# Patient Record
Sex: MALE | Race: ASIAN | NOT HISPANIC OR LATINO | Employment: OTHER | URBAN - METROPOLITAN AREA
[De-identification: names, ages, dates, MRNs, and addresses within clinical notes are randomized per-mention and may not be internally consistent; named-entity substitution may affect disease eponyms.]

---

## 2017-01-03 PROCEDURE — 80197 ASSAY OF TACROLIMUS: CPT | Performed by: TRANSPLANT SURGERY

## 2017-01-05 DIAGNOSIS — Z48.298 AFTERCARE FOLLOWING ORGAN TRANSPLANT: ICD-10-CM

## 2017-01-05 DIAGNOSIS — Z94.0 KIDNEY REPLACED BY TRANSPLANT: Primary | ICD-10-CM

## 2017-01-05 DIAGNOSIS — Z79.899 ENCOUNTER FOR LONG-TERM CURRENT USE OF MEDICATION: ICD-10-CM

## 2017-01-05 LAB
TACROLIMUS BLD-MCNC: 8.3 UG/L (ref 5–15)
TME LAST DOSE: NORMAL H

## 2017-01-09 ENCOUNTER — RESULTS ONLY (OUTPATIENT)
Dept: OTHER | Facility: CLINIC | Age: 52
End: 2017-01-09

## 2017-01-09 PROCEDURE — 86832 HLA CLASS I HIGH DEFIN QUAL: CPT | Performed by: INTERNAL MEDICINE

## 2017-01-09 PROCEDURE — 86833 HLA CLASS II HIGH DEFIN QUAL: CPT | Performed by: INTERNAL MEDICINE

## 2017-01-10 DIAGNOSIS — Z94.0 KIDNEY TRANSPLANTED: Primary | ICD-10-CM

## 2017-01-11 LAB
Lab: NORMAL
Lab: NORMAL

## 2017-01-13 LAB
DONOR IDENTIFICATION: NORMAL
DSA COMMENTS: NORMAL
DSA PRESENT: NO
DSA TEST METHOD: NORMAL
ORGAN: NORMAL
SA1 CELL: NORMAL
SA1 COMMENTS: NORMAL
SA1 HI RISK ABY: NORMAL
SA1 MOD RISK ABY: NORMAL
SA1 TEST METHOD: NORMAL
SA2 CELL: NORMAL
SA2 COMMENTS: NORMAL
SA2 HI RISK ABY UA: NORMAL
SA2 MOD RISK ABY: NORMAL
SA2 TEST METHOD: NORMAL

## 2017-02-06 ENCOUNTER — TRANSFERRED RECORDS (OUTPATIENT)
Dept: HEALTH INFORMATION MANAGEMENT | Facility: CLINIC | Age: 52
End: 2017-02-06

## 2017-03-10 DIAGNOSIS — Z94.0 KIDNEY REPLACED BY TRANSPLANT: ICD-10-CM

## 2017-03-10 DIAGNOSIS — Z79.899 ENCOUNTER FOR LONG-TERM CURRENT USE OF MEDICATION: ICD-10-CM

## 2017-03-10 DIAGNOSIS — Z48.298 AFTERCARE FOLLOWING ORGAN TRANSPLANT: ICD-10-CM

## 2017-03-10 PROCEDURE — 80197 ASSAY OF TACROLIMUS: CPT | Performed by: INTERNAL MEDICINE

## 2017-03-12 LAB
TACROLIMUS BLD-MCNC: 5.9 UG/L (ref 5–15)
TME LAST DOSE: NORMAL H

## 2017-03-14 ENCOUNTER — OFFICE VISIT (OUTPATIENT)
Dept: NEPHROLOGY | Facility: CLINIC | Age: 52
End: 2017-03-14
Attending: INTERNAL MEDICINE
Payer: MEDICARE

## 2017-03-14 VITALS
WEIGHT: 148.4 LBS | DIASTOLIC BLOOD PRESSURE: 73 MMHG | TEMPERATURE: 98.3 F | SYSTOLIC BLOOD PRESSURE: 117 MMHG | HEART RATE: 76 BPM | HEIGHT: 65 IN | BODY MASS INDEX: 24.72 KG/M2

## 2017-03-14 DIAGNOSIS — E83.39 HYPOPHOSPHATEMIA: ICD-10-CM

## 2017-03-14 DIAGNOSIS — Z94.0 KIDNEY REPLACED BY TRANSPLANT: Primary | ICD-10-CM

## 2017-03-14 PROCEDURE — 99212 OFFICE O/P EST SF 10 MIN: CPT | Mod: ZF

## 2017-03-14 ASSESSMENT — PAIN SCALES - GENERAL: PAINLEVEL: NO PAIN (0)

## 2017-03-14 NOTE — LETTER
3/14/2017       RE: Haider Torrez  [address] [facility] [address]     Dear Colleague,    Thank you for referring your patient, Haider Torrez, to the [facility] NEPHROLOGY at [facility]. Please see a copy of my visit note below.    REASON FOR VISIT:  Mr. Torrez is a 55-year-old gentleman here for followup after a  donor kidney transplant on 10/13/2015.       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  He had his first transplant in  (DDKT) which failed in .  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      This transplant was in 10/2015.  PRA at the time of transplant was 99% to class I and 40% to class II. DSA last checked in 2017 was negative.  His current immunosuppression is Myfortic 540 b.i.d., tacrolimus and prednisone 5 (on prednisone at the time of transplant).  MPA level in December was 3.8 and most recent tacrolimus level was 5.9 on 2017.       The patient checks his blood pressures at home with systolics in the mid 110s.  He sees a dermatologist and does not have skin cancer.  He has been treated for vitamin D deficiency.  He had his fistula ligated.       The patient has had a recurrence of depression which he has had previously.  His major symptoms are increased irritability and insomnia.  He started back on clonazepam as well as an antidepressant of unknown name.  He just began medication last month and so far has not noticed any improvement in symptoms.       SOCIAL HISTORY:  He lives with his wife.  He does not smoke or drink alcohol.  He exercises twice weekly on a treadmill for 30 minutes.  He is unemployed.  He was formerly employed at [employer] which moved to [facility].  His actual is age 4 years older than stated because his brother filled out his paperwork when he arrived here in .  He babysits for his  18-month-old grandchild regularly.       PAST MEDICAL HISTORY:   1.  End-stage renal disease secondary to IgA nephropathy.   2.  History of PRES in 2011 after thyroidectomy.   3.  Thyroid cancer (papillary type) in 2011, resection.   4.  History of seizure disorder, now off medication.   5.  Depression, recurrent, on meds.   6.  Insomnia.       CURRENT MEDICATIONS:  See below.      PHYSICAL EXAMINATION:   VITAL SIGNS:  Blood pressure 112-118/73 (3 measurements), weight 148, BMI 24.7.   HEENT:  Exam is not remarkable.   LUNGS:  Clear bilaterally.   CARDIAC:  Regular sinus rhythm.  No murmurs, rubs or gallops.  He has a scar from a ligated fistula.   ABDOMEN:  No abdominal bruits over the allograft.  Nontender.    LOWER EXTREMITIES:  No peripheral edema.   SKIN:  No concerning lesions.       LABORATORY TESTS:  Creatinine 1.09.  DSA negative in 01/2017.  Tacrolimus level 5.9 on 03/07.     Electrolytes/Renal -   Recent Labs   Lab Test  11/17/15   1139  10/30/15   0718  10/26/15   0707  10/25/15   0750   NA   --   141  139  139   POTASSIUM  4.0  5.0  4.9  4.8   CHLORIDE   --   114*  110*  111*   CO2   --   24  24  24   BUN   --   31*  28  26   CR   --   1.53*  1.35*  1.45*   GLC   --   105*  96  88   CRISTIAN   --   8.5  8.7  8.3*   MAG   --   1.7  1.6  1.3*   PHOS   --   2.7  2.2*  2.6       CBC -   Recent Labs   Lab Test  11/17/15   1139  10/30/15   0718  10/26/15   0707  10/25/15   0750   WBC   --   4.7  7.3  7.6   HGB  12.8*  11.9*  11.9*  11.8*   PLT   --   282  267  269       LFTs -   Recent Labs   Lab Test  10/12/15   1330  06/18/15   0729   ALKPHOS  81  69   BILITOTAL  0.4  0.6   ALT  16  25   AST  10  28   PROTTOTAL  8.4  7.4   ALBUMIN  4.2  3.8       Coags -   Recent Labs   Lab Test  11/17/15   1139  10/30/15   0718  10/26/15   0707   10/23/15   0652  10/21/15   0648   INR  2.13*  1.75*  1.55*   < >  1.09  1.04   PTT   --   38*   --    --   32  31    < > = values in this interval not displayed.       Iron Panel -    Recent Labs   Lab Test  10/17/15   0700   IRON  25*   IRONSAT  12*   OFE  883*       Endocrine -   Recent Labs   Lab Test  10/12/15   1330   A1C  5.1         IMPRESSION:   1.  Allograft function.  Good.    2.  Immunosuppression.  I would consider adjusting the CellCept dose downward in the future if the level remains high.  So far, he does not have any skin cancer and does have occasional diarrhea but is not really bothered by it.   3.  Hyperparathyroidism.  PTH was as high as 158.  He just had a course of vitamin D.  We will need to repeat that.   4.  Depression.  Recommend to increase exercise to help with his depressive symptoms.  PCP has added several medications but he does not think he is improving.  Not suicidal.      PLAN:   1.  No change in immunosuppression for now.   2.  Increase exercise.     3.  Return to clinic in 6-8 months.    4.  Target tacrolimus 4-6.         EDGARDO MULLER MD      Current Outpatient Prescriptions   Medication Sig Dispense Refill     FLUOXETINE HCL PO Take 10 mg by mouth daily       ROPINIRole HCl (REQUIP PO) Take 0.5 mg by mouth At Bedtime 3 tabs       CLONAZEPAM PO Take 0.5 mg by mouth At Bedtime        sulfamethoxazole-trimethoprim (BACTRIM/SEPTRA) 400-80 MG per tablet Take 1 tablet by mouth daily 30 tablet 11     MYFORTIC 180 MG PO EC TABLET Take 3 tablets (540 mg) by mouth 2 times daily 180 tablet 11     cholecalciferol (VITAMIN  -D) 1000 UNITS capsule Take 2 capsules (2,000 Units) by mouth daily 60 capsule 11     tacrolimus (PROGRAF - GENERIC EQUIVALENT) 1 MG capsule Take 3 tablets (1 mg) along with 1 tablet (0.5 mg) twice daily.  Total dose = 3.5 mg BID. 180 capsule 11     tacrolimus (PROGRAF - GENERIC EQUIVALENT) 0.5 MG capsule Take 1 tablet (0.5 mg) along with 3 tablets (1 mg) twice daily.  Total dose = 3.5 mg BID 60 capsule 11     pantoprazole (PROTONIX) 40 MG enteric coated tablet Take 1 tablet (40 mg) by mouth daily Take 30-60 minutes before a meal. 30 tablet 0      FOLIC ACID PO Take 1 mg by mouth daily       LEVOTHYROXINE SODIUM PO Take 112 mcg by mouth daily        PREDNISONE PO Take 5 mg by mouth daily                Again, thank you for allowing me to participate in the care of your patient.      Sincerely,    Sandra Randall MD       D: 2017 15:53   T: 03/15/2017 07:29   MT: DEENA      Name:     MARTELL MATTSON   MRN:      [id number]        Account:      [id number]   :      1965           Service Date: 2017      Document: [id number]

## 2017-03-14 NOTE — NURSING NOTE
"Chief Complaint   Patient presents with     RECHECK     6 mnth follow up       Initial /73  Pulse 76  Temp 98.3  F (36.8  C) (Oral)  Ht 1.651 m (5' 5\")  Wt 67.3 kg (148 lb 6.4 oz)  BMI 24.7 kg/m2 Estimated body mass index is 24.7 kg/(m^2) as calculated from the following:    Height as of this encounter: 1.651 m (5' 5\").    Weight as of this encounter: 67.3 kg (148 lb 6.4 oz).  Medication Reconciliation: complete  "

## 2017-03-14 NOTE — MR AVS SNAPSHOT
After Visit Summary   3/14/2017    Haider Torrez    MRN: [id number]           Patient Information     Date Of Birth          1965        Visit Information        Provider Department      3/14/2017 2:45 PM Sandra Randall MD [facility] Nephrology        Today's Diagnoses     Kidney replaced by transplant    -  1    Hypophosphatemia          Care Instructions    1.  No change in meds  2.  Target tac 4 - 6  3.  Exercise very day.  Treadmill for 10 - 15 minutes for now         Follow-ups after your visit        Follow-up notes from your care team     Return in about 6 months (around 9/14/2017).      Your next 10 appointments already scheduled     Sep 26, 2017  3:00 PM CDT   Lab with  LAB   [facility] ([facility])    [address] [place] [id number] [phone]            Sep 26, 2017  4:00 PM CDT   (Arrive by 3:30 PM)   Return Kidney Transplant with Sandra Randall MD   [facility] Nephrology ([facility])    [address] [place] [id number] [phone]              Who to contact     If you have questions or need follow up information about today's clinic visit or your schedule please contact [facility] NEPHROLOGY directly at [phone].  Normal or non-critical lab and imaging results will be communicated to you by MyChart, letter or phone within 4 business days after the clinic has received the results. If you do not hear from us within 7 days, please contact the clinic through MyChart or phone. If you have a critical or abnormal lab result, we will notify you by phone as soon as possible.  Submit refill requests through [employer] or call your pharmacy and they will forward the refill request to us. Please allow 3 business days for your refill to be completed.          Additional Information About Your Visit        [employer] Information     [employer] gives you secure access to your electronic  "health record. If you see a primary care provider, you can also send messages to your care team and make appointments. If you have questions, please call your primary care clinic.  If you do not have a primary care provider, please call [phone] and they will assist you.        Care EveryWhere ID     This is your Care EveryWhere ID. This could be used by other organizations to access your [facility] medical records  [id number]        Your Vitals Were     Pulse Temperature Height BMI (Body Mass Index)          76 98.3  F (36.8  C) (Oral) 1.651 m (5' 5\") 24.7 kg/m2         Blood Pressure from Last 3 Encounters:   No data found for BP    Weight from Last 3 Encounters:   No data found for Wt              Today, you had the following     No orders found for display       Primary Care Provider    None Specified       No primary provider on file.        Thank you!     Thank you for choosing [facility] NEPHROLOGY  for your care. Our goal is always to provide you with excellent care. Hearing back from our patients is one way we can continue to improve our services. Please take a few minutes to complete the written survey that you may receive in the mail after your visit with us. Thank you!             Your Updated Medication List - Protect others around you: Learn how to safely use, store and throw away your medicines at www.disposemymeds.org.          This list is accurate as of: 3/14/17 11:59 PM.  Always use your most recent med list.                   Brand Name Dispense Instructions for use    cholecalciferol 1000 UNITS capsule    vitamin  -D    60 capsule    Take 2 capsules (2,000 Units) by mouth daily       CLONAZEPAM PO      Take 0.5 mg by mouth At Bedtime       FLUOXETINE HCL PO      Take 10 mg by mouth daily       FOLIC ACID PO      Take 1 mg by mouth daily       LEVOTHYROXINE SODIUM PO      Take 112 mcg by mouth daily       mycophenolic acid EC tablet     180 tablet    Take 3 tablets (540 mg) by mouth 2 times " daily       pantoprazole 40 MG EC tablet    PROTONIX    30 tablet    Take 1 tablet (40 mg) by mouth daily Take 30-60 minutes before a meal.       PREDNISONE PO      Take 5 mg by mouth daily       REQUIP PO      Take 0.5 mg by mouth At Bedtime 3 tabs       sulfamethoxazole-trimethoprim 400-80 MG per tablet    BACTRIM/SEPTRA    30 tablet    Take 1 tablet by mouth daily       * tacrolimus 1 MG capsule    PROGRAF - GENERIC EQUIVALENT    180 capsule    Take 3 tablets (1 mg) along with 1 tablet (0.5 mg) twice daily.  Total dose = 3.5 mg BID.       * tacrolimus 0.5 MG capsule    PROGRAF - GENERIC EQUIVALENT    60 capsule    Take 1 tablet (0.5 mg) along with 3 tablets (1 mg) twice daily.  Total dose = 3.5 mg BID       * Notice:  This list has 2 medication(s) that are the same as other medications prescribed for you. Read the directions carefully, and ask your doctor or other care provider to review them with you.

## 2017-03-15 ENCOUNTER — TELEPHONE (OUTPATIENT)
Dept: NEPHROLOGY | Facility: CLINIC | Age: 52
End: 2017-03-15

## 2017-03-15 NOTE — PROGRESS NOTES
REASON FOR VISIT:  Mr. Torrez is a 55-year-old gentleman here for followup after a  donor kidney transplant on 10/13/2015.       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  He had his first transplant in  (DDKT) which failed in .  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      This transplant was in 10/2015.  PRA at the time of transplant was 99% to class I and 40% to class II. DSA last checked in 2017 was negative.  His current immunosuppression is Myfortic 540 b.i.d., tacrolimus and prednisone 5 (on prednisone at the time of transplant).  MPA level in December was 3.8 and most recent tacrolimus level was 5.9 on 2017.       The patient checks his blood pressures at home with systolics in the mid 110s.  He sees a dermatologist and does not have skin cancer.  He has been treated for vitamin D deficiency.  He had his fistula ligated.       The patient has had a recurrence of depression which he has had previously.  His major symptoms are increased irritability and insomnia.  He started back on clonazepam as well as an antidepressant of unknown name.  He just began medication last month and so far has not noticed any improvement in symptoms.       SOCIAL HISTORY:  He lives with his wife.  He does not smoke or drink alcohol.  He exercises twice weekly on a treadmill for 30 minutes.  He is unemployed.  He was formerly employed at [employer] which moved to [facility].  His actual is age 4 years older than stated because his brother filled out his paperwork when he arrived here in .  He babysits for his 18-month-old grandchild regularly.       PAST MEDICAL HISTORY:   1.  End-stage renal disease secondary to IgA nephropathy.   2.  History of PRES in  after thyroidectomy.   3.  Thyroid cancer (papillary type) in , resection.   4.  History of seizure disorder, now off medication.    5.  Depression, recurrent, on meds.   6.  Insomnia.       CURRENT MEDICATIONS:  See below.      PHYSICAL EXAMINATION:   VITAL SIGNS:  Blood pressure 112-118/73 (3 measurements), weight 148, BMI 24.7.   HEENT:  Exam is not remarkable.   LUNGS:  Clear bilaterally.   CARDIAC:  Regular sinus rhythm.  No murmurs, rubs or gallops.  He has a scar from a ligated fistula.   ABDOMEN:  No abdominal bruits over the allograft.  Nontender.    LOWER EXTREMITIES:  No peripheral edema.   SKIN:  No concerning lesions.       LABORATORY TESTS:  Creatinine 1.09.  DSA negative in 01/2017.  Tacrolimus level 5.9 on 03/07.     Electrolytes/Renal -   Recent Labs   Lab Test  11/17/15   1139  10/30/15   0718  10/26/15   0707  10/25/15   0750   NA   --   141  139  139   POTASSIUM  4.0  5.0  4.9  4.8   CHLORIDE   --   114*  110*  111*   CO2   --   24  24  24   BUN   --   31*  28  26   CR   --   1.53*  1.35*  1.45*   GLC   --   105*  96  88   CRISTIAN   --   8.5  8.7  8.3*   MAG   --   1.7  1.6  1.3*   PHOS   --   2.7  2.2*  2.6       CBC -   Recent Labs   Lab Test  11/17/15   1139  10/30/15   0718  10/26/15   0707  10/25/15   0750   WBC   --   4.7  7.3  7.6   HGB  12.8*  11.9*  11.9*  11.8*   PLT   --   282  267  269       LFTs -   Recent Labs   Lab Test  10/12/15   1330  06/18/15   0729   ALKPHOS  81  69   BILITOTAL  0.4  0.6   ALT  16  25   AST  10  28   PROTTOTAL  8.4  7.4   ALBUMIN  4.2  3.8       Coags -   Recent Labs   Lab Test  11/17/15   1139  10/30/15   0718  10/26/15   0707   10/23/15   0652  10/21/15   0648   INR  2.13*  1.75*  1.55*   < >  1.09  1.04   PTT   --   38*   --    --   32  31    < > = values in this interval not displayed.       Iron Panel -   Recent Labs   Lab Test  10/17/15   0700   IRON  25*   IRONSAT  12*   OFE  883*       Endocrine -   Recent Labs   Lab Test  10/12/15   1330   A1C  5.1         IMPRESSION:   1.  Allograft function.  Good.    2.  Immunosuppression.  I would consider adjusting the CellCept dose downward in  the future if the level remains high.  So far, he does not have any skin cancer and does have occasional diarrhea but is not really bothered by it.   3.  Hyperparathyroidism.  PTH was as high as 158.  He just had a course of vitamin D.  We will need to repeat that.   4.  Depression.  Recommend to increase exercise to help with his depressive symptoms.  PCP has added several medications but he does not think he is improving.  Not suicidal.      PLAN:   1.  No change in immunosuppression for now.   2.  Increase exercise.     3.  Return to clinic in 6-8 months.    4.  Target tacrolimus 4-6.         EDGARDO MULLER MD      Current Outpatient Prescriptions   Medication Sig Dispense Refill     FLUOXETINE HCL PO Take 10 mg by mouth daily       ROPINIRole HCl (REQUIP PO) Take 0.5 mg by mouth At Bedtime 3 tabs       CLONAZEPAM PO Take 0.5 mg by mouth At Bedtime        sulfamethoxazole-trimethoprim (BACTRIM/SEPTRA) 400-80 MG per tablet Take 1 tablet by mouth daily 30 tablet 11     MYFORTIC 180 MG PO EC TABLET Take 3 tablets (540 mg) by mouth 2 times daily 180 tablet 11     cholecalciferol (VITAMIN  -D) 1000 UNITS capsule Take 2 capsules (2,000 Units) by mouth daily 60 capsule 11     tacrolimus (PROGRAF - GENERIC EQUIVALENT) 1 MG capsule Take 3 tablets (1 mg) along with 1 tablet (0.5 mg) twice daily.  Total dose = 3.5 mg BID. 180 capsule 11     tacrolimus (PROGRAF - GENERIC EQUIVALENT) 0.5 MG capsule Take 1 tablet (0.5 mg) along with 3 tablets (1 mg) twice daily.  Total dose = 3.5 mg BID 60 capsule 11     pantoprazole (PROTONIX) 40 MG enteric coated tablet Take 1 tablet (40 mg) by mouth daily Take 30-60 minutes before a meal. 30 tablet 0     FOLIC ACID PO Take 1 mg by mouth daily       LEVOTHYROXINE SODIUM PO Take 112 mcg by mouth daily        PREDNISONE PO Take 5 mg by mouth daily                D: 03/14/2017 15:53   T: 03/15/2017 07:29   MT: DEENA      Name:     MARTELL MATTSON   MRN:      [id number]        Account:       [id number]   :      1965           Service Date: 2017      Document: [id number]

## 2017-03-15 NOTE — TELEPHONE ENCOUNTER
Call to patient and spoke to wife to go over medications. List has been updated. Will pass along to Dr. Randall as well.  Rissa Frazier LPN  Nephrology  [facility]  [phone]

## 2017-05-01 ENCOUNTER — TELEPHONE (OUTPATIENT)
Dept: TRANSPLANT | Facility: CLINIC | Age: 52
End: 2017-05-01

## 2017-05-01 ENCOUNTER — TRANSFERRED RECORDS (OUTPATIENT)
Dept: HEALTH INFORMATION MANAGEMENT | Facility: CLINIC | Age: 52
End: 2017-05-01

## 2017-06-08 ENCOUNTER — DOCUMENTATION ONLY (OUTPATIENT)
Dept: TRANSPLANT | Facility: CLINIC | Age: 52
End: 2017-06-08

## 2017-06-08 NOTE — PROGRESS NOTES
Sandra Randall MD Huepfel, Mary K, RN                   Looks fine            Previous Messages       ----- Message -----      From: Magda Jacobs RN      Sent: 5/9/2017   2:15 PM        To: Sandra Randall MD   Subject: Please look at his 24 hour urine protein - P*     Haider Torrez local nephrologist ordered a 24 hour collection of urine protein     I think it looks ok

## 2017-06-16 NOTE — TELEPHONE ENCOUNTER
----- Message from Sandra Randall MD sent at 5/15/2017  2:08 PM CDT -----  Regarding: RE: Please look at his 24 hour urine protein - Please look at image too   Looks fine  ----- Message -----     From: Magda Jacobs RN     Sent: 5/9/2017   2:15 PM       To: Sandra Randall MD  Subject: Please look at his 24 hour urine protein - P#    Haider Isaac Shan local nephrologist ordered a 24 hour collection of urine protein     I think it looks ok     Can you take an extra look at his lab images

## 2017-09-05 DIAGNOSIS — Z94.0 KIDNEY REPLACED BY TRANSPLANT: ICD-10-CM

## 2017-09-05 RX ORDER — TACROLIMUS 1 MG/1
3 CAPSULE ORAL 2 TIMES DAILY
Qty: 180 CAPSULE | Refills: 3 | Status: SHIPPED | OUTPATIENT
Start: 2017-09-05 | End: 2018-02-09

## 2017-09-05 RX ORDER — MYCOPHENOLIC ACID 180 MG/1
540 TABLET, DELAYED RELEASE ORAL 2 TIMES DAILY
Qty: 180 TABLET | Refills: 3 | Status: SHIPPED | OUTPATIENT
Start: 2017-09-05 | End: 2018-03-30

## 2017-09-05 RX ORDER — TACROLIMUS 0.5 MG/1
0.5 CAPSULE ORAL 2 TIMES DAILY
Qty: 60 CAPSULE | Refills: 3 | Status: SHIPPED | OUTPATIENT
Start: 2017-09-05 | End: 2018-02-09

## 2017-09-25 ENCOUNTER — TELEPHONE (OUTPATIENT)
Dept: TRANSPLANT | Facility: CLINIC | Age: 52
End: 2017-09-25

## 2017-09-25 NOTE — TELEPHONE ENCOUNTER
Issue no labs in EPIC system =   Please call Haider Torrez confirm obtaining labs monthly   He may have been obtained locally with nephrologist

## 2017-09-25 NOTE — TELEPHONE ENCOUNTER
"Patient states did labs \"a few weeks ago\" at [facility], sent request to admin staff to call and get those results faxed so can be reviewed with Dr Randall at appointment tomorrow. Patient believes he only needs his transplant lab every 6 months, explained a few labs are every 6 months but his regular standing transplant labs and prograf level are monthly. Patient will discuss with Dr Randall tomorrow as well.  "

## 2017-09-25 NOTE — TELEPHONE ENCOUNTER
----- Message from Magda Jacobs RN sent at 8/27/2017  8:25 AM CDT -----  Regarding: FW: Non compliance follow up       ----- Message -----     From: Magda Jacobs RN     Sent: 8/22/2017       To: Magda Jacobs RN  Subject: FW: Non compliance follow up                         ----- Message -----     From: Magda Jacobs RN     Sent: 6/20/2017       To: Magda Jacobs RN  Subject: Non compliance follow up

## 2017-09-26 ENCOUNTER — RESULTS ONLY (OUTPATIENT)
Dept: OTHER | Facility: CLINIC | Age: 52
End: 2017-09-26

## 2017-09-26 ENCOUNTER — OFFICE VISIT (OUTPATIENT)
Dept: NEPHROLOGY | Facility: CLINIC | Age: 52
End: 2017-09-26
Attending: INTERNAL MEDICINE
Payer: MEDICARE

## 2017-09-26 VITALS
BODY MASS INDEX: 25.54 KG/M2 | DIASTOLIC BLOOD PRESSURE: 69 MMHG | HEART RATE: 91 BPM | WEIGHT: 153.3 LBS | HEIGHT: 65 IN | SYSTOLIC BLOOD PRESSURE: 111 MMHG | TEMPERATURE: 98.4 F

## 2017-09-26 DIAGNOSIS — Z79.899 ENCOUNTER FOR LONG-TERM CURRENT USE OF MEDICATION: ICD-10-CM

## 2017-09-26 DIAGNOSIS — Z94.0 KIDNEY TRANSPLANTED: ICD-10-CM

## 2017-09-26 DIAGNOSIS — Z94.0 KIDNEY REPLACED BY TRANSPLANT: ICD-10-CM

## 2017-09-26 DIAGNOSIS — Z94.0 KIDNEY REPLACED BY TRANSPLANT: Primary | ICD-10-CM

## 2017-09-26 DIAGNOSIS — Z48.298 AFTERCARE FOLLOWING ORGAN TRANSPLANT: ICD-10-CM

## 2017-09-26 LAB
ANION GAP SERPL CALCULATED.3IONS-SCNC: 8 MMOL/L (ref 3–14)
BUN SERPL-MCNC: 20 MG/DL (ref 7–30)
CALCIUM SERPL-MCNC: 8.8 MG/DL (ref 8.5–10.1)
CHLORIDE SERPL-SCNC: 109 MMOL/L (ref 94–109)
CO2 SERPL-SCNC: 24 MMOL/L (ref 20–32)
CREAT SERPL-MCNC: 1.11 MG/DL (ref 0.66–1.25)
CREAT UR-MCNC: 82 MG/DL
ERYTHROCYTE [DISTWIDTH] IN BLOOD BY AUTOMATED COUNT: 12.7 % (ref 10–15)
GFR SERPL CREATININE-BSD FRML MDRD: 70 ML/MIN/1.7M2
GLUCOSE SERPL-MCNC: 138 MG/DL (ref 70–99)
HCT VFR BLD AUTO: 43.4 % (ref 40–53)
HGB BLD-MCNC: 14.1 G/DL (ref 13.3–17.7)
MCH RBC QN AUTO: 30.1 PG (ref 26.5–33)
MCHC RBC AUTO-ENTMCNC: 32.5 G/DL (ref 31.5–36.5)
MCV RBC AUTO: 93 FL (ref 78–100)
PLATELET # BLD AUTO: 147 10E9/L (ref 150–450)
POTASSIUM SERPL-SCNC: 4.6 MMOL/L (ref 3.4–5.3)
PROT UR-MCNC: 0.08 G/L
PROT/CREAT 24H UR: 0.1 G/G CR (ref 0–0.2)
RBC # BLD AUTO: 4.69 10E12/L (ref 4.4–5.9)
SODIUM SERPL-SCNC: 141 MMOL/L (ref 133–144)
TACROLIMUS BLD-MCNC: 6.1 UG/L (ref 5–15)
TME LAST DOSE: NORMAL H
WBC # BLD AUTO: 5.7 10E9/L (ref 4–11)

## 2017-09-26 PROCEDURE — 36415 COLL VENOUS BLD VENIPUNCTURE: CPT | Performed by: INTERNAL MEDICINE

## 2017-09-26 PROCEDURE — 85027 COMPLETE CBC AUTOMATED: CPT | Performed by: INTERNAL MEDICINE

## 2017-09-26 PROCEDURE — 99212 OFFICE O/P EST SF 10 MIN: CPT | Mod: ZF

## 2017-09-26 PROCEDURE — 86833 HLA CLASS II HIGH DEFIN QUAL: CPT | Performed by: INTERNAL MEDICINE

## 2017-09-26 PROCEDURE — 86832 HLA CLASS I HIGH DEFIN QUAL: CPT | Performed by: INTERNAL MEDICINE

## 2017-09-26 PROCEDURE — 80048 BASIC METABOLIC PNL TOTAL CA: CPT | Performed by: INTERNAL MEDICINE

## 2017-09-26 PROCEDURE — 80197 ASSAY OF TACROLIMUS: CPT | Performed by: INTERNAL MEDICINE

## 2017-09-26 PROCEDURE — 84156 ASSAY OF PROTEIN URINE: CPT | Performed by: INTERNAL MEDICINE

## 2017-09-26 ASSESSMENT — PAIN SCALES - GENERAL: PAINLEVEL: NO PAIN (0)

## 2017-09-26 NOTE — PATIENT INSTRUCTIONS
1.  No change in immunosuppression  2.  Try to get exercise every day  3.  Ask surgeon if fistula might be causing numbness in hand  4.  Ask primary doc if you need to see neurologist about your hand weakness  4.  Sita will call about when to get labs

## 2017-09-26 NOTE — NURSING NOTE
"Chief Complaint   Patient presents with     RECHECK     follow up kidney transplant       Initial /69  Pulse 91  Temp 98.4  F (36.9  C) (Oral)  Ht 1.651 m (5' 5\")  Wt 69.5 kg (153 lb 4.8 oz)  BMI 25.51 kg/m2 Estimated body mass index is 25.51 kg/(m^2) as calculated from the following:    Height as of this encounter: 1.651 m (5' 5\").    Weight as of this encounter: 69.5 kg (153 lb 4.8 oz).  Medication Reconciliation: complete  "

## 2017-09-26 NOTE — LETTER
2017       RE: Haider Torrez  [address] [facility] [address]     Dear Colleague,    Thank you for referring your patient, Haider Torrez, to the [facility] NEPHROLOGY at [facility]. Please see a copy of my visit note below.    REASON FOR VISIT:  Mr. Torrez is a 56-year-old gentleman here for followup after a  donor kidney transplant in 10/2015.  Chart age is 52, but he states that his actual age is 5 years over that due to the way the paperwork was filled out when [place] this [place].       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  He received a transplant in  (DDKT) which failed in  in the setting of thyroid storm and multiorgan system failure.  He was on dialysis for 4 years and had a second transplant on 10/13/2015.  PRA at the time of transplant was 99% class I, 40% class II.  DSA when last checked in 2017 was negative.  Current immunosuppression is Myfortic 540 b.i.d., tacrolimus (target 4-6) and prednisone 5.  MPA level has been on the high side with some thought to decreasing it if he develops skin problems.  However, currently he has no diarrhea, no cytopenias, and no skin cancer.       Blood pressure at home is in the 110-120 range.      REVIEW OF SYSTEMS:  Comprehensive review of systems is completed and negative.  His depression is well managed on medications.       SOCIAL HISTORY:  He lives with his wife.  He exercises more regularly in the summer.  He rides a bike up to 5 times per week.  He uses an elliptical twice a week in the colder months.  He is a former employee of [employer] which has now moved to [facility]  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  As mentioned above, his paperwork when he arrived here shows an age 4 years younger than he actually is.      PAST MEDICAL HISTORY:   1.  End-stage renal disease secondary to IgA nephropathy.   2.  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    3.   Papillary thyroid cancer in , status post resection.   4.  Seizure disorder (also associated with thyroid storm), now off meds.   5.  History of depression, now controlled with medication.   6.  Insomnia for which he takes clonazepam.      MEDICATIONS:  See below.      PHYSICAL EXAMINATION:   VITAL SIGNS:  Blood pressure 111/69, weight 153, BMI 25.   GENERAL:  He is well-developed, well-nourished.   HEENT:  Exam is not remarkable.   LUNGS:  Clear.   CARDIAC:  Regular sinus rhythm.  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   ABDOMEN:  No bruit over the allograft.   EXTREMITIES:  No peripheral edema.      LABORATORY TESTS:  Creatinine 1.1 (baseline).  Protein/creatinine 1 gram/gram.  Glucose elevated, but recent A1c is 5.5.  HDL 33, triglycerides 364.       IMPRESSION:   1.  Allograft function.  Good.    2.  Immunosuppression.  He is on 3 drugs because of the high PRA.  We will continue to monitor DSA and consider dropping the CellCept dose since his levels have been on the high side.    3.  Depression, controlled.   4.  Insulin resistance.  He has some mild high glucoses, low HDL and high triglycerides.  I encouraged him to keep exercising.  So far, he is not diabetic based on the A1c.      PLAN:   1.  Monitor DSA.   2.  Consider decreasing CellCept dose at the next clinic visit.   3.  The patient was encouraged to exercise.         EDGARDO MULLER MD             D: 2017 12:02   T: 2017 12:32   MT: DEENA      Name:     MARTELL MATTSON   MRN:      [id number]        Account:      [id number]   :      1965           Service Date: 2017      Document: [id number]

## 2017-09-26 NOTE — MR AVS SNAPSHOT
After Visit Summary   9/26/2017    Haider Torrez    MRN: [id number]           Patient Information     Date Of Birth          1965        Visit Information        Provider Department      9/26/2017 4:00 PM Sandra Randall MD [facility]        Today's Diagnoses     Kidney replaced by transplant    -  1      Care Instructions    1.  No change in immunosuppression  2.  Try to get exercise every day  3.  Ask surgeon if fistula might be causing numbness in hand  4.  Ask primary doc if you need to see neurologist about your hand weakness  4.  Sita will call about when to get labs          Follow-ups after your visit        Follow-up notes from your care team     Return in about 6 months (around 3/26/2018).      Your next 10 appointments already scheduled     Mar 27, 2018  3:30 PM CDT   Lab with  LAB   [facility] ([facility])    [address] [place] [id number] [phone]            Mar 27, 2018  4:25 PM CDT   (Arrive by 3:55 PM)   Return Kidney Transplant with Sandra Randall MD   [facility] ([facility])    [address] [place] [id number] [phone]              Who to contact     If you have questions or need follow up information about today's clinic visit or your schedule please contact [facility] directly at [phone].  Normal or non-critical lab and imaging results will be communicated to you by MyChart, letter or phone within 4 business days after the clinic has received the results. If you do not hear from us within 7 days, please contact the clinic through MyChart or phone. If you have a critical or abnormal lab result, we will notify you by phone as soon as possible.  Submit refill requests through [employer] or call your pharmacy and they will forward the refill request to us. Please allow 3 business days for your refill to be completed.           "Additional Information About Your Visit        MyChart Information     [employer] gives you secure access to your electronic health record. If you see a primary care provider, you can also send messages to your care team and make appointments. If you have questions, please call your primary care clinic.  If you do not have a primary care provider, please call [phone] and they will assist you.        Care EveryWhere ID     This is your Care EveryWhere ID. This could be used by other organizations to access your [place] medical records  [id number]        Your Vitals Were     Pulse Temperature Height BMI (Body Mass Index)          91 98.4  F (36.9  C) (Oral) 1.651 m (5' 5\") 25.51 kg/m2         Blood Pressure from Last 3 Encounters:   09/26/17 111/69   03/14/17 117/73   09/13/16 120/71    Weight from Last 3 Encounters:   09/26/17 69.5 kg (153 lb 4.8 oz)   03/14/17 67.3 kg (148 lb 6.4 oz)   09/13/16 66.3 kg (146 lb 3.2 oz)              Today, you had the following     No orders found for display       Primary Care Provider    None Specified       No primary provider on file.        Equal Access to Services     [facility] : Hadii linda Menendez, warayrayda suzie, bobbita kaalmada thor, tatiana de leon . So [facility] [phone].    ATENCIÓN: Si habla español, tiene a santiago disposición servicios gratuitos de asistencia lingüística. Llame al [phone].    We comply with applicable federal civil rights laws and [place] laws. We do not discriminate on the basis of race, color, national origin, age, disability sex, sexual orientation or gender identity.            Thank you!     Thank you for choosing [facility] NEPHR[facility]  for your care. Our goal is always to provide you with excellent care. Hearing back from our patients is one way we can continue to improve our services. Please take a few minutes to complete the written survey that you may receive in the mail after your visit with us. " Thank you!             Your Updated Medication List - Protect others around you: Learn how to safely use, store and throw away your medicines at www.disposemymeds.org.          This list is accurate as of: 9/26/17  5:16 PM.  Always use your most recent med list.                   Brand Name Dispense Instructions for use Diagnosis    cholecalciferol 1000 UNITS capsule    vitamin  -D    60 capsule    Take 2 capsules (2,000 Units) by mouth daily    Vitamin D deficiency       CLONAZEPAM PO      Take 0.5 mg by mouth At Bedtime        FLUOXETINE HCL PO      Take 10 mg by mouth daily        FOLIC ACID PO      Take 1 mg by mouth daily        LEVOTHYROXINE SODIUM PO      Take 112 mcg by mouth daily        mycophenolic acid EC tablet     180 tablet    Take 3 tablets (540 mg) by mouth 2 times daily    Kidney replaced by transplant       pantoprazole 40 MG EC tablet    PROTONIX    30 tablet    Take 1 tablet (40 mg) by mouth daily Take 30-60 minutes before a meal.    S/P kidney transplant       PREDNISONE PO      Take 5 mg by mouth daily        REQUIP PO      Take 0.5 mg by mouth At Bedtime 3 tabs        sulfamethoxazole-trimethoprim 400-80 MG per tablet    BACTRIM/SEPTRA    30 tablet    Take 1 tablet by mouth daily    Kidney replaced by transplant       * tacrolimus 1 MG capsule    GENERIC EQUIVALENT    180 capsule    Take 3 capsules (3 mg) by mouth 2 times daily 3.5 mg twice per day.    Kidney replaced by transplant       * tacrolimus 0.5 MG capsule    GENERIC EQUIVALENT    60 capsule    Take 1 capsule (0.5 mg) by mouth 2 times daily 3.5 mg twice per day.    Kidney replaced by transplant       * Notice:  This list has 2 medication(s) that are the same as other medications prescribed for you. Read the directions carefully, and ask your doctor or other care provider to review them with you.

## 2017-09-27 LAB — PRA DONOR SPECIFIC ABY: NORMAL

## 2017-09-28 NOTE — PROGRESS NOTES
REASON FOR VISIT:  Mr. Torrez is a 56-year-old gentleman here for followup after a  donor kidney transplant in 10/2015.  Chart age is 52, but he states that his actual age is 5 years over that due to the way the paperwork was filled out when he came to this country.       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  He received a transplant in  (DDKT) which failed in  in the setting of thyroid storm and multiorgan system failure.  He was on dialysis for 4 years and had a second transplant on 10/13/2015.  PRA at the time of transplant was 99% class I, 40% class II.  DSA when last checked in 2017 was negative.  Current immunosuppression is Myfortic 540 b.i.d., tacrolimus (target 4-6) and prednisone 5.  MPA level has been on the high side with some thought to decreasing it if he develops skin problems.  However, currently he has no diarrhea, no cytopenias, and no skin cancer.       Blood pressure at home is in the 110-120 range. BL Cr 1.1.     REVIEW OF SYSTEMS:  Comprehensive review of systems is completed and negative.  His depression is well managed on medications.       SOCIAL HISTORY:  He lives with his wife.  He exercises more regularly in the summer.  He rides a bike up to 5 times per week.  He uses an elliptical twice a week in the colder months.  He is a former employee of [employer] which has now moved to [facility].  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  As mentioned above, his paperwork when he arrived here shows an age 4 years younger than he actually is.      PAST MEDICAL HISTORY:   1.  End-stage renal disease secondary to IgA nephropathy.   2.  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    3.  Papillary thyroid cancer in , status post resection.   4.  Seizure disorder (also associated with thyroid storm), now off meds.   5.  History of depression, now controlled with medication.   6.  Insomnia for which he takes clonazepam.      MEDICATIONS:  See below.       PHYSICAL EXAMINATION:   VITAL SIGNS:  Blood pressure 111/69, weight 153, BMI 25.   GENERAL:  He is well-developed, well-nourished.   HEENT:  Exam is not remarkable.   LUNGS:  Clear.   CARDIAC:  Regular sinus rhythm.  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   ABDOMEN:  No bruit over the allograft.   EXTREMITIES:  No peripheral edema.      LABORATORY TESTS:  Creatinine 1.1 (baseline).  Protein/creatinine 1 gram/gram.  Glucose elevated, but recent A1c is 5.5.  HDL 33, triglycerides 364.    Electrolytes/Renal -   Recent Labs   Lab Test  09/26/17   1507  11/17/15   1139  10/30/15   0718  10/26/15   0707  10/25/15   0750   NA  141   --   141  139  139   POTASSIUM  4.6  4.0  5.0  4.9  4.8   CHLORIDE  109   --   114*  110*  111*   CO2  24   --   24  24  24   BUN  20   --   31*  28  26   CR  1.11   --   1.53*  1.35*  1.45*   GLC  138*   --   105*  96  88   CRISTIAN  8.8   --   8.5  8.7  8.3*   MAG   --    --   1.7  1.6  1.3*   PHOS   --    --   2.7  2.2*  2.6       CBC -   Recent Labs   Lab Test  09/26/17   1507  11/17/15   1139  10/30/15   0718  10/26/15   0707   WBC  5.7   --   4.7  7.3   HGB  14.1  12.8*  11.9*  11.9*   PLT  147*   --   282  267       LFTs -   Recent Labs   Lab Test  10/12/15   1330  06/18/15   0729   ALKPHOS  81  69   BILITOTAL  0.4  0.6   ALT  16  25   AST  10  28   PROTTOTAL  8.4  7.4   ALBUMIN  4.2  3.8        IMPRESSION:   1.  Allograft function.  Good.    2.  Immunosuppression.  He is on 3 drugs because of the high PRA.  We will continue to monitor DSA and consider dropping the CellCept dose since his levels have been on the high side.    3.  Depression, controlled.   4.  Insulin resistance.  He has some mild high glucoses, low HDL and high triglycerides.  I encouraged him to keep exercising.  So far, he is not diabetic based on the A1c.      PLAN:   1.  Monitor DSA.   2.  Consider decreasing CellCept dose at the next clinic  visit.   3.  The patient was encouraged to exercise.     Current Outpatient Prescriptions   Medication Sig Dispense Refill     tacrolimus (GENERIC EQUIVALENT) 1 MG capsule Take 3 capsules (3 mg) by mouth 2 times daily 3.5 mg twice per day. 180 capsule 3     tacrolimus (GENERIC EQUIVALENT) 0.5 MG capsule Take 1 capsule (0.5 mg) by mouth 2 times daily 3.5 mg twice per day. 60 capsule 3     MYFORTIC (BRAND) 180 MG EC TABLET Take 3 tablets (540 mg) by mouth 2 times daily 180 tablet 3     FLUOXETINE HCL PO Take 10 mg by mouth daily       ROPINIRole HCl (REQUIP PO) Take 0.5 mg by mouth At Bedtime 3 tabs       CLONAZEPAM PO Take 0.5 mg by mouth At Bedtime        sulfamethoxazole-trimethoprim (BACTRIM/SEPTRA) 400-80 MG per tablet Take 1 tablet by mouth daily 30 tablet 11     cholecalciferol (VITAMIN  -D) 1000 UNITS capsule Take 2 capsules (2,000 Units) by mouth daily 60 capsule 11     pantoprazole (PROTONIX) 40 MG enteric coated tablet Take 1 tablet (40 mg) by mouth daily Take 30-60 minutes before a meal. 30 tablet 0     FOLIC ACID PO Take 1 mg by mouth daily       LEVOTHYROXINE SODIUM PO Take 112 mcg by mouth daily        PREDNISONE PO Take 5 mg by mouth daily             EDGARDO MULLER MD             D: 2017 12:02   T: 2017 12:32   MT: DEENA      Name:     MARTELL MATTSON   MRN:      [id number]        Account:      [id number]   :      1965           Service Date: 2017      Document: [id number]

## 2017-11-14 ENCOUNTER — TRANSFERRED RECORDS (OUTPATIENT)
Dept: HEALTH INFORMATION MANAGEMENT | Facility: CLINIC | Age: 52
End: 2017-11-14

## 2018-02-05 ENCOUNTER — RESULTS ONLY (OUTPATIENT)
Dept: OTHER | Facility: CLINIC | Age: 53
End: 2018-02-05

## 2018-02-05 ENCOUNTER — APPOINTMENT (OUTPATIENT)
Dept: LAB | Facility: CLINIC | Age: 53
End: 2018-02-05
Attending: PATHOLOGY
Payer: MEDICARE

## 2018-02-05 DIAGNOSIS — Z94.0 KIDNEY REPLACED BY TRANSPLANT: ICD-10-CM

## 2018-02-05 DIAGNOSIS — Z48.298 AFTERCARE FOLLOWING ORGAN TRANSPLANT: ICD-10-CM

## 2018-02-05 DIAGNOSIS — Z79.899 ENCOUNTER FOR LONG-TERM CURRENT USE OF MEDICATION: ICD-10-CM

## 2018-02-05 PROCEDURE — 86832 HLA CLASS I HIGH DEFIN QUAL: CPT | Performed by: INTERNAL MEDICINE

## 2018-02-05 PROCEDURE — 86833 HLA CLASS II HIGH DEFIN QUAL: CPT | Performed by: INTERNAL MEDICINE

## 2018-02-05 PROCEDURE — 80197 ASSAY OF TACROLIMUS: CPT | Performed by: INTERNAL MEDICINE

## 2018-02-06 LAB
TACROLIMUS BLD-MCNC: 8.1 UG/L (ref 5–15)
TME LAST DOSE: NORMAL H

## 2018-02-07 DIAGNOSIS — Z48.298 AFTERCARE FOLLOWING ORGAN TRANSPLANT: Primary | ICD-10-CM

## 2018-02-07 DIAGNOSIS — Z79.899 ENCOUNTER FOR LONG-TERM CURRENT USE OF MEDICATION: ICD-10-CM

## 2018-02-08 DIAGNOSIS — Z94.0 KIDNEY REPLACED BY TRANSPLANT: ICD-10-CM

## 2018-02-08 NOTE — TELEPHONE ENCOUNTER
Tacrolimus 8.1, goal 4-6  Please call pt to verify this was a good trough level. Confirm dose 3.5mg BID. If so, decrease tacrolimus to 3 mg BID. Repeat labs in 1-2 weeks.

## 2018-02-08 NOTE — LETTER
PHYSICIAN ORDERS      DATE & TIME ISSUED: 2018 1:58 PM  PATIENT NAME: Haider Torrez   : 1965     [facility] MR# [if applicable]: [id number]     DIAGNOSIS:  Kidney Transplant  ICD-9 CODE: Z94.0     Patient will be into lab to repeat Tacrolimus level.    Any questions please call: [phone]    Please fax these results to [phone].    .

## 2018-02-09 RX ORDER — TACROLIMUS 1 MG/1
3 CAPSULE ORAL 2 TIMES DAILY
Qty: 180 CAPSULE | Refills: 3 | Status: SHIPPED | OUTPATIENT
Start: 2018-02-09 | End: 2023-03-07

## 2018-02-09 NOTE — TELEPHONE ENCOUNTER
Spoke to patient and he confirmed accurate 12 hour tacrolimus level and did not take medicine before draw. Will decrease prograf to 3 mg twice per day and repeat level 1-2 week. Faxed order to [facility].

## 2018-02-28 ENCOUNTER — RESULTS ONLY (OUTPATIENT)
Dept: OTHER | Facility: CLINIC | Age: 53
End: 2018-02-28

## 2018-02-28 DIAGNOSIS — Z79.899 ENCOUNTER FOR LONG-TERM CURRENT USE OF MEDICATION: ICD-10-CM

## 2018-02-28 DIAGNOSIS — Z48.298 AFTERCARE FOLLOWING ORGAN TRANSPLANT: ICD-10-CM

## 2018-02-28 DIAGNOSIS — Z94.0 KIDNEY REPLACED BY TRANSPLANT: ICD-10-CM

## 2018-02-28 PROCEDURE — 86832 HLA CLASS I HIGH DEFIN QUAL: CPT | Performed by: INTERNAL MEDICINE

## 2018-02-28 PROCEDURE — 86833 HLA CLASS II HIGH DEFIN QUAL: CPT | Performed by: INTERNAL MEDICINE

## 2018-02-28 PROCEDURE — 80197 ASSAY OF TACROLIMUS: CPT | Performed by: INTERNAL MEDICINE

## 2018-03-01 LAB
TACROLIMUS BLD-MCNC: 5 UG/L (ref 5–15)
TME LAST DOSE: NORMAL H

## 2018-03-02 LAB — PRA DONOR SPECIFIC ABY: NORMAL

## 2018-03-05 LAB
DONOR IDENTIFICATION: NORMAL
DSA COMMENTS: NORMAL
DSA PRESENT: NO
DSA TEST METHOD: NORMAL
ORGAN: NORMAL
SA1 CELL: NORMAL
SA1 COMMENTS: NORMAL
SA1 HI RISK ABY: NORMAL
SA1 MOD RISK ABY: NORMAL
SA1 TEST METHOD: NORMAL
SA2 CELL: NORMAL
SA2 COMMENTS: NORMAL
SA2 HI RISK ABY UA: NORMAL
SA2 MOD RISK ABY: NORMAL
SA2 TEST METHOD: NORMAL
UNOS CPRA: 90

## 2018-03-16 DIAGNOSIS — E55.9 VITAMIN D DEFICIENCY: ICD-10-CM

## 2018-03-16 DIAGNOSIS — D63.8 ANEMIA IN OTHER CHRONIC DISEASES CLASSIFIED ELSEWHERE: ICD-10-CM

## 2018-03-16 DIAGNOSIS — Z94.0 KIDNEY REPLACED BY TRANSPLANT: Primary | ICD-10-CM

## 2018-03-27 ENCOUNTER — TELEPHONE (OUTPATIENT)
Dept: TRANSPLANT | Facility: CLINIC | Age: 53
End: 2018-03-27

## 2018-03-27 ENCOUNTER — OFFICE VISIT (OUTPATIENT)
Dept: NEPHROLOGY | Facility: CLINIC | Age: 53
End: 2018-03-27
Attending: INTERNAL MEDICINE
Payer: MEDICARE

## 2018-03-27 VITALS
HEIGHT: 65 IN | DIASTOLIC BLOOD PRESSURE: 68 MMHG | BODY MASS INDEX: 25.59 KG/M2 | WEIGHT: 153.6 LBS | SYSTOLIC BLOOD PRESSURE: 121 MMHG | HEART RATE: 76 BPM

## 2018-03-27 DIAGNOSIS — Z94.0 KIDNEY REPLACED BY TRANSPLANT: ICD-10-CM

## 2018-03-27 DIAGNOSIS — D63.8 ANEMIA IN OTHER CHRONIC DISEASES CLASSIFIED ELSEWHERE: ICD-10-CM

## 2018-03-27 DIAGNOSIS — E55.9 VITAMIN D DEFICIENCY: ICD-10-CM

## 2018-03-27 DIAGNOSIS — Z94.0 KIDNEY REPLACED BY TRANSPLANT: Primary | ICD-10-CM

## 2018-03-27 LAB
ALBUMIN SERPL-MCNC: 3.8 G/DL (ref 3.4–5)
ANION GAP SERPL CALCULATED.3IONS-SCNC: 6 MMOL/L (ref 3–14)
BUN SERPL-MCNC: 21 MG/DL (ref 7–30)
CALCIUM SERPL-MCNC: 8.4 MG/DL (ref 8.5–10.1)
CHLORIDE SERPL-SCNC: 112 MMOL/L (ref 94–109)
CO2 SERPL-SCNC: 24 MMOL/L (ref 20–32)
CREAT SERPL-MCNC: 0.94 MG/DL (ref 0.66–1.25)
CREAT UR-MCNC: 106 MG/DL
DEPRECATED CALCIDIOL+CALCIFEROL SERPL-MC: 63 UG/L (ref 20–75)
FERRITIN SERPL-MCNC: 695 NG/ML (ref 26–388)
GFR SERPL CREATININE-BSD FRML MDRD: 84 ML/MIN/1.7M2
GLUCOSE SERPL-MCNC: 152 MG/DL (ref 70–99)
IRON SATN MFR SERPL: 24 % (ref 15–46)
IRON SERPL-MCNC: 67 UG/DL (ref 35–180)
PHOSPHATE SERPL-MCNC: 2.3 MG/DL (ref 2.5–4.5)
POTASSIUM SERPL-SCNC: 4.2 MMOL/L (ref 3.4–5.3)
PROT UR-MCNC: 0.13 G/L
PROT/CREAT 24H UR: 0.12 G/G CR (ref 0–0.2)
PTH-INTACT SERPL-MCNC: 126 PG/ML (ref 18–80)
SODIUM SERPL-SCNC: 143 MMOL/L (ref 133–144)
TIBC SERPL-MCNC: 276 UG/DL (ref 240–430)

## 2018-03-27 PROCEDURE — 80069 RENAL FUNCTION PANEL: CPT | Performed by: INTERNAL MEDICINE

## 2018-03-27 PROCEDURE — 36415 COLL VENOUS BLD VENIPUNCTURE: CPT | Performed by: INTERNAL MEDICINE

## 2018-03-27 PROCEDURE — 84156 ASSAY OF PROTEIN URINE: CPT | Performed by: INTERNAL MEDICINE

## 2018-03-27 PROCEDURE — 82306 VITAMIN D 25 HYDROXY: CPT | Performed by: INTERNAL MEDICINE

## 2018-03-27 PROCEDURE — G0463 HOSPITAL OUTPT CLINIC VISIT: HCPCS | Mod: ZF

## 2018-03-27 PROCEDURE — 83970 ASSAY OF PARATHORMONE: CPT | Performed by: INTERNAL MEDICINE

## 2018-03-27 PROCEDURE — 83540 ASSAY OF IRON: CPT | Performed by: INTERNAL MEDICINE

## 2018-03-27 PROCEDURE — 83550 IRON BINDING TEST: CPT | Performed by: INTERNAL MEDICINE

## 2018-03-27 PROCEDURE — 82728 ASSAY OF FERRITIN: CPT | Performed by: INTERNAL MEDICINE

## 2018-03-27 ASSESSMENT — PAIN SCALES - GENERAL: PAINLEVEL: NO PAIN (0)

## 2018-03-27 NOTE — NURSING NOTE
"Chief Complaint   Patient presents with     RECHECK     Kidney tx follow up       Initial /68  Pulse 76  Ht 1.651 m (5' 5\")  Wt 69.7 kg (153 lb 9.6 oz)  BMI 25.56 kg/m2 Estimated body mass index is 25.56 kg/(m^2) as calculated from the following:    Height as of this encounter: 1.651 m (5' 5\").    Weight as of this encounter: 69.7 kg (153 lb 9.6 oz).  Medication Reconciliation: complete   LATANYA RAMIREZ CMA      "

## 2018-03-27 NOTE — LETTER
3/27/2018      RE: Haider Torrez  [address] [facility] [address]       Service Date: 2018      REASON FOR VISIT:  Mr. Torrez is a 52-year-old gentleman here for followup after a  donor kidney transplant in 10/2015.  Chart age is 52, but the patient is 5 years older than the stated age because of paperwork filled out when [place] the country.      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  He received a transplant in  (DDKT) which failed in  in the setting of thyroid storm and multiorgan system failure.  He was on dialysis for 4 years.  He had a second transplant in 10/2015.  PRA at the time of transplant was 99% class I, 40% class II.  His UNOS PRA is now 90.  DSA last checked in 2018 was negative.  Current immunosuppression is Myfortic 540 b.i.d., tacrolimus target 4-6, prednisone 5.  MPA level has been 3.0 x2.  He has had no episodes of skin cancer.  He has not seen a dermatologist for at least 2 years.      The patient has a fistula which has been resulting in numbness and difficulty using his right hand.  He consulted his fistula surgeon, who discussed possible takedown versus some type of vascular bypass.      Overall, he feels well.  He checks his blood pressure at home.  It is in the range of systolic 115-120.  Baseline creatinine is 1.0-1.2.      REVIEW OF SYSTEMS:  Comprehensive review of systems was completed and negative except as in the chart.       SOCIAL HISTORY:  He gets more exercise in the summer.  He is a former employee of [employer] which moved to [facility].  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       PAST MEDICAL HISTORY:   1.  IgA nephropathy.   2.  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   3.  Papillary thyroid cancer in , status post resection.   4.  Seizure disorder during thyroid storm, resolved.   5.  History of depression, on meds.   6.  Insomnia for which he takes clonazepam.       MEDICATIONS:  See below.      PHYSICAL EXAMINATION:   VITAL SIGNS:  Blood pressure 121/68, weight 153, BMI 25.   GENERAL:  He is well-developed, well-nourished.   HEENT:  Not remarkable.   SKIN:  No concerning skin lesions.   LUNGS:  Clear bilaterally.   CARDIAC:  Regular sinus rhythm.  No murmurs, rubs or gallops.   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  His right hand is redder and warmer than the left hand.  He has symmetric grasp, but he has difficulty holding a pen after 10 or 15 seconds because of numbness.       LABORATORY TESTS:  Protein 0.13 gram per gram.  Tacrolimus 5.0.     Electrolytes/Renal -   Recent Labs   Lab Test  03/27/18   1510  09/26/17   1507  11/17/15   1139  10/30/15   0718  10/26/15   0707  10/25/15   0750   NA  143  141   --   141  139  139   POTASSIUM  4.2  4.6  4.0  5.0  4.9  4.8   CHLORIDE  112*  109   --   114*  110*  111*   CO2  24  24   --   24  24  24   BUN  21  20   --   31*  28  26   CR  0.94  1.11   --   1.53*  1.35*  1.45*   GLC  152*  138*   --   105*  96  88   CRISTIAN  8.4*  8.8   --   8.5  8.7  8.3*   MAG   --    --    --   1.7  1.6  1.3*   PHOS  2.3*   --    --   2.7  2.2*  2.6       CBC -   Recent Labs   Lab Test  09/26/17   1507  11/17/15   1139  10/30/15   0718  10/26/15   0707   WBC  5.7   --   4.7  7.3   HGB  14.1  12.8*  11.9*  11.9*   PLT  147*   --   282  267       LFTs -   Recent Labs   Lab Test  03/27/18   1510  10/12/15   1330  06/18/15   0729   ALKPHOS   --   81  69   BILITOTAL   --   0.4  0.6   ALT   --   16  25   AST   --   10  28   PROTTOTAL   --   8.4  7.4   ALBUMIN  3.8  4.2  3.8     Iron Panel -   Recent Labs   Lab Test  03/27/18   1510  10/17/15   0700   IRON  67  25*   IRONSAT  24  12*   OFE  695*  883*        IMPRESSION:   1.  Allograft function.  Stable.   2.  Immunosuppression.  He is on 3-drug immunosuppression.  He has had no rejections.  He had a high PRA at the time of the transplant, but no DSA.  MPA level  has been 3.0 on two occasions.  We will decrease the Myfortic dose to 540 in the morning and 360 at night.    3.  Insurance.  The patient has Medicaid and was contacted by the hospital saying they no longer would honor this form of insurance.  I have referred him to our transplant office to determine how he can continue to come to the clinic.       PLAN:   1.  Decrease Myfortic p.m. dose to 360.   2.   consult regarding insurance issues.   3.  Patient to see dermatologist.         SANDRA MULLER MD      Current Outpatient Prescriptions   Medication Sig Dispense Refill     tacrolimus (GENERIC EQUIVALENT) 1 MG capsule Take 3 capsules (3 mg) by mouth 2 times daily 180 capsule 3     MYFORTIC (BRAND) 180 MG EC TABLET Take 3 tablets (540 mg) by mouth 2 times daily 180 tablet 3     FLUOXETINE HCL PO Take 10 mg by mouth daily       ROPINIRole HCl (REQUIP PO) Take 0.5 mg by mouth At Bedtime 3 tabs       CLONAZEPAM PO Take 0.5 mg by mouth At Bedtime        sulfamethoxazole-trimethoprim (BACTRIM/SEPTRA) 400-80 MG per tablet Take 1 tablet by mouth daily 30 tablet 11     cholecalciferol (VITAMIN  -D) 1000 UNITS capsule Take 2 capsules (2,000 Units) by mouth daily 60 capsule 11     pantoprazole (PROTONIX) 40 MG enteric coated tablet Take 1 tablet (40 mg) by mouth daily Take 30-60 minutes before a meal. 30 tablet 0     FOLIC ACID PO Take 1 mg by mouth daily       LEVOTHYROXINE SODIUM PO Take 112 mcg by mouth daily        PREDNISONE PO Take 5 mg by mouth daily          Sandra Muller MD        D: 2018   T: 2018   MT: DP      Name:     MARTELL MATTSON   MRN:      [id number]        Account:      [id number]   :      1965           Service Date: 2018      Document: [id number]

## 2018-03-27 NOTE — PATIENT INSTRUCTIONS
1.  Drop evening dose of Myfortic to 2 capsules.  Continue 3 in morning  2.  Should see dermatologist  3. Stay out of sun. You have a higher risk of skin cancer.    4.  Talk to our office about continuing to come to [facility]

## 2018-03-27 NOTE — TELEPHONE ENCOUNTER
Patient Call: Patient Call: Insurance  Reason for Call: Insurance issue: RAMIRO Maloney called, returning Sherrill Kent's call.  Please call Haider # [phone]

## 2018-03-27 NOTE — MR AVS SNAPSHOT
After Visit Summary   3/27/2018    Haider Torrez    MRN: [id number]           Patient Information     Date Of Birth          1965        Visit Information        Provider Department      3/27/2018 4:25 PM Sandra Randall MD [facility]        Today's Diagnoses     Kidney replaced by transplant    -  1      Care Instructions    1.  Drop evening dose of Myfortic to 2 capsules.  Continue 3 in morning  2.  Should see dermatologist  3. Stay out of sun. You have a higher risk of skin cancer.    4.  Talk to our office about continuing to come to [facility]          Follow-ups after your visit        Follow-up notes from your care team     Return in about 1 year (around 3/27/2019).      Your next 10 appointments already scheduled     Mar 27, 2018  4:25 PM CDT   (Arrive by 3:55 PM)   Return Kidney Transplant with Sandra Randall MD   [facility] ([facility])    [address] [place] [address]  [address]  [facility] [address] [phone]            Mar 26, 2019  3:30 PM CDT   Lab with  LAB   [facility] ([facility])    [address] [place] [address]  1st Floor  [facility] [address] [phone]            Mar 26, 2019  4:25 PM CDT   (Arrive by 3:55 PM)   Return Kidney Transplant with Sandra Randall MD   [facility] ([facility])    [address] [place] [address]  [address]  [facility] [address] [phone]              Who to contact     If you have questions or need follow up information about today's clinic visit or your schedule please contact [facility] directly at [phone].  Normal or non-critical lab and imaging results will be communicated to you by MyChart, letter or phone within 4 business days after the clinic has received the results. If you do not hear from us within 7 days, please contact the clinic through MyChart or phone. If you have a critical or abnormal lab result, we will notify you by  "phone as soon as possible.  Submit refill requests through [a]list games or call your pharmacy and they will forward the refill request to us. Please allow 3 business days for your refill to be completed.          Additional Information About Your Visit        ShopifyharGoPlaceIt Information     [a]list games gives you secure access to your electronic health record. If you see a primary care provider, you can also send messages to your care team and make appointments. If you have questions, please call your primary care clinic.  If you do not have a primary care provider, please call [phone] and they will assist you.        Care EveryWhere ID     This is your Care EveryWhere ID. This could be used by other organizations to access your [place] medical records  [id number]        Your Vitals Were     Pulse Height BMI (Body Mass Index)             76 1.651 m (5' 5\") 25.56 kg/m2          Blood Pressure from Last 3 Encounters:   03/27/18 121/68   09/26/17 111/69   03/14/17 117/73    Weight from Last 3 Encounters:   03/27/18 69.7 kg (153 lb 9.6 oz)   09/26/17 69.5 kg (153 lb 4.8 oz)   03/14/17 67.3 kg (148 lb 6.4 oz)              Today, you had the following     No orders found for display       Primary Care Provider    None Specified       No primary provider on file.        Equal Access to Services     CARLA OCHOA : Hadii linda harriso Sodrew, waaxda luqadaha, qaybta kaalmada ademinervada, tatiana de leon . So [facility] [phone].    ATENCIÓN: Si habla español, tiene a santiago disposición servicios gratuitos de asistencia lingüística. Llame al [phone].    We comply with applicable federal civil rights laws and [place] laws. We do not discriminate on the basis of race, color, national origin, age, disability, sex, sexual orientation, or gender identity.            Thank you!     Thank you for choosing [facility]  for your care. Our goal is always to provide you with excellent care. Hearing back from our " patients is one way we can continue to improve our services. Please take a few minutes to complete the written survey that you may receive in the mail after your visit with us. Thank you!             Your Updated Medication List - Protect others around you: Learn how to safely use, store and throw away your medicines at www.disposemymeds.org.          This list is accurate as of 3/27/18  4:09 PM.  Always use your most recent med list.                   Brand Name Dispense Instructions for use Diagnosis    cholecalciferol 1000 UNITS capsule    vitamin  -D    60 capsule    Take 2 capsules (2,000 Units) by mouth daily    Vitamin D deficiency       CLONAZEPAM PO      Take 0.5 mg by mouth At Bedtime        FLUOXETINE HCL PO      Take 10 mg by mouth daily        FOLIC ACID PO      Take 1 mg by mouth daily        LEVOTHYROXINE SODIUM PO      Take 112 mcg by mouth daily        mycophenolic acid 180 MG EC tablet     180 tablet    Take 3 tablets (540 mg) by mouth 2 times daily    Kidney replaced by transplant       pantoprazole 40 MG EC tablet    PROTONIX    30 tablet    Take 1 tablet (40 mg) by mouth daily Take 30-60 minutes before a meal.    S/P kidney transplant       PREDNISONE PO      Take 5 mg by mouth daily        REQUIP PO      Take 0.5 mg by mouth At Bedtime 3 tabs        sulfamethoxazole-trimethoprim 400-80 MG per tablet    BACTRIM/SEPTRA    30 tablet    Take 1 tablet by mouth daily    Kidney replaced by transplant       tacrolimus 1 MG capsule    GENERIC EQUIVALENT    180 capsule    Take 3 capsules (3 mg) by mouth 2 times daily    Kidney replaced by transplant

## 2018-03-28 NOTE — PROGRESS NOTES
Service Date: 2018      REASON FOR VISIT:  Mr. Torrez is a 52-year-old gentleman here for followup after a  donor kidney transplant in 10/2015.  Chart age is 52, but the patient is 5 years older than the stated age because [place]      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  He received a transplant in  ([facility]) which failed in  in the setting of thyroid storm and multiorgan system failure.  He was on dialysis for 4 years.  He had a second transplant in 10/2015.  PRA at the time of transplant was 99% class I, 40% class II.  His UNOS PRA is now 90.  DSA last checked in 2018 was negative.  Current immunosuppression is Myfortic 540 b.i.d., tacrolimus target 4-6, prednisone 5.  MPA level has been 3.0 x2.  He has had no episodes of skin cancer.  He has not seen a dermatologist for at least 2 years.      The patient has a fistula which has been resulting in numbness and difficulty using his right hand.  He consulted his fistula surgeon, who discussed possible takedown versus some type of vascular bypass.      Overall, he feels well.  He checks his blood pressure at home.  It is in the range of systolic 115-120.  Baseline creatinine is 1.0-1.2.      REVIEW OF SYSTEMS:  Comprehensive review of systems was completed and negative except as in the chart.       SOCIAL HISTORY:  He gets more exercise in the summer.  He is a former employee of [employer] which moved to "[employer].  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       PAST MEDICAL HISTORY:   1.  IgA nephropathy.   2.  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   3.  Papillary thyroid cancer in , status post resection.   4.  Seizure disorder during thyroid storm, resolved.   5.  History of depression, on meds.   6.  Insomnia for which he takes clonazepam.      MEDICATIONS:  See below.      PHYSICAL EXAMINATION:   VITAL SIGNS:  Blood  pressure 121/68, weight 153, BMI 25.   GENERAL:  He is well-developed, well-nourished.   HEENT:  Not remarkable.   SKIN:  No concerning skin lesions.   LUNGS:  Clear bilaterally.   CARDIAC:  Regular sinus rhythm.  No murmurs, rubs or gallops.   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  His right hand is redder and warmer than the left hand.  He has symmetric grasp, but he has difficulty holding a pen after 10 or 15 seconds because of numbness.       LABORATORY TESTS:  Protein 0.13 gram per gram.  Tacrolimus 5.0.     Electrolytes/Renal -   Recent Labs   Lab Test  03/27/18   1510  09/26/17   1507  11/17/15   1139  10/30/15   0718  10/26/15   0707  10/25/15   0750   NA  143  141   --   141  139  139   POTASSIUM  4.2  4.6  4.0  5.0  4.9  4.8   CHLORIDE  112*  109   --   114*  110*  111*   CO2  24  24   --   24  24  24   BUN  21  20   --   31*  28  26   CR  0.94  1.11   --   1.53*  1.35*  1.45*   GLC  152*  138*   --   105*  96  88   CRISTIAN  8.4*  8.8   --   8.5  8.7  8.3*   MAG   --    --    --   1.7  1.6  1.3*   PHOS  2.3*   --    --   2.7  2.2*  2.6       CBC -   Recent Labs   Lab Test  09/26/17   1507  11/17/15   1139  10/30/15   0718  10/26/15   0707   WBC  5.7   --   4.7  7.3   HGB  14.1  12.8*  11.9*  11.9*   PLT  147*   --   282  267       LFTs -   Recent Labs   Lab Test  03/27/18   1510  10/12/15   1330  06/18/15   0729   ALKPHOS   --   81  69   BILITOTAL   --   0.4  0.6   ALT   --   16  25   AST   --   10  28   PROTTOTAL   --   8.4  7.4   ALBUMIN  3.8  4.2  3.8     Iron Panel -   Recent Labs   Lab Test  03/27/18   1510  10/17/15   0700   IRON  67  25*   IRONSAT  24  12*   OFE  695*  883*        IMPRESSION:   1.  Allograft function.  Stable.   2.  Immunosuppression.  He is on 3-drug immunosuppression.  He has had no rejections.  He had a high PRA at the time of the transplant, but no DSA.  MPA level has been 3.0 on two occasions.  We will decrease the Myfortic dose to 540 in  the morning and 360 at night.    3.  Insurance.  The patient has Medicaid and was contacted by the hospital saying they no longer would honor this form of insurance.  I have referred him to our transplant office to determine how he can continue to come to the clinic.       PLAN:   1.  Decrease Myfortic p.m. dose to 360.   2.   consult regarding insurance issues.   3.  Patient to see dermatologist.         EDGARDO MULLER MD      Current Outpatient Prescriptions   Medication Sig Dispense Refill     tacrolimus (GENERIC EQUIVALENT) 1 MG capsule Take 3 capsules (3 mg) by mouth 2 times daily 180 capsule 3     MYFORTIC (BRAND) 180 MG EC TABLET Take 3 tablets (540 mg) by mouth 2 times daily 180 tablet 3     FLUOXETINE HCL PO Take 10 mg by mouth daily       ROPINIRole HCl (REQUIP PO) Take 0.5 mg by mouth At Bedtime 3 tabs       CLONAZEPAM PO Take 0.5 mg by mouth At Bedtime        sulfamethoxazole-trimethoprim (BACTRIM/SEPTRA) 400-80 MG per tablet Take 1 tablet by mouth daily 30 tablet 11     cholecalciferol (VITAMIN  -D) 1000 UNITS capsule Take 2 capsules (2,000 Units) by mouth daily 60 capsule 11     pantoprazole (PROTONIX) 40 MG enteric coated tablet Take 1 tablet (40 mg) by mouth daily Take 30-60 minutes before a meal. 30 tablet 0     FOLIC ACID PO Take 1 mg by mouth daily       LEVOTHYROXINE SODIUM PO Take 112 mcg by mouth daily        PREDNISONE PO Take 5 mg by mouth daily                D: 2018   T: 2018   MT: DEENA      Name:     MARTELL MATTSON   MRN:      [id number]        Account:      [id number]   :      1965           Service Date: 2018      Document: [id number]

## 2018-03-30 ENCOUNTER — TELEPHONE (OUTPATIENT)
Dept: TRANSPLANT | Facility: CLINIC | Age: 53
End: 2018-03-30

## 2018-03-30 DIAGNOSIS — Z94.0 KIDNEY REPLACED BY TRANSPLANT: ICD-10-CM

## 2018-03-30 RX ORDER — MYCOPHENOLIC ACID 180 MG/1
TABLET, DELAYED RELEASE ORAL
Qty: 450 TABLET | Refills: 3 | Status: SHIPPED | OUTPATIENT
Start: 2018-03-30 | End: 2023-03-07

## 2018-03-30 NOTE — TELEPHONE ENCOUNTER
ISSUE:  Sandra Randall MD Krull, Leisa K, RN                     I decreased the myfortic to 540 am and 360 pm.         PLAN:   *Updated and sent to pharmacy

## 2018-04-04 NOTE — TELEPHONE ENCOUNTER
Returned call to Haider, spoke to his wife regarding his insurance coverage.  Has [place] as secondary coverage to [place]. Reviewed that currently [place] not accepted here.  Has local nephrologist, and explained that prescriptions by our providers will be covered.  If needs to return for care, would connect him with PFR before coming and if needed to transfer to another transplant center-reviewed options that are available in [place].  Questions answered.

## 2018-05-10 ENCOUNTER — TELEPHONE (OUTPATIENT)
Dept: TRANSPLANT | Facility: CLINIC | Age: 53
End: 2018-05-10

## 2018-05-10 NOTE — TELEPHONE ENCOUNTER
Tacrolimus 3.8, goal 4-6  Please call pt to verify this was a good trough. Confirm dose 3 mg BID. Previous results were at goal. Repeat level in 1-2 weeks. Will make dose changes then if necessary.

## 2018-05-10 NOTE — LETTER
PHYSICIAN ORDERS      DATE & TIME ISSUED: May 10, 2018 4:32 PM  PATIENT NAME: Haider Torrez   : 1965     [facility] MR# [if applicable]: [id number]     DIAGNOSIS:  Kidney transplant   ICD-9 CODE: Z94.0      Please repeat tacrolimus level in 1-2 weeks.     Any questions please call: [phone]    Please fax these results to [phone].

## 2018-05-10 NOTE — TELEPHONE ENCOUNTER
Call placed to patient: Patient confirms current dose of 3 mg in the am and 2 mg in the pm and accurate trough level. Patient verbalize understanding to repeat level in 1-2 weeks. Order placed

## 2019-04-18 ENCOUNTER — TELEPHONE (OUTPATIENT)
Dept: TRANSPLANT | Facility: CLINIC | Age: 54
End: 2019-04-18

## 2019-04-18 NOTE — TELEPHONE ENCOUNTER
ISSUE:   Tacrolimus level 3.7 on 4/11, goal 4-6, dose 3 mg BID    PLAN:   Please call pt and confirm this was a good 12-hour trough. Verify dose 3 mg BID. Confirm no new medications, illness, or missed doses of medication.  Previous level was in December and was within goal.  Recommend repeating in 1 week.  If level still low, will make dose adjustment at that time.    LPN TASK:   Call with above instructions   Update lab order

## 2019-04-18 NOTE — LETTER
PHYSICIAN ORDERS       DATE & TIME ISSUED: 2019 12:45 PM  PATIENT NAME: Haider Torrez   : 1965     [facility] MR# [if applicable]: [id number]     DIAGNOSIS:  Kidney transplant   ICD-10 CODE: Z94.0      Please complete the following level in one week   Tacrolimus level    Any questions please call: [phone] option #5    Please fax results to [phone].

## 2019-11-21 ENCOUNTER — TELEPHONE (OUTPATIENT)
Dept: TRANSPLANT | Facility: CLINIC | Age: 54
End: 2019-11-21

## 2019-11-21 NOTE — TELEPHONE ENCOUNTER
Issue  Cr 1.73     Please call Haider regarding his increase creatinine    Please review most recent BP ,Temp and Weight    Any low grade fevers , illness  any new medications   Review if any missed medications in the past week or past   month     Confirm he has followed up with nephrologist  In [place]  Recommend repeating labs in one week   Recommend follow up with transplant  Nephrologist  In [place] or return to [facility]

## 2019-11-21 NOTE — LETTER
PHYSICIAN ORDERS      DATE & TIME ISSUED: 2019 9:56 AM  PATIENT NAME: Haider Torrez   : 1965     [facility] MR# [if applicable]: [id number]     DIAGNOSIS:  Kidney transplant   ICD-10 CODE: Z94.0       Please repeat the following labs in 1-2 weeks  Creatinine level    Any questions please call: [phone] option 5    Please fax results to [phone].

## 2019-11-21 NOTE — LETTER
PHYSICIAN ORDERS      DATE & TIME ISSUED: 2019 9:56 AM  PATIENT NAME: Haider Torrez   : 1965     [facility] MR# [if applicable]: [id number]     DIAGNOSIS:  Kidney transplant   ICD-10 CODE: Z94.0       Please repeat the following labs in 1-2 weeks  Creatinine level    Any questions please call: [phone] option 5    Please fax results to [phone].

## 2019-11-26 NOTE — TELEPHONE ENCOUNTER
Call placed to patient. No answer. Voice message left with instructions listed below. Will try back

## 2019-11-26 NOTE — TELEPHONE ENCOUNTER
Call placed to patient. No answer. Voice message left requesting a return call to discuss any recent illness, diarrhea or medication changes. Also to document recent vital signs. Instructions left for patient to improve hydration and repeat level in 1-2 weeks

## 2019-11-26 NOTE — TELEPHONE ENCOUNTER
Creatinine slight trend down, but still remains elevated.     LPN TASK:  Please call pt to ensure pt is drinking 2-3L/day, no new illness/fever/malaise/abdominal pain/edema, medication changes, or normal UOP. If the above is normal, encourage continued hydration.  Please see if pt is still being managed by local nephrologist and encourage f/u appointment soon, if not, pt can return to [facility] for management.  Encourage pt to repeat labs in 1 week.      HLD (hyperlipidemia)

## 2019-11-27 NOTE — TELEPHONE ENCOUNTER
Call placed to patient. No answer. Voice message left requesting a return call to discuss any recent illness, diarrhea or medication changes. Also to document recent vital signs. Instructions left for patient to improve hydration and repeat level in . Order sent

## 2019-12-10 ENCOUNTER — TELEPHONE (OUTPATIENT)
Dept: TRANSPLANT | Facility: CLINIC | Age: 54
End: 2019-12-10

## 2019-12-10 NOTE — TELEPHONE ENCOUNTER
ISSUE:  Creatinine remains elevated at 1.6.      PLAN:   Pt reviewed with Dr Turner during rounds.  Per Dr Turner, pt to have IVF and then repeat transplant labs following fluids along with UA/UC.  If creatinine doesn't improve with hydration and free of s/s UTI, pt to have BX.     OUTCOME:   Call place to pt, no answer.  Left detailed message of above plan.    LPN TASK:  Please place orders for transplant labs and UA/UC.  Please mail letter to pt to contact transplant office.

## 2019-12-10 NOTE — LETTER
PHYSICIAN ORDERS      DATE & TIME ISSUED: 2019 9:19 AM  PATIENT NAME: Haider Torrez   : 1965     [facility] MR# [if applicable]: [id number]     DIAGNOSIS:  Kidney transplant   ICD-10 CODE: Z94.0      Please complete the following labs after completing IV Fluids.   Urinalysis and urine culture  CBC with differential and platelets  BMP  Tacrolimus level (ensure 12 hours between last dose and blood draw)    Any questions please call: [phone] option 5    Please fax results to [phone].

## 2019-12-10 NOTE — LETTER
December 11, 2019    Dear Haider Torrez,    We have recently tried to reach you via telephone.  Please call our office and update us with any new phone or address.    For the best outcome for your transplant and in order for us to refill your prescriptions, we encourage you to have a yearly clinic appointment with a physician and to have current labs. If you are unable to return to our transplant center there are several alternatives. Please call your coordinator to discuss them.   To make an appointment with a physician here at [facility], please call:  The Transplant Office at [phone] or [phone]    The Transplant Staff at [facility]

## 2019-12-11 DIAGNOSIS — Z94.0 KIDNEY REPLACED BY TRANSPLANT: Primary | ICD-10-CM

## 2019-12-11 DIAGNOSIS — Z48.298 AFTERCARE FOLLOWING ORGAN TRANSPLANT: ICD-10-CM

## 2019-12-11 DIAGNOSIS — Z79.899 ENCOUNTER FOR LONG-TERM CURRENT USE OF MEDICATION: ICD-10-CM

## 2019-12-11 NOTE — TELEPHONE ENCOUNTER
Provider Call: Voicemail  Date/Time: 12/11/19 at 2:11p    Reason for call: Provider Call: Transplant Lab/Orders  Route to LPN  Post Transplant Days: 1520 (Kidney), 5718 (Kidney)  When patient is less than 60 days post-transplant, route high priority  Reason for Call: Clarification; which order? New faxed orders    Facility Name:      [facility] [phone] (Phone)  [phone] (Fax)         Callback needed? Yes    Return Call Needed  Same as documented in contacts section  When to return call?: Same day: Route High Priority

## 2019-12-11 NOTE — LETTER
OUTPATIENT LABORATORY TEST ORDER    Patient Name: Haider Torrez  Transplant Date: 10/13/2015   YOB: 1965  Issue Date & Time: 12/11/2019  3:02 PM  [facility] MR: [id number] Exp. Date (1 year after date issued)      Diagnoses: Kidney Transplant (ICD-10  Z94.0)   Long term use of medications (ICD-10  Z79.899)     Lab results to be available on the same day drawn.   Patient should release information to the [facility].  Please fax to the Transplant Center at [phone].    Every other monthl   ?Hemogram and Platelet  ?Basic Metabolic Panel (Sodium, Potassium, Chloride, CO2, Creatinine, Urea Nitrogen,     Glucose,   Calcium)         ?/Tacrolimus/Prograf drug level                         Every 6 Months                  ?Urine for protein/creatinine                ?PRA/DSA level (mailers provided by the patient)     If you have any questions, please call The Transplant Center at [phone] or [phone].    Please fax labs to [phone]  .

## 2019-12-16 NOTE — TELEPHONE ENCOUNTER
Spoke to the office  @ [facility] BriscoeKim Torrez returning for an ultrasound on Dec 16,2019    Possible kidney transplant  Biopsy  - concern for missed immunosuppression medications   Attempted to call Haider Torrez CHERIE again

## 2020-02-20 ENCOUNTER — TRANSFERRED RECORDS (OUTPATIENT)
Dept: HEALTH INFORMATION MANAGEMENT | Facility: CLINIC | Age: 55
End: 2020-02-20

## 2020-03-10 ENCOUNTER — HEALTH MAINTENANCE LETTER (OUTPATIENT)
Age: 55
End: 2020-03-10

## 2020-05-20 ENCOUNTER — TELEPHONE (OUTPATIENT)
Dept: TRANSPLANT | Facility: CLINIC | Age: 55
End: 2020-05-20

## 2020-05-20 ENCOUNTER — TRANSFERRED RECORDS (OUTPATIENT)
Dept: HEALTH INFORMATION MANAGEMENT | Facility: CLINIC | Age: 55
End: 2020-05-20

## 2020-06-03 ENCOUNTER — TRANSFERRED RECORDS (OUTPATIENT)
Dept: HEALTH INFORMATION MANAGEMENT | Facility: CLINIC | Age: 55
End: 2020-06-03

## 2020-06-25 ENCOUNTER — TRANSFERRED RECORDS (OUTPATIENT)
Dept: HEALTH INFORMATION MANAGEMENT | Facility: CLINIC | Age: 55
End: 2020-06-25

## 2020-06-29 ENCOUNTER — TELEPHONE (OUTPATIENT)
Dept: TRANSPLANT | Facility: CLINIC | Age: 55
End: 2020-06-29

## 2020-06-29 NOTE — TELEPHONE ENCOUNTER
Provider Call: Voicemail  Date/Time: 6/29/2020 1238pm  Reason for call: [facility] calling, requesting a call back re: biopsy results on this patient. Please call [phone] option 2 to speak to the nurse.

## 2020-06-30 ENCOUNTER — TELEPHONE (OUTPATIENT)
Dept: TRANSPLANT | Facility: CLINIC | Age: 55
End: 2020-06-30

## 2020-06-30 NOTE — TELEPHONE ENCOUNTER
Voicemail  Date/Time: 6/30/20 at 8:25 am  Reason for call: would like a return call no details left

## 2020-06-30 NOTE — TELEPHONE ENCOUNTER
Returned call to [facility].  RN not available.  Left v/m.  Returned call to Haider.  No answer, left basil/bernadine.

## 2020-06-30 NOTE — TELEPHONE ENCOUNTER
Patient Call: Voicemail  Date/Time: 6/30/20 @ 1:43 PM  Reason for call: Returning coordinators call

## 2020-06-30 NOTE — TELEPHONE ENCOUNTER
Spoke with Uvaldo at [facility].  Dr. Butler is requesting a DSA  be sent to the patient.  Task created to send mailers.  Per Uvaldo, patient had a kidney biopsy and the results were faxed to us.  She is unaware if any changes have been made.  Encouraged Dr. Butler to contact us with any questions or concerns regarding biopsy.

## 2020-07-06 ENCOUNTER — TRANSFERRED RECORDS (OUTPATIENT)
Dept: HEALTH INFORMATION MANAGEMENT | Facility: CLINIC | Age: 55
End: 2020-07-06

## 2020-07-07 ENCOUNTER — TELEPHONE (OUTPATIENT)
Dept: TRANSPLANT | Facility: CLINIC | Age: 55
End: 2020-07-07

## 2020-07-07 NOTE — TELEPHONE ENCOUNTER
RN at [facility] inquiring about DSA mailers.  Confirmed with admin that DSA mailers will be mailed out today.

## 2020-07-14 ENCOUNTER — RESULTS ONLY (OUTPATIENT)
Dept: OTHER | Facility: CLINIC | Age: 55
End: 2020-07-14

## 2020-07-14 DIAGNOSIS — Z48.298 AFTERCARE FOLLOWING ORGAN TRANSPLANT: ICD-10-CM

## 2020-07-14 DIAGNOSIS — Z94.0 KIDNEY REPLACED BY TRANSPLANT: ICD-10-CM

## 2020-07-14 DIAGNOSIS — Z79.899 ENCOUNTER FOR LONG-TERM CURRENT USE OF MEDICATION: ICD-10-CM

## 2020-07-14 PROCEDURE — 86832 HLA CLASS I HIGH DEFIN QUAL: CPT | Performed by: TRANSPLANT SURGERY

## 2020-07-14 PROCEDURE — 86833 HLA CLASS II HIGH DEFIN QUAL: CPT | Performed by: TRANSPLANT SURGERY

## 2020-07-20 LAB
DONOR IDENTIFICATION: NORMAL
DSA COMMENTS: NORMAL
DSA PRESENT: NO
DSA TEST METHOD: NORMAL
ORGAN: NORMAL
SA1 CELL: NORMAL
SA1 COMMENTS: NORMAL
SA1 HI RISK ABY: NORMAL
SA1 MOD RISK ABY: NORMAL
SA1 TEST METHOD: NORMAL
SA2 CELL: NORMAL
SA2 COMMENTS: NORMAL
SA2 HI RISK ABY UA: NORMAL
SA2 MOD RISK ABY: NORMAL
SA2 TEST METHOD: NORMAL
UNACCEPTABLE ANTIGEN: NORMAL
UNOS CPRA: 90

## 2020-08-06 ENCOUNTER — TRANSFERRED RECORDS (OUTPATIENT)
Dept: HEALTH INFORMATION MANAGEMENT | Facility: CLINIC | Age: 55
End: 2020-08-06

## 2020-08-08 NOTE — TELEPHONE ENCOUNTER
80year old female who is postop day #1 S/P debridement of a necrotizing L groin wound. The wound is clean. There is no evidence of further spread of the infection or ongoing bleeding. Continue wet-to-dry dressing changes daily. Continue broad-spectrum antibiotics and adjust according to cultures as needed. ISSUE:  Creatinine elevated 1.84  Biopsy was recommended in February but patient wanted to hold off (per Care Everywhere notes)    PLAN:  Call and assess hydration status.  How much water is he drinking per day?  Any recent illness, diarrhea, s/s of a UTI, or medication changes?  Any increased intake of alcohol or caffeine?  Follows with an outside provider.  Recommend he increase hydration and follow up with his nephrologist    OUTCOME:  Attempted to contact Haider.  Phone number listed is not in service.  Left v/m for Rosana Torrez requesting Haider follow up with his primary nephrologist and contact us with the plan.

## 2020-10-21 ENCOUNTER — MEDICAL CORRESPONDENCE (OUTPATIENT)
Dept: HEALTH INFORMATION MANAGEMENT | Facility: CLINIC | Age: 55
End: 2020-10-21

## 2020-11-09 ENCOUNTER — TRANSFERRED RECORDS (OUTPATIENT)
Dept: HEALTH INFORMATION MANAGEMENT | Facility: CLINIC | Age: 55
End: 2020-11-09
Payer: MEDICARE

## 2020-12-27 ENCOUNTER — HEALTH MAINTENANCE LETTER (OUTPATIENT)
Age: 55
End: 2020-12-27

## 2021-01-05 ENCOUNTER — TRANSFERRED RECORDS (OUTPATIENT)
Dept: HEALTH INFORMATION MANAGEMENT | Facility: CLINIC | Age: 56
End: 2021-01-05
Payer: MEDICARE

## 2021-03-09 ENCOUNTER — TRANSFERRED RECORDS (OUTPATIENT)
Dept: HEALTH INFORMATION MANAGEMENT | Facility: CLINIC | Age: 56
End: 2021-03-09
Payer: MEDICARE

## 2021-03-09 LAB — TSH SERPL-ACNC: 0.4 MIU/L (ref 0.3–4.2)

## 2021-04-05 ENCOUNTER — TRANSFERRED RECORDS (OUTPATIENT)
Dept: HEALTH INFORMATION MANAGEMENT | Facility: CLINIC | Age: 56
End: 2021-04-05
Payer: MEDICARE

## 2021-04-16 ENCOUNTER — TRANSFERRED RECORDS (OUTPATIENT)
Dept: HEALTH INFORMATION MANAGEMENT | Facility: CLINIC | Age: 56
End: 2021-04-16

## 2021-04-18 ENCOUNTER — TELEPHONE (OUTPATIENT)
Dept: TRANSPLANT | Facility: CLINIC | Age: 56
End: 2021-04-18

## 2021-04-18 NOTE — TELEPHONE ENCOUNTER
ISSUE  Creatinine trending up to 1.95  Bicarb 19  Follows with Dr. Butler for transplant.    PLAN  Call Haider:  Ensure he is discussign elevated creatinine and low bicarb level with Dr. Butler.      LPN Task:  Please call with above plan.  Thanks!

## 2021-04-19 NOTE — TELEPHONE ENCOUNTER
Left message for patient regarding:  Creatinine trending up to 1.95  Bicarb 19  Follows with Dr. Butler for transplant.     PLAN  Call Haider:  Ensure he is discussign elevated creatinine and low bicarb level with Dr. Butler.

## 2021-04-24 ENCOUNTER — HEALTH MAINTENANCE LETTER (OUTPATIENT)
Age: 56
End: 2021-04-24

## 2021-04-28 ENCOUNTER — TRANSFERRED RECORDS (OUTPATIENT)
Dept: HEALTH INFORMATION MANAGEMENT | Facility: CLINIC | Age: 56
End: 2021-04-28
Payer: MEDICARE

## 2021-05-11 ENCOUNTER — TRANSFERRED RECORDS (OUTPATIENT)
Dept: HEALTH INFORMATION MANAGEMENT | Facility: CLINIC | Age: 56
End: 2021-05-11
Payer: MEDICARE

## 2021-07-15 ENCOUNTER — TRANSFERRED RECORDS (OUTPATIENT)
Dept: HEALTH INFORMATION MANAGEMENT | Facility: CLINIC | Age: 56
End: 2021-07-15
Payer: MEDICARE

## 2021-07-15 ENCOUNTER — TELEPHONE (OUTPATIENT)
Dept: TRANSPLANT | Facility: CLINIC | Age: 56
End: 2021-07-15

## 2021-07-15 LAB
CHOLESTEROL (EXTERNAL): 170 MG/DL
CREATININE (EXTERNAL): 2.19 MG/DL (ref 0.74–1.35)
GFR ESTIMATED (EXTERNAL): 33 ML/MIN/BSA
GFR ESTIMATED (IF AFRICAN AMERICAN) (EXTERNAL): 38 ML/MIN/BSA
GLUCOSE (EXTERNAL): 112 MG/DL (ref 70–140)
HDLC SERPL-MCNC: 40 MG/DL
LDL CHOLESTEROL CALCULATED (EXTERNAL): 96 MG/DL
NON HDL CHOLESTEROL (EXTERNAL): 130 MG/DL
POTASSIUM (EXTERNAL): 4.8 MMOL/L (ref 3.6–5.2)
TRIGLYCERIDES (EXTERNAL): 168 MG/DL

## 2021-07-15 NOTE — TELEPHONE ENCOUNTER
ISSUE  Creatinine 2.19, baseline 1.8-2.0.  Follows with Dr. Butler for transplant.    PLAN  Call Haider and advise he discuss elevated creatinine with Dr. Butler's team.    LPN Task:  Please call with above plan.  Thanks!

## 2021-07-22 ENCOUNTER — TRANSFERRED RECORDS (OUTPATIENT)
Dept: HEALTH INFORMATION MANAGEMENT | Facility: CLINIC | Age: 56
End: 2021-07-22
Payer: MEDICARE

## 2021-07-22 LAB
CREATININE (EXTERNAL): 2.19 MG/DL (ref 0.74–1.35)
GFR ESTIMATED (EXTERNAL): 33 ML/MIN/BSA
GFR ESTIMATED (IF AFRICAN AMERICAN) (EXTERNAL): 38 ML/MIN/BSA
GLUCOSE (EXTERNAL): 106 MG/DL (ref 70–140)
POTASSIUM (EXTERNAL): 4.7 MMOL/L (ref 3.6–5.2)

## 2021-07-28 ENCOUNTER — TRANSFERRED RECORDS (OUTPATIENT)
Dept: HEALTH INFORMATION MANAGEMENT | Facility: CLINIC | Age: 56
End: 2021-07-28
Payer: MEDICARE

## 2021-08-02 ENCOUNTER — TRANSFERRED RECORDS (OUTPATIENT)
Dept: HEALTH INFORMATION MANAGEMENT | Facility: CLINIC | Age: 56
End: 2021-08-02
Payer: MEDICARE

## 2021-08-02 LAB
CREATININE (EXTERNAL): 1.78 MG/DL (ref 0.74–1.35)
GFR ESTIMATED (EXTERNAL): 42 ML/MIN/BSA
GFR ESTIMATED (IF AFRICAN AMERICAN) (EXTERNAL): 49 ML/MIN/BSA
GLUCOSE (EXTERNAL): 108 MG/DL (ref 70–140)
POTASSIUM (EXTERNAL): 4.7 MMOL/L (ref 3.6–5.2)

## 2021-10-03 ENCOUNTER — HEALTH MAINTENANCE LETTER (OUTPATIENT)
Age: 56
End: 2021-10-03

## 2021-12-22 ENCOUNTER — TRANSFERRED RECORDS (OUTPATIENT)
Dept: HEALTH INFORMATION MANAGEMENT | Facility: CLINIC | Age: 56
End: 2021-12-22

## 2021-12-27 ENCOUNTER — TRANSFERRED RECORDS (OUTPATIENT)
Dept: HEALTH INFORMATION MANAGEMENT | Facility: CLINIC | Age: 56
End: 2021-12-27

## 2021-12-28 ENCOUNTER — TRANSFERRED RECORDS (OUTPATIENT)
Dept: HEALTH INFORMATION MANAGEMENT | Facility: CLINIC | Age: 56
End: 2021-12-28
Payer: MEDICARE

## 2022-03-03 ENCOUNTER — TRANSFERRED RECORDS (OUTPATIENT)
Dept: HEALTH INFORMATION MANAGEMENT | Facility: CLINIC | Age: 57
End: 2022-03-03

## 2022-03-03 LAB — TSH SERPL-ACNC: 1.1 MIU/L (ref 0.3–4.2)

## 2022-03-10 ENCOUNTER — TRANSFERRED RECORDS (OUTPATIENT)
Dept: HEALTH INFORMATION MANAGEMENT | Facility: CLINIC | Age: 57
End: 2022-03-10

## 2022-03-25 ENCOUNTER — TRANSFERRED RECORDS (OUTPATIENT)
Dept: HEALTH INFORMATION MANAGEMENT | Facility: CLINIC | Age: 57
End: 2022-03-25

## 2022-05-15 ENCOUNTER — HEALTH MAINTENANCE LETTER (OUTPATIENT)
Age: 57
End: 2022-05-15

## 2022-06-06 ENCOUNTER — TRANSFERRED RECORDS (OUTPATIENT)
Dept: HEALTH INFORMATION MANAGEMENT | Facility: CLINIC | Age: 57
End: 2022-06-06

## 2022-07-26 ENCOUNTER — TRANSFERRED RECORDS (OUTPATIENT)
Dept: HEALTH INFORMATION MANAGEMENT | Facility: CLINIC | Age: 57
End: 2022-07-26

## 2022-08-02 ENCOUNTER — TRANSFERRED RECORDS (OUTPATIENT)
Dept: HEALTH INFORMATION MANAGEMENT | Facility: CLINIC | Age: 57
End: 2022-08-02

## 2022-08-16 ENCOUNTER — TELEPHONE (OUTPATIENT)
Dept: TRANSPLANT | Facility: CLINIC | Age: 57
End: 2022-08-16

## 2022-08-16 NOTE — LETTER
PHYSICIAN ORDERS      DATE & TIME ISSUED: 2022 1:23 PM  PATIENT NAME: Haider Torrez   : 1965     [facility] MR# [if applicable]: [id number]     DIAGNOSIS:  Kidney transplant   ICD-10 CODE: Z94.0      Please repeat the following level in one week  BMP  Urinalysis and urine culture    Any questions please call: [phone] option 5    Please fax results to [phone].    .

## 2022-08-16 NOTE — TELEPHONE ENCOUNTER
Call placed to patient. Patient denies any illness. Note the his Dr added a new medication but he is unsure of the name. Patient v\u to improve hydration and repeat level in one week. Order sent

## 2022-08-16 NOTE — TELEPHONE ENCOUNTER
Creatinine = 2.36  (8/15/22)  Baseline 1.8-2.0    PLAN:  Check for recent illness.  Any fever?  Any missed medication?  Changes in medication, (phillip diuretics)?  Any pain over the transplanted kidney?  Any nausea / vomiting / diarrhea?  Dehydrated?   Instruct to improve hydration and recheck BMP and UA/UC next week.

## 2022-08-29 ENCOUNTER — TRANSFERRED RECORDS (OUTPATIENT)
Dept: HEALTH INFORMATION MANAGEMENT | Facility: CLINIC | Age: 57
End: 2022-08-29

## 2022-09-11 ENCOUNTER — HEALTH MAINTENANCE LETTER (OUTPATIENT)
Age: 57
End: 2022-09-11

## 2022-09-22 ENCOUNTER — TRANSFERRED RECORDS (OUTPATIENT)
Dept: HEALTH INFORMATION MANAGEMENT | Facility: CLINIC | Age: 57
End: 2022-09-22

## 2022-09-25 LAB
ALT SERPL-CCNC: 16 U/L (ref 7–55)
AST SERPL-CCNC: 28 U/L (ref 8–48)
CREATININE (EXTERNAL): 2.47 MG/DL (ref 0.74–1.35)
GFR ESTIMATED (EXTERNAL): 30 ML/MIN/BSA
GLUCOSE (EXTERNAL): 132 MG/DL (ref 70–140)
POTASSIUM (EXTERNAL): 3.6 MMOL/L (ref 3.6–5.2)

## 2022-09-29 ENCOUNTER — TRANSFERRED RECORDS (OUTPATIENT)
Dept: HEALTH INFORMATION MANAGEMENT | Facility: CLINIC | Age: 57
End: 2022-09-29

## 2022-09-30 ENCOUNTER — TRANSFERRED RECORDS (OUTPATIENT)
Dept: HEALTH INFORMATION MANAGEMENT | Facility: CLINIC | Age: 57
End: 2022-09-30

## 2022-09-30 LAB
CREATININE (EXTERNAL): 2.25 MG/DL (ref 0.74–1.35)
GFR ESTIMATED (EXTERNAL): 33 ML/MIN/BSA
GLUCOSE (EXTERNAL): 99 MG/DL (ref 70–140)
POTASSIUM (EXTERNAL): 4.7 MMOL/L (ref 3.6–5.2)

## 2022-10-09 ENCOUNTER — TRANSFERRED RECORDS (OUTPATIENT)
Dept: HEALTH INFORMATION MANAGEMENT | Facility: CLINIC | Age: 57
End: 2022-10-09

## 2022-10-09 LAB
ALT SERPL-CCNC: 34 U/L (ref 7–55)
AST SERPL-CCNC: 31 U/L (ref 8–48)
TSH SERPL-ACNC: 0.6 MIU/L (ref 0.3–4.2)

## 2022-10-17 LAB
CREATININE (EXTERNAL): 2.37 MG/DL (ref 0.74–1.35)
GFR ESTIMATED (EXTERNAL): 31 ML/MIN/BSA
GLUCOSE (EXTERNAL): 98 MG/DL (ref 70–140)
POTASSIUM (EXTERNAL): 4.3 MMOL/L (ref 3.6–5.2)

## 2022-10-24 ENCOUNTER — TRANSFERRED RECORDS (OUTPATIENT)
Dept: HEALTH INFORMATION MANAGEMENT | Facility: CLINIC | Age: 57
End: 2022-10-24

## 2022-10-26 ENCOUNTER — TRANSFERRED RECORDS (OUTPATIENT)
Dept: HEALTH INFORMATION MANAGEMENT | Facility: CLINIC | Age: 57
End: 2022-10-26

## 2022-11-01 ENCOUNTER — TRANSFERRED RECORDS (OUTPATIENT)
Dept: HEALTH INFORMATION MANAGEMENT | Facility: CLINIC | Age: 57
End: 2022-11-01

## 2022-11-02 ENCOUNTER — TRANSFERRED RECORDS (OUTPATIENT)
Dept: HEALTH INFORMATION MANAGEMENT | Facility: CLINIC | Age: 57
End: 2022-11-02

## 2022-11-21 ENCOUNTER — DOCUMENTATION ONLY (OUTPATIENT)
Dept: TRANSPLANT | Facility: CLINIC | Age: 57
End: 2022-11-21

## 2022-11-21 NOTE — PROGRESS NOTES
Received notification of failed kidney from [facility].    Fail is indicated by retransplant.  Date of organ failure was reported as (not fully known, but retransplant date 10/13/2015).  Cause of failure is reported as unknown.    TIS verfication is pending.

## 2022-12-26 PROBLEM — U07.1 CLINICAL DIAGNOSIS OF COVID-19: Status: ACTIVE | Noted: 2022-09-22

## 2023-01-13 ENCOUNTER — TELEPHONE (OUTPATIENT)
Dept: TRANSPLANT | Facility: CLINIC | Age: 58
End: 2023-01-13
Payer: MEDICARE

## 2023-01-13 NOTE — LETTER
PHYSICIAN ORDERS      DATE & TIME ISSUED: 23 3:25 PM   PATIENT NAME: Haider Torrez   : 1965     [facility] MR# [if applicable]: [id number]     DIAGNOSIS:  Kidney transplant   ICD-10 CODE: Z94.0      With next set of labs please draw DSA/PRA (instructions below,  kit provided by patient):             DSA/PRA     1. Please draw 20ml of blood in red top (plain) tube for Antileukocyte Antibody (ALA / PRA).  2. Label tubes with the patient s name, complete lab slip.    3. Mailers, lab slips with instructions are sent to patient separately.  4. Call the Outreach Lab at [phone] to reorder mailers.  5. Mail blood to (this address is also on the mailers):     IMMUNOLOGY LABORATORY  [facility]  [address] [facility] [address]                         [address] [place]  [address]      Any questions please call: [phone] option 5    Please fax results to [phone].      Janel Winn MD

## 2023-01-13 NOTE — TELEPHONE ENCOUNTER
ISSUE  Call from Dr. Butler, nephrologist at [facility] in [place]. Pt has not been following the the transplant team for many years due to insurance issues.    Per Dr. Butler pt was hospitalized in October 2022 with COVID, readmitted with fevers and diarrhea. He was found to have a positive CMV PCR. Biopsies completed and negative for CMV. CMV level has been slowly increasing (79 in November, 135 December, now 138). Denies any current symptoms but creatinine was previously stable around 2.2 has risen to 2.6.     Current IS confirmed and he is taking MPA 540mg/360mg BID, prednisone 5mg, tacrolimus goal 4-6.     No hx DSA and had a biopsy in 2020 with no evidence of rejection.    Dr. Butler looking for recommendations/input from transplant team on management of CMV    PLAN  Will review with team and call Dr. Butler back at provided cell #[phone]

## 2023-01-20 DIAGNOSIS — Z94.0 KIDNEY TRANSPLANTED: Primary | ICD-10-CM

## 2023-01-20 NOTE — TELEPHONE ENCOUNTER
Reviewed with Dr. Edmar Barrios.     If no symptoms and CMV staining negative for CMV colitis then no recommendations to treat with Valycte at this time.     If pt develops symptoms or does have CMV colitis would recommend treatment with Valcyte.     For now remain on /360 BID, check a level to ensure at goal  Ensure tacrolimus level at goal and that it is being followed    Check CMV weekly until it is negative x2    Update DSA.     OUTCOME  Voicemail left for Dr. Butler with these recommendations. Call back number provided for questions.     Contacted pt and also reviewed these recommendations. He confirmed he does not have any symptoms. Reviewed getting an updated DSA and will mail him a kit that he will bring with to his next lab appointment.

## 2023-02-01 ENCOUNTER — LAB (OUTPATIENT)
Dept: LAB | Facility: CLINIC | Age: 58
End: 2023-02-01
Payer: MEDICARE

## 2023-02-01 DIAGNOSIS — Z94.0 KIDNEY TRANSPLANTED: ICD-10-CM

## 2023-02-01 PROCEDURE — 86832 HLA CLASS I HIGH DEFIN QUAL: CPT

## 2023-02-01 PROCEDURE — 86833 HLA CLASS II HIGH DEFIN QUAL: CPT

## 2023-02-07 LAB
DONOR IDENTIFICATION: NORMAL
DSA COMMENTS: NORMAL
DSA PRESENT: NO
DSA TEST METHOD: NORMAL
ORGAN: NORMAL
SA 1 CELL: NORMAL
SA 1 TEST METHOD: NORMAL
SA 2 CELL: NORMAL
SA 2 TEST METHOD: NORMAL
SA1 HI RISK ABY: NORMAL
SA1 MOD RISK ABY: NORMAL
SA2 HI RISK ABY: NORMAL
SA2 MOD RISK ABY: NORMAL
UNACCEPTABLE ANTIGENS: NORMAL
UNOS CPRA: 90
ZZZSA 1  COMMENTS: NORMAL
ZZZSA 2 COMMENTS: NORMAL

## 2023-02-08 ENCOUNTER — LAB (OUTPATIENT)
Dept: LAB | Facility: CLINIC | Age: 58
End: 2023-02-08
Payer: MEDICARE

## 2023-02-08 DIAGNOSIS — Z94.0 KIDNEY REPLACED BY TRANSPLANT: ICD-10-CM

## 2023-02-08 PROCEDURE — 86833 HLA CLASS II HIGH DEFIN QUAL: CPT

## 2023-02-08 PROCEDURE — 86832 HLA CLASS I HIGH DEFIN QUAL: CPT

## 2023-02-10 DIAGNOSIS — Z94.0 KIDNEY REPLACED BY TRANSPLANT: Primary | ICD-10-CM

## 2023-02-15 ENCOUNTER — LAB (OUTPATIENT)
Dept: LAB | Facility: CLINIC | Age: 58
End: 2023-02-15
Payer: MEDICARE

## 2023-02-15 DIAGNOSIS — Z94.0 KIDNEY REPLACED BY TRANSPLANT: ICD-10-CM

## 2023-02-15 LAB
DONOR IDENTIFICATION: NORMAL
DSA COMMENTS: NORMAL
DSA PRESENT: NO
DSA TEST METHOD: NORMAL
ORGAN: NORMAL
SA 1 CELL: NORMAL
SA 1 TEST METHOD: NORMAL
SA 2 CELL: NORMAL
SA 2 TEST METHOD: NORMAL
SA1 HI RISK ABY: NORMAL
SA1 MOD RISK ABY: NORMAL
SA2 HI RISK ABY: NORMAL
SA2 MOD RISK ABY: NORMAL
UNACCEPTABLE ANTIGENS: NORMAL
UNOS CPRA: 91
ZZZSA 1  COMMENTS: NORMAL
ZZZSA 2 COMMENTS: NORMAL

## 2023-02-15 PROCEDURE — 86832 HLA CLASS I HIGH DEFIN QUAL: CPT

## 2023-02-15 PROCEDURE — 86833 HLA CLASS II HIGH DEFIN QUAL: CPT

## 2023-03-01 ENCOUNTER — TELEPHONE (OUTPATIENT)
Dept: TRANSPLANT | Facility: CLINIC | Age: 58
End: 2023-03-01
Payer: MEDICARE

## 2023-03-01 DIAGNOSIS — B25.9 CMV (CYTOMEGALOVIRUS INFECTION) (H): Primary | ICD-10-CM

## 2023-03-01 DIAGNOSIS — Z94.0 KIDNEY REPLACED BY TRANSPLANT: ICD-10-CM

## 2023-03-01 DIAGNOSIS — Z94.0 KIDNEY TRANSPLANTED: ICD-10-CM

## 2023-03-01 NOTE — TELEPHONE ENCOUNTER
Contacted pt to review DSA lab that we received 3 results of. He has been getting weekly CMV check through local nephrologist and when we first requested an updated DSA he had a misunderstanding that he needed these weekly as well. He had kits from several years ago he had been using. Advised no more DSA needed. Spoke to  at immunology to ensure they do not run any future DSA without speaking to me, primary RNCC.     Also reviewed with pt to see if he would have interest in re-establishing care with us due to recent CMV. He states he stopped seeing us years ago when he received a letter in the mail stating he could no longer have appts with the [facility] once he became a [place] resident.     Will review with [facility] to confirm, however he has medicare and there should be no problem with him continuing to follow up with us. Will place orders for virtual visit to re-establish care.     Call placed to pt to inform him of recommended appt on 3/7/23 in new open virtual spots. Will advise he double check with his insurance that it is covered but that we do not foresee any issues with it. Voicemail left for him with these details along with a call back number for myself if any questions.

## 2023-03-07 ENCOUNTER — VIRTUAL VISIT (OUTPATIENT)
Dept: NEPHROLOGY | Facility: CLINIC | Age: 58
End: 2023-03-07
Attending: INTERNAL MEDICINE
Payer: MEDICARE

## 2023-03-07 VITALS — WEIGHT: 130 LBS | HEIGHT: 65 IN | BODY MASS INDEX: 21.66 KG/M2

## 2023-03-07 DIAGNOSIS — Z94.0 HTN, KIDNEY TRANSPLANT RELATED: ICD-10-CM

## 2023-03-07 DIAGNOSIS — D84.9 IMMUNOSUPPRESSION (H): ICD-10-CM

## 2023-03-07 DIAGNOSIS — K59.1 FUNCTIONAL DIARRHEA: ICD-10-CM

## 2023-03-07 DIAGNOSIS — B25.9 CYTOMEGALOVIRUS (CMV) VIREMIA (H): ICD-10-CM

## 2023-03-07 DIAGNOSIS — E87.20 METABOLIC ACIDOSIS: ICD-10-CM

## 2023-03-07 DIAGNOSIS — D61.818 PANCYTOPENIA (H): ICD-10-CM

## 2023-03-07 DIAGNOSIS — Z48.298 AFTERCARE FOLLOWING ORGAN TRANSPLANT: ICD-10-CM

## 2023-03-07 DIAGNOSIS — Z94.0 KIDNEY REPLACED BY TRANSPLANT: Primary | ICD-10-CM

## 2023-03-07 DIAGNOSIS — R80.1 PERSISTENT PROTEINURIA: ICD-10-CM

## 2023-03-07 DIAGNOSIS — I15.1 HTN, KIDNEY TRANSPLANT RELATED: ICD-10-CM

## 2023-03-07 PROBLEM — U07.1 CLINICAL DIAGNOSIS OF COVID-19: Status: RESOLVED | Noted: 2022-09-22 | Resolved: 2023-03-07

## 2023-03-07 PROCEDURE — 99205 OFFICE O/P NEW HI 60 MIN: CPT | Mod: VID | Performed by: INTERNAL MEDICINE

## 2023-03-07 RX ORDER — SWAB
1 SWAB, NON-MEDICATED MISCELLANEOUS DAILY
COMMUNITY
Start: 2023-03-07

## 2023-03-07 RX ORDER — MYCOPHENOLIC ACID 180 MG/1
540 TABLET, DELAYED RELEASE ORAL 2 TIMES DAILY
Qty: 450 TABLET | Refills: 3 | COMMUNITY
Start: 2023-03-07 | End: 2023-10-02

## 2023-03-07 RX ORDER — LEVOTHYROXINE SODIUM 100 UG/1
100 TABLET ORAL DAILY
COMMUNITY
Start: 2023-03-07 | End: 2023-12-20

## 2023-03-07 RX ORDER — SODIUM BICARBONATE 650 MG/1
1300 TABLET ORAL 2 TIMES DAILY
COMMUNITY
Start: 2023-03-07 | End: 2023-10-02

## 2023-03-07 RX ORDER — TACROLIMUS 0.5 MG/1
CAPSULE ORAL
Status: ON HOLD | COMMUNITY
Start: 2023-03-07 | End: 2023-10-18

## 2023-03-07 RX ORDER — LOPERAMIDE HYDROCHLORIDE 2 MG/1
2 TABLET ORAL 3 TIMES DAILY PRN
COMMUNITY
Start: 2023-03-07 | End: 2023-03-07

## 2023-03-07 RX ORDER — FOLIC ACID 1 MG/1
1 TABLET ORAL DAILY
COMMUNITY
Start: 2023-03-07

## 2023-03-07 RX ORDER — PREDNISONE 5 MG/1
5 TABLET ORAL DAILY
COMMUNITY
Start: 2023-03-07

## 2023-03-07 RX ORDER — PANTOPRAZOLE SODIUM 40 MG/1
40 TABLET, DELAYED RELEASE ORAL DAILY
Qty: 30 TABLET | Refills: 0 | COMMUNITY
Start: 2023-03-07

## 2023-03-07 RX ORDER — CARVEDILOL 25 MG/1
25 TABLET ORAL 2 TIMES DAILY WITH MEALS
COMMUNITY
Start: 2023-03-07

## 2023-03-07 RX ORDER — ROPINIROLE 1 MG/1
1 TABLET, FILM COATED ORAL AT BEDTIME
COMMUNITY
Start: 2023-03-07

## 2023-03-07 RX ORDER — TACROLIMUS 1 MG/1
CAPSULE ORAL
Qty: 180 CAPSULE | Refills: 3 | Status: ON HOLD | COMMUNITY
Start: 2023-03-07 | End: 2023-10-18

## 2023-03-07 RX ORDER — CLONAZEPAM 0.5 MG/1
0.5 TABLET ORAL AT BEDTIME
COMMUNITY
Start: 2023-03-07

## 2023-03-07 ASSESSMENT — PAIN SCALES - GENERAL: PAINLEVEL: NO PAIN (0)

## 2023-03-07 NOTE — PATIENT INSTRUCTIONS
Patient Recommendations:  - Take loperamide off of your medication list  -Decrease mycophenolate to 360mg twice daily  -Check your blood pressures at home and let us know if they are higher than 130/80  -Labs weekly for 4 weeks starting 3/13/23    Transplant Patient Information  Your Post Transplant Coordinator is: Lorena Samuels  For non urgent items, we encourage you to contact your coordinator/care team online via V[employer]  You and your care team can also contact your transplant coordinator Monday - Friday, 8am - 5pm at [phone] (Option 2 to reach the coordinator or Option 4 to schedule an appointment).  After hours for urgent matters, please call [facility] at [phone].

## 2023-03-07 NOTE — LETTER
3/7/2023       RE: Haider Torrez  [address]  [facility] [address]     Dear Colleague,    Thank you for referring your patient, Haider Torrez, to the [facility] [place] at [facility]. Please see a copy of my visit note below.    Video-Visit Details    Type of service:  Video Visit    Video Start Time (time video started): 2:38 PM    Video End Time (time video stopped): 3:14 PM      Originating Location (pt. Location): Home    Distant Location (provider location):  On-site    Mode of Communication:  Video Conference via [employer] [place]      TRANSPLANT NEPHROLOGY CONSULT NOTE    Assessment & Plan   # DDKT: Trend down after IV fluids for diarrhea   - Baseline Creatinine  ~ 2.1-2.5   - Proteinuria: Moderate (1-3 grams), 1.22g/g 2/1/23   - Date DSA Last Checked: Feb/2023      Latest DSA: No   - BK Viremia: Yes, detectable, but less than quantifiable, recheck now    - Kidney Tx Biopsy: Jun 25, 2020; Result:  severe hyaline arteriosclerosis, no evidence of acute cellular or humoral rejection, mild and patchy interstitial fibrosis with associated tubular atrophy involving less than 10% of cortical area, evolving transplant glomerulopathy. 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   -MPA level 2.4 on 3/2/23 and tac level 6.9.    - Changes: Yes - decrease MPA to 360mg bid. If diarrhea returns I would blame this on MPA toxicity as it was already suggested in the colonoscopy 10/2022 and would consider switching to mTORi . I don't think he would tolerate AZA 2/2 low WBC at baseline.     # Infection Prophylaxis:   - PJP: Sulfa/TMP (Bactrim)    # Diarrhea:   -Colonoscopy  10/2022 with mild archietectural distortion, focal crypt degeneration. Negative stain for CMV   -Recently admitted 3/1-3/3/23 for diarrhea and MATTHEW, found + for norovirus   -Per patient, resolved since he was discharged on 3/3 and hasn't returned    # CMV viremia:   -Noted to have CMV viremia on 10/9/22 with 41 copies, up to 736 copies on 3/2/23. This hase been managed conservatively   -Pt was CMV IgG+ at time of transplant. Recheck now    -Recommend decreasing MPA to 360mg bid now, checking CMV PCR weekly for 4 weeks . If CMV rises despite decreasing MPA may need to consider decreasing further vs starting valcyte   -Check total IgG     #Pancytopenia:   -Worse in most recent hospitalization. Pt's WBC usually ~4k, and was down to 2.3 on 3/3   -Possibly 2/2 acute illness but may be 2/2 MPA vs CMV    -platelets are usually ~120k, Hb 8-11   -Decrease MPA as above, monitor CMV   -Repeat labs with diff, check parvovirus PCR, B12 level     # Hypertension: Borderline control;  Goal BP: < 130/80   - Changes: Not at this time. Continue coreg 25mg bid. Pt does have proteinuria but would not trial ACEi as I think he will develop MATTHEW with the severe hyalinosis he has on biopsy in 6/2020    # Anemia in Chronic Renal Disease: Hgb: Trend down      DANNY: No   - Iron studies: Not checked recently. Check ferritin, iron panel     # Mineral Bone Disorder:   - Secondary renal hyperparathyroidism; PTH level: Mildly elevated (151-300 pg/ml)        On treatment: None, recheck   - Vitamin D; level: Not checked recently, but was normal last check        On supplement: Yes  - Calcium; level: Normal corrected for albumin       On supplement: No  - Phosphorus; level: Normal        On supplement: No   -Check vitamin D, PTH     # Electrolytes:   - Potassium; level: Normal        On supplement: No  - Magnesium; level: Not checked recently, but was low last check        On supplement: No, check mag   - Bicarbonate; level: Low        On supplement:  Yes, bicarb 1300mg bid    # NAGMA:    -Thought 2/2 diarrhea. Started on bicarb 1300mg bid   -Recheck labs and increase bicarb if necessary    # Proteinuria:   -1.2g/g in previous check in 2/2023   -Obtain A1c, SPEP w/ ifx, K/L.    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   -He went to the dermatology in 6/2022. No history of skin CA    # Medical Compliance: Yes    # COVID-19 Virus Review: Discussed COVID-19 virus and the potential medical risks.  Reviewed preventative health recommendations, including wearing a mask where appropriate.  Recommended COVID vaccination should be up to date with either an initial vaccination or booster shot when appropriate.  Asked the patient to inform the transplant center if they are exposed or diagnosed with this virus.    # COVID Vaccination Up To Date: Yes     # Transplant History:  Etiology of Kidney Failure: IgA nephropathy  Tx: DDKT  Transplant: 10/13/2015 (Kidney), 4/15/2004 (Kidney)  Significant changes in immunosuppression: None  Significant transplant-related complications: BK Viremia and CMV Viremia      Transplant Office Phone Number: [phone]    Assessment and plan was discussed with the patient and he voiced his understanding and agreement.    Return visit: Return in about 6 months (around 9/7/2023).    Maurice Bullard MD      Reason for Consult   Haider Torrez was seen in consultation at the request of Dr. Butler for kidney transplant, immunosuppression management, follow-up after hospitalization and CMV viremia.    Chief Complaint   Mr. Torrez is a 57 year old here for kidney transplant, immunosuppression management, new medical concerns and CMV viremia.    History of Present Illness    Mr. Torrez is a 57yM w/ PMH of IgAN s/p DDKT #2  10/13/2015, history of papillary thyroid CA in 2011 s/p resection, history of thyroid storm, rATG induction, no DSA at transplant, admission 10/9-10/18/22 for fever and diarrhea, put on broad spectrum abx, COVID was negative (previously had admit for COVID), bronhoscopy performed and was negative. Noted to have CMV viremia on 10/9/22 with 41 copies, up to 736 copies on 3/2/23. This hase been managed conservatively. He was again admitted 3/1-3/3/23 with  MATTHEW up to 2.97, bicarb 13, found on enteric panel to have +norovirus. The patient was given IV fluids with improvement of Scr to 2.1 on discharge.     Since discharge he has been having formed stools. He was having multiple stools daily when he went in but that stopped. He didn't receive any medications that I can see for norovirus.     Currently he feels well. He denies orthostasis. He denies fevers, chills, shortness of breath, chest pain, LE edema, abdominal pain. He lost 12lbs with the diarrhea but gained some of it back. He was having diarrhea for 2.5 weeks and was having diarrhea about 5x/day.     Recent Hospitalizations:  [] No [x] Yes norovirus diarrhea, MATTHEW, fever   New Medical Issues: [] No [x] Yes CMV viremia   Decreased energy: [x] No [] Yes    Chest pain or SOB with exertion:  [x] No [] Yes    Appetite change or weight change: [] No [x] Yes Lost weight with diarrhea starting 2/2023 but then gained it back after discharge   Nausea, vomiting or diarrhea:  [] No [x] Yes Improved following 3/3 hospitalization.    Fever, sweats or chills: [x] No [] Yes    Leg swelling: [x] No [] Yes      Home BP: 130s systolic    Review of Systems   A comprehensive review of systems was obtained and negative, except as noted in the HPI or PMH.    Problem List   Patient Active Problem List   Diagnosis     IgA nephropathy     Restless leg syndrome     Dyslipidemia     Myoclonus     Secondary renal hyperparathyroidism (H)     Immunosuppressed status (H)     Kidney replaced by  transplant     DVT (deep venous thrombosis), right     Hypothyroidism     Hypophosphatemia     High risk medication use     Papillary thyroid carcinoma (H)     Hypomagnesemia     Clinical diagnosis of COVID-19       Past Medical History    Past Medical History:   Diagnosis Date     Anemia of chronic kidney failure      Carpal tunnel syndrome      Clinical diagnosis of COVID-19 2022     Dyslipidemia      History of respiratory failure      Hypertension      Hypomagnesemia      IgA nephropathy      Insomnia      Kidney disease, chronic, end stage on dialysis (H)      Kidney transplant failure      Myoclonus      Obstructive sleep apnea      Papillary thyroid carcinoma (H)      PRES (posterior reversible encephalopathy syndrome)     2011 after thyroidectomy     Restless leg syndrome      Secondary hyperparathyroidism (H)      Seizure disorder (H)     Post surgery. Keppra now discontinued     Urinoma of kidney transplant        Past Surgical History   Past Surgical History:   Procedure Laterality Date     BENCH KIDNEY  10/13/2015    Procedure: BENCH KIDNEY;  Surgeon: Seth Gibbons MD;  Location: [facility] OR     CREATE FISTULA ARTERIOVENOUS UPPER EXTREMITY       CYSTOSCOPY, REMOVE STENT(S), COMBINED N/A 2015    Procedure: COMBINED CYSTOSCOPY, REMOVE STENT(S);  Surgeon: Seth Gibbons MD;  Location: [facility] OR     CYSTOSCOPY, REMOVE STENT(S), COMBINED Bilateral 2015    Procedure: COMBINED CYSTOSCOPY, REMOVE STENT(S);  Surgeon: Seth Gibbons MD;  Location: [facility] OR     ELBOW SURGERY Right      hemithyroidectomy Left      HERNIA REPAIR Right 3/2009     THYROIDECTOMY       TRANSPLANT KIDNEY RECIPIENT  DONOR       TRANSPLANT KIDNEY RECIPIENT  DONOR N/A 10/13/2015    Procedure: TRANSPLANT KIDNEY RECIPIENT  DONOR;  Surgeon: Seth Gibbons MD;  Location: [facility] OR     ureteropyelostomy  2004       Family History   Family History   Problem Relation Age  of Onset     Diabetes Mother      Other Cancer Father         lung        Social History   Social History     Tobacco Use     Smoking status: Never     Smokeless tobacco: Never   Substance Use Topics     Alcohol use: No     Drug use: No       Allergies   Allergies   Allergen Reactions     Hydralazine Swelling     Betadine [Povidone Iodine]      Cephalexin Hives     Clindamycin Hives     Trazodone Swelling     Swelling of face/lips       Medications   Current Outpatient Medications   Medication Sig     carvedilol (COREG) 25 MG tablet Take 1 tablet (25 mg) by mouth 2 times daily (with meals)     clonazePAM (KLONOPIN) 0.5 MG tablet Take 1 tablet (0.5 mg) by mouth At Bedtime     folic acid (FOLVITE) 1 MG tablet Take 1 tablet (1 mg) by mouth daily     levothyroxine (SYNTHROID/LEVOTHROID) 100 MCG tablet Take 1 tablet (100 mcg) by mouth daily     MYFORTIC (BRAND) 180 MG EC tablet Take 3 tablets (540 mg) by mouth 2 times daily     pantoprazole (PROTONIX) 40 MG EC tablet Take 1 tablet (40 mg) by mouth daily Take 30-60 minutes before a meal.     predniSONE (DELTASONE) 5 MG tablet Take 1 tablet (5 mg) by mouth daily     rOPINIRole (REQUIP) 1 MG tablet Take 1 tablet (1 mg) by mouth At Bedtime     sodium bicarbonate 650 MG tablet Take 2 tablets (1,300 mg) by mouth 2 times daily     sulfamethoxazole-trimethoprim (BACTRIM/SEPTRA) 400-80 MG per tablet Take 1 tablet by mouth daily     tacrolimus (GENERIC EQUIVALENT) 0.5 MG capsule Take 1 capsule (0.5 mg) by mouth 2 times daily Total dose 3mg in AM and 2.5mg in PM     tacrolimus (GENERIC EQUIVALENT) 1 MG capsule Take 3 capsules (3 mg) by mouth 2 times daily Total dose 3mg in AM and 2.5mg in PM     vitamin D3 (CHOLECALCIFEROL) 10 MCG (400 UNIT) capsule Take 1 capsule (400 Units) by mouth daily     No current facility-administered medications for this visit.     Medications Discontinued During This Encounter   Medication Reason     cholecalciferol (VITAMIN  -D) 1000 UNITS capsule       "FOLIC ACID PO      CLONAZEPAM PO      LEVOTHYROXINE SODIUM PO      MYFORTIC (BRAND) 180 MG EC TABLET      pantoprazole (PROTONIX) 40 MG enteric coated tablet      PREDNISONE PO      ROPINIRole HCl (REQUIP PO)      tacrolimus (GENERIC EQUIVALENT) 1 MG capsule      FLUOXETINE HCL PO      loperamide (IMODIUM A-D) 2 MG tablet        Physical Exam   Vital Signs: Ht 1.651 m (5' 5\")   Wt 59 kg (130 lb)   BMI 21.63 kg/m      GENERAL APPEARANCE: alert and no distress  HENT: no obvious abnormalities on appearance  RESP: breathing appears unremarkable with normal rate, no audible wheezing or cough and no apparent shortness of breath with conversation  MS: extremities normal - no gross deformities noted  SKIN: no apparent rash and normal skin tone  NEURO: speech is clear with no obvious neurological deficits  PSYCH: mentation appears normal and affect normal      Data     Renal Latest Ref Rng & Units 2/1/2023 1/11/2023 10/24/2022   SODIUM 133 - 144 mmol/L - - -   Na (external) 135 - 145 mmol/L 143 144 143   K 3.4 - 5.3 mmol/L - - -   K (external) 3.6 - 5.2 mmol/L 4.8 5.2 4.6   Cl 98 - 107 mmol/L 113(H) 115(H) 111(H)   Cl (external) 98 - 107 mmol/L 113(H) 115(H) 111(H)   CO2 20 - 32 mmol/L - - -   CO2 (external) 22 - 29 mmol/L 18(L) 18(L) 23   UREA NITROGEN 7 - 30 mg/dL - - -   BUN (external) 8 - 24 mg/dL 55(H) 51(H) 40(H)   CREATININE 0.66 - 1.25 mg/dL - - -   Cr (external) 0.74 - 1.35 mg/dL 2.47(H) 2.57(H) 2.34(H)   Glucose 70 - 99 mg/dL - - -   Glucose (external) 70 - 140 mg/dL 108 92 99   CALCIUM, TOTAL 8.5 - 10.1 mg/dL - - -   Ca (external) 8.6 - 10.0 mg/dL 8.4(L) 8.7 8.6   MAGNESIUM 1.6 - 2.3 mg/dL - - -   Mg (external) 1.7 - 2.3 mg/dL - - -     Bone Health Latest Ref Rng & Units 7/26/2022 7/15/2021 1/5/2021   PHOSPHORUS 2.5 - 4.5 mg/dL - - -   Phos (external) 2.5 - 4.5 mg/dL 3.9 3.6 -   PARATHYROID HORMONE INTACT 18 - 80 pg/mL - - -   PTHi (external) 15 - 65 pg/mL 253(H) 151(H) 230(H)   Vit D Def 20 - 75 ug/L - - - "     Heme Latest Ref Rng & Units 2/1/2023 10/24/2022 7/26/2022   WBC 4.0 - 11.0 10e9/L - - -   WBC (external) 3.4 - 9.6 x10(9)uL 3.6 4.6 4.4   Hgb 13.3 - 17.7 g/dL - - -   Hgb (external) 13.2 - 16.6 g/dL 10.8(L) 8.1(L) 11.8(L)   Plt 150 - 450 10e9/L - - -   Plt (external) 135 - 317 x10(9)uL 120(L) 243 115(L)   ABSOLUTE NEUTROPHIL 1.6 - 8.3 10e9/L - - -   ABSOLUTE NEUTROPHILS (EXTERNAL) 1.56 - 6.45 x10(9)uL 2.76 3.42 3.21   ABSOLUTE LYMPHOCYTES 0.8 - 5.3 10e9/L - - -   ABSOLUTE LYMPHOCYTES (EXTERNAL) 0.95 - 3.07 x10(9)uL 0.34(L) 0.52(L) 0.58(L)   ABSOLUTE MONOCYTES 0.0 - 1.3 10e9/L - - -   ABSOLUTE MONOCYTES (EXTERNAL) 0.26 - 0.81 x10(9)uL 0.47 0.55 0.53   ABSOLUTE EOSINOPHILS 0.0 - 0.7 10e9/L - - -   ABSOLUTE EOSINOPHILS (EXTERNAL) 0.03 - 0.48 x10(9)uL 0.03 0.05 0.03   ABSOLUTE BASOPHILS 0.0 - 0.2 10e9/L - - -   ABSOLUTE BASOPHILS (EXTERNAL) 0.01 - 0.08 x10(9)uL <0.03 <0.03 <0.03   ABS IMMATURE GRANULOCYTES 0 - 0.4 10e9/L - - -     Liver Latest Ref Rng & Units 10/9/2022 9/25/2022 7/26/2022   AP 40 - 150 U/L - - -   AP (external) 40 - 129 U/L - - -   TBili 0.2 - 1.3 mg/dL - - -   TBili (external) <=1.2 mg/dL - - -   DBili (external) 0.0 - 0.3 mg/dL - - -   ALT 0 - 70 U/L - - -   ALT (external) 7 - 55 U/L 34 16 -   AST 0 - 45 U/L - - -   AST (external) 8 - 48 U/L 31 28 -   Tot Protein 6.8 - 8.8 g/dL - - -   Tot Protein (external) 6.3 - 7.9 g/dL - - -   ALBUMIN 3.4 - 5.0 g/dL - - -   Albumin (external) 3.5 - 5.0 g/dL - - 4.0     Pancreas Latest Ref Rng & Units 7/26/2022 7/15/2021 7/12/2019   A1C 4.3 - 6.0 % - - -   A1C (external) 4.2 - 5.6 % 5.6 5.8(H) 5.5     Iron studies Latest Ref Rng & Units 3/27/2018 10/17/2015   Iron 35 - 180 ug/dL 67 25(L)   IRON SATURATION INDEX 15 - 46 % 24 12(L)   FERRITIN 26 - 388 ng/mL 695(H) 883(H)     [facility] Txp Virology Latest Ref Rng & Units 2/15/2023 2/8/2023 1/11/2023   CMV DNA Quant Ext Undetected IU/mL 301(A) 311(A) 138(A)   LOG IU/ML OF CMVQNT (EXTERNAL) log IU/mL 2.48 2.49 2.14   BK  Spec - - - -   BK Res BKNEG copies/mL - - -   BK Log <2.7 Log copies/mL - - -   BK Quant Log Ext log IU/mL - - <1.34   BK Quant Result Ext Undetected IU/mL - - <22(A)   BK Quant Spec Ext - - - Plasma   BK Qual UR Ext Copies/ml - - -   EBV CAPSID ANTIBODY IGG 0.0 - 0.8 AI - - -   Hep B Core NR - - -   Hep B Core Ext Negative - - -   Hep B Surf Ext  - - - -        Recent Labs   Lab Test 09/26/17  1508 02/05/18  0710 02/28/18  0705   DOSTAC 730a 9.26.2017 2100 02/04/18 02/27/18  1930   TACROL 6.1 8.1 5.0     Recent Labs   Lab Test 10/19/15  0701 10/21/15  0648   DOSMPA 2,100 1,900   MPACID 1.14 1.52   MPAG 92.7 93.8       Again, thank you for allowing me to participate in the care of your patient.      Sincerely,    Maurice Bullard MD

## 2023-03-07 NOTE — NURSING NOTE
Is the patient currently in the state of [place]? NO [place]    Visit mode:TELEPHONE    If the visit is dropped, the patient can be reconnected by: VIDEO VISIT: Text to cell phone: [phone]    Will anyone else be joining the visit? NO      How would you like to obtain your AVS? MyChart    Are changes needed to the allergy or medication list? NO    Reason for visit: return

## 2023-03-07 NOTE — PROGRESS NOTES
Video-Visit Details    Type of service:  Video Visit    Video Start Time (time video started): 2:38 PM    Video End Time (time video stopped): 3:14 PM      Originating Location (pt. Location): Home    Distant Location (provider location):  On-site    Mode of Communication:  Video Conference via [email or web address]      TRANSPLANT NEPHROLOGY CONSULT NOTE    Assessment & Plan   # DDKT: Trend down after IV fluids for diarrhea   - Baseline Creatinine  ~ 2.1-2.5   - Proteinuria: Moderate (1-3 grams), 1.22g/g 2/1/23   - Date DSA Last Checked: Feb/2023      Latest DSA: No   - BK Viremia: Yes, detectable, but less than quantifiable, recheck now    - Kidney Tx Biopsy: Jun 25, 2020; Result:  severe hyaline arteriosclerosis, no evidence of acute cellular or humoral rejection, mild and patchy interstitial fibrosis with associated tubular atrophy involving less than 10% of cortical area, evolving transplant glomerulopathy. 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   -MPA level 2.4 on 3/2/23 and tac level 6.9.    - Changes: Yes - decrease MPA to 360mg bid. If diarrhea returns I would blame this on MPA toxicity as it was already suggested in the colonoscopy 10/2022 and would consider switching to mTORi . I don't think he would tolerate AZA 2/2 low WBC at baseline.     # Infection Prophylaxis:   - PJP: Sulfa/TMP (Bactrim)    # Diarrhea:   -Colonoscopy 10/2022 with mild archietectural distortion, focal crypt degeneration. 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 This hase been managed conservatively   -Pt was CMV IgG+ at time of transplant. Recheck now    -Recommend decreasing MPA to 360mg bid now, checking CMV PCR weekly for 4 weeks . If CMV rises despite decreasing MPA may need to consider decreasing further vs starting valcyte   -Check total IgG     #Pancytopenia:   -Worse in most recent hospitalization. Pt's WBC usually ~4k, and was down to 2.3 on 3/3   -Possibly 2/2 acute illness but may be 2/2 MPA vs CMV    -platelets are usually ~120k, Hb 8-11   -Decrease MPA as above, monitor CMV   -Repeat labs with diff, check parvovirus PCR, B12 level     # Hypertension: Borderline control;  Goal BP: < 130/80   - Changes: Not at this time. Continue coreg 25mg bid. Pt does have proteinuria but would not trial ACEi as I think he will develop MATTHEW with the severe hyalinosis he has on biopsy in 6/2020    # Anemia in Chronic Renal Disease: Hgb: Trend down      DANNY: No   - Iron studies: Not checked recently. Check ferritin, iron panel     # Mineral Bone Disorder:   - Secondary renal hyperparathyroidism; PTH level: Mildly elevated (151-300 pg/ml)        On treatment: None, recheck   - Vitamin D; level: Not checked recently, but was normal last check        On supplement: Yes  - Calcium; level: Normal corrected for albumin       On supplement: No  - Phosphorus; level: Normal        On supplement: No   -Check vitamin D, PTH     # Electrolytes:   - Potassium; level: Normal        On supplement: No  - Magnesium; level: Not checked recently, but was low last check        On supplement: No, check mag   - Bicarbonate; level: Low        On supplement: Yes, bicarb 1300mg bid    # NAGMA:    -Thought 2/2 diarrhea. Started on bicarb 1300mg bid   -Recheck labs and increase bicarb if necessary    # Proteinuria:   -1.2g/g in previous check in 2/2023   -Obtain A1c, SPEP w/ ifx, K/L.    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   -He went to the dermatology in 6/2022. No history of skin CA    # Medical Compliance: Yes    # COVID-19 Virus Review: Discussed COVID-19 virus and the potential medical risks.  Reviewed preventative health recommendations, including wearing a mask where appropriate.  Recommended COVID vaccination should be up to date with either an initial vaccination or booster shot when appropriate.  Asked the patient to inform the transplant center if they are exposed or diagnosed with this virus.    # COVID Vaccination Up To Date: Yes     # Transplant History:  Etiology of Kidney Failure: IgA nephropathy  Tx: DDKT  Transplant: 10/13/2015 (Kidney), 4/15/2004 (Kidney)  Significant changes in immunosuppression: None  Significant transplant-related complications: BK Viremia and CMV Viremia      Transplant Office Phone Number: [phone]    Assessment and plan was discussed with the patient and he voiced his understanding and agreement.    Return visit: Return in about 6 months (around 9/7/2023).    Maurice Bullard MD      Reason for Consult   Haider Torrez was seen in consultation at the request of Dr. Butler for kidney transplant, immunosuppression management, follow-up after hospitalization and CMV viremia.    Chief Complaint   Mr. Torrez is a 57 year old here for kidney transplant, immunosuppression management, new medical concerns and CMV viremia.    History of Present Illness    Mr. Torrez is a 57yM w/ PMH of IgAN s/p DDKT #2 10/13/2015, history of papillary thyroid CA in 2011 s/p resection, history of thyroid storm, rATG induction, no DSA at transplant, admission 10/9-10/18/22 for fever and diarrhea, put on broad spectrum abx, COVID was negative (previously had admit for COVID), bronhoscopy performed and was negative. Noted  to have CMV viremia on 10/9/22 with 41 copies, up to 736 copies on 3/2/23. This hase been managed conservatively. He was again admitted 3/1-3/3/23 with  MATTHEW up to 2.97, bicarb 13, found on enteric panel to have +norovirus. The patient was given IV fluids with improvement of Scr to 2.1 on discharge.     Since discharge he has been having formed stools. He was having multiple stools daily when he went in but that stopped. He didn't receive any medications that I can see for norovirus.     Currently he feels well. He denies orthostasis. He denies fevers, chills, shortness of breath, chest pain, LE edema, abdominal pain. He lost 12lbs with the diarrhea but gained some of it back. He was having diarrhea for 2.5 weeks and was having diarrhea about 5x/day.     Recent Hospitalizations:  [] No [x] Yes norovirus diarrhea, MATTHEW, fever   New Medical Issues: [] No [x] Yes CMV viremia   Decreased energy: [x] No [] Yes    Chest pain or SOB with exertion:  [x] No [] Yes    Appetite change or weight change: [] No [x] Yes Lost weight with diarrhea starting 2/2023 but then gained it back after discharge   Nausea, vomiting or diarrhea:  [] No [x] Yes Improved following 3/3 hospitalization.    Fever, sweats or chills: [x] No [] Yes    Leg swelling: [x] No [] Yes      Home BP: 130s systolic    Review of Systems   A comprehensive review of systems was obtained and negative, except as noted in the HPI or PMH.    Problem List   Patient Active Problem List   Diagnosis     IgA nephropathy     Restless leg syndrome     Dyslipidemia     Myoclonus     Secondary renal hyperparathyroidism (H)     Immunosuppressed status (H)     Kidney replaced by transplant     DVT (deep venous thrombosis), right     Hypothyroidism     Hypophosphatemia     High risk medication use     Papillary thyroid carcinoma (H)     Hypomagnesemia     Clinical diagnosis of COVID-19       Past Medical History    Past Medical History:   Diagnosis Date     Anemia of chronic kidney  failure      Carpal tunnel syndrome      Clinical diagnosis of COVID-19 2022     Dyslipidemia      History of respiratory failure      Hypertension      Hypomagnesemia      IgA nephropathy      Insomnia      Kidney disease, chronic, end stage on dialysis (H)      Kidney transplant failure      Myoclonus      Obstructive sleep apnea      Papillary thyroid carcinoma (H)      PRES (posterior reversible encephalopathy syndrome)      after thyroidectomy     Restless leg syndrome      Secondary hyperparathyroidism (H)      Seizure disorder (H)     Post surgery. Keppra now discontinued     Urinoma of kidney transplant        Past Surgical History   Past Surgical History:   Procedure Laterality Date     BENCH KIDNEY  10/13/2015    Procedure: BENCH KIDNEY;  Surgeon: Seth Gibbons MD;  Location: [facility] OR     CREATE FISTULA ARTERIOVENOUS UPPER EXTREMITY       CYSTOSCOPY, REMOVE STENT(S), COMBINED N/A 2015    Procedure: COMBINED CYSTOSCOPY, REMOVE STENT(S);  Surgeon: Seth Gibbons MD;  Location: [facility] OR     CYSTOSCOPY, REMOVE STENT(S), COMBINED Bilateral 2015    Procedure: COMBINED CYSTOSCOPY, REMOVE STENT(S);  Surgeon: Seth Gibbons MD;  Location: [facility] OR     ELBOW SURGERY Right      hemithyroidectomy Left      HERNIA REPAIR Right 3/2009     THYROIDECTOMY  2011     TRANSPLANT KIDNEY RECIPIENT  DONOR       TRANSPLANT KIDNEY RECIPIENT  DONOR N/A 10/13/2015    Procedure: TRANSPLANT KIDNEY RECIPIENT  DONOR;  Surgeon: Seth Gibbons MD;  Location: [facility] OR     ureteropyelostomy  2004       Family History   Family History   Problem Relation Age of Onset     Diabetes Mother      Other Cancer Father         lung        Social History   Social History     Tobacco Use     Smoking status: Never     Smokeless tobacco: Never   Substance Use Topics     Alcohol use: No     Drug use: No       Allergies   Allergies   Allergen Reactions     Hydralazine  Swelling     Betadine [Povidone Iodine]      Cephalexin Hives     Clindamycin Hives     Trazodone Swelling     Swelling of face/lips       Medications   Current Outpatient Medications   Medication Sig     carvedilol (COREG) 25 MG tablet Take 1 tablet (25 mg) by mouth 2 times daily (with meals)     clonazePAM (KLONOPIN) 0.5 MG tablet Take 1 tablet (0.5 mg) by mouth At Bedtime     folic acid (FOLVITE) 1 MG tablet Take 1 tablet (1 mg) by mouth daily     levothyroxine (SYNTHROID/LEVOTHROID) 100 MCG tablet Take 1 tablet (100 mcg) by mouth daily     MYFORTIC (BRAND) 180 MG EC tablet Take 3 tablets (540 mg) by mouth 2 times daily     pantoprazole (PROTONIX) 40 MG EC tablet Take 1 tablet (40 mg) by mouth daily Take 30-60 minutes before a meal.     predniSONE (DELTASONE) 5 MG tablet Take 1 tablet (5 mg) by mouth daily     rOPINIRole (REQUIP) 1 MG tablet Take 1 tablet (1 mg) by mouth At Bedtime     sodium bicarbonate 650 MG tablet Take 2 tablets (1,300 mg) by mouth 2 times daily     sulfamethoxazole-trimethoprim (BACTRIM/SEPTRA) 400-80 MG per tablet Take 1 tablet by mouth daily     tacrolimus (GENERIC EQUIVALENT) 0.5 MG capsule Take 1 capsule (0.5 mg) by mouth 2 times daily Total dose 3mg in AM and 2.5mg in PM     tacrolimus (GENERIC EQUIVALENT) 1 MG capsule Take 3 capsules (3 mg) by mouth 2 times daily Total dose 3mg in AM and 2.5mg in PM     vitamin D3 (CHOLECALCIFEROL) 10 MCG (400 UNIT) capsule Take 1 capsule (400 Units) by mouth daily     No current facility-administered medications for this visit.     Medications Discontinued During This Encounter   Medication Reason     cholecalciferol (VITAMIN  -D) 1000 UNITS capsule      FOLIC ACID PO      CLONAZEPAM PO      LEVOTHYROXINE SODIUM PO      MYFORTIC (BRAND) 180 MG EC TABLET      pantoprazole (PROTONIX) 40 MG enteric coated tablet      PREDNISONE PO      ROPINIRole HCl (REQUIP PO)      tacrolimus (GENERIC EQUIVALENT) 1 MG capsule      FLUOXETINE HCL PO      loperamide  "(IMODIUM A-D) 2 MG tablet        Physical Exam   Vital Signs: Ht 1.651 m (5' 5\")   Wt 59 kg (130 lb)   BMI 21.63 kg/m      GENERAL APPEARANCE: alert and no distress  HENT: no obvious abnormalities on appearance  RESP: breathing appears unremarkable with normal rate, no audible wheezing or cough and no apparent shortness of breath with conversation  MS: extremities normal - no gross deformities noted  SKIN: no apparent rash and normal skin tone  NEURO: speech is clear with no obvious neurological deficits  PSYCH: mentation appears normal and affect normal      Data     Renal Latest Ref Rng & Units 2/1/2023 1/11/2023 10/24/2022   SODIUM 133 - 144 mmol/L - - -   Na (external) 135 - 145 mmol/L 143 144 143   K 3.4 - 5.3 mmol/L - - -   K (external) 3.6 - 5.2 mmol/L 4.8 5.2 4.6   Cl 98 - 107 mmol/L 113(H) 115(H) 111(H)   Cl (external) 98 - 107 mmol/L 113(H) 115(H) 111(H)   CO2 20 - 32 mmol/L - - -   CO2 (external) 22 - 29 mmol/L 18(L) 18(L) 23   UREA NITROGEN 7 - 30 mg/dL - - -   BUN (external) 8 - 24 mg/dL 55(H) 51(H) 40(H)   CREATININE 0.66 - 1.25 mg/dL - - -   Cr (external) 0.74 - 1.35 mg/dL 2.47(H) 2.57(H) 2.34(H)   Glucose 70 - 99 mg/dL - - -   Glucose (external) 70 - 140 mg/dL 108 92 99   CALCIUM, TOTAL 8.5 - 10.1 mg/dL - - -   Ca (external) 8.6 - 10.0 mg/dL 8.4(L) 8.7 8.6   MAGNESIUM 1.6 - 2.3 mg/dL - - -   Mg (external) 1.7 - 2.3 mg/dL - - -     Bone Health Latest Ref Rng & Units 7/26/2022 7/15/2021 1/5/2021   PHOSPHORUS 2.5 - 4.5 mg/dL - - -   Phos (external) 2.5 - 4.5 mg/dL 3.9 3.6 -   PARATHYROID HORMONE INTACT 18 - 80 pg/mL - - -   PTHi (external) 15 - 65 pg/mL 253(H) 151(H) 230(H)   Vit D Def 20 - 75 ug/L - - -     Heme Latest Ref Rng & Units 2/1/2023 10/24/2022 7/26/2022   WBC 4.0 - 11.0 10e9/L - - -   WBC (external) 3.4 - 9.6 x10(9)uL 3.6 4.6 4.4   Hgb 13.3 - 17.7 g/dL - - -   Hgb (external) 13.2 - 16.6 g/dL 10.8(L) 8.1(L) 11.8(L)   Plt 150 - 450 10e9/L - - -   Plt (external) 135 - 317 x10(9)uL 120(L) 243 " 115(L)   ABSOLUTE NEUTROPHIL 1.6 - 8.3 10e9/L - - -   ABSOLUTE NEUTROPHILS (EXTERNAL) 1.56 - 6.45 x10(9)uL 2.76 3.42 3.21   ABSOLUTE LYMPHOCYTES 0.8 - 5.3 10e9/L - - -   ABSOLUTE LYMPHOCYTES (EXTERNAL) 0.95 - 3.07 x10(9)uL 0.34(L) 0.52(L) 0.58(L)   ABSOLUTE MONOCYTES 0.0 - 1.3 10e9/L - - -   ABSOLUTE MONOCYTES (EXTERNAL) 0.26 - 0.81 x10(9)uL 0.47 0.55 0.53   ABSOLUTE EOSINOPHILS 0.0 - 0.7 10e9/L - - -   ABSOLUTE EOSINOPHILS (EXTERNAL) 0.03 - 0.48 x10(9)uL 0.03 0.05 0.03   ABSOLUTE BASOPHILS 0.0 - 0.2 10e9/L - - -   ABSOLUTE BASOPHILS (EXTERNAL) 0.01 - 0.08 x10(9)uL <0.03 <0.03 <0.03   ABS IMMATURE GRANULOCYTES 0 - 0.4 10e9/L - - -     Liver Latest Ref Rng & Units 10/9/2022 9/25/2022 7/26/2022   AP 40 - 150 U/L - - -   AP (external) 40 - 129 U/L - - -   TBili 0.2 - 1.3 mg/dL - - -   TBili (external) <=1.2 mg/dL - - -   DBili (external) 0.0 - 0.3 mg/dL - - -   ALT 0 - 70 U/L - - -   ALT (external) 7 - 55 U/L 34 16 -   AST 0 - 45 U/L - - -   AST (external) 8 - 48 U/L 31 28 -   Tot Protein 6.8 - 8.8 g/dL - - -   Tot Protein (external) 6.3 - 7.9 g/dL - - -   ALBUMIN 3.4 - 5.0 g/dL - - -   Albumin (external) 3.5 - 5.0 g/dL - - 4.0     Pancreas Latest Ref Rng & Units 7/26/2022 7/15/2021 7/12/2019   A1C 4.3 - 6.0 % - - -   A1C (external) 4.2 - 5.6 % 5.6 5.8(H) 5.5     Iron studies Latest Ref Rng & Units 3/27/2018 10/17/2015   Iron 35 - 180 ug/dL 67 25(L)   IRON SATURATION INDEX 15 - 46 % 24 12(L)   FERRITIN 26 - 388 ng/mL 695(H) 883(H)     [facility] Txp Virology Latest Ref Rng & Units 2/15/2023 2/8/2023 1/11/2023   CMV DNA Quant Ext Undetected IU/mL 301(A) 311(A) 138(A)   LOG IU/ML OF CMVQNT (EXTERNAL) log IU/mL 2.48 2.49 2.14   BK Spec - - - -   BK Res BKNEG copies/mL - - -   BK Log <2.7 Log copies/mL - - -   BK Quant Log Ext log IU/mL - - <1.34   BK Quant Result Ext Undetected IU/mL - - <22(A)   BK Quant Spec Ext - - - Plasma   BK Qual UR Ext Copies/ml - - -   EBV CAPSID ANTIBODY IGG 0.0 - 0.8 AI - - -   Hep B Core NR - - -    Hep B Core Ext Negative - - -   Hep B Surf Ext  - - - -        Recent Labs   Lab Test 09/26/17  1508 02/05/18  0710 02/28/18  0705   DOSTAC 730a 9.26.2017 2100 02/04/18 02/27/18  1930   TACROL 6.1 8.1 5.0     Recent Labs   Lab Test 10/19/15  0701 10/21/15  0648   DOSMPA 2,100 1,900   MPACID 1.14 1.52   MPAG 92.7 93.8

## 2023-03-07 NOTE — LETTER
Date:March 8, 2023      Patient was self referred, no letter generated. Do not send.        [facility] Health Information

## 2023-03-08 ENCOUNTER — TELEPHONE (OUTPATIENT)
Dept: TRANSPLANT | Facility: CLINIC | Age: 58
End: 2023-03-08
Payer: MEDICARE

## 2023-03-08 ENCOUNTER — MYC MEDICAL ADVICE (OUTPATIENT)
Dept: TRANSPLANT | Facility: CLINIC | Age: 58
End: 2023-03-08
Payer: MEDICARE

## 2023-03-08 NOTE — LETTER
PHYSICIAN ORDERS      DATE & TIME ISSUED: 23 1:17 PM   PATIENT NAME: Haider Torrez   : 1965     [facility] MR# [if applicable]: [id number]     DIAGNOSIS:  Kidney transplant   ICD-10 CODE: Z94.0      Please obtain the following labs on Monday 3/13/23:     Complete Metabolic Panel    CBC with platelets and differential    Tacrolimus/FK trough (12 hour trough)    IgG    CMV IgG    Parvovirus PCR    Protein electrophoresis, blood    Protein Immunofixation Serum    Kappa and Lambda light chain    BK virus Quantitative, PCR    CMV Quantitative, PCR    Vitamin D    PTH    Magnesium    Hemoglobin A1C    B12    Ferritin    Iron binding panel    Urine Protein/Creatinine Ratio      Please obtain the following labs once weekly x 3 weeks beginning Monday 3/20/23:    BMP    CBC with platelets and differential    Tacrolimus/FK trough (12 hour trough)    CMV quantitative, PCR        Any questions please call: [phone] option 5    Please fax results to [phone].      Maurice Bullard MD

## 2023-03-08 NOTE — TELEPHONE ENCOUNTER
----- Message from Maurice Bullard MD sent at 3/7/2023  3:23 PM CST -----  Regarding: plan  Hi,    We are taking over this patient's management.     He will get labs weekly x4 weeks starting Monday 3/13 at [facility] in [place]. Please obtain the following labs with his first lab draw: CMP, CBC w/ diff, UPCR, BK PCR, CMV PCR, B12, ferritin, iron panel, IgG, CMV IgG, tac level, vitamin D, PTH, mag, HbA1c, SPEP w/ ifx, K/L.     With following labs please obtain: BMP, CBC w/ diff, CMV PCR, tac level.     Thanks,  Maurice

## 2023-03-08 NOTE — LETTER
PHYSICIAN ORDERS      DATE & TIME ISSUED: 23 1:05 PM   PATIENT NAME: Haider Torrez   : 1965     [facility] MR# [if applicable]: [id number]     DIAGNOSIS:  Kidney transplant, CMV viremia, Care after Organ Transplant  ICD-10 CODE: Z94.0 , B25.8, Z48.2    Please obtain the following labs on Monday 3/13/23:     Complete Metabolic Panel    CBC with platelets and differential    Tacrolimus/FK trough (12 hour trough)    IgG    CMV IgG    Protein electrophoresis, blood    Protein Immunofixation Serum    Kappa and Lambda light chain    BK virus Quantitative, PCR    CMV Quantitative, PCR    Vitamin D    PTH    Magnesium    Hemoglobin A1C    B12    Ferritin    Iron panel    Urine Protein/Creatinine Ratio      Please obtain the following labs once weekly x 3 weeks beginning Monday 3/20/23:    BMP    CBC with platelets and differential    Tacrolimus/FK trough (12 hour trough)    CMV quantitative, PCR  *See next page for ordering provider and return fax #*  Any questions please call: [phone] option 5    Please fax results to [phone].      Maurice Bullard MD

## 2023-03-08 NOTE — TELEPHONE ENCOUNTER
Maurice Bullard MD Buboltz, Brittany J, RN Sorry, he also needs parvovirus PCR. Thanks     OUTCOME  Lab letter faxed to local lab. Ozzy pt sent to pt with updates on obtaining labs

## 2023-03-10 NOTE — TELEPHONE ENCOUNTER
Voicemail left for pt to confirm he has lab appts arranged Mondays x 4 weeks. Reviewed lab plans.     Provided pt phone number for [facility] dept as well as mychart help line so he can get into his mychart again.     Pt appreciated call and no further questions.

## 2023-03-29 ENCOUNTER — TELEPHONE (OUTPATIENT)
Dept: TRANSPLANT | Facility: CLINIC | Age: 58
End: 2023-03-29
Payer: MEDICARE

## 2023-03-29 NOTE — LETTER
PHYSICIAN ORDERS      DATE & TIME ISSUED: 23 9:52 AM   PATIENT NAME: Haider Torrez   : 1965     [facility] MR# [if applicable]: [id number]     DIAGNOSIS:  Kidney transplant   ICD-10 CODE: Z94.0      Please discontinue prior orders and draw the following labs every 2 weeks:    BMP    CBC with platelets and differential    Tacrolimus/FK trough (12 hour trough)    CMV quantitative, PCR        Any questions please call: [phone] option 5    Please fax results to [phone].      Maurice Bullard MD

## 2023-03-29 NOTE — TELEPHONE ENCOUNTER
Spoke to pt and he denies any symptoms including diarrhea. He was instructed to contact me if he does develop any diarrhea.     Discussed plans to change labs to every 2 weeks. Will send lab orders and he will contact his lab to change appointment times.

## 2023-03-29 NOTE — LETTER
PHYSICIAN ORDERS      DATE & TIME ISSUED: 23 2:38 PM   PATIENT NAME: Haider Torrez   : 1965     [facility] MR# [if applicable]: [id number]     DIAGNOSIS:  Kidney transplant   ICD-10 CODE: Z94.0      Please discontinue prior orders and draw the following labs every 2 weeks:    BMP    CBC with platelets and differential    Tacrolimus/FK trough (12 hour trough)    CMV quantitative, PCR        Any questions please call: [phone] option 5    Please fax results to [phone].      Maurice Bullard MD

## 2023-03-29 NOTE — TELEPHONE ENCOUNTER
----- Message from Maurice Bullard MD sent at 3/24/2023 12:34 PM CDT -----  Any diarrhea? If not, would not treat unless CMV >1000. Ok to change labs to q2 weeks  ----- Message -----  From: Lorena Samuels RN  Sent: 3/24/2023   9:16 AM CDT  To: Maurice Bullard MD    FYI weekly CMV slight trend up. Continues with weekly labs

## 2023-04-17 ENCOUNTER — TELEPHONE (OUTPATIENT)
Dept: TRANSPLANT | Facility: CLINIC | Age: 58
End: 2023-04-17
Payer: MEDICARE

## 2023-04-17 NOTE — TELEPHONE ENCOUNTER
ISSUE  Creatinine elevated 3.06 (baseline <2.5)  Tacrolimus level 7.9 above goal 46    PLAN  Review with pt and repeat labs in 2 weeks    OUTCOME  Spoke to pt who confirms he may have been dehydrated at the time of the lab draw due to travel the couple days prior to the labs when he was in [place]). He denies any illness, missed medications or pain over his graft.     He confirms 12 hr trough and dosing of 3mg/2.5mg BID.     Pt will push hydration and repeat labs as planned next week (currently getting labs every 2 weeks).

## 2023-04-17 NOTE — TELEPHONE ENCOUNTER
----- Message from Maurice Bullard MD sent at 4/13/2023 11:24 AM CDT -----  Trend up in Scr. Please repeat labs in 2 weeks

## 2023-05-02 ENCOUNTER — TRANSFERRED RECORDS (OUTPATIENT)
Dept: HEALTH INFORMATION MANAGEMENT | Facility: CLINIC | Age: 58
End: 2023-05-02
Payer: MEDICARE

## 2023-06-03 ENCOUNTER — HEALTH MAINTENANCE LETTER (OUTPATIENT)
Age: 58
End: 2023-06-03

## 2023-08-02 ENCOUNTER — TRANSFERRED RECORDS (OUTPATIENT)
Dept: HEALTH INFORMATION MANAGEMENT | Facility: CLINIC | Age: 58
End: 2023-08-02
Payer: MEDICARE

## 2023-09-28 ENCOUNTER — TELEPHONE (OUTPATIENT)
Dept: TRANSPLANT | Facility: CLINIC | Age: 58
End: 2023-09-28
Payer: MEDICARE

## 2023-09-28 NOTE — TELEPHONE ENCOUNTER
Call from spouse- stated pt has upcoming appt on 10/2/ pt not able to do a virtual without her getting it readyn and she is working  neeeds to seeif they can drive down for appt or need to re-schedule

## 2023-09-29 NOTE — TELEPHONE ENCOUNTER
[facility] spoke with Rosana - she is concerned that Haider won't be able to facilitate the virtual visit at 4:35 on 10/2 independently.    She works 2nd shift that day, so they are not able to drive to be in person either.    [facility] did let Rosana know that if reschedule is needed, Dr. Bullard is booking into 2024.    She will connect with family to ensure someone is available to help him faciliate the 4:35p virutal visit.    She will call [facility] to confirm.

## 2023-10-02 ENCOUNTER — VIRTUAL VISIT (OUTPATIENT)
Dept: TRANSPLANT | Facility: CLINIC | Age: 58
End: 2023-10-02
Attending: INTERNAL MEDICINE
Payer: MEDICARE

## 2023-10-02 DIAGNOSIS — B25.9 CYTOMEGALOVIRUS (CMV) VIREMIA (H): ICD-10-CM

## 2023-10-02 DIAGNOSIS — R80.1 PERSISTENT PROTEINURIA: ICD-10-CM

## 2023-10-02 DIAGNOSIS — R19.7 DIARRHEA OF PRESUMED INFECTIOUS ORIGIN: ICD-10-CM

## 2023-10-02 DIAGNOSIS — N17.9 AKI (ACUTE KIDNEY INJURY) (H): ICD-10-CM

## 2023-10-02 DIAGNOSIS — A08.11 NOROVIRUS: ICD-10-CM

## 2023-10-02 DIAGNOSIS — Z94.0 KIDNEY REPLACED BY TRANSPLANT: Primary | ICD-10-CM

## 2023-10-02 DIAGNOSIS — E87.20 METABOLIC ACIDOSIS: ICD-10-CM

## 2023-10-02 PROCEDURE — 99215 OFFICE O/P EST HI 40 MIN: CPT | Mod: VID | Performed by: INTERNAL MEDICINE

## 2023-10-02 RX ORDER — MYCOPHENOLIC ACID 180 MG/1
360 TABLET, DELAYED RELEASE ORAL 2 TIMES DAILY
Qty: 450 TABLET | Refills: 3 | Status: ON HOLD | COMMUNITY
Start: 2023-10-02 | End: 2023-10-03

## 2023-10-02 RX ORDER — NITAZOXANIDE 500 MG/1
500 TABLET ORAL 2 TIMES DAILY WITH MEALS
Qty: 28 TABLET | Refills: 0 | Status: ON HOLD | OUTPATIENT
Start: 2023-10-02 | End: 2023-10-18

## 2023-10-02 RX ORDER — SODIUM BICARBONATE 650 MG/1
1950 TABLET ORAL 3 TIMES DAILY
Qty: 810 TABLET | Refills: 3 | Status: ON HOLD | OUTPATIENT
Start: 2023-10-02 | End: 2023-10-18

## 2023-10-02 NOTE — LETTER
10/2/2023         RE: Haider Torrez  [address] [facility] [id number]        Dear Colleague,    Thank you for referring your patient, Haider Torrez, to the [facility]. Please see a copy of my visit note below.      TRANSPLANT NEPHROLOGY CHRONIC POST TRANSPLANT NOTE      Plan for ED presentation: Repeat C.diff and enteric panel, if acidosis would give D5 w/ 150meq bicarb, consult transplant nephrology, start nitazoxanide 500mg bid if +norovirus, repeat IgG level and give IVIG 0.5g/kg x1 after consulting with txp neph, consult GI for scope with biopsies due to concern for CMV colitis. Decrease MPA to 360mg bid. Pending result of colonoscopy will determine treatment options (if MMF colitis would transition off MPA, if CMV colitis treat with IV GCV). Obtain EBV  PCR    Assessment & Plan   # DDKT: MATTHEW, Scr 3.9 today for which he went to ED and was discharged after fluids.    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 No recurrence of IgAN    -Recommend ED presentation at [facility] for IVF with bicarb, colonoscopy with biopsies to rule out CMV colitis or MPA toxicity, txp neph consult, treatment with nitazoxanide, IVIG   -I did not have time to discuss him being referred back for repeat txp evaluation, CKD access planning due to multiple acute issues.     # Immunosuppression: Tacrolimus immediate release (goal 4-6), Mycophenolic acid (dose 540 mg every 12 hours) and Prednisone (dose 5 mg daily)   - Continue with intensive monitoring of immunosuppression for  efficacy and toxicity.   - Changes: Yes - decrease MPA to 360mg bid (supposed to be done in March 2023 but patient did not decrease). If MPA toxicity on biopsy could consider AZA instead of MPA but has had low WBC in the past. MPA level was 4.7 on 8/1/23.     # Infection Prophylaxis:   - PJP: Sulfa/TMP (Bactrim)    # Diarrhea:   -Colonoscopy 10/2022 with mild archietectural distortion, focal crypt degeneration. Negative stain for CMV    -Recently admitted 3/1-3/3/23 for diarrhea and MATTHEW, found + for norovirus   -Now with worsening diarrhea x3 weeks. +Norovirus on 9/21/23 again. Recommend nitazoxanide 500mg bid x14d, IVIG, colonoscopy with biopsies to rule out CMV colitis and if + for CMV would treat with IV GCV.     # CMV viremia:   -Noted to have CMV viremia on 10/9/22 with 41 copies, up to 736 copies on 3/2/23. 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    -IgG >600. CMV has been low level but present    -concern at this point with worsening diarrhea that he has CMV colitis. Recommend repeat colonoscopy with biopsies, and if CMV + would treat with IV GCV.       # Hypertension: Borderline control;  Goal BP: < 130/80   - Changes: Not at this time. Continue coreg 25mg bid. Pt does have proteinuria but would not trial ACEi as I think he will develop MATTHEW with the severe hyalinosis he has on biopsy in 6/2020    # Anemia in Chronic Renal Disease: Hgb: Trend up due to volume depletion      DANNY: No   - Iron studies: Replete.    # Mineral Bone Disorder:   - Secondary renal hyperparathyroidism; PTH level: Mildly elevated (151-300 pg/ml)        On treatment: None, recheck   - Vitamin D; level: Not checked recently, but was normal last check        On supplement: Yes  - Calcium; level: Normal corrected for albumin       On supplement: No  - Phosphorus; level: Normal        On supplement: No       # Electrolytes:   - Potassium;  level: Normal        On supplement: No  - Magnesium; level: Not checked recently, but was low last check        On supplement: No, check mag   - Bicarbonate; level: Low        On supplement: Yes, increase bicarb to 1950mg tid    # NAGMA:    -Thought 2/2 diarrhea. Started on bicarb 1300mg bid previously   -He needs workup for diarrrhea as above and recommend IV bicarb infusion    # Proteinuria:   -1.2g/g in previous check in 2/2023, 3.3g on 24h collection in 8/2023   -A1c 5.8%. 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   -He went to the dermatology in 6/2022. No history of skin CA    # Medical Compliance: Yes    # Health Maintenance and Vaccination Review: Not Reviewed      # Transplant History:  Etiology of Kidney Failure: IgA nephropathy  Tx: DDKT  Transplant: 10/13/2015 (Kidney), 4/15/2004 (Kidney)  Significant changes in immunosuppression: None  Significant transplant-related complications: BK Viremia and CMV Viremia      Transplant Office Phone Number: [phone]    Assessment and plan was discussed with the patient and he voiced his understanding and agreement.    Return visit: Return in about 4 months (around 2/2/2024).    Maurice Bullard MD      Chief Complaint   Mr. Torrez is a 58 year old here for kidney transplant, immunosuppression management, new medical concerns and CMV viremia.    History of Present Illness   Haider has been having worsening diarrhea for the past 3 weeks. He previously was diagnosed with norovirus diarrhea but symptoms improved spontaneously. He presented to the ED today due to increasing diarrhea and some dizziness. Found to have Scr 3.9 from 2.3, bicarb of 13. He was given IVF and discharged. He denies fever, chills, N/V, SOB,  chest pain, LE edema. He denies sick contacts, unintentional weight loss, night sweats, melena, hematochezia.     Home BP:  130s systolic        Problem List   Patient Active Problem List   Diagnosis    Dyslipidemia    Secondary renal hyperparathyroidism (H24)    Immunosuppression (H24)    Kidney replaced by transplant    Hypothyroidism    Hypomagnesemia    HTN, kidney transplant related    Metabolic acidosis    Cytomegalovirus (CMV) viremia (H)    Persistent proteinuria    Pancytopenia (H)    Functional diarrhea    Aftercare following organ transplant       Past Medical History    Past Medical History:   Diagnosis Date    Anemia of chronic kidney failure     Carpal tunnel syndrome     Clinical diagnosis of COVID-19 9/22/2022    Dyslipidemia     History of respiratory failure     Hypertension     Hypomagnesemia 2015    IgA nephropathy     Insomnia     Kidney disease, chronic, end stage on dialysis (H)     Kidney transplant failure     Myoclonus     Obstructive sleep apnea     Papillary thyroid carcinoma (H)     PRES (posterior reversible encephalopathy syndrome)     2011 after thyroidectomy    Restless leg syndrome     Secondary hyperparathyroidism (H)     Seizure disorder (H)     Post surgery. Keppra now discontinued    Urinoma of kidney transplant        Past Surgical History   Past Surgical History:   Procedure Laterality Date    BENCH KIDNEY  10/13/2015    Procedure: BENCH KIDNEY;  Surgeon: Seth Gibbons MD;  Location: [facility] OR    CREATE FISTULA ARTERIOVENOUS UPPER EXTREMITY  2011    CYSTOSCOPY, REMOVE STENT(S), COMBINED N/A 11/17/2015    Procedure: COMBINED CYSTOSCOPY, REMOVE STENT(S);  Surgeon: Seth Gibbons MD;  Location: [facility] OR    CYSTOSCOPY, REMOVE STENT(S), COMBINED Bilateral 11/17/2015    Procedure: COMBINED CYSTOSCOPY, REMOVE STENT(S);  Surgeon: Seth Gibbons MD;  Location: [facility] OR    ELBOW SURGERY Right 1978    hemithyroidectomy Left 2011    HERNIA REPAIR Right 3/2009    THYROIDECTOMY       TRANSPLANT KIDNEY RECIPIENT  DONOR      TRANSPLANT KIDNEY RECIPIENT  DONOR N/A 10/13/2015    Procedure: TRANSPLANT KIDNEY RECIPIENT  DONOR;  Surgeon: Seth Gibbons MD;  Location: [facility] OR    ureteropyelostomy  2004       Family History   Family History   Problem Relation Age of Onset    Diabetes Mother     Other Cancer Father         lung        Social History   Social History     Tobacco Use    Smoking status: Never    Smokeless tobacco: Never   Substance Use Topics    Alcohol use: No    Drug use: No       Allergies   Allergies   Allergen Reactions    Hydralazine Swelling    Betadine [Povidone Iodine]     Cephalexin Hives    Clindamycin Hives    Trazodone Swelling     Swelling of face/lips       Medications   Current Outpatient Medications   Medication Sig    MYFORTIC (BRAND) 180 MG EC tablet Take 2 tablets (360 mg) by mouth 2 times daily    nitazoxanide (ALINIA) 500 MG tablet Take 1 tablet (500 mg) by mouth 2 times daily (with meals) for 14 days    sodium bicarbonate 650 MG tablet Take 3 tablets (1,950 mg) by mouth 3 times daily for 90 days    carvedilol (COREG) 25 MG tablet Take 1 tablet (25 mg) by mouth 2 times daily (with meals)    clonazePAM (KLONOPIN) 0.5 MG tablet Take 1 tablet (0.5 mg) by mouth At Bedtime    folic acid (FOLVITE) 1 MG tablet Take 1 tablet (1 mg) by mouth daily    levothyroxine (SYNTHROID/LEVOTHROID) 100 MCG tablet Take 1 tablet (100 mcg) by mouth daily    pantoprazole (PROTONIX) 40 MG EC tablet Take 1 tablet (40 mg) by mouth daily Take 30-60 minutes before a meal.    predniSONE (DELTASONE) 5 MG tablet Take 1 tablet (5 mg) by mouth daily    rOPINIRole (REQUIP) 1 MG tablet Take 1 tablet (1 mg) by mouth At Bedtime    sulfamethoxazole-trimethoprim (BACTRIM/SEPTRA) 400-80 MG per tablet Take 1 tablet by mouth daily    tacrolimus (GENERIC EQUIVALENT) 0.5 MG capsule Take 1 capsule (0.5 mg) by mouth 2 times daily Total dose 3mg in AM and 2.5mg in PM     tacrolimus (GENERIC EQUIVALENT) 1 MG capsule Take 3 capsules (3 mg) by mouth 2 times daily Total dose 3mg in AM and 2.5mg in PM    vitamin D3 (CHOLECALCIFEROL) 10 MCG (400 UNIT) capsule Take 1 capsule (400 Units) by mouth daily     No current facility-administered medications for this visit.     Medications Discontinued During This Encounter   Medication Reason    MYFORTIC (BRAND) 180 MG EC tablet     sodium bicarbonate 650 MG tablet          Physical Exam   Vital Signs: There were no vitals taken for this visit.    GENERAL APPEARANCE: alert and no distress  HENT: no obvious abnormalities on appearance  RESP: breathing appears unremarkable with normal rate, no audible wheezing or cough and no apparent shortness of breath with conversation  MS: extremities normal - no gross deformities noted  SKIN: no apparent rash and normal skin tone  NEURO: speech is clear with no obvious neurological deficits  PSYCH: mentation appears normal and affect normal      Data         Latest Ref Rng & Units 5/1/2023     7:00 AM 4/10/2023     7:03 AM 3/13/2023     7:40 AM   Renal   Na (external) 135 - 145 mmol/L 144     142     142       K (external) 3.6 - 5.2 mmol/L 5.0     4.8     4.4       Cl 98 - 107 mmol/L 115     111     113       Cl (external) 98 - 107 mmol/L 115     111     113       CO2 (external) 22 - 29 mmol/L 19     18     21       BUN (external) 8 - 24 mg/dL 57     54     44       Cr (external) 0.74 - 1.35 mg/dL 2.84     3.06     2.37       Glucose (external) 70 - 140 mg/dL 101     103     106       Ca (external) 8.6 - 10.0 mg/dL 8.7     9.0     8.6       Mg (external) 1.7 - 2.3 mg/dL   1.5           This result is from an external source.         Latest Ref Rng & Units 3/13/2023     7:40 AM 7/26/2022     7:55 AM 7/15/2021     7:22 AM   Bone Health   Phos (external) 2.5 - 4.5 mg/dL  3.9     3.6       PTHi (external) 15 - 65 pg/mL 337     253     151       Vit D Def (external) 20 - 50 ng/mL 36             This result is from  an external source.         Latest Ref Rng & Units 5/1/2023     7:00 AM 4/10/2023     7:03 AM 3/13/2023     7:40 AM   Heme   WBC (external) 3.4 - 9.6 x10(9)/L 3.2     4.2     3.4       Hgb (external) 13.2 - 16.6 g/dL 10.8     10.7     9.2       Plt (external) 135 - 317 x10(9)/L 116     132     170       ABSOLUTE NEUTROPHILS (EXTERNAL) 1.56 - 6.45 x10(9)/L 2.18     2.97     2.62       ABSOLUTE LYMPHOCYTES (EXTERNAL) 0.95 - 3.07 x10(9)/L 0.46     0.67        ABSOLUTE MONOCYTES (EXTERNAL) 0.26 - 0.81 x10(9)/L 0.49     0.50        ABSOLUTE EOSINOPHILS (EXTERNAL) 0.03 - 0.48 x10(9)/L 0.03     0.03        ABSOLUTE BASOPHILS (EXTERNAL) 0.01 - 0.08 x10(9)/L <0.03     <0.03            This result is from an external source.         Latest Ref Rng & Units 3/13/2023     7:40 AM 10/9/2022    10:44 AM 9/25/2022     7:15 AM   Liver   AP (external) 40 - 129 U/L 76         TBili (external) <=1.2 mg/dL 0.3         ALT (external) 7 - 55 U/L 14     34     16       AST (external) 8 - 48 U/L 22     31     28       Tot Protein (external) 6.3 - 7.9 g/dL 5.8         Albumin (external) 3.5 - 5.0 g/dL 3.5             This result is from an external source.         Latest Ref Rng & Units 3/13/2023     7:40 AM 7/26/2022     7:55 AM 7/15/2021     7:22 AM   Pancreas   A1C (external) 4.2 - 5.6 % 5.5     5.6     5.8           This result is from an external source.         Latest Ref Rng & Units 3/13/2023     7:40 AM 3/27/2018     3:10 PM 10/17/2015     7:00 AM   Iron studies   Iron 35 - 180 ug/dL  67  25    Iron Saturation Index 15 - 46 %  24  12    Ferritin 26 - 388 ng/mL  695  883    Ferritin (external) 31 - 409 mcg/L 531             This result is from an external source.         Latest Ref Rng & Units 5/1/2023     7:00 AM 4/10/2023     7:03 AM 3/20/2023     7:03 AM   [facility] Virology   CMV DNA Quant Ext Undetected IU/mL 318     460     622       LOG IU/ML OF CMVQNT (EXTERNAL) log IU/mL 2.50     2.66     2.79       BK Quant Log Ext log IU/mL  1.46         BK Quant Result Ext Undetected IU/mL 29         BK Quant Spec Ext  Blood             This result is from an external source.     Recent Labs   Lab Test 09/26/17  1508 02/05/18  0710 02/28/18  0705   DOSTAC 730a 9.26.2017 2100 02/04/18 02/27/18  1930   TACROL 6.1 8.1 5.0     Recent Labs   Lab Test 10/19/15  0701 10/21/15  0648   DOSMPA 2,100 1,900   MPACID 1.14 1.52   MPAG 92.7 93.8     Again, thank you for allowing me to participate in the care of your patient.      Sincerely,    Maurice Bullard MD

## 2023-10-02 NOTE — PROGRESS NOTES
TRANSPLANT NEPHROLOGY CHRONIC POST TRANSPLANT NOTE    Virtual Visit Details    Type of service:  Video Visit   Video Start Time: 4:36 PM  Video End Time:5:03 PM    Originating Location (pt. Location): Home  Distant Location (provider location):  On-site  Platform used for Video Visit: Balwinder      Plan for ED presentation: Repeat C.diff and enteric panel, if acidosis would give D5 w/ 150meq bicarb, consult transplant nephrology, start nitazoxanide 500mg bid if +norovirus, repeat IgG level and give IVIG 0.5g/kg x1 after consulting with txp neph, consult GI for scope with biopsies due to concern for CMV colitis. Decrease MPA to 360mg bid. Pending result of colonoscopy will determine treatment options (if MMF colitis would transition off MPA, if CMV colitis treat with IV GCV). Obtain EBV  PCR    Assessment & Plan   # DDKT: MATTHEW, Scr 3.9 today for which he went to ED and was discharged after fluids.    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 No recurrence of IgAN    -Recommend ED presentation at [facility] for IVF with bicarb, colonoscopy with biopsies to rule out CMV colitis or MPA toxicity, txp neph consult, treatment with nitazoxanide, IVIG   -I did not have time to discuss him being referred back for repeat txp evaluation, CKD access planning due to multiple acute issues.     # Immunosuppression: Tacrolimus immediate release (goal 4-6), Mycophenolic acid (dose 540 mg every 12 hours) and Prednisone (dose 5 mg daily)   - Continue with intensive monitoring of immunosuppression for efficacy  and toxicity.   - Changes: Yes - decrease MPA to 360mg bid (supposed to be done in March 2023 but patient did not decrease). If MPA toxicity on biopsy could consider AZA instead of MPA but has had low WBC in the past. MPA level was 4.7 on 8/1/23.     # Infection Prophylaxis:   - PJP: Sulfa/TMP (Bactrim)    # Diarrhea:   -Colonoscopy 10/2022 with mild archietectural distortion, focal crypt degeneration. Negative stain for CMV    -Recently admitted 3/1-3/3/23 for diarrhea and MATTHEW, found + for norovirus   -Now with worsening diarrhea x3 weeks. +Norovirus on 9/21/23 again. Recommend nitazoxanide 500mg bid x14d, IVIG, colonoscopy with biopsies to rule out CMV colitis and if + for CMV would treat with IV GCV.     # CMV viremia:   -Noted to have CMV viremia on 10/9/22 with 41 copies, up to 736 copies on 3/2/23. 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    -IgG >600. CMV has been low level but present    -concern at this point with worsening diarrhea that he has CMV colitis. Recommend repeat colonoscopy with biopsies, and if CMV + would treat with IV GCV.       # Hypertension: Borderline control;  Goal BP: < 130/80   - Changes: Not at this time. Continue coreg 25mg bid. Pt does have proteinuria but would not trial ACEi as I think he will develop MATTHEW with the severe hyalinosis he has on biopsy in 6/2020    # Anemia in Chronic Renal Disease: Hgb: Trend up due to volume depletion      DANNY: No   - Iron studies: Replete.    # Mineral Bone Disorder:   - Secondary renal hyperparathyroidism; PTH level: Mildly elevated (151-300 pg/ml)        On treatment: None, recheck   - Vitamin D; level: Not checked recently, but was normal last check        On supplement: Yes  - Calcium; level: Normal corrected for albumin       On supplement: No  - Phosphorus; level: Normal        On supplement: No       # Electrolytes:   - Potassium; level:  Normal        On supplement: No  - Magnesium; level: Not checked recently, but was low last check        On supplement: No, check mag   - Bicarbonate; level: Low        On supplement: Yes, increase bicarb to 1950mg tid    # NAGMA:    -Thought 2/2 diarrhea. Started on bicarb 1300mg bid previously   -He needs workup for diarrrhea as above and recommend IV bicarb infusion    # Proteinuria:   -1.2g/g in previous check in 2/2023, 3.3g on 24h collection in 8/2023   -A1c 5.8%. 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   -He went to the dermatology in 6/2022. No history of skin CA    # Medical Compliance: Yes    # Health Maintenance and Vaccination Review: Not Reviewed      # Transplant History:  Etiology of Kidney Failure: IgA nephropathy  Tx: DDKT  Transplant: 10/13/2015 (Kidney), 4/15/2004 (Kidney)  Significant changes in immunosuppression: None  Significant transplant-related complications: BK Viremia and CMV Viremia      Transplant Office Phone Number: [phone]    Assessment and plan was discussed with the patient and he voiced his understanding and agreement.    Return visit: Return in about 4 months (around 2/2/2024).    Maurice Bullard MD      Chief Complaint   Mr. Torrez is a 58 year old here for kidney transplant, immunosuppression management, new medical concerns and CMV viremia.    History of Present Illness   Haider has been having worsening diarrhea for the past 3 weeks. He previously was diagnosed with norovirus diarrhea but symptoms improved spontaneously. He presented to the ED today due to increasing diarrhea and some dizziness. Found to have Scr 3.9 from 2.3, bicarb of 13. He was given IVF and discharged. He denies fever, chills, N/V, SOB, chest  pain, LE edema. He denies sick contacts, unintentional weight loss, night sweats, melena, hematochezia.     Home BP:  130s systolic        Problem List   Patient Active Problem List   Diagnosis     Dyslipidemia     Secondary renal hyperparathyroidism (H24)     Immunosuppression (H24)     Kidney replaced by transplant     Hypothyroidism     Hypomagnesemia     HTN, kidney transplant related     Metabolic acidosis     Cytomegalovirus (CMV) viremia (H)     Persistent proteinuria     Pancytopenia (H)     Functional diarrhea     Aftercare following organ transplant       Past Medical History    Past Medical History:   Diagnosis Date     Anemia of chronic kidney failure      Carpal tunnel syndrome      Clinical diagnosis of COVID-19 9/22/2022     Dyslipidemia      History of respiratory failure      Hypertension      Hypomagnesemia 2015     IgA nephropathy      Insomnia      Kidney disease, chronic, end stage on dialysis (H)      Kidney transplant failure      Myoclonus      Obstructive sleep apnea      Papillary thyroid carcinoma (H)      PRES (posterior reversible encephalopathy syndrome)     2011 after thyroidectomy     Restless leg syndrome      Secondary hyperparathyroidism (H)      Seizure disorder (H)     Post surgery. Keppra now discontinued     Urinoma of kidney transplant        Past Surgical History   Past Surgical History:   Procedure Laterality Date     BENCH KIDNEY  10/13/2015    Procedure: BENCH KIDNEY;  Surgeon: Seth Gibbons MD;  Location: [facility] OR     CREATE FISTULA ARTERIOVENOUS UPPER EXTREMITY  2011     CYSTOSCOPY, REMOVE STENT(S), COMBINED N/A 11/17/2015    Procedure: COMBINED CYSTOSCOPY, REMOVE STENT(S);  Surgeon: Seth Gibbons MD;  Location: [facility] OR     CYSTOSCOPY, REMOVE STENT(S), COMBINED Bilateral 11/17/2015    Procedure: COMBINED CYSTOSCOPY, REMOVE STENT(S);  Surgeon: Seth Gibbons MD;  Location: [facility] OR     ELBOW SURGERY Right 1978     hemithyroidectomy Left 2011     HERNIA  REPAIR Right 3/2009     THYROIDECTOMY       TRANSPLANT KIDNEY RECIPIENT  DONOR       TRANSPLANT KIDNEY RECIPIENT  DONOR N/A 10/13/2015    Procedure: TRANSPLANT KIDNEY RECIPIENT  DONOR;  Surgeon: Seth Gibbons MD;  Location: [facility] OR     ureteropyelostomy  2004       Family History   Family History   Problem Relation Age of Onset     Diabetes Mother      Other Cancer Father         lung        Social History   Social History     Tobacco Use     Smoking status: Never     Smokeless tobacco: Never   Substance Use Topics     Alcohol use: No     Drug use: No       Allergies   Allergies   Allergen Reactions     Hydralazine Swelling     Betadine [Povidone Iodine]      Cephalexin Hives     Clindamycin Hives     Trazodone Swelling     Swelling of face/lips       Medications   Current Outpatient Medications   Medication Sig     MYFORTIC (BRAND) 180 MG EC tablet Take 2 tablets (360 mg) by mouth 2 times daily     nitazoxanide (ALINIA) 500 MG tablet Take 1 tablet (500 mg) by mouth 2 times daily (with meals) for 14 days     sodium bicarbonate 650 MG tablet Take 3 tablets (1,950 mg) by mouth 3 times daily for 90 days     carvedilol (COREG) 25 MG tablet Take 1 tablet (25 mg) by mouth 2 times daily (with meals)     clonazePAM (KLONOPIN) 0.5 MG tablet Take 1 tablet (0.5 mg) by mouth At Bedtime     folic acid (FOLVITE) 1 MG tablet Take 1 tablet (1 mg) by mouth daily     levothyroxine (SYNTHROID/LEVOTHROID) 100 MCG tablet Take 1 tablet (100 mcg) by mouth daily     pantoprazole (PROTONIX) 40 MG EC tablet Take 1 tablet (40 mg) by mouth daily Take 30-60 minutes before a meal.     predniSONE (DELTASONE) 5 MG tablet Take 1 tablet (5 mg) by mouth daily     rOPINIRole (REQUIP) 1 MG tablet Take 1 tablet (1 mg) by mouth At Bedtime     sulfamethoxazole-trimethoprim (BACTRIM/SEPTRA) 400-80 MG per tablet Take 1 tablet by mouth daily     tacrolimus (GENERIC EQUIVALENT) 0.5 MG capsule Take 1 capsule (0.5 mg) by  mouth 2 times daily Total dose 3mg in AM and 2.5mg in PM     tacrolimus (GENERIC EQUIVALENT) 1 MG capsule Take 3 capsules (3 mg) by mouth 2 times daily Total dose 3mg in AM and 2.5mg in PM     vitamin D3 (CHOLECALCIFEROL) 10 MCG (400 UNIT) capsule Take 1 capsule (400 Units) by mouth daily     No current facility-administered medications for this visit.     Medications Discontinued During This Encounter   Medication Reason     MYFORTIC (BRAND) 180 MG EC tablet      sodium bicarbonate 650 MG tablet          Physical Exam   Vital Signs: There were no vitals taken for this visit.    GENERAL APPEARANCE: alert and no distress  HENT: no obvious abnormalities on appearance  RESP: breathing appears unremarkable with normal rate, no audible wheezing or cough and no apparent shortness of breath with conversation  MS: extremities normal - no gross deformities noted  SKIN: no apparent rash and normal skin tone  NEURO: speech is clear with no obvious neurological deficits  PSYCH: mentation appears normal and affect normal      Data         Latest Ref Rng & Units 5/1/2023     7:00 AM 4/10/2023     7:03 AM 3/13/2023     7:40 AM   Renal   Na (external) 135 - 145 mmol/L 144     142     142       K (external) 3.6 - 5.2 mmol/L 5.0     4.8     4.4       Cl 98 - 107 mmol/L 115     111     113       Cl (external) 98 - 107 mmol/L 115     111     113       CO2 (external) 22 - 29 mmol/L 19     18     21       BUN (external) 8 - 24 mg/dL 57     54     44       Cr (external) 0.74 - 1.35 mg/dL 2.84     3.06     2.37       Glucose (external) 70 - 140 mg/dL 101     103     106       Ca (external) 8.6 - 10.0 mg/dL 8.7     9.0     8.6       Mg (external) 1.7 - 2.3 mg/dL   1.5           This result is from an external source.         Latest Ref Rng & Units 3/13/2023     7:40 AM 7/26/2022     7:55 AM 7/15/2021     7:22 AM   Bone Health   Phos (external) 2.5 - 4.5 mg/dL  3.9     3.6       PTHi (external) 15 - 65 pg/mL 337     253     151       Vit D  Def (external) 20 - 50 ng/mL 36             This result is from an external source.         Latest Ref Rng & Units 5/1/2023     7:00 AM 4/10/2023     7:03 AM 3/13/2023     7:40 AM   Heme   WBC (external) 3.4 - 9.6 x10(9)/L 3.2     4.2     3.4       Hgb (external) 13.2 - 16.6 g/dL 10.8     10.7     9.2       Plt (external) 135 - 317 x10(9)/L 116     132     170       ABSOLUTE NEUTROPHILS (EXTERNAL) 1.56 - 6.45 x10(9)/L 2.18     2.97     2.62       ABSOLUTE LYMPHOCYTES (EXTERNAL) 0.95 - 3.07 x10(9)/L 0.46     0.67        ABSOLUTE MONOCYTES (EXTERNAL) 0.26 - 0.81 x10(9)/L 0.49     0.50        ABSOLUTE EOSINOPHILS (EXTERNAL) 0.03 - 0.48 x10(9)/L 0.03     0.03        ABSOLUTE BASOPHILS (EXTERNAL) 0.01 - 0.08 x10(9)/L <0.03     <0.03            This result is from an external source.         Latest Ref Rng & Units 3/13/2023     7:40 AM 10/9/2022    10:44 AM 9/25/2022     7:15 AM   Liver   AP (external) 40 - 129 U/L 76         TBili (external) <=1.2 mg/dL 0.3         ALT (external) 7 - 55 U/L 14     34     16       AST (external) 8 - 48 U/L 22     31     28       Tot Protein (external) 6.3 - 7.9 g/dL 5.8         Albumin (external) 3.5 - 5.0 g/dL 3.5             This result is from an external source.         Latest Ref Rng & Units 3/13/2023     7:40 AM 7/26/2022     7:55 AM 7/15/2021     7:22 AM   Pancreas   A1C (external) 4.2 - 5.6 % 5.5     5.6     5.8           This result is from an external source.         Latest Ref Rng & Units 3/13/2023     7:40 AM 3/27/2018     3:10 PM 10/17/2015     7:00 AM   Iron studies   Iron 35 - 180 ug/dL  67  25    Iron Saturation Index 15 - 46 %  24  12    Ferritin 26 - 388 ng/mL  695  883    Ferritin (external) 31 - 409 mcg/L 531             This result is from an external source.         Latest Ref Rng & Units 5/1/2023     7:00 AM 4/10/2023     7:03 AM 3/20/2023     7:03 AM   [facility] Txp Virology   CMV DNA Quant Ext Undetected IU/mL 318     460     622       LOG IU/ML OF CMVQNT (EXTERNAL) log  IU/mL 2.50     2.66     2.79       BK Quant Log Ext log IU/mL 1.46         BK Quant Result Ext Undetected IU/mL 29         BK Quant Spec Ext  Blood             This result is from an external source.       Recent Labs   Lab Test 09/26/17  1508 02/05/18  0710 02/28/18  0705   DOSTAC 730a 9.26.2017 2100 02/04/18 02/27/18  1930   TACROL 6.1 8.1 5.0     Recent Labs   Lab Test 10/19/15  0701 10/21/15  0648   DOSMPA 2,100 1,900   MPACID 1.14 1.52   MPAG 92.7 93.8

## 2023-10-02 NOTE — NURSING NOTE
Is the patient currently in the state of [place]? YES    Visit mode:VIDEO    If the visit is dropped, the patient can be reconnected by: VIDEO VISIT: Text to cell phone:   Telephone Information:   Mobile [phone]       Will anyone else be joining the visit? NO  (If patient encounters technical issues they should call [phone]    How would you like to obtain your AVS? MyChart    Are changes needed to the allergy or medication list? No    Reason for visit: RECHECK    Blayne CHENG

## 2023-10-02 NOTE — PATIENT INSTRUCTIONS
Patient Recommendations:  - Decrease mycophenolic acid to 360mg twice daily  -I have ordered nitazoxanide 500mg twice daily for 14 days to your pharmacy.   -We need to recheck labs later this week to see if you need more IV fluids and to check tacrolimus level  -We need to arrange for another colonoscopy with biopsies  -Increase sodium bicarbonate to 1950mg three times daily    Transplant Patient Information  Your Post Transplant Coordinator is: Meagan Richardson  For non urgent items, we encourage you to contact your coordinator/care team online via [employer]  You and your care team can also contact your transplant coordinator Monday - Friday, 8am - 5pm at [phone] (Option 2 to reach the coordinator or Option 4 to schedule an appointment).  After hours for urgent matters, please call [facility] at [phone].

## 2023-10-03 ENCOUNTER — HOSPITAL ENCOUNTER (INPATIENT)
Facility: CLINIC | Age: 58
LOS: 16 days | Discharge: HOME | DRG: 698 | End: 2023-10-19
Attending: EMERGENCY MEDICINE | Admitting: INTERNAL MEDICINE
Payer: MEDICARE

## 2023-10-03 DIAGNOSIS — N17.9 AKI (ACUTE KIDNEY INJURY) (H): ICD-10-CM

## 2023-10-03 DIAGNOSIS — R19.7 DIARRHEA, UNSPECIFIED TYPE: ICD-10-CM

## 2023-10-03 DIAGNOSIS — Z94.0 HTN, KIDNEY TRANSPLANT RELATED: ICD-10-CM

## 2023-10-03 DIAGNOSIS — A08.39 CMV COLITIS (H): Primary | ICD-10-CM

## 2023-10-03 DIAGNOSIS — E87.20 ACIDOSIS: ICD-10-CM

## 2023-10-03 DIAGNOSIS — I82.621 ACUTE DEEP VEIN THROMBOSIS (DVT) OF RIGHT UPPER EXTREMITY, UNSPECIFIED VEIN (H): ICD-10-CM

## 2023-10-03 DIAGNOSIS — Z94.0 KIDNEY REPLACED BY TRANSPLANT: ICD-10-CM

## 2023-10-03 DIAGNOSIS — I15.1 HTN, KIDNEY TRANSPLANT RELATED: ICD-10-CM

## 2023-10-03 DIAGNOSIS — B25.9 CYTOMEGALOVIRUS (CMV) VIREMIA (H): ICD-10-CM

## 2023-10-03 DIAGNOSIS — B25.9 CMV COLITIS (H): Primary | ICD-10-CM

## 2023-10-03 LAB
ALBUMIN SERPL BCG-MCNC: 3.9 G/DL (ref 3.5–5.2)
ALBUMIN UR-MCNC: 200 MG/DL
ALP SERPL-CCNC: 62 U/L (ref 40–129)
ALT SERPL W P-5'-P-CCNC: 7 U/L (ref 0–70)
ANION GAP SERPL CALCULATED.3IONS-SCNC: 11 MMOL/L (ref 7–15)
ANION GAP SERPL CALCULATED.3IONS-SCNC: 11 MMOL/L (ref 7–15)
APPEARANCE UR: CLEAR
AST SERPL W P-5'-P-CCNC: 12 U/L (ref 0–45)
BASO+EOS+MONOS # BLD AUTO: ABNORMAL 10*3/UL
BASO+EOS+MONOS NFR BLD AUTO: ABNORMAL %
BASOPHILS # BLD AUTO: 0 10E3/UL (ref 0–0.2)
BASOPHILS NFR BLD AUTO: 0 %
BILIRUB SERPL-MCNC: 0.3 MG/DL
BILIRUB UR QL STRIP: NEGATIVE
BUN SERPL-MCNC: 61.9 MG/DL (ref 6–20)
BUN SERPL-MCNC: 65.5 MG/DL (ref 6–20)
CALCIUM SERPL-MCNC: 7.9 MG/DL (ref 8.6–10)
CALCIUM SERPL-MCNC: 8.5 MG/DL (ref 8.6–10)
CHLORIDE SERPL-SCNC: 119 MMOL/L (ref 98–107)
CHLORIDE SERPL-SCNC: 121 MMOL/L (ref 98–107)
COLOR UR AUTO: ABNORMAL
CREAT SERPL-MCNC: 3.39 MG/DL (ref 0.67–1.17)
CREAT SERPL-MCNC: 3.75 MG/DL (ref 0.67–1.17)
DEPRECATED HCO3 PLAS-SCNC: 12 MMOL/L (ref 22–29)
DEPRECATED HCO3 PLAS-SCNC: 14 MMOL/L (ref 22–29)
EGFRCR SERPLBLD CKD-EPI 2021: 18 ML/MIN/1.73M2
EGFRCR SERPLBLD CKD-EPI 2021: 20 ML/MIN/1.73M2
EOSINOPHIL # BLD AUTO: 0 10E3/UL (ref 0–0.7)
EOSINOPHIL NFR BLD AUTO: 1 %
ERYTHROCYTE [DISTWIDTH] IN BLOOD BY AUTOMATED COUNT: 14.5 % (ref 10–15)
GLUCOSE SERPL-MCNC: 143 MG/DL (ref 70–99)
GLUCOSE SERPL-MCNC: 145 MG/DL (ref 70–99)
GLUCOSE UR STRIP-MCNC: NEGATIVE MG/DL
HCT VFR BLD AUTO: 35 % (ref 40–53)
HGB BLD-MCNC: 10.8 G/DL (ref 13.3–17.7)
HGB UR QL STRIP: NEGATIVE
HOLD SPECIMEN: NORMAL
IMM GRANULOCYTES # BLD: 0 10E3/UL
IMM GRANULOCYTES NFR BLD: 1 %
KETONES UR STRIP-MCNC: NEGATIVE MG/DL
LEUKOCYTE ESTERASE UR QL STRIP: NEGATIVE
LYMPHOCYTES # BLD AUTO: 0.4 10E3/UL (ref 0.8–5.3)
LYMPHOCYTES NFR BLD AUTO: 11 %
MCH RBC QN AUTO: 29.5 PG (ref 26.5–33)
MCHC RBC AUTO-ENTMCNC: 30.9 G/DL (ref 31.5–36.5)
MCV RBC AUTO: 96 FL (ref 78–100)
MONOCYTES # BLD AUTO: 0.3 10E3/UL (ref 0–1.3)
MONOCYTES NFR BLD AUTO: 10 %
MUCOUS THREADS #/AREA URNS LPF: PRESENT /LPF
NEUTROPHILS # BLD AUTO: 2.5 10E3/UL (ref 1.6–8.3)
NEUTROPHILS NFR BLD AUTO: 77 %
NITRATE UR QL: NEGATIVE
NRBC # BLD AUTO: 0 10E3/UL
NRBC BLD AUTO-RTO: 0 /100
PH UR STRIP: 6 [PH] (ref 5–7)
PLATELET # BLD AUTO: 122 10E3/UL (ref 150–450)
POTASSIUM SERPL-SCNC: 4.1 MMOL/L (ref 3.4–5.3)
POTASSIUM SERPL-SCNC: 4.9 MMOL/L (ref 3.4–5.3)
PROT SERPL-MCNC: 6.1 G/DL (ref 6.4–8.3)
RBC # BLD AUTO: 3.66 10E6/UL (ref 4.4–5.9)
RBC URINE: 1 /HPF
SODIUM SERPL-SCNC: 144 MMOL/L (ref 135–145)
SODIUM SERPL-SCNC: 144 MMOL/L (ref 135–145)
SP GR UR STRIP: 1.01 (ref 1–1.03)
TRANSITIONAL EPI: <1 /HPF
UROBILINOGEN UR STRIP-MCNC: NORMAL MG/DL
WBC # BLD AUTO: 3.2 10E3/UL (ref 4–11)
WBC URINE: 1 /HPF

## 2023-10-03 PROCEDURE — 250N000012 HC RX MED GY IP 250 OP 636 PS 637

## 2023-10-03 PROCEDURE — 84155 ASSAY OF PROTEIN SERUM: CPT

## 2023-10-03 PROCEDURE — 80053 COMPREHEN METABOLIC PANEL: CPT | Performed by: NURSE PRACTITIONER

## 2023-10-03 PROCEDURE — 96360 HYDRATION IV INFUSION INIT: CPT | Performed by: EMERGENCY MEDICINE

## 2023-10-03 PROCEDURE — 84165 PROTEIN E-PHORESIS SERUM: CPT | Mod: TC | Performed by: PATHOLOGY

## 2023-10-03 PROCEDURE — 81001 URINALYSIS AUTO W/SCOPE: CPT

## 2023-10-03 PROCEDURE — 250N000009 HC RX 250: Performed by: NURSE PRACTITIONER

## 2023-10-03 PROCEDURE — 36415 COLL VENOUS BLD VENIPUNCTURE: CPT | Performed by: NURSE PRACTITIONER

## 2023-10-03 PROCEDURE — 250N000013 HC RX MED GY IP 250 OP 250 PS 637

## 2023-10-03 PROCEDURE — 120N000011 HC R&B TRANSPLANT UMMC

## 2023-10-03 PROCEDURE — 85025 COMPLETE CBC W/AUTO DIFF WBC: CPT | Performed by: NURSE PRACTITIONER

## 2023-10-03 PROCEDURE — 82784 ASSAY IGA/IGD/IGG/IGM EACH: CPT | Performed by: NURSE PRACTITIONER

## 2023-10-03 PROCEDURE — 87799 DETECT AGENT NOS DNA QUANT: CPT

## 2023-10-03 PROCEDURE — 99285 EMERGENCY DEPT VISIT HI MDM: CPT | Mod: FS | Performed by: EMERGENCY MEDICINE

## 2023-10-03 PROCEDURE — 99285 EMERGENCY DEPT VISIT HI MDM: CPT | Mod: 25 | Performed by: EMERGENCY MEDICINE

## 2023-10-03 PROCEDURE — 36415 COLL VENOUS BLD VENIPUNCTURE: CPT

## 2023-10-03 PROCEDURE — 84165 PROTEIN E-PHORESIS SERUM: CPT | Mod: 26

## 2023-10-03 PROCEDURE — 258N000003 HC RX IP 258 OP 636: Performed by: NURSE PRACTITIONER

## 2023-10-03 RX ORDER — CARVEDILOL 25 MG/1
25 TABLET ORAL 2 TIMES DAILY WITH MEALS
Status: DISCONTINUED | OUTPATIENT
Start: 2023-10-03 | End: 2023-10-19 | Stop reason: HOSPADM

## 2023-10-03 RX ORDER — MYCOPHENOLIC ACID 180 MG/1
360 TABLET, DELAYED RELEASE ORAL 2 TIMES DAILY
Status: ON HOLD | COMMUNITY
End: 2023-10-18

## 2023-10-03 RX ORDER — NITAZOXANIDE 500 MG/1
500 TABLET ORAL 2 TIMES DAILY WITH MEALS
Status: DISCONTINUED | OUTPATIENT
Start: 2023-10-03 | End: 2023-10-04

## 2023-10-03 RX ORDER — SODIUM BICARBONATE 650 MG/1
1950 TABLET ORAL 3 TIMES DAILY
Status: DISCONTINUED | OUTPATIENT
Start: 2023-10-03 | End: 2023-10-06

## 2023-10-03 RX ORDER — CLONAZEPAM 0.5 MG/1
0.5 TABLET ORAL AT BEDTIME
Status: DISCONTINUED | OUTPATIENT
Start: 2023-10-03 | End: 2023-10-19 | Stop reason: HOSPADM

## 2023-10-03 RX ORDER — PREDNISONE 5 MG/1
5 TABLET ORAL DAILY
Status: DISCONTINUED | OUTPATIENT
Start: 2023-10-04 | End: 2023-10-19 | Stop reason: HOSPADM

## 2023-10-03 RX ORDER — MYCOPHENOLIC ACID 360 MG/1
360 TABLET, DELAYED RELEASE ORAL 2 TIMES DAILY
Status: DISCONTINUED | OUTPATIENT
Start: 2023-10-03 | End: 2023-10-13

## 2023-10-03 RX ORDER — LEVOTHYROXINE SODIUM 100 UG/1
100 TABLET ORAL DAILY
Status: DISCONTINUED | OUTPATIENT
Start: 2023-10-03 | End: 2023-10-19 | Stop reason: HOSPADM

## 2023-10-03 RX ORDER — PANTOPRAZOLE SODIUM 40 MG/1
40 TABLET, DELAYED RELEASE ORAL DAILY
Status: DISCONTINUED | OUTPATIENT
Start: 2023-10-04 | End: 2023-10-19 | Stop reason: HOSPADM

## 2023-10-03 RX ORDER — SULFAMETHOXAZOLE AND TRIMETHOPRIM 400; 80 MG/1; MG/1
1 TABLET ORAL DAILY
Status: DISCONTINUED | OUTPATIENT
Start: 2023-10-04 | End: 2023-10-04

## 2023-10-03 RX ORDER — TACROLIMUS 1 MG/1
3 CAPSULE ORAL EVERY MORNING
Status: DISCONTINUED | OUTPATIENT
Start: 2023-10-04 | End: 2023-10-05

## 2023-10-03 RX ORDER — TACROLIMUS 0.5 MG/1
0.5 CAPSULE ORAL 2 TIMES DAILY
Status: DISCONTINUED | OUTPATIENT
Start: 2023-10-03 | End: 2023-10-03

## 2023-10-03 RX ORDER — FOLIC ACID 1 MG/1
1 TABLET ORAL DAILY
Status: DISCONTINUED | OUTPATIENT
Start: 2023-10-03 | End: 2023-10-19 | Stop reason: HOSPADM

## 2023-10-03 RX ADMIN — FOLIC ACID 1 MG: 1 TABLET ORAL at 21:04

## 2023-10-03 RX ADMIN — CARVEDILOL 25 MG: 25 TABLET, FILM COATED ORAL at 21:04

## 2023-10-03 RX ADMIN — MYCOPHENILIC ACID 360 MG: 360 TABLET, DELAYED RELEASE ORAL at 21:05

## 2023-10-03 RX ADMIN — LEVOTHYROXINE SODIUM 100 MCG: 100 TABLET ORAL at 21:04

## 2023-10-03 RX ADMIN — SODIUM CHLORIDE 1000 ML: 9 INJECTION, SOLUTION INTRAVENOUS at 12:46

## 2023-10-03 RX ADMIN — SODIUM BICARBONATE: 84 INJECTION, SOLUTION INTRAVENOUS at 14:03

## 2023-10-03 RX ADMIN — CLONAZEPAM 0.5 MG: 0.5 TABLET ORAL at 21:04

## 2023-10-03 RX ADMIN — SODIUM BICARBONATE 650 MG TABLET 1950 MG: at 21:04

## 2023-10-03 ASSESSMENT — ACTIVITIES OF DAILY LIVING (ADL)
ADLS_ACUITY_SCORE: 35
ADLS_ACUITY_SCORE: 35
ADLS_ACUITY_SCORE: 25
ADLS_ACUITY_SCORE: 35

## 2023-10-03 NOTE — LETTER
[facility] [address] [address]  Phone: [phone]    October 9, 2023        Haider Torrez  [address] [facility] [address]          To whom it may concern:    RE: Haider Torrez    Please excuse Mrs. Torrez from work as she is taking care of her  who is admitted to my care for the past 4 days, and is expected that the patient will need close assistance for another week before the patient can be independent.    Please contact me for questions or concerns.      Sincerely,        Yovana Flanagan MD

## 2023-10-03 NOTE — H&P
[facility]    History and Physical - Medicine Service, [facility] TEAM 2       Date of Admission:  10/3/2023    Assessment & Plan      Haider Torrez is a 58 year old male admitted on 10/3/2023. He has a PMH of IgA nephropathy s/p kidney transplant (2004, 2015), BK and CMV viremia, secondary hyperparathyroid presenting with persistent diarrhea, concerning for CMV colitis vs. MMF colitis and MATTHEW.       Kidney transplant  MATTHEW  Proteinuria  Baseline Cr 2.1-2.5, today 3.9. Proteinuria since 8/23.   - consult transplant nephrology  - currently on PTA tacrolimus immediate release (goal 4-6), Mycophenolic acid (dose 360mg bid) and Prednisone (dose 5 mg daily)  - Continue PJP prophylaxis with bactrim    - Obtain EBV PCR pending      Diarrhea  He has a history of norovirus diarrhea and MATTHEW in March 2023. Patient has been having loose bowel movement, especially after eating for a month, and recently test positive for norovirus in stool on 9/21.   -Colonoscopy 10/2022 with mild archietectural distortion, focal crypt degeneration. Negative stain for CMV   - consult GI for concerns for CMV colitis  - C. Diff panel pending  - plan to start nitazoxanide 500mg bid if +norovirus       NAGMA  Likely secondary to diarrhea.   - continue PTA bicarb 1,950 mg TID  - D5 with 150 mEq bicarb infusion in ED due to acidosis    CMV viremia  Per transplant nephrology note:    -Noted to have CMV viremia on 10/9/22 with 41 copies, up to 736 copies on 3/2/23. This hase been managed conservatively with decrease in MPA (patient did not actually decrease dose)              -Pt is CMV IgG+ at time of transplant.               -IgG >600. CMV has been low level but present               -concern at this point with worsening diarrhea that he has CMV colitis. Recommend repeat colonoscopy with biopsies, and if CMV + would treat with IV GCV.   - plan for repeat IgG level and give IVIG 0.5g/kg x1 after consulting  with txp neph     Hypertension  On carvedilol 25 mg BID    Anemia of CKD  Normal Iron study. Hb at baseline of 11    Mineral bone disorder due to secondary hyperparathyroidism    Papillary thyroid carcinoma s/p surgical resection  Continue PTA levothyroxine      Diet:  regular, renal diet  DVT Prophylaxis: Ambulate every shift  Fernandez Catheter: Not present  Lines: None     Cardiac Monitoring: None  Code Status:  full code    Clinically Significant Risk Factors Present on Admission                # Thrombocytopenia: Lowest platelets = 122 in last 2 days, will monitor for bleeding   # Hypertension: Noted on problem list                 Disposition Plan      Expected Discharge Date: 10/05/2023                The patient's care was discussed with the Attending Physician, Dr. Alcazar .    Yovana Flanagan MD  Medicine Service, [address]  Securely message with [employer] (more info)  Text page via [facility] Paging/Directory   See signed in provider for up to date coverage information    ______________________________________________________________________    Chief Complaint     He has been having loose diarrhea every time after eating or drinking - around 3-4 episodes/day. No fever/nausea vomiting.   Patient was diagnosed with norovirus colitis on 9/20/23.     History is obtained from the patient    History of Present Illness   Haider Torrez is a 58 year old male who has a PMH of IgA nephropathy s/p kidney transplant (2004, 2015), BK and CMV viremia, secondary hyperparathyroid presenting with persistent diarrhea, concerning for CMV colitis vs. MMF colitis and MATTHEW.       Past Medical History    Past Medical History:   Diagnosis Date    Anemia of chronic kidney failure     Carpal tunnel syndrome     Clinical diagnosis of COVID-19 9/22/2022    Dyslipidemia     History of respiratory failure     Hypertension     Hypomagnesemia 2015    IgA nephropathy     Insomnia      Kidney disease, chronic, end stage on dialysis (H)     Kidney transplant failure     Myoclonus     Obstructive sleep apnea     Papillary thyroid carcinoma (H)     PRES (posterior reversible encephalopathy syndrome)      after thyroidectomy    Restless leg syndrome     Secondary hyperparathyroidism (H24)     Seizure disorder (H)     Post surgery. Keppra now discontinued    Urinoma of kidney transplant        Past Surgical History   Past Surgical History:   Procedure Laterality Date    BENCH KIDNEY  10/13/2015    Procedure: BENCH KIDNEY;  Surgeon: Seth Gibbons MD;  Location: [facility] OR    CREATE FISTULA ARTERIOVENOUS UPPER EXTREMITY      CYSTOSCOPY, REMOVE STENT(S), COMBINED N/A 2015    Procedure: COMBINED CYSTOSCOPY, REMOVE STENT(S);  Surgeon: Seth Gibbons MD;  Location: [facility] OR    CYSTOSCOPY, REMOVE STENT(S), COMBINED Bilateral 2015    Procedure: COMBINED CYSTOSCOPY, REMOVE STENT(S);  Surgeon: Seth Gibbons MD;  Location: [facility] OR    ELBOW SURGERY Right     hemithyroidectomy Left     HERNIA REPAIR Right 3/2009    THYROIDECTOMY      TRANSPLANT KIDNEY RECIPIENT  DONOR      TRANSPLANT KIDNEY RECIPIENT  DONOR N/A 10/13/2015    Procedure: TRANSPLANT KIDNEY RECIPIENT  DONOR;  Surgeon: Seth Gibbons MD;  Location: [facility] OR    ureteropyelostomy  2004       Prior to Admission Medications   Prior to Admission Medications   Prescriptions Last Dose Informant Patient Reported? Taking?   MYFORTIC (BRAND) 180 MG EC tablet   Yes No   Sig: Take 2 tablets (360 mg) by mouth 2 times daily   carvedilol (COREG) 25 MG tablet   Yes No   Sig: Take 1 tablet (25 mg) by mouth 2 times daily (with meals)   clonazePAM (KLONOPIN) 0.5 MG tablet   Yes No   Sig: Take 1 tablet (0.5 mg) by mouth At Bedtime   folic acid (FOLVITE) 1 MG tablet   Yes No   Sig: Take 1 tablet (1 mg) by mouth daily   levothyroxine (SYNTHROID/LEVOTHROID) 100 MCG tablet   Yes No   Sig: Take 1  tablet (100 mcg) by mouth daily   nitazoxanide (ALINIA) 500 MG tablet   No No   Sig: Take 1 tablet (500 mg) by mouth 2 times daily (with meals) for 14 days   pantoprazole (PROTONIX) 40 MG EC tablet   Yes No   Sig: Take 1 tablet (40 mg) by mouth daily Take 30-60 minutes before a meal.   predniSONE (DELTASONE) 5 MG tablet   Yes No   Sig: Take 1 tablet (5 mg) by mouth daily   rOPINIRole (REQUIP) 1 MG tablet   Yes No   Sig: Take 1 tablet (1 mg) by mouth At Bedtime   sodium bicarbonate 650 MG tablet   No No   Sig: Take 3 tablets (1,950 mg) by mouth 3 times daily for 90 days   sulfamethoxazole-trimethoprim (BACTRIM/SEPTRA) 400-80 MG per tablet   No No   Sig: Take 1 tablet by mouth daily   tacrolimus (GENERIC EQUIVALENT) 0.5 MG capsule   Yes No   Sig: Take 1 capsule (0.5 mg) by mouth 2 times daily Total dose 3mg in AM and 2.5mg in PM   tacrolimus (GENERIC EQUIVALENT) 1 MG capsule   Yes No   Sig: Take 3 capsules (3 mg) by mouth 2 times daily Total dose 3mg in AM and 2.5mg in PM   vitamin D3 (CHOLECALCIFEROL) 10 MCG (400 UNIT) capsule   Yes No   Sig: Take 1 capsule (400 Units) by mouth daily      Facility-Administered Medications: None           Physical Exam   Vital Signs: Temp: 98.1  F (36.7  C) Temp src: Temporal BP: (!) 161/76 Pulse: 64   Resp: 18 SpO2: 100 % O2 Device: None (Room air)    Weight: 0 lbs 0 oz        Medical Decision Making             Data

## 2023-10-03 NOTE — ED TRIAGE NOTES
Referred from transplant nephrology after virtual visit yesterday  Having continued norovirus, diarrhea that has not improved.   DDKT 2016           Triage Assessment       Row Name 10/03/23 1024       Triage Assessment (Adult)    Airway WDL WDL       Respiratory WDL    Respiratory WDL WDL       Skin Circulation/Temperature WDL    Skin Circulation/Temperature WDL WDL       Cardiac WDL    Cardiac WDL WDL       Peripheral/Neurovascular WDL    Peripheral Neurovascular WDL WDL       Cognitive/Neuro/Behavioral WDL    Cognitive/Neuro/Behavioral WDL WDL                     Wound Care (No Sutures): Petrolatum

## 2023-10-03 NOTE — ED PROVIDER NOTES
ED Provider Note  [facility]      History     Chief Complaint   Patient presents with    Referral     HPI  Haider Torrez is a 58 year old male with a past history of dyslipidemia, secondary renal hyperparathyroidism, immunosuppressed s/p kidney replaced by transplant in 2015, hypothyroidism, hypomagnesemia, hypertension kidney transplant related who presents over concerns for continued diarrhea. Dignosed with norovirus 09/20, and reports about 20 minutes after he eats he develops watery loose stool. Denies fever, chills, abdominal pain, nausea, vomiting, is still producing urine.    He was seen at outside ED yesterday that showed an MATTHEW and he received IV fluids. He also had a virtual visit with transplant nephrology yesterday and was referred to ED for further evaluation and possible hospital admission.    Past Medical History  Past Medical History:   Diagnosis Date    Anemia of chronic kidney failure     Carpal tunnel syndrome     Clinical diagnosis of COVID-19 9/22/2022    Dyslipidemia     History of respiratory failure     Hypertension     Hypomagnesemia 2015    IgA nephropathy     Insomnia     Kidney disease, chronic, end stage on dialysis (H)     Kidney transplant failure     Myoclonus     Obstructive sleep apnea     Papillary thyroid carcinoma (H)     PRES (posterior reversible encephalopathy syndrome)     2011 after thyroidectomy    Restless leg syndrome     Secondary hyperparathyroidism (H24)     Seizure disorder (H)     Post surgery. Keppra now discontinued    Urinoma of kidney transplant      Past Surgical History:   Procedure Laterality Date    BENCH KIDNEY  10/13/2015    Procedure: BENCH KIDNEY;  Surgeon: Seth Gibbons MD;  Location: [facility] OR    CREATE FISTULA ARTERIOVENOUS UPPER EXTREMITY  2011    CYSTOSCOPY, REMOVE STENT(S), COMBINED N/A 11/17/2015    Procedure: COMBINED CYSTOSCOPY, REMOVE STENT(S);  Surgeon: Seth Gibbons MD;  Location: [facility] OR    CYSTOSCOPY,  REMOVE STENT(S), COMBINED Bilateral 2015    Procedure: COMBINED CYSTOSCOPY, REMOVE STENT(S);  Surgeon: Seth Gibbons MD;  Location: [facility] OR    ELBOW SURGERY Right     hemithyroidectomy Left     HERNIA REPAIR Right 3/2009    THYROIDECTOMY  2011    TRANSPLANT KIDNEY RECIPIENT  DONOR      TRANSPLANT KIDNEY RECIPIENT  DONOR N/A 10/13/2015    Procedure: TRANSPLANT KIDNEY RECIPIENT  DONOR;  Surgeon: Seth Gibbons MD;  Location: [facility] OR    ureteropyelostomy  2004     carvedilol (COREG) 25 MG tablet  clonazePAM (KLONOPIN) 0.5 MG tablet  folic acid (FOLVITE) 1 MG tablet  levothyroxine (SYNTHROID/LEVOTHROID) 100 MCG tablet  MYFORTIC (BRAND) 180 MG EC tablet  nitazoxanide (ALINIA) 500 MG tablet  pantoprazole (PROTONIX) 40 MG EC tablet  predniSONE (DELTASONE) 5 MG tablet  rOPINIRole (REQUIP) 1 MG tablet  sodium bicarbonate 650 MG tablet  sulfamethoxazole-trimethoprim (BACTRIM/SEPTRA) 400-80 MG per tablet  tacrolimus (GENERIC EQUIVALENT) 0.5 MG capsule  tacrolimus (GENERIC EQUIVALENT) 1 MG capsule  vitamin D3 (CHOLECALCIFEROL) 10 MCG (400 UNIT) capsule      Allergies   Allergen Reactions    Hydralazine Swelling    Betadine [Povidone Iodine]     Cephalexin Hives    Clindamycin Hives    Trazodone Swelling     Swelling of face/lips     Family History  Family History   Problem Relation Age of Onset    Diabetes Mother     Other Cancer Father         lung     Social History   Social History     Tobacco Use    Smoking status: Never    Smokeless tobacco: Never   Substance Use Topics    Alcohol use: No    Drug use: No         A medically appropriate review of systems was performed with pertinent positives and negatives noted in the HPI, and all other systems negative.    Physical Exam   BP: (!) 161/76  Pulse: 64  Temp: 98.1  F (36.7  C)  Resp: 18  SpO2: 100 %  Physical Exam  Vitals and nursing note reviewed.   HENT:      Head: Normocephalic and atraumatic.      Right Ear: External  ear normal.      Left Ear: External ear normal.      Nose: Nose normal.      Mouth/Throat:      Pharynx: Oropharynx is clear.   Eyes:      Conjunctiva/sclera: Conjunctivae normal.   Cardiovascular:      Rate and Rhythm: Normal rate and regular rhythm.      Pulses: Normal pulses.      Heart sounds: Normal heart sounds.   Pulmonary:      Effort: Pulmonary effort is normal.      Breath sounds: Normal breath sounds.   Abdominal:      Palpations: Abdomen is soft.      Tenderness: There is no abdominal tenderness. There is no guarding or rebound.   Musculoskeletal:         General: Normal range of motion.        Arms:       Cervical back: Normal range of motion and neck supple.      Comments: Fistula to left upper arm with bruit auscultated.   Skin:     General: Skin is warm.   Neurological:      Mental Status: He is alert and oriented to person, place, and time.   Psychiatric:         Mood and Affect: Mood normal.         ED Course as of 10/03/23 1435   Tue Oct 03, 2023   1331 Spoke with TXP nephrology. Will order 1L D5W with 150 of bicarb for 100/mL. Will recheck BMP at ~6pm.     Procedures               Results for orders placed or performed during the hospital encounter of 10/03/23   Comprehensive metabolic panel     Status: Abnormal   Result Value Ref Range    Sodium 144 135 - 145 mmol/L    Potassium 4.9 3.4 - 5.3 mmol/L    Carbon Dioxide (CO2) 12 (L) 22 - 29 mmol/L    Anion Gap 11 7 - 15 mmol/L    Urea Nitrogen 65.5 (H) 6.0 - 20.0 mg/dL    Creatinine 3.75 (H) 0.67 - 1.17 mg/dL    GFR Estimate 18 (L) >60 mL/min/1.73m2    Calcium 8.5 (L) 8.6 - 10.0 mg/dL    Chloride 121 (H) 98 - 107 mmol/L    Glucose 145 (H) 70 - 99 mg/dL    Alkaline Phosphatase 62 40 - 129 U/L    AST 12 0 - 45 U/L    ALT 7 0 - 70 U/L    Protein Total 6.1 (L) 6.4 - 8.3 g/dL    Albumin 3.9 3.5 - 5.2 g/dL    Bilirubin Total 0.3 <=1.2 mg/dL   [place] Draw     Status: None (In process)    Narrative    The following orders were created for panel order  [place] Draw.  Procedure                               Abnormality         Status                     ---------                               -----------         ------                     Extra Blue Top Tube[[id number]]                              Final result               Extra Red Top Tube[[id number]]                                                          Extra Green Top (Lithium...[[id number]]                                                 Extra Purple Top Tube[[id number]]                                                         Please view results for these tests on the individual orders.   CBC with platelets and differential     Status: Abnormal   Result Value Ref Range    WBC Count 3.2 (L) 4.0 - 11.0 10e3/uL    RBC Count 3.66 (L) 4.40 - 5.90 10e6/uL    Hemoglobin 10.8 (L) 13.3 - 17.7 g/dL    Hematocrit 35.0 (L) 40.0 - 53.0 %    MCV 96 78 - 100 fL    MCH 29.5 26.5 - 33.0 pg    MCHC 30.9 (L) 31.5 - 36.5 g/dL    RDW 14.5 10.0 - 15.0 %    Platelet Count 122 (L) 150 - 450 10e3/uL    % Neutrophils 77 %    % Lymphocytes 11 %    % Monocytes 10 %    Mids % (Monos, Eos, Basos)      % Eosinophils 1 %    % Basophils 0 %    % Immature Granulocytes 1 %    NRBCs per 100 WBC 0 <1 /100    Absolute Neutrophils 2.5 1.6 - 8.3 10e3/uL    Absolute Lymphocytes 0.4 (L) 0.8 - 5.3 10e3/uL    Absolute Monocytes 0.3 0.0 - 1.3 10e3/uL    Mids Abs (Monos, Eos, Basos)      Absolute Eosinophils 0.0 0.0 - 0.7 10e3/uL    Absolute Basophils 0.0 0.0 - 0.2 10e3/uL    Absolute Immature Granulocytes 0.0 <=0.4 10e3/uL    Absolute NRBCs 0.0 10e3/uL   Extra Blue Top Tube     Status: None   Result Value Ref Range    Hold Specimen [facility]    CBC with platelets differential     Status: Abnormal    Narrative    The following orders were created for panel order CBC with platelets differential.  Procedure                               Abnormality         Status                     ---------                               -----------         ------                      CBC with platelets and d...[[id number]]  Abnormal            Final result                 Please view results for these tests on the individual orders.     Medications   sodium bicarbonate 150 mEq in D5W 1,000 mL infusion ( Intravenous $New Bag 10/3/23 1405)   sodium chloride 0.9% BOLUS 1,000 mL (0 mLs Intravenous Stopped 10/3/23 1344)     Labs Ordered and Resulted from Time of ED Arrival to Time of ED Departure   COMPREHENSIVE METABOLIC PANEL - Abnormal       Result Value    Sodium 144      Potassium 4.9      Carbon Dioxide (CO2) 12 (*)     Anion Gap 11      Urea Nitrogen 65.5 (*)     Creatinine 3.75 (*)     GFR Estimate 18 (*)     Calcium 8.5 (*)     Chloride 121 (*)     Glucose 145 (*)     Alkaline Phosphatase 62      AST 12      ALT 7      Protein Total 6.1 (*)     Albumin 3.9      Bilirubin Total 0.3     CBC WITH PLATELETS AND DIFFERENTIAL - Abnormal    WBC Count 3.2 (*)     RBC Count 3.66 (*)     Hemoglobin 10.8 (*)     Hematocrit 35.0 (*)     MCV 96      MCH 29.5      MCHC 30.9 (*)     RDW 14.5      Platelet Count 122 (*)     % Neutrophils 77      % Lymphocytes 11      % Monocytes 10      Mids % (Monos, Eos, Basos)        % Eosinophils 1      % Basophils 0      % Immature Granulocytes 1      NRBCs per 100 WBC 0      Absolute Neutrophils 2.5      Absolute Lymphocytes 0.4 (*)     Absolute Monocytes 0.3      Mids Abs (Monos, Eos, Basos)        Absolute Eosinophils 0.0      Absolute Basophils 0.0      Absolute Immature Granulocytes 0.0      Absolute NRBCs 0.0     ROUTINE UA WITH MICROSCOPIC REFLEX TO CULTURE   IGG   ENTERIC BACTERIA AND VIRUS PANEL BY PCR   C. DIFFICILE TOXIN B PCR WITH REFLEX TO C. DIFFICILE ANTIGEN AND TOXINS A/B EIA     No orders to display          Critical care was not performed.     Medical Decision Making  The patient's presentation was of high complexity (an acute health issue posing potential threat to life or bodily function).    The patient's evaluation involved:  review of  external note(s) from 3+ sources (endocrinology, organ transplant clinic notes, urgent care visit summaries)  review of 3+ test result(s) ordered prior to this encounter (CBC, CMP, UA)  ordering and/or review of 3+ test(s) in this encounter (see separate area of note for details)  discussion of management or test interpretation with another health professional (discussion with transplant nephrology)    The patient's management necessitated high risk (a decision regarding hospitalization).    Assessment & Plan    58 year old male who presents with the above complaints.     Clinically, patient appears non-toxic and in no acute distress. Vital signs without tachycardia, hypotension, or fever. On examination patent's lungs are clear, cardiac exam negative for murmurs.    He had an IV placed and a repeat CBC, CMP, and IGG drawn. Also ordered, but have not collected UA, and enteric studies.     CBC shows WBC of 3.2, and hemoglobin of 10.8. In comparing from outside ED yesterday these are essentially unchanged. CMP shows continued MATTHEW with creatinine of 3.75 and bicarb of 12. He was initially given one liter of NS pending his results.    I did speak to the transplant nephrology staff, and will order the one liter of D5W with 150mg bicarb to be infused at 100/mL and will recheck BMP at 6pm.    I spoke with the hospital triage physician and reviewed patient and presentation, as well as transplant clinic note from yesterday. They will admit for further evaluation/management and coordination with consulting services as needed.     I have reviewed the available findings, plan for admission with the patient and his wife.    --    ED Attending Physician Attestation    I Digna Escobedo MD, cared for this patient with the Advanced Practice Provider (CHACE). I have performed a history and physical examination of the patient independent of the CHACE. I reviewed the CHACE's documentation above and agree with the documented findings  and plan of care. I personally provided a substantive portion of the care for this patient, including the complete Medical Decision Making. Please see the CHACE's documentation for full details.    Summary of HPI, PE, ED Course   Patient is a 58 year old male evaluated in the emergency department for elevated creatinine in the setting of ongoing diarrhea secondary to norovirus.  He denies any abdominal pain or fevers.  Abdomen is soft on exam.  No tenderness over transplant site.  Recheck of his labs today do show persistent elevation in his creatinine.  Additional IV fluids ordered and discussed with transplant team.  Plan to admit to medicine for ongoing management of acute kidney injury.      Digna Escobedo MD  Emergency Medicine     New Prescriptions    No medications on file       Final diagnoses:   Kidney replaced by transplant   Diarrhea, unspecified type   MATTHEW (acute kidney injury) (H24)   Acidosis         MARCUS Crystal CNP  [facility] EMERGENCY DEPARTMENT  10/3/2023     Bryan Maravilla APRN CNP  10/03/23 1620       Digna Escobedo MD  10/03/23 1953

## 2023-10-03 NOTE — LETTER
Transition Communication Hand-off for Care Transitions to Next Level of Care Provider    Name: Haider Torrez  : 1965  MRN #: [id number]  Primary Care Provider: David Nogueira     Primary Clinic: [address] [place] [address]     Reason for Hospitalization:  Acidosis [E87.20]  Kidney replaced by transplant [Z94.0]  MATTHEW (acute kidney injury) (H24) [N17.9]  Diarrhea, unspecified type [R19.7]  Admit Date/Time: 10/3/2023 12:16 PM  Discharge Date 10/19/23  Payor Source: Payor: MEDICARE / Plan: MEDICARE / Product Type: Medicare /              Reason for Communication Hand-off Referral: Other continuity of care    Discharge Plan: Home with outpatient follow up       Concern for non-adherence with plan of care:  No  Discharge Needs Assessment:  Needs      Flowsheet Row Most Recent Value   Equipment Currently Used at Home walker, rolling   # of Referrals Placed by CM Home Infusion          Follow-up specialty is recommended: Yes    Follow-up plan:    Future Appointments   Date Time Provider Department Center   10/31/2023  6:30 PM Serafin Rudd MD [facility]       Any outstanding tests or procedures:        Referrals       Future Labs/Procedures    Adult Eye  Referral     Comments:    Please be aware that coverage of these services is subject to the terms and limitations of your health insurance plan.  Call member services at your health plan with any benefit or coverage questions.  [facility] will call you to coordinate your care as prescribed by your provider. If you don't hear from a representative within 2 business days, please call [phone].      Adult Oncology/Hematology  Referral     Process Instructions:    Consider Adult E-Consult to Hematology for the following conditions: anticoagulation established condition, isolated anemia, MGUS or elevated light chains, thrombocytopenia.   E-Consult Cost: </=$10.    Comments:    Please be aware that coverage of  these services is subject to the terms and limitations of your health insurance plan.  Call member services at your health plan with any benefit or coverage questions.  [facility] will call you to coordinate your care as prescribed by the provider.  If you don t hear from a representative within 2 business days, please call [phone]      OPAT enrollment and ID Clinic Referral     Scheduling Instructions:    Scheduled with Dr. Rudd on 10/31. Placing referral for OPAT episode creation.    Comments:    You are being prescribed a strong antibiotic treatment for an infection. This treatment requires close monitoring, and you have been recommended to follow up with a specialist in the Infectious Diseases Clinic within 2 weeks of your hospital discharge.   Our team of Infectious Diseases specialists looks forward to seeing you in clinic. A representative will call you within 3 business days to help schedule your appointment. If you do not receive a call to schedule, or if you have any questions about or problems with your clinical care, please contact us by phone at [phone].                Key Recommendations:  Attached please find AVS.      Betty Boucher RN    AVS/Discharge Summary is the source of truth; this is a helpful guide for improved communication of patient story

## 2023-10-04 LAB
ADV 40+41 DNA STL QL NAA+NON-PROBE: NEGATIVE
ANION GAP SERPL CALCULATED.3IONS-SCNC: 11 MMOL/L (ref 7–15)
ANION GAP SERPL CALCULATED.3IONS-SCNC: 14 MMOL/L (ref 7–15)
ASTRO TYP 1-8 RNA STL QL NAA+NON-PROBE: NEGATIVE
BASO+EOS+MONOS # BLD AUTO: ABNORMAL 10*3/UL
BASO+EOS+MONOS NFR BLD AUTO: ABNORMAL %
BASOPHILS # BLD AUTO: 0 10E3/UL (ref 0–0.2)
BASOPHILS NFR BLD AUTO: 0 %
BUN SERPL-MCNC: 50.3 MG/DL (ref 6–20)
BUN SERPL-MCNC: 60.8 MG/DL (ref 6–20)
C CAYETANENSIS DNA STL QL NAA+NON-PROBE: NEGATIVE
C DIFF TOX B STL QL: NEGATIVE
CALCIUM SERPL-MCNC: 7.3 MG/DL (ref 8.6–10)
CALCIUM SERPL-MCNC: 8.2 MG/DL (ref 8.6–10)
CAMPYLOBACTER DNA SPEC NAA+PROBE: NEGATIVE
CHLORIDE SERPL-SCNC: 109 MMOL/L (ref 98–107)
CHLORIDE SERPL-SCNC: 118 MMOL/L (ref 98–107)
CREAT SERPL-MCNC: 3.03 MG/DL (ref 0.67–1.17)
CREAT SERPL-MCNC: 3.3 MG/DL (ref 0.67–1.17)
CRYPTOSP DNA STL QL NAA+NON-PROBE: NEGATIVE
DEPRECATED HCO3 PLAS-SCNC: 16 MMOL/L (ref 22–29)
DEPRECATED HCO3 PLAS-SCNC: 22 MMOL/L (ref 22–29)
E COLI O157 DNA STL QL NAA+NON-PROBE: ABNORMAL
E HISTOLYT DNA STL QL NAA+NON-PROBE: NEGATIVE
EAEC ASTA GENE ISLT QL NAA+PROBE: NEGATIVE
EBV DNA # SPEC NAA+PROBE: NOT DETECTED COPIES/ML
EC STX1+STX2 GENES STL QL NAA+NON-PROBE: NEGATIVE
EGFRCR SERPLBLD CKD-EPI 2021: 21 ML/MIN/1.73M2
EGFRCR SERPLBLD CKD-EPI 2021: 23 ML/MIN/1.73M2
EOSINOPHIL # BLD AUTO: 0 10E3/UL (ref 0–0.7)
EOSINOPHIL NFR BLD AUTO: 0 %
EPEC EAE GENE STL QL NAA+NON-PROBE: NEGATIVE
ERYTHROCYTE [DISTWIDTH] IN BLOOD BY AUTOMATED COUNT: 14.3 % (ref 10–15)
ETEC LTA+ST1A+ST1B TOX ST NAA+NON-PROBE: NEGATIVE
G LAMBLIA DNA STL QL NAA+NON-PROBE: NEGATIVE
GLUCOSE SERPL-MCNC: 114 MG/DL (ref 70–99)
GLUCOSE SERPL-MCNC: 142 MG/DL (ref 70–99)
HCT VFR BLD AUTO: 26 % (ref 40–53)
HGB BLD-MCNC: 8.7 G/DL (ref 13.3–17.7)
IGG SERPL-MCNC: 810 MG/DL (ref 610–1616)
IMM GRANULOCYTES # BLD: 0 10E3/UL
IMM GRANULOCYTES NFR BLD: 0 %
INR PPP: 1.3 (ref 0.85–1.15)
LYMPHOCYTES # BLD AUTO: 0.4 10E3/UL (ref 0.8–5.3)
LYMPHOCYTES NFR BLD AUTO: 17 %
MAGNESIUM SERPL-MCNC: 1.1 MG/DL (ref 1.7–2.3)
MAGNESIUM SERPL-MCNC: 1.8 MG/DL (ref 1.7–2.3)
MCH RBC QN AUTO: 29.9 PG (ref 26.5–33)
MCHC RBC AUTO-ENTMCNC: 33.5 G/DL (ref 31.5–36.5)
MCV RBC AUTO: 89 FL (ref 78–100)
MONOCYTES # BLD AUTO: 0.3 10E3/UL (ref 0–1.3)
MONOCYTES NFR BLD AUTO: 15 %
NEUTROPHILS # BLD AUTO: 1.5 10E3/UL (ref 1.6–8.3)
NEUTROPHILS NFR BLD AUTO: 68 %
NOROVIRUS GI+II RNA STL QL NAA+NON-PROBE: POSITIVE
NRBC # BLD AUTO: 0 10E3/UL
NRBC BLD AUTO-RTO: 0 /100
P SHIGELLOIDES DNA STL QL NAA+NON-PROBE: NEGATIVE
PHOSPHATE SERPL-MCNC: 2.9 MG/DL (ref 2.5–4.5)
PLATELET # BLD AUTO: 112 10E3/UL (ref 150–450)
POTASSIUM SERPL-SCNC: 3.4 MMOL/L (ref 3.4–5.3)
POTASSIUM SERPL-SCNC: 3.4 MMOL/L (ref 3.4–5.3)
RBC # BLD AUTO: 2.91 10E6/UL (ref 4.4–5.9)
RVA RNA STL QL NAA+NON-PROBE: NEGATIVE
SALMONELLA SP RPOD STL QL NAA+PROBE: NEGATIVE
SAPO I+II+IV+V RNA STL QL NAA+NON-PROBE: NEGATIVE
SHIGELLA SP+EIEC IPAH ST NAA+NON-PROBE: NEGATIVE
SODIUM SERPL-SCNC: 145 MMOL/L (ref 135–145)
SODIUM SERPL-SCNC: 145 MMOL/L (ref 135–145)
TOTAL PROTEIN SERUM FOR ELP: 5.8 G/DL (ref 6.4–8.3)
V CHOLERAE DNA SPEC QL NAA+PROBE: NEGATIVE
VIBRIO DNA SPEC NAA+PROBE: NEGATIVE
WBC # BLD AUTO: 2.3 10E3/UL (ref 4–11)
Y ENTEROCOL DNA STL QL NAA+PROBE: NEGATIVE

## 2023-10-04 PROCEDURE — 30233S1 TRANSFUSION OF NONAUTOLOGOUS GLOBULIN INTO PERIPHERAL VEIN, PERCUTANEOUS APPROACH: ICD-10-PCS | Performed by: INTERNAL MEDICINE

## 2023-10-04 PROCEDURE — 250N000013 HC RX MED GY IP 250 OP 250 PS 637

## 2023-10-04 PROCEDURE — 85025 COMPLETE CBC W/AUTO DIFF WBC: CPT

## 2023-10-04 PROCEDURE — 120N000011 HC R&B TRANSPLANT UMMC

## 2023-10-04 PROCEDURE — 85610 PROTHROMBIN TIME: CPT | Performed by: DIETITIAN, REGISTERED

## 2023-10-04 PROCEDURE — 83735 ASSAY OF MAGNESIUM: CPT

## 2023-10-04 PROCEDURE — 250N000011 HC RX IP 250 OP 636

## 2023-10-04 PROCEDURE — 250N000011 HC RX IP 250 OP 636: Mod: JZ

## 2023-10-04 PROCEDURE — 250N000009 HC RX 250

## 2023-10-04 PROCEDURE — 93010 ELECTROCARDIOGRAM REPORT: CPT | Performed by: INTERNAL MEDICINE

## 2023-10-04 PROCEDURE — 87493 C DIFF AMPLIFIED PROBE: CPT

## 2023-10-04 PROCEDURE — 99223 1ST HOSP IP/OBS HIGH 75: CPT | Mod: FS

## 2023-10-04 PROCEDURE — 250N000012 HC RX MED GY IP 250 OP 636 PS 637: Performed by: INTERNAL MEDICINE

## 2023-10-04 PROCEDURE — 36415 COLL VENOUS BLD VENIPUNCTURE: CPT | Performed by: DIETITIAN, REGISTERED

## 2023-10-04 PROCEDURE — 84100 ASSAY OF PHOSPHORUS: CPT

## 2023-10-04 PROCEDURE — 93005 ELECTROCARDIOGRAM TRACING: CPT

## 2023-10-04 PROCEDURE — 999N000128 HC STATISTIC PERIPHERAL IV START W/O US GUIDANCE

## 2023-10-04 PROCEDURE — 80048 BASIC METABOLIC PNL TOTAL CA: CPT

## 2023-10-04 PROCEDURE — 258N000003 HC RX IP 258 OP 636

## 2023-10-04 PROCEDURE — 99223 1ST HOSP IP/OBS HIGH 75: CPT | Performed by: DIETITIAN, REGISTERED

## 2023-10-04 PROCEDURE — 999N000157 HC STATISTIC RCP TIME EA 10 MIN

## 2023-10-04 PROCEDURE — 250N000012 HC RX MED GY IP 250 OP 636 PS 637

## 2023-10-04 PROCEDURE — 250N000013 HC RX MED GY IP 250 OP 250 PS 637: Performed by: DIETITIAN, REGISTERED

## 2023-10-04 PROCEDURE — 36415 COLL VENOUS BLD VENIPUNCTURE: CPT

## 2023-10-04 RX ORDER — DIPHENHYDRAMINE HYDROCHLORIDE 50 MG/ML
50 INJECTION INTRAMUSCULAR; INTRAVENOUS ONCE
Status: COMPLETED | OUTPATIENT
Start: 2023-10-04 | End: 2023-10-04

## 2023-10-04 RX ORDER — DIPHENHYDRAMINE HYDROCHLORIDE 50 MG/ML
50 INJECTION INTRAMUSCULAR; INTRAVENOUS
Status: DISCONTINUED | OUTPATIENT
Start: 2023-10-04 | End: 2023-10-05

## 2023-10-04 RX ORDER — DIPHENHYDRAMINE HCL 25 MG
50 CAPSULE ORAL ONCE
Status: COMPLETED | OUTPATIENT
Start: 2023-10-04 | End: 2023-10-04

## 2023-10-04 RX ORDER — CALCIUM GLUCONATE 20 MG/ML
1 INJECTION, SOLUTION INTRAVENOUS ONCE
Status: COMPLETED | OUTPATIENT
Start: 2023-10-04 | End: 2023-10-04

## 2023-10-04 RX ORDER — MAGNESIUM SULFATE HEPTAHYDRATE 40 MG/ML
4 INJECTION, SOLUTION INTRAVENOUS ONCE
Status: COMPLETED | OUTPATIENT
Start: 2023-10-04 | End: 2023-10-04

## 2023-10-04 RX ORDER — METHYLPREDNISOLONE SODIUM SUCCINATE 125 MG/2ML
125 INJECTION, POWDER, LYOPHILIZED, FOR SOLUTION INTRAMUSCULAR; INTRAVENOUS
Status: DISCONTINUED | OUTPATIENT
Start: 2023-10-04 | End: 2023-10-05

## 2023-10-04 RX ORDER — ACETAMINOPHEN 325 MG/1
650 TABLET ORAL
Status: DISCONTINUED | OUTPATIENT
Start: 2023-10-04 | End: 2023-10-05

## 2023-10-04 RX ORDER — SULFAMETHOXAZOLE AND TRIMETHOPRIM 400; 80 MG/1; MG/1
1 TABLET ORAL
Status: DISCONTINUED | OUTPATIENT
Start: 2023-10-06 | End: 2023-10-19 | Stop reason: HOSPADM

## 2023-10-04 RX ORDER — ONDANSETRON 2 MG/ML
4 INJECTION INTRAMUSCULAR; INTRAVENOUS EVERY 6 HOURS PRN
Status: DISCONTINUED | OUTPATIENT
Start: 2023-10-04 | End: 2023-10-19 | Stop reason: HOSPADM

## 2023-10-04 RX ORDER — POLYETHYLENE GLYCOL 3350 17 G/17G
238 POWDER, FOR SOLUTION ORAL ONCE
Status: DISCONTINUED | OUTPATIENT
Start: 2023-10-04 | End: 2023-10-04

## 2023-10-04 RX ORDER — BISACODYL 5 MG
5 TABLET, DELAYED RELEASE (ENTERIC COATED) ORAL ONCE
Status: COMPLETED | OUTPATIENT
Start: 2023-10-04 | End: 2023-10-04

## 2023-10-04 RX ORDER — POLYETHYLENE GLYCOL 3350 17 G/17G
238 POWDER, FOR SOLUTION ORAL
Status: DISCONTINUED | OUTPATIENT
Start: 2023-10-05 | End: 2023-10-04

## 2023-10-04 RX ORDER — ACETAMINOPHEN 325 MG/1
650 TABLET ORAL ONCE
Status: COMPLETED | OUTPATIENT
Start: 2023-10-04 | End: 2023-10-04

## 2023-10-04 RX ORDER — POTASSIUM CHLORIDE 20MEQ/15ML
20 LIQUID (ML) ORAL DAILY
Status: DISCONTINUED | OUTPATIENT
Start: 2023-10-04 | End: 2023-10-06

## 2023-10-04 RX ORDER — MYCOPHENOLIC ACID 360 MG/1
360 TABLET, DELAYED RELEASE ORAL 2 TIMES DAILY
Status: DISCONTINUED | OUTPATIENT
Start: 2023-10-04 | End: 2023-10-04

## 2023-10-04 RX ORDER — POLYETHYLENE GLYCOL 3350 17 G/17G
238 POWDER, FOR SOLUTION ORAL ONCE
Status: DISCONTINUED | OUTPATIENT
Start: 2023-10-05 | End: 2023-10-04

## 2023-10-04 RX ORDER — DIPHENHYDRAMINE HCL 25 MG
50 CAPSULE ORAL
Status: DISCONTINUED | OUTPATIENT
Start: 2023-10-04 | End: 2023-10-05

## 2023-10-04 RX ADMIN — CARVEDILOL 25 MG: 25 TABLET, FILM COATED ORAL at 08:16

## 2023-10-04 RX ADMIN — SODIUM BICARBONATE: 84 INJECTION, SOLUTION INTRAVENOUS at 11:22

## 2023-10-04 RX ADMIN — POTASSIUM CHLORIDE 20 MEQ: 1.5 SOLUTION ORAL at 20:08

## 2023-10-04 RX ADMIN — SODIUM BICARBONATE 650 MG TABLET 1950 MG: at 08:16

## 2023-10-04 RX ADMIN — SODIUM BICARBONATE: 84 INJECTION, SOLUTION INTRAVENOUS at 00:07

## 2023-10-04 RX ADMIN — BISACODYL 5 MG: 5 TABLET, COATED ORAL at 15:36

## 2023-10-04 RX ADMIN — SODIUM BICARBONATE 650 MG TABLET 1950 MG: at 20:08

## 2023-10-04 RX ADMIN — POLYETHYLENE GLYCOL 3350, SODIUM SULFATE ANHYDROUS, SODIUM BICARBONATE, SODIUM CHLORIDE, POTASSIUM CHLORIDE 2000 ML: 236; 22.74; 6.74; 5.86; 2.97 POWDER, FOR SOLUTION ORAL at 17:27

## 2023-10-04 RX ADMIN — TACROLIMUS 2.5 MG: 1 CAPSULE ORAL at 18:23

## 2023-10-04 RX ADMIN — DIPHENHYDRAMINE HYDROCHLORIDE 50 MG: 25 CAPSULE ORAL at 18:23

## 2023-10-04 RX ADMIN — TACROLIMUS 3 MG: 1 CAPSULE ORAL at 08:16

## 2023-10-04 RX ADMIN — CARVEDILOL 25 MG: 25 TABLET, FILM COATED ORAL at 18:24

## 2023-10-04 RX ADMIN — MYCOPHENILIC ACID 360 MG: 360 TABLET, DELAYED RELEASE ORAL at 08:16

## 2023-10-04 RX ADMIN — HUMAN IMMUNOGLOBULIN G 30 G: 20 LIQUID INTRAVENOUS at 19:00

## 2023-10-04 RX ADMIN — CHOLECALCIFEROL (VITAMIN D3) 10 MCG (400 UNIT) TABLET 10 MCG: at 08:16

## 2023-10-04 RX ADMIN — ACETAMINOPHEN 650 MG: 325 TABLET ORAL at 18:24

## 2023-10-04 RX ADMIN — SODIUM BICARBONATE: 84 INJECTION, SOLUTION INTRAVENOUS at 21:47

## 2023-10-04 RX ADMIN — PREDNISONE 5 MG: 5 TABLET ORAL at 08:16

## 2023-10-04 RX ADMIN — LEVOTHYROXINE SODIUM 100 MCG: 100 TABLET ORAL at 08:16

## 2023-10-04 RX ADMIN — MAGNESIUM SULFATE IN WATER 4 G: 40 INJECTION, SOLUTION INTRAVENOUS at 17:27

## 2023-10-04 RX ADMIN — CALCIUM GLUCONATE 1 G: 20 INJECTION, SOLUTION INTRAVENOUS at 10:10

## 2023-10-04 RX ADMIN — SULFAMETHOXAZOLE AND TRIMETHOPRIM 1 TABLET: 400; 80 TABLET ORAL at 08:16

## 2023-10-04 RX ADMIN — CLONAZEPAM 0.5 MG: 0.5 TABLET ORAL at 21:45

## 2023-10-04 RX ADMIN — SODIUM BICARBONATE 650 MG TABLET 1950 MG: at 13:43

## 2023-10-04 RX ADMIN — FOLIC ACID 1 MG: 1 TABLET ORAL at 08:16

## 2023-10-04 RX ADMIN — MYCOPHENILIC ACID 360 MG: 360 TABLET, DELAYED RELEASE ORAL at 20:08

## 2023-10-04 RX ADMIN — PANTOPRAZOLE SODIUM 40 MG: 40 TABLET, DELAYED RELEASE ORAL at 08:16

## 2023-10-04 ASSESSMENT — ACTIVITIES OF DAILY LIVING (ADL)
ADLS_ACUITY_SCORE: 23
ADLS_ACUITY_SCORE: 25
ADLS_ACUITY_SCORE: 23
ADLS_ACUITY_SCORE: 27
ADLS_ACUITY_SCORE: 23
ADLS_ACUITY_SCORE: 27
ADLS_ACUITY_SCORE: 25
ADLS_ACUITY_SCORE: 23
ADLS_ACUITY_SCORE: 25
ADLS_ACUITY_SCORE: 23

## 2023-10-04 NOTE — PLAN OF CARE
/73 (BP Location: Left arm)   Pulse 57   Temp 97.8  F (36.6  C) (Oral)   Resp 18   Wt 56.6 kg (124 lb 11.2 oz)   SpO2 100%   BMI 20.75 kg/m      Shift: 6998-3171  Isolation Status: Enteric for Norovirus  VS: stable on room air, afebrile  Neuro: Aox4  Behaviors: calm, cooperative, flat  BG: none  Labs: calcium=7.3; creat=3.30; mag=1.1; GFR=21  Respiratory: WDL  Cardiac: WDL  Pain/Nausea: denies both  PRN: none administered  Diet: clear liquids (no red coloring), NPO at midnight  IV Access: left PIV x2, right AVF  Infusion(s): sodium bicarb @100mL/hr, Magnesium replacement, IVIG   Lines/Drains: none  GI/: diarrhea, voiding spontaneously without difficulty  Skin: scar on LLQ abd from past kidney tx  Mobility: UAL  Events/Education: [place] for colonoscopy prep  Plan: IVIG tonight, colonoscopy tomorrow

## 2023-10-04 NOTE — PROGRESS NOTES
[facility]    Medicine Progress Note - Medicine Service, [facility] TEAM 2    Date of Admission:  10/3/2023    Assessment & Plan   Haider Torrez is a 58 year old male admitted on 10/3/2023. He has a PMH of IgA nephropathy s/p kidney transplant (2004, 2015), BK and CMV viremia, secondary hyperparathyroid presenting with persistent diarrhea, concerning for CMV colitis vs. MMF colitis and MATTHEW.     Today:  - norovirus positive on enteric panel  - asked transplant ID, will not start nitaxoxanide  - C diff neg  - colonoscopy tomorrow  - IVIG today  - Mg and K replacement, rechecking BMP and Mg this evening  - MATTHEW and acidosis improved but not normalized, continue sodium bicarbonate gtt with 150 mEq in D5 at 100 ml/hr.  - pending tacrolimus level, SPEP, EBV PCR     Kidney transplant  MATTHEW  Proteinuria  Baseline Cr 2.1-2.5, today 3.9. Proteinuria since 8/23.   - consult transplant nephrology  - currently on PTA tacrolimus immediate release (goal 4-6), Mycophenolic acid (dose 360mg bid) and Prednisone (dose 5 mg daily)  - Continue PJP prophylaxis with bactrim    - EBV PCR pending        Diarrhea  He has a history of norovirus diarrhea and MATTHEW in March 2023. Patient has been having loose bowel movement, especially after eating for a month, and recently test positive for norovirus in stool on 9/21.   -Colonoscopy 10/2022 with mild archietectural distortion, focal crypt degeneration. Negative stain for CMV   - consult GI for concerns for CMV vs MMF colitis: plan colonoscopy 10/5   - C. Diff panel neg  - enteric stool panel: norovirus positive, but transplant ID will not start nitaxoxanide due to poor evidence. Would suggest weaning immunosuppression and prn imodium.      NAGMA  Likely secondary to diarrhea. Improving, not normalized.   - continue PTA bicarb 1,950 mg TID  - D5 with 150 mEq bicarb infusion in ED due to acidosis     CMV viremia  Per transplant nephrology note:    -Noted  to have CMV viremia on 10/9/22 with 41 copies, up to 736 copies on 3/2/23. This hase been managed conservatively with decrease in MPA (patient did not actually decrease dose)              -Pt is CMV IgG+ at time of transplant.               -IgG >600. CMV has been low level but present               -concern at this point with worsening diarrhea that he has CMV colitis. Recommend repeat colonoscopy with biopsies, and if CMV + would treat with IV GCV.   - plan for repeat IgG level and give IVIG 0.5g/kg x1 after consulting with txp neph      Hypertension  On carvedilol 25 mg BID     Anemia of CKD  Normal Iron study. 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     Papillary thyroid carcinoma s/p surgical resection  Continue PTA levothyroxine     Hypocalcemia    Labs:    10/03/23 12:40 10/03/23 20:21 10/04/23 05:04   Calcium 8.5  7.9  7.3       - calcium gluconate 1 g 10/4       Diet: Clear Liquid Diet (No Red)  NPO per Anesthesia Guidelines for Procedure/Surgery Except for: Meds, Other; Specify: additional bowel prep for colonoscopy    DVT Prophylaxis: Ambulate every shift  Fernandez Catheter: Not present  Lines: None     Cardiac Monitoring: None  Code Status: Full Code      Clinically Significant Risk Factors          # Hypocalcemia: Lowest Ca = 7.3 mg/dL in last 2 days, will monitor and replace as appropriate   # Hypomagnesemia: Lowest Mg = 1.1 mg/dL in last 2 days, will replace as needed    # Coagulation Defect: INR = 1.30 (Ref range: 0.85 - 1.15) and/or PTT = N/A, will monitor for bleeding  # Thrombocytopenia: Lowest platelets = 112 in last 2 days, will monitor for bleeding   # Hypertension: Noted on problem list                   Disposition Plan      Expected Discharge Date: 10/06/2023                The patient's care was discussed with the Attending Physician, Dr. Lane .    Yovana Flanagan MD  Medicine Service, [address]  [facility]  Securely message with [employer] (more info)  Text page via [employer] Paging/Directory   See signed in provider for up to date coverage information  ______________________________________________________________________    Interval History   Patient still having diarrhea, similar to when he is at home. No fever/nausea. V/S stable.     Physical Exam   Vital Signs: Temp: 97.8  F (36.6  C) Temp src: Oral BP: 134/73 Pulse: 57   Resp: 18 SpO2: 100 % O2 Device: None (Room air)    Weight: 124 lbs 11.2 oz        Medical Decision Making             Data

## 2023-10-04 NOTE — PLAN OF CARE
Goal Outcome Evaluation:       BP (!) 142/77 (BP Location: Left arm)   Pulse 60   Temp 98  F (36.7  C) (Oral)   Resp 16   Wt 56.6 kg (124 lb 11.2 oz)   SpO2 98%   BMI 20.75 kg/m      Shift: 1930-0730    VSS  Neuro: Aox4  Cardio: WDL  Respiratory: WDL on RA  GI:1 loose BM  : Voiding  Skin: WDL, R AV fistula  Diet: Regular  Labs: urine and stool sample collected  BG: none  LDA: L PIV  Infusions: Bicarb @100ml/hr  Mobility: Independent  Pain/Nausea: denies   PRN medications: none given

## 2023-10-04 NOTE — CONSULTS
[facility]  Transplant Nephrology Consult  Date of Admission:  10/3/2023  Today's Date: 10/04/2023  Requesting physician: Phoenix Alcazar MD    Recommendations:  - Give IVIG 0.5 g/kg once (ordered).  - Give magnesium sulfate 4 g IV once.  - Give KCL ER 20 mEq PO.  - Agree with sodium bicarbonate gtt with 150 mEq in D5 at 100 ml/hr.  - Agree with calcium gluconate 1g IV once (ordered).  - Recheck BMP this afternoon.  - Follow-up EBV PCR  - Check tacrolimus level with morning labs (ordered).  - Check SPEP (ordered).  - Would give nitazoxanide if ID approves and continue as outpatient if covered by insurance.  - GI consulted for colonoscopy with biopsies.  Follow-up biopsy results.  - Will refer for retransplant evaluation as outpatient once acute issues resolve.    Assessment & Plan   # DDKT: Increased serum creatinine this admission.    - MATTHEW felt secondary to dehydration.    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    # Immunosuppression: Tacrolimus immediate release (goal 4-6), Mycophenolic acid (dose 360 mg every 12 hours), and Prednisone (dose 5 mg daily)   - Continue with intensive monitoring of immunosuppression for efficacy and toxicity.   - Changes: Not at this time.  Myfortic already reduced.    # Infection Prophylaxis:   - PJP: Sulfa/TMP (Bactrim)      # Diarrhea:  Pt presented 10/3/23 with persistent diarrhea,  concerning for CMV colitis vs. MMF colitis and MATTHEW.   - Colonoscopy 10/2022 with mild archietectural distortion, focal crypt degeneration. Negative stain for CMV    - Recently admitted (3/1-3/3/23) for diarrhea and MATTHEW, found + for norovirus   - Stool studies positive for norovirus again.  Would give nitazoxanide if ID approves and continue as outpatient if covered by insurance.   - Give IVIG 0.5 g/kg once (ordered).   - GI consulted for colonoscopy with biopsies.  Follow-up biopsy results.       10/4/2023 C. Diff Toxin PCR Negative    10/3/2023   EBV DNA PCR Quantitative Whole Blood In process    10/3/2023  IgG 810    10/3/2023  Enteric Bacteria and Virus Panel PCR Positive for norovirus.         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    - Would give IVIG 0.5 g/kg once.  - Concern at this point with worsening diarrhea that he has CMV colitis.   - If coloscopy biopsies CMV + would treat with IV GCV.      03/20/23  04/10/23  05/01/23    CMV DNA Quant (External) 622 !  460 !  318 !        # Hypertension: Variable;  Goal BP: < 140/90 (Hospitalization goal)   - Changes: Not at this time.  Continue carvedilol  25 mg PO BID.    # Anemia in Chronic Renal Disease: Hgb: Trend down     DANNY: No   - Iron studies: Replete.    # Mineral Bone Disorder:   - Secondary renal hyperparathyroidism; PTH level: Mildly elevated (151-300 pg/ml)        On treatment: None  - Vitamin D; level: Not checked recently, but was normal last check        On supplement: Yes  - Calcium; level: Low           On supplement: No  - Phosphorus; level: Normal         On supplement: No       # Electrolytes:   - Potassium; level: Normal        On supplement: No  - Magnesium; level: Low        On supplement: No  - Bicarbonate; level: Low        On supplement: Yes, sodium bicarbonate 1950 mg PO TID, agree with sodium bicarbonate gtt with 150 mEq in D5 at 100 ml/hr.    # Proteinuria:   -1.2g/g in  previous check in 2/2023, 3.3g on 24h collection in 8/2023   -A1c 5.8%.    - Check SPEP (ordered).   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    Seen and discussed with Dr. Winn.      Kurt Shane, APRN CNP  Pager: [phone]        Physician Attestation     I saw and evaluated Haider Torrez as part of a shared APRN/PA visit.     I personally reviewed the vital signs, medications, labs, and imaging.    I personally performed the substantive portion of the medical decision making for this visit - please see the [facility]'s documentation for full details.    Key management decisions made by me and carried out under my direction: ESKD 2/2 IgA nephropathy s/p DDKTx x2  (2004, 2015) with CKD G4A3 (b/l Cr~2.1-2.5) maintained on FK/MMF with diarrhea found to have norovirus, prior hx of low grade CMV viremia., Glen and metabolic acidosis. Agree with iv sodium bicarb,  MPA reduced ~30% to 360/360, IgG>800. Repeat CMV pcr, F/up colonoscopy +bx to decide on further management    Janel Winn MD  Date of Service (when I saw the patient): 10/4/23    REASON FOR CONSULT   History of Kidney Transplant    History of Present Illness   Haider Torrez is a 58 year old male with history of IgA nephropathy s/p kidney transplant (2004, 2015), CMV viremia, HLD, HTN, and CAT who presented yesterday 10/3/23 with persistent diarrhea, concerning for CMV colitis vs. MMF colitis and GLEN.  His creatinine today of 3.3 is above baseline of ~ 2.1-2.5.      SBP 120s-160s overnight.  Afebrile.  No SOB.  Denies nausea or vomiting.  No leg edema.      Review of Systems    The 10 point Review of Systems is negative other than noted in the HPI or here.     Past Medical History    I have reviewed this patient's medical history and updated it with pertinent information if needed.   Past Medical History:   Diagnosis Date    Anemia of chronic kidney failure     Carpal tunnel syndrome     Clinical diagnosis of COVID-19 2022    Dyslipidemia     History of respiratory failure     Hypertension     Hypomagnesemia     IgA nephropathy     Insomnia     Kidney disease, chronic, end stage on dialysis (H)     Kidney transplant failure     Myoclonus     Obstructive sleep apnea     Papillary thyroid carcinoma (H)     PRES (posterior reversible encephalopathy syndrome)      after thyroidectomy    Restless leg syndrome     Secondary hyperparathyroidism (H24)     Seizure disorder (H)     Post surgery. Keppra now discontinued    Urinoma of kidney transplant        Past Surgical History   I have reviewed this patient's surgical history and updated it with pertinent information if needed.  Past Surgical History:   Procedure Laterality Date    BENCH KIDNEY  10/13/2015    Procedure: BENCH KIDNEY;  Surgeon: Seth Gibbons MD;  Location: [facility] OR    CREATE FISTULA ARTERIOVENOUS UPPER EXTREMITY      CYSTOSCOPY, REMOVE STENT(S), COMBINED N/A 2015    Procedure: COMBINED CYSTOSCOPY, REMOVE STENT(S);  Surgeon: Seth Gibbons MD;  Location: [facility] OR    CYSTOSCOPY, REMOVE STENT(S), COMBINED Bilateral 2015    Procedure: COMBINED CYSTOSCOPY, REMOVE STENT(S);  Surgeon: Seth Gibbons MD;  Location: [facility] OR    ELBOW SURGERY Right     hemithyroidectomy Left     HERNIA REPAIR Right 3/2009    THYROIDECTOMY      TRANSPLANT KIDNEY RECIPIENT  DONOR      TRANSPLANT KIDNEY RECIPIENT  DONOR N/A 10/13/2015    Procedure: TRANSPLANT KIDNEY RECIPIENT  DONOR;  Surgeon: Shai  Seth Estrada MD;  Location: [facility] OR    ureteropyelostomy  2004       Family History   I have reviewed this patient's family history and updated it with pertinent information if needed.   Family History   Problem Relation Age of Onset    Diabetes Mother     Other Cancer Father         lung       Social History   I have reviewed this patient's social history and updated it with pertinent information if needed. Haider Torrez  reports that he has never smoked. He has never used smokeless tobacco. He reports that he does not drink alcohol and does not use drugs.    Allergies   Allergies   Allergen Reactions    Hydralazine Swelling    Betadine [Povidone Iodine]     Cephalexin Hives    Clindamycin Hives    Trazodone Swelling     Swelling of face/lips     Prior to Admission Medications    carvedilol  25 mg Oral BID w/meals    cholecalciferol  10 mcg Oral Daily    clonazePAM  0.5 mg Oral At Bedtime    folic acid  1 mg Oral Daily    levothyroxine  100 mcg Oral Daily    mycophenolic acid  360 mg Oral BID    [Held by provider] nitazoxanide  500 mg Oral BID w/meals    pantoprazole  40 mg Oral Daily    predniSONE  5 mg Oral Daily    sodium bicarbonate  1,950 mg Oral TID    sulfamethoxazole-trimethoprim  1 tablet Oral Daily    tacrolimus  2.5 mg Oral QPM    tacrolimus  3 mg Oral QAM      sodium bicarbonate 150 mEq in D5W 1,000 mL infusion 100 mL/hr at 10/04/23 0007       Physical Exam   Temp  Av.8  F (36.6  C)  Min: 97.5  F (36.4  C)  Max: 98.1  F (36.7  C)      Pulse  Av.3  Min: 58  Max: 64 Resp  Av  Min: 16  Max: 18  SpO2  Av.5 %  Min: 98 %  Max: 100 %     BP (!) 142/77 (BP Location: Left arm)   Pulse 60   Temp 98  F (36.7  C) (Oral)   Resp 16   Wt 56.6 kg (124 lb 11.2 oz)   SpO2 98%   BMI 20.75 kg/m      Admit Weight: 57.1 kg (125 lb 14.1 oz)     GENERAL APPEARANCE: alert and no distress  HENT: mouth without ulcers or lesions  RESP: lungs clear to auscultation - no rales, rhonchi or wheezes  CV:  regular rhythm, normal rate, no rub, no murmur  EDEMA: no LE edema bilaterally  ABDOMEN: soft, nondistended, nontender, bowel sounds normal  MS: extremities normal - no gross deformities noted, no evidence of inflammation in joints, no muscle tenderness  SKIN: no rash  PSYCH: mentation appears normal and affect normal/bright  DIALYSIS ACCESS:  RUE AV fistula +bruit/+thrill    Data   CMP  Recent Labs   Lab 10/04/23  0504 10/03/23  2021 10/03/23  1240    144 144   POTASSIUM 3.4 4.1 4.9   CHLORIDE 118* 119* 121*   CO2 16* 14* 12*   ANIONGAP 11 11 11   * 143* 145*   BUN 60.8* 61.9* 65.5*   CR 3.30* 3.39* 3.75*   GFRESTIMATED 21* 20* 18*   CRISTIAN 7.3* 7.9* 8.5*   PROTTOTAL  --   --  6.1*   ALBUMIN  --   --  3.9   BILITOTAL  --   --  0.3   ALKPHOS  --   --  62   AST  --   --  12   ALT  --   --  7     CBC  Recent Labs   Lab 10/04/23  0504 10/03/23  1240   HGB 8.7* 10.8*   WBC 2.3* 3.2*   RBC 2.91* 3.66*   HCT 26.0* 35.0*   MCV 89 96   MCH 29.9 29.5   MCHC 33.5 30.9*   RDW 14.3 14.5   * 122*     INRNo lab results found in last 7 days.  ABGNo lab results found in last 7 days.   Urine Studies  Recent Labs   Lab Test 10/03/23  2244 11/17/15  1410 10/14/15  2220 10/13/15  0725   COLOR Light Yellow Light Yellow Yellow Straw   APPEARANCE Clear Clear Clear Clear   URINEGLC Negative Negative Negative Negative   URINEBILI Negative Negative Negative Negative   URINEKETONE Negative Negative Negative Negative   SG 1.014 1.007 1.006 1.003   UBLD Negative Negative Large* Large*   URINEPH 6.0 6.5 5.5 5.0   PROTEIN 200* Negative 10* Negative   NITRITE Negative Negative Negative Negative   LEUKEST Negative Negative Small* Negative   RBCU 1 0 >182* 57*   WBCU 1 1 15* 1     Recent Labs   Lab Test 03/27/18  1516 09/26/17  1506   UTPG 0.12 0.10     PTH  Recent Labs   Lab Test 03/27/18  1510 10/15/15  0626   PTHI 126* 561*     Iron Studies  Recent Labs   Lab Test 03/27/18  1510 10/17/15  0700   IRON 67 25*    216*    TRAET 24 12*   * 883*       IMAGING:  All imaging studies reviewed by me.

## 2023-10-04 NOTE — CONSULTS
Gastroenterology Consultation      Date of Admission:  10/3/2023  Reason for Admission: Acidosis, MATTHEW, Kidney transplant  Date of Consult  10/4/2023   Requesting Physician:  Phoenix Alcazar MD           ASSESSMENT AND RECOMMENDATIONS:   Assessment:  Haider Torrez is a 58 year old male with a PMH of IgA nephropathy s/p kidney transplant (2004, 2015), BK and CMV viremia, norovirus (3/1/23, 9/21/23) and secondary hyperparathyroid now admitted on 10/3/2023 with MATTHEW and metabolic acidosis in the setting of persistent diarrhea, concerning for CMV colitis vs. MMF colitis for which GI has been consulted to complete colonoscopy with bx.    #Acute on chronic diarrhea  #Kidney transplant on immunosuppression - hx of MMF toxicity, CMV viremia  #Hx Norovirus gastroenteritis  Presents with reported minimally improved sx of watery/undigested food particle/non-bloody diarrhea essentially since March 2023 (although noted to have resolved diarrhea at discharge from 3/2023 admission).  Enteric panel remains positive for Norovirus this adm (previously positive initially 3/1/23 and again 9/21/23, not treated) but negative for all other organisms. Transplant team rec consider nitazoxanide for ongoing norovirus positivity if ID agrees. C.diff negative. No cross sectional imaging this adm; Last colonoscopy 10/2022 with bx negative for CMV and noted mild architectural distortion with epithelial apoptosis and focal crypt degeneration could be suggestive of a medication-related colitis (MMF toxicity). Currently on immunosuppression with MMF (360 mg BID), tacrolimus and prednisone 5 mg daily. Based on post-transplant immunosuppression status, DDX for acute on chronic diarrhea most likely includes infectious etiology (C.diff and enteric panel negative; more likely CMV vs ongoing norovirus - reported that norovirus shedding can occur up to 60+ days in healthy subjects but suspect could be prolonged in immunocompromised patients) vs  medication-related (ongoing MMF toxicity).    RECOMMENDATIONS:  -Plan for Colonoscopy on 10/5 under moderate sedation with Dr. Dodge to obtain bx to evaluate for CMV vs ongoing MMF toxicity/norovirus.  -Prep as follows for colonoscopy: (ordered for you)                --  Day 1 (Date 10/4/2023):  - Clear liquid diet (no red, purple or blue) today until midnight.  - At 12:00: Bisacodyl 5 mg PO once.  - At 12:00: 2L Golytely or 238 grams of Miralax with 64 ounces of Gatorade (no red, blue or purple), drink 12 ounces every 15 minutes until the solution is gone (finished by 1400).  - At 17:00:  2L Golytely or 238 grams of Miralax with 64 ounces of Gatorade (no red, blue or purple), drink 12 ounces every 15 minutes until the solution is gone (finished by 1900).  -- Day 2 (Date 10/5): Day of Procedure  - At 00:15: NPO other than additional prep and small sip of water with morning medications.  - At 04:00:  2 L Golytely or 238 grams of Miralax with 64 ounces of Gatorade (no red, blue or purple), drink 12 ounces every 15 minutes until the solution is gone (finished by 06:00).   -AM Labs at 0400: CBC with Platelets with Diff , BMP, Magnesium, Phosphorus, INR (ordered for you)  -Goals for pre-procedure labs: Hgb >7, INR <2, Plts >50.   - At 06:00: if stools not clear (mountain dew, tea or broth in color), administer PRN 2L Golytely or 238 grams of Miralax with 64 ounces of Gatorade (no red, blue or purple), drink 12 ounces every 15 minutes until the solution is gone (finished by 08:00), and please FYI page the day GI Luminal service.   -- Please ensure multiple antiemetic options are available during prep.  -- Anticipate fluid and electrolyte shifts during this process, monitor closely.  -- Do not begin any anticoagulation 24 hrs prior to procedure.  -- Treatment of norovirus with nitazoxanide per transplant ID.  Agree with treatment if bx negative for CMV colitis.    Discussed with primary medicine team and  patient/family.    Gastroenterology follow up recommendations:   TBD pending clinical course.    Thank you for involving us in this patient's care. Please do not hesitate to contact the GI service with any questions or concerns.     Pt seen and care plan discussed with Dr. Dodge, GI staff physician.    Overall time spent on the date of this encounter preparing to see the patient (including chart review of available notes, clinical status events, imaging and labs); obtaining and/or reviewing separately obtained histor; ordering medications, tests or procedures; communicating with other health care professionals; and documenting the above clinical information in the electronic medical record was 90 minutes.      Marilin Vogel PA-C  GI Service  [facility]  Text Page  -------------------------------------------------------------------------------------------------------------------       Reason for Consultation:   Kidney tranplant patient with CMV viremia, suspecting CMV colitis vs. MMF colitis, consulted for colonoscopy          History of Present Illness:   Haider Torrez is a 58 year old male with a PMH of IgA nephropathy s/p kidney transplant (2004, 2015), BK and CMV viremia, norovirus (3/1/23, 9/21/23) and secondary hyperparathyroid now admitted on 10/3/2023 with MATTHEW and metabolic acidosis in the setting of persistent diarrhea, concerning for CMV colitis vs. MMF colitis for which GI has been consulted to complete colonoscopy with bx.    Patient seen and examined at 11.45. History is obtained from patient, family member and chart review.  Reports initially had onset of watery diarrhea back in March 2023 when was dx with norovirus and has never really improved (although noted in discharge summary 3/3/23 reported that diarrhea had resolved at discharge).  Reports 6-7 watery/non-bloody stools during the day only (no nocturnal diarrhea) and reports rapid transit of chewed/undigested  food 15 minutes post-prandially. Has been trying to increase his water consumption. Has not received any treatment for norovirus PTA.  Denies fever, chills, N/V and abd pain preceding stools.  Family member believes pt has a hemorrhoid and asking if would be able to see if proceed with colonoscopy.  Pt does reports some straining with BM's but no pain or any hematochezia with defecation.  No known sick contacts or foodborne illness exposures.    Previous Procedures:  10/13/2022: Colonoscopy under moderate sedation at at [place] with Dr. Heredia   Indication: Diarrhea  Findings:       The perianal and digital rectal examinations were normal. Pertinent        negatives include no palpable rectal lesions.        The terminal ileum appeared normal.        A few small-mouthed diverticula were found in the ascending colon. There        was no evidence of diverticular bleeding.        Normal mucosa was found in the entire colon. Biopsies were taken with a        cold forceps for histology. Estimated blood loss was minimal.   Pathology:   FINAL DIAGNOSIS   A. Colon, right, biopsy:  Mild focal active colitis.   Negative for CMV on immunostain.  See comment.     B. Colon, left, biopsy:  Mild focal active colitis.   Negative for CMV on immunostain.  See comment.     Comment:  The findings are non-specific, but some findings   including mild architectural distortion, epithelial   apoptosis and focal crypt degeneration could be suggestive   of a medication-related colitis given the patient's history   of immunosuppresive therapy.  An evolving/resolving   infectious colitis also cannot be ruled out.  Correlation   with clinical and endoscopic findings is recommended.     3/13/74543: Colonoscopy under MAC at [place] with Dr. Ruiz  Indication: Screening (this is the patient's first colonoscopy)  Findings:       The perianal and digital rectal examinations were normal. Pertinent        negatives include no palpable rectal lesions and no  anal lesion or        abnormality was detected.        A 3 mm polyp was found in the cecum. The polyp was sessile. The polyp        was removed with a cold biopsy forceps. Resection and retrieval were        complete.        No other significant abnormalities were identified in a careful        examination of the remainder of the colon.        The retroflexed view of the distal rectum and anal verge was normal and        showed no anal or rectal abnormalities.   Pathology:   Diagnosis   Colon, cecum, polyp, biopsy:   TUBULAR ADENOMA.           Past Medical History:   Reviewed and edited as appropriate  Past Medical History:   Diagnosis Date    Anemia of chronic kidney failure     Carpal tunnel syndrome     Clinical diagnosis of COVID-19 9/22/2022    Dyslipidemia     History of respiratory failure     Hypertension     Hypomagnesemia 2015    IgA nephropathy     Insomnia     Kidney disease, chronic, end stage on dialysis (H)     Kidney transplant failure     Myoclonus     Obstructive sleep apnea     Papillary thyroid carcinoma (H)     PRES (posterior reversible encephalopathy syndrome)     2011 after thyroidectomy    Restless leg syndrome     Secondary hyperparathyroidism (H24)     Seizure disorder (H)     Post surgery. Keppra now discontinued    Urinoma of kidney transplant             Past Surgical History:   Reviewed and edited as appropriate   Past Surgical History:   Procedure Laterality Date    BENCH KIDNEY  10/13/2015    Procedure: BENCH KIDNEY;  Surgeon: Seth Gibbons MD;  Location: [facility] OR    CREATE FISTULA ARTERIOVENOUS UPPER EXTREMITY  2011    CYSTOSCOPY, REMOVE STENT(S), COMBINED N/A 11/17/2015    Procedure: COMBINED CYSTOSCOPY, REMOVE STENT(S);  Surgeon: Seth Gibbons MD;  Location: [facility] OR    CYSTOSCOPY, REMOVE STENT(S), COMBINED Bilateral 11/17/2015    Procedure: COMBINED CYSTOSCOPY, REMOVE STENT(S);  Surgeon: Seth Gibbons MD;  Location: [facility] OR    ELBOW SURGERY Right 1978     hemithyroidectomy Left     HERNIA REPAIR Right 3/2009    THYROIDECTOMY      TRANSPLANT KIDNEY RECIPIENT  DONOR      TRANSPLANT KIDNEY RECIPIENT  DONOR N/A 10/13/2015    Procedure: TRANSPLANT KIDNEY RECIPIENT  DONOR;  Surgeon: Seth Gibbons MD;  Location: [facility] OR    ureteropyelostomy  2004            Social History:   .  Lives in [place], [place].         Family History:   Patient's family history is reviewed today and is non-contributory    Family History   Problem Relation Age of Onset    Diabetes Mother     Other Cancer Father         lung             Allergies:   Reviewed and edited as appropriate     Allergies   Allergen Reactions    Hydralazine Swelling    Betadine [Povidone Iodine]     Cephalexin Hives    Clindamycin Hives    Trazodone Swelling     Swelling of face/lips            Medications:     Current Facility-Administered Medications   Medication    calcium gluconate 1 g in 50 mL sodium chloride intermittent infusion (premix)    carvedilol (COREG) tablet 25 mg    cholecalciferol (VITAMIN D3) tablet 10 mcg    clonazePAM (klonoPIN) tablet 0.5 mg    folic acid (FOLVITE) tablet 1 mg    levothyroxine (SYNTHROID/LEVOTHROID) tablet 100 mcg    melatonin tablet 1 mg    mycophenolic acid (GENERIC EQUIVALENT) EC tablet 360 mg    [Held by provider] nitazoxanide (ALINIA) tablet 500 mg    pantoprazole (PROTONIX) EC tablet 40 mg    predniSONE (DELTASONE) tablet 5 mg    sodium bicarbonate 150 mEq in D5W 1,000 mL infusion    sodium bicarbonate tablet 1,950 mg    sulfamethoxazole-trimethoprim (BACTRIM) 400-80 MG per tablet 1 tablet    tacrolimus (GENERIC) capsule 2.5 mg    tacrolimus (GENERIC) capsule 3 mg             Review of Systems:     A complete review of systems was performed and is negative except as noted in the HPI           Physical Exam:   Temp: 97.7  F (36.5  C) Temp src: Oral BP: (P) 127/67 Pulse: (P) 61   Resp: (P) 16 SpO2: (P) 99 % O2 Device: (P) None (Room  air)    Wt:   Wt Readings from Last 2 Encounters:   10/04/23 56.6 kg (124 lb 11.2 oz)   03/07/23 59 kg (130 lb)      General: Pleasant male in NAD.  Answers appropriately.    HEENT: Head is AT/NC. Sclera anicteric. No conjunctival injection.  Oropharynx is clear, moist and w/o exudate or lesions. No thrush. Good dentition.  Lungs: Non-labored breathing on RA.   Heart: Regular rate and rhythm.  No murmurs, gallops or rubs.  Normal S1 and S2.  Abdomen: Soft, non-tender, non-distended.  No rebound or peritoneal signs.   Extremities: WWP, no pedal edema.  MSK: no gross deformity, no overt muscle wasting  Skin: No jaundice or rash  Neurologic: Grossly non-focal.  CN 2-12 grossly intact.   Psych: mood appropriate to situation           Data:   Labs and imaging below were independently reviewed and interpreted    LAB WORK:    BMP  Recent Labs   Lab 10/04/23  0504 10/03/23  2021 10/03/23  1240    144 144   POTASSIUM 3.4 4.1 4.9   CHLORIDE 118* 119* 121*   CRISTIAN 7.3* 7.9* 8.5*   CO2 16* 14* 12*   BUN 60.8* 61.9* 65.5*   CR 3.30* 3.39* 3.75*   * 143* 145*     CBC  Recent Labs   Lab 10/04/23  0504 10/03/23  1240   WBC 2.3* 3.2*   RBC 2.91* 3.66*   HGB 8.7* 10.8*   HCT 26.0* 35.0*   MCV 89 96   MCH 29.9 29.5   MCHC 33.5 30.9*   RDW 14.3 14.5   * 122*     INRNo lab results found in last 7 days.  LFTs  Recent Labs   Lab 10/03/23  1240   ALKPHOS 62   AST 12   ALT 7   BILITOTAL 0.3   PROTTOTAL 6.1*   ALBUMIN 3.9      PANCNo lab results found in last 7 days.    IMAGING:  No results found for this visit on 10/03/23.     9/22/2023: CT Abdomen Pelvis without IV Contrast  (at [facility]) - report only available  FINDINGS:   Patchy opacities both lung bases, please see separately dictated CT chest.     The liver, pancreas, spleen, and adrenals are negative on this noncontrast exam. Distended   gallbladder, nonspecific. Atrophy of the native kidneys. There appears to be 2 right lower quadrant   renal transplant similar  atrophic. Left lower quadrant renal transplant probable mild hydronephrosis   in the left lower quadrant renal transplant. Normal caliber of the bowel. There are multiple   air-fluid levels throughout the large and small bowel. Mild arterial calcifications. No   lymphadenopathy in the abdomen or pelvis by size criteria. No acute osseous abnormality.     =======================================================================

## 2023-10-04 NOTE — PHARMACY-ADMISSION MEDICATION HISTORY
Pharmacist Admission Medication History    Admission medication history is complete. The information provided in this note is only as accurate as the sources available at the time of the update.    Information Source(s): Patient via phone interview, dispense report    Pertinent Information:   - Patient knew his medications and last doses.  - Patient told me that he took his own home supply of tacrolimus for his evening dose tonight. I explained that we will provide his medications and he should not take his own. I notified his nurse that he reported taking this evening's dose.  - Patient states that nitazoxanide was prescribed to him yesterday but he hasn't picked it up yet.   - Home pharmacy is [facility]    Changes made to PTA medication list:  Added: Mycophenolic acid (generic - per dispense report)  Deleted: Myfortic brand  Changed: Tacrolimus to 3 mg QAM, 2.5 mg QPM, confirmed by patient.        Allergies reviewed with patient and updates made in EHR: yes    Medication History Completed By:   Jenise Swan, PharmD      Prior to Admission medications    Medication Sig Last Dose Taking? Auth Provider Long Term End Date   carvedilol (COREG) 25 MG tablet Take 1 tablet (25 mg) by mouth 2 times daily (with meals) 10/3/2023 at PM Yes Maurice Bullard MD Yes    clonazePAM (KLONOPIN) 0.5 MG tablet Take 1 tablet (0.5 mg) by mouth At Bedtime 10/2/2023 at PM Yes Maurice Bullard MD Yes    folic acid (FOLVITE) 1 MG tablet Take 1 tablet (1 mg) by mouth daily 10/3/2023 at AM Yes Maurice Bullard MD     levothyroxine (SYNTHROID/LEVOTHROID) 100 MCG tablet Take 1 tablet (100 mcg) by mouth daily 10/3/2023 at AM Yes Maurice Bullard MD Yes    mycophenolic acid (GENERIC EQUIVALENT) 180 MG EC tablet Take 360 mg by mouth 2 times daily 10/3/2023 at AM Yes Unknown, Entered By History     pantoprazole (PROTONIX) 40 MG EC tablet Take 1 tablet (40 mg) by mouth daily Take 30-60 minutes before a meal. 10/3/2023 at AM Yes Juan Miguel  MD Maurice     predniSONE (DELTASONE) 5 MG tablet Take 1 tablet (5 mg) by mouth daily 10/3/2023 at AM Yes Maurice Bullard MD     rOPINIRole (REQUIP) 1 MG tablet Take 1 tablet (1 mg) by mouth At Bedtime 10/2/2023 at PM Yes Maurice Bullard MD Yes    sodium bicarbonate 650 MG tablet Take 3 tablets (1,950 mg) by mouth 3 times daily for 90 days Past Week Yes Maurice Bullard MD  12/31/23   sulfamethoxazole-trimethoprim (BACTRIM/SEPTRA) 400-80 MG per tablet Take 1 tablet by mouth daily 10/3/2023 at AM Yes Nicola Lyon MD     tacrolimus (GENERIC EQUIVALENT) 0.5 MG capsule Total dose 3mg in AM and 2.5mg in PM 10/3/2023 at 0700 Yes Maurice Bullard MD Yes    tacrolimus (GENERIC EQUIVALENT) 1 MG capsule Total dose 3mg in AM and 2.5mg in PM 10/3/2023 at 1900 Yes Maurice Bullard MD Yes    vitamin D3 (CHOLECALCIFEROL) 10 MCG (400 UNIT) capsule Take 1 capsule (400 Units) by mouth daily 10/3/2023 at PM Yes Maurice Bullard MD     nitazoxanide (ALINIA) 500 MG tablet Take 1 tablet (500 mg) by mouth 2 times daily (with meals) for 14 days Not started yet at n/a  Maurice Bullard MD  10/16/23

## 2023-10-04 NOTE — UTILIZATION REVIEW
Admission Status; Secondary Review Determination    Under the authority of the Utilization Management Committee, the utilization review process indicated a secondary review on the above patient. The review outcome is based on review of the medical records, discussions with staff, and applying clinical experience noted on the date of the review.    (x) Inpatient Status Appropriate - This patient's medical care is consistent with medical management for inpatient care and reasonable inpatient medical practice.    RATIONALE FOR DETERMINATION: 58-year-old male with history of kidney transplant in 2015 with resultant immunosuppression, secondary renal hyperparathyroidism, hypertension who now presents with significant diarrhea that is profuse after he attempts to eat.  This is resulted in acute kidney injury.  With patient's post transplant status, immunosuppression and now positive norovirus with ongoing acute kidney injury requiring greater than 2 nights in the hospital for IV fluid management due to ongoing diarrhea appropriate for inpatient care.    At the time of admission with the information available to the attending physician more than 2 nights Hospital complex care was anticipated, based on patient risk of adverse outcome if treated as outpatient and complex care required. Inpatient admission is appropriate based on the Medicare guidelines.    This document was produced using voice recognition software    The information on this document is developed by the utilization review team in order for the business office to ensure compliance. This only denotes the appropriateness of proper admission status and does not reflect the quality of care rendered.    The definitions of Inpatient Status and Observation Status used in making the determination above are those provided in the CMS Coverage Manual, Chapter 1 and Chapter 6, section 70.4.    Sincerely,    Ezequiel Pack MD  Utilization Review  Physician  Advisor  [facility].

## 2023-10-04 NOTE — PROVIDER NOTIFICATION
Admitted/transferred from: ED  Time of arrival on unit 1934  2 RN full  skin assessment completed by Ericka MENDOZA and Miranda PERERA  Skin assessment finding: issues found R AV fistula    Interventions/actions: none needed at this time

## 2023-10-05 LAB
ANION GAP SERPL CALCULATED.3IONS-SCNC: 11 MMOL/L (ref 7–15)
ATRIAL RATE - MUSE: 57 BPM
BUN SERPL-MCNC: 42 MG/DL (ref 6–20)
CALCIUM SERPL-MCNC: 7.3 MG/DL (ref 8.6–10)
CHLORIDE SERPL-SCNC: 108 MMOL/L (ref 98–107)
CREAT SERPL-MCNC: 3.01 MG/DL (ref 0.67–1.17)
DEPRECATED HCO3 PLAS-SCNC: 26 MMOL/L (ref 22–29)
DIASTOLIC BLOOD PRESSURE - MUSE: NORMAL MMHG
EGFRCR SERPLBLD CKD-EPI 2021: 23 ML/MIN/1.73M2
ERYTHROCYTE [DISTWIDTH] IN BLOOD BY AUTOMATED COUNT: 14.1 % (ref 10–15)
GLUCOSE SERPL-MCNC: 109 MG/DL (ref 70–99)
HCT VFR BLD AUTO: 29.1 % (ref 40–53)
HGB BLD-MCNC: 9.3 G/DL (ref 13.3–17.7)
INTERPRETATION ECG - MUSE: NORMAL
MAGNESIUM SERPL-MCNC: 2.1 MG/DL (ref 1.7–2.3)
MCH RBC QN AUTO: 29.2 PG (ref 26.5–33)
MCHC RBC AUTO-ENTMCNC: 32 G/DL (ref 31.5–36.5)
MCV RBC AUTO: 92 FL (ref 78–100)
P AXIS - MUSE: 58 DEGREES
PLATELET # BLD AUTO: 100 10E3/UL (ref 150–450)
POTASSIUM SERPL-SCNC: 3.1 MMOL/L (ref 3.4–5.3)
PR INTERVAL - MUSE: 164 MS
QRS DURATION - MUSE: 110 MS
QT - MUSE: 456 MS
QTC - MUSE: 443 MS
R AXIS - MUSE: 8 DEGREES
RBC # BLD AUTO: 3.18 10E6/UL (ref 4.4–5.9)
SODIUM SERPL-SCNC: 145 MMOL/L (ref 135–145)
SYSTOLIC BLOOD PRESSURE - MUSE: NORMAL MMHG
T AXIS - MUSE: 4 DEGREES
TACROLIMUS BLD-MCNC: 8.1 UG/L (ref 5–15)
TME LAST DOSE: NORMAL H
TME LAST DOSE: NORMAL H
VENTRICULAR RATE- MUSE: 57 BPM
WBC # BLD AUTO: 1.8 10E3/UL (ref 4–11)

## 2023-10-05 PROCEDURE — 88342 IMHCHEM/IMCYTCHM 1ST ANTB: CPT | Mod: 26 | Performed by: PATHOLOGY

## 2023-10-05 PROCEDURE — 88342 IMHCHEM/IMCYTCHM 1ST ANTB: CPT | Mod: TC | Performed by: INTERNAL MEDICINE

## 2023-10-05 PROCEDURE — 0DBP8ZX EXCISION OF RECTUM, VIA NATURAL OR ARTIFICIAL OPENING ENDOSCOPIC, DIAGNOSTIC: ICD-10-PCS | Performed by: INTERNAL MEDICINE

## 2023-10-05 PROCEDURE — 99153 MOD SED SAME PHYS/QHP EA: CPT | Performed by: INTERNAL MEDICINE

## 2023-10-05 PROCEDURE — 80197 ASSAY OF TACROLIMUS: CPT

## 2023-10-05 PROCEDURE — 82374 ASSAY BLOOD CARBON DIOXIDE: CPT

## 2023-10-05 PROCEDURE — 88341 IMHCHEM/IMCYTCHM EA ADD ANTB: CPT | Mod: 26 | Performed by: PATHOLOGY

## 2023-10-05 PROCEDURE — 99207 PR NO BILLABLE SERVICE THIS VISIT: CPT

## 2023-10-05 PROCEDURE — 250N000012 HC RX MED GY IP 250 OP 636 PS 637

## 2023-10-05 PROCEDURE — 250N000013 HC RX MED GY IP 250 OP 250 PS 637

## 2023-10-05 PROCEDURE — 99233 SBSQ HOSP IP/OBS HIGH 50: CPT | Mod: FS

## 2023-10-05 PROCEDURE — 250N000011 HC RX IP 250 OP 636

## 2023-10-05 PROCEDURE — G0500 MOD SEDAT ENDO SERVICE >5YRS: HCPCS | Performed by: INTERNAL MEDICINE

## 2023-10-05 PROCEDURE — 0DBH8ZX EXCISION OF CECUM, VIA NATURAL OR ARTIFICIAL OPENING ENDOSCOPIC, DIAGNOSTIC: ICD-10-PCS | Performed by: INTERNAL MEDICINE

## 2023-10-05 PROCEDURE — 36415 COLL VENOUS BLD VENIPUNCTURE: CPT

## 2023-10-05 PROCEDURE — 83735 ASSAY OF MAGNESIUM: CPT

## 2023-10-05 PROCEDURE — 250N000011 HC RX IP 250 OP 636: Performed by: INTERNAL MEDICINE

## 2023-10-05 PROCEDURE — 82310 ASSAY OF CALCIUM: CPT

## 2023-10-05 PROCEDURE — 88305 TISSUE EXAM BY PATHOLOGIST: CPT | Mod: 26 | Performed by: PATHOLOGY

## 2023-10-05 PROCEDURE — 45380 COLONOSCOPY AND BIOPSY: CPT | Performed by: INTERNAL MEDICINE

## 2023-10-05 PROCEDURE — 85014 HEMATOCRIT: CPT

## 2023-10-05 PROCEDURE — 120N000011 HC R&B TRANSPLANT UMMC

## 2023-10-05 RX ORDER — FENTANYL CITRATE 50 UG/ML
INJECTION, SOLUTION INTRAMUSCULAR; INTRAVENOUS PRN
Status: DISCONTINUED | OUTPATIENT
Start: 2023-10-05 | End: 2023-10-07

## 2023-10-05 RX ORDER — CALCIUM GLUCONATE 20 MG/ML
1 INJECTION, SOLUTION INTRAVENOUS ONCE
Status: COMPLETED | OUTPATIENT
Start: 2023-10-05 | End: 2023-10-05

## 2023-10-05 RX ORDER — POTASSIUM CHLORIDE 20MEQ/15ML
40 LIQUID (ML) ORAL DAILY
Status: DISCONTINUED | OUTPATIENT
Start: 2023-10-05 | End: 2023-10-06

## 2023-10-05 RX ORDER — LOPERAMIDE HCL 2 MG
2 CAPSULE ORAL 4 TIMES DAILY PRN
Status: DISCONTINUED | OUTPATIENT
Start: 2023-10-05 | End: 2023-10-06

## 2023-10-05 RX ORDER — ROPINIROLE 1 MG/1
1 TABLET, FILM COATED ORAL AT BEDTIME
Status: DISCONTINUED | OUTPATIENT
Start: 2023-10-05 | End: 2023-10-19 | Stop reason: HOSPADM

## 2023-10-05 RX ADMIN — CALCIUM GLUCONATE 1 G: 20 INJECTION, SOLUTION INTRAVENOUS at 10:31

## 2023-10-05 RX ADMIN — PREDNISONE 5 MG: 5 TABLET ORAL at 08:01

## 2023-10-05 RX ADMIN — PANTOPRAZOLE SODIUM 40 MG: 40 TABLET, DELAYED RELEASE ORAL at 08:01

## 2023-10-05 RX ADMIN — TACROLIMUS 2.5 MG: 1 CAPSULE ORAL at 18:24

## 2023-10-05 RX ADMIN — ROPINIROLE HYDROCHLORIDE 1 MG: 1 TABLET, FILM COATED ORAL at 22:18

## 2023-10-05 RX ADMIN — POTASSIUM CHLORIDE 40 MEQ: 40 SOLUTION ORAL at 10:37

## 2023-10-05 RX ADMIN — SODIUM BICARBONATE 650 MG TABLET 1950 MG: at 20:13

## 2023-10-05 RX ADMIN — CARVEDILOL 25 MG: 25 TABLET, FILM COATED ORAL at 08:01

## 2023-10-05 RX ADMIN — TACROLIMUS 3 MG: 1 CAPSULE ORAL at 08:00

## 2023-10-05 RX ADMIN — LEVOTHYROXINE SODIUM 100 MCG: 100 TABLET ORAL at 08:01

## 2023-10-05 RX ADMIN — CLONAZEPAM 0.5 MG: 0.5 TABLET ORAL at 22:18

## 2023-10-05 RX ADMIN — POTASSIUM CHLORIDE 20 MEQ: 1.5 SOLUTION ORAL at 08:03

## 2023-10-05 RX ADMIN — CARVEDILOL 25 MG: 25 TABLET, FILM COATED ORAL at 18:25

## 2023-10-05 RX ADMIN — CHOLECALCIFEROL (VITAMIN D3) 10 MCG (400 UNIT) TABLET 10 MCG: at 08:03

## 2023-10-05 RX ADMIN — SODIUM BICARBONATE 650 MG TABLET 1950 MG: at 14:41

## 2023-10-05 RX ADMIN — SODIUM BICARBONATE 650 MG TABLET 1950 MG: at 08:00

## 2023-10-05 RX ADMIN — FOLIC ACID 1 MG: 1 TABLET ORAL at 08:00

## 2023-10-05 RX ADMIN — MYCOPHENILIC ACID 360 MG: 360 TABLET, DELAYED RELEASE ORAL at 08:01

## 2023-10-05 RX ADMIN — MYCOPHENILIC ACID 360 MG: 360 TABLET, DELAYED RELEASE ORAL at 20:13

## 2023-10-05 ASSESSMENT — ACTIVITIES OF DAILY LIVING (ADL)
ADLS_ACUITY_SCORE: 23

## 2023-10-05 NOTE — PLAN OF CARE
Goal Outcome Evaluation:      /74 (BP Location: Left arm)   Pulse 59   Temp 97.4  F (36.3  C) (Oral)   Resp 20   Wt 56.6 kg (124 lb 11.2 oz)   SpO2 98%   BMI 20.75 kg/m      [id number]    Neuro: A&Ox4.   Cardiac: SR. VSS.   Respiratory: Sating 98% on RA.  GI/: No bm. Adequate urine output.   Diet/appetite: Tolerating regular diet. Eating well.  Activity: Independent  up to chair and in halls.  Pain: At acceptable level on current regimen.   Skin: No new deficits noted.  LDA's:PIV saline locked.  New this shift : Colonoscopy.  Plan: Continue with POC. Notify primary team with changes.

## 2023-10-05 NOTE — PROCEDURES
Colonoscopy brief note:    There is no clear cause of diarrhea identified on this exam. If the biopsies were negative, please further discuss with ID/transplant nephrology for further treatment of prolonged Norovirus infection.  - The examined portion of the ileum was normal.   - Normal mucosa in the sigmoid colon, in the descending colon, in the transverse colon, in the ascending colon and in the cecum.  Biopsied.   - Diverticulosis in the ascending colon.   - Granular mucosa in the rectum from anal verge up to 3 cm proximal into the rectum.  Biopsied.   - Non-bleeding internal hemorrhoids.   - Mucosal nodule at the top of the internal hemorrhoid. This could be related to scarring post hemorrhoid, or tumor, pending biopsies.    Plan  - Return patient to [facility] for ongoing care.   - Resume regular diet today.   - Await pathology results.   - GI will sign off.    Merritt Guzmán MD  Gastroenterology/Hepatology Fellow

## 2023-10-05 NOTE — PROGRESS NOTES
[facility]    Medicine Progress Note - Medicine Service, [facility]NA TEAM 2    Date of Admission:  10/3/2023    Assessment & Plan   Haider Torrez is a 58 year old male admitted on 10/3/2023. He has a PMH of IgA nephropathy s/p kidney transplant (2004, 2015), BK and CMV viremia, secondary hyperparathyroid presenting with persistent diarrhea, concerning for CMV colitis vs. MMF colitis and MATTHEW.     Today:  - replacing Ca and K  - Colonoscopy done, pending biopsy result  - IV bicarb discontinued due to normalized HCO3  - MATTHEW improving, promoting oral intake (regular diet)  - EBV and IgG resulted     Kidney transplant  MATTHEW  Proteinuria  Baseline Cr 2.1-2.5, today 3.9. Proteinuria since 8/23.   - consult transplant nephrology  - currently on PTA tacrolimus immediate release (goal 4-6), Mycophenolic acid (dose 360mg bid) and Prednisone (dose 5 mg daily)  - Continue PJP prophylaxis with bactrim    - EBV PCR negative     Diarrhea  He has a history of norovirus diarrhea and MATTHEW in March 2023. Patient has been having loose bowel movement, especially after eating for a month, and recently test positive for norovirus in stool on 9/21.   -Colonoscopy 10/2022 with mild archietectural distortion, focal crypt degeneration. Negative stain for CMV   - consult GI for concerns for CMV vs MMF colitis vs. Chronic norovirus: colonoscopy 10/5, pending biopsy  - C. Diff panel neg  - enteric stool panel: norovirus positive, but transplant ID will not start nitaxoxanide due to poor evidence. Would suggest weaning immunosuppression and prn imodium.      NAGMA  Likely secondary to diarrhea. Normalized.   - continue PTA bicarb 1,950 mg TID  - discontinue D5 with 150 mEq bicarb infusion in ED due to resolved acidosis.      CMV viremia  Per transplant nephrology note:    -Noted to have CMV viremia on 10/9/22 with 41 copies, up to 736 copies on 3/2/23. This hase been managed conservatively with decrease in  MPA (patient did not actually decrease dose)              -Pt is CMV IgG+ at time of transplant.               -IgG >600. CMV has been low level but present               -concern at this point with worsening diarrhea that he has CMV colitis. Recommend repeat colonoscopy with biopsies, and if CMV + would treat with IV GCV.   - IVIG 0.5g/kg x1 10/4, IgG level: normal 10/5     Hypertension  On carvedilol 25 mg BID     Anemia of CKD  Normal Iron study. 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     Papillary thyroid carcinoma s/p surgical resection  Continue PTA levothyroxine     Hypocalcemia    Labs:    10/03/23 12:40 10/03/23 20:21 10/04/23 05:04   Calcium 8.5  7.9  7.3       - calcium gluconate 1 g 10/4, still low 10/5 repeated another dose       Diet: Regular Diet Adult    DVT Prophylaxis: Ambulate every shift  Fernandez Catheter: Not present  Lines: None     Cardiac Monitoring: None  Code Status: Full Code      Clinically Significant Risk Factors        # Hypokalemia: Lowest K = 3.1 mmol/L in last 2 days, will replace as needed   # Hypocalcemia: Lowest Ca = 7.3 mg/dL in last 2 days, will monitor and replace as appropriate   # Hypomagnesemia: Lowest Mg = 1.1 mg/dL in last 2 days, will replace as needed      # Coagulation Defect: INR = 1.30 (Ref range: 0.85 - 1.15) and/or PTT = N/A, will monitor for bleeding    # Thrombocytopenia: Lowest platelets = 100 in last 2 days, will monitor for bleeding     # Hypertension: Noted on problem list                   Disposition Plan      Expected Discharge Date: 10/06/2023        Discharge Comments: Likely home; Pending colonoscopy results, diarrhea and MATTHEW management in a transplanted kidney        The patient's care was discussed with the Attending Physician, Dr. Lane .    Yovana Flanagan MD  Medicine Service, [facility] TEAM [address] [place] [address]  Securely message with [facility] (more info)  Text  page via [email or web address] Paging/Directory   See signed in provider for up to date coverage information  ______________________________________________________________________    Interval History   NAOE. Patient still has low K, Ca so replacement today. Acidosis resolved, stopped bicarb IV. EBV neg, IgG normal. Patient has the appetite for food.    Colonoscopy today. Granular mucosa seen, suspecting chronic norovirus. Biopsy result pending.     Physical Exam   Vital Signs: Temp: 97.4  F (36.3  C) Temp src: Oral BP: 137/74 Pulse: 59   Resp: 20 SpO2: 98 % O2 Device: None (Room air) Oxygen Delivery: 2 LPM  Weight: 124 lbs 11.2 oz        Medical Decision Making             Data

## 2023-10-05 NOTE — PLAN OF CARE
Goal Outcome Evaluation:      BP (!) 144/75   Pulse 60   Temp 97.3  F (36.3  C) (Oral)   Resp 16   Wt 56.6 kg (124 lb 11.2 oz)   SpO2 100%   BMI 20.75 kg/m      Shift: 0365-7091    VS: Hypertensive 140s systolic  Neuro: Aox4  Cardio: WDL  Respiratory: WDL on RA  GI: bowel prepped for colonoscopy, stools watery and clear yellow  : Voiding adequately   Skin: WDL   Diet: NPO   Labs: none overnight  BG: none  LDA: PIV x2, R AVF  Infusions: IVIG given, sodium bicarb @100ml/hr  Mobility: Independent  Pain/Nausea: denies  PRN medications: none given  Plan:  Colonoscopy scheduled for 0900

## 2023-10-05 NOTE — PROGRESS NOTES
[facility]   Transplant Nephrology Progress Note  Date of Admission:  10/3/2023  Today's Date: 10/05/2023    Recommendations:  - Decrease tacrolimus to 2.5 mg PO BID (ordered).  - f/up colonoscopy path results  - Agree with calcium gluconate 1 g IV once.  - Agree with KCL 20 mEq PO once.    Assessment & Plan   # DDKT: Increased serum creatinine this admission.    - MATTHEW felt secondary to dehydration.    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    # Immunosuppression: Tacrolimus immediate release (goal 4-6), Mycophenolic acid (dose 360 mg every 12 hours), and Prednisone (dose 5 mg daily)   - Continue with intensive monitoring of immunosuppression for efficacy and toxicity.   - 10/05/23  Tacrolimus level: 8.1 (~ 11.5 hour trough)   - Changes: Yes - Decrease tacrolimus to 2.5 mg PO BID .  Myfortic already reduced.    # Infection Prophylaxis:   - PJP: Sulfa/TMP (Bactrim)      # Diarrhea:  Pt presented 10/3/23 with persistent diarrhea, concerning for CMV colitis vs. MMF colitis and MATTHEW.   - Colonoscopy 10/2022 with mild archietectural distortion, focal crypt degeneration. Negative stain for CMV    - Recently admitted (3/1-3/3/23) for diarrhea and MATTHEW, found + for norovirus   - Stool studies positive for norovirus again.     - Give IVIG 0.5 g/kg once (ordered).   - GI consulted for colonoscopy with biopsies.  Follow-up biopsy  results.       10/4/2023 C. Diff Toxin PCR Negative    10/3/2023   EBV DNA PCR Quantitative Whole Blood In process    10/3/2023  IgG 810    10/3/2023  Enteric Bacteria and Virus Panel PCR Positive for norovirus.         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    - Would give IVIG 0.5 g/kg once.  - Concern at this point with worsening diarrhea that he has CMV colitis.   - If coloscopy biopsies CMV + would treat with IV GCV.      03/20/23  04/10/23  05/01/23    CMV DNA Quant (External) 622 !  460 !  318 !        # Hypertension: Borderline control;  Goal BP: < 140/90 (Hospitalization goal)   - Changes: Not at this time.  Continue carvedilol  25 mg PO BID.    # Anemia in Chronic Renal Disease: Hgb: Stable     DANNY: No   - Iron studies: Replete.    # Mineral Bone Disorder:   - Secondary renal hyperparathyroidism; PTH level: Mildly elevated (151-300 pg/ml)        On treatment: None  - Vitamin D; level: Not checked recently, but was normal last check        On supplement: Yes  - Calcium; level: Low           On supplement: No, agree with calcium gluconate 1 g IV once.  - Phosphorus; level: Normal  10/4/23       On supplement: No       # Electrolytes:   - Potassium; level: Low        On supplement: No, agree with KCL 20 mEq PO once.  - Magnesium; level: Low        On supplement: No  - Bicarbonate; level: Normal        On supplement: Yes, sodium bicarbonate 1950 mg PO TID    # Proteinuria:   -1.2g/g in previous check in 2/2023, 3.3g on 24h collection in 8/2023   -A1c 5.8%.    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    Discussed with Dr. Winn.    MARCUS Bynum CNP   Pager: [phone]        Physician Attestation     I saw and evaluated Haider Torrez as part of a shared APRN/PA visit.     I personally reviewed the vital signs, medications, labs, and imaging.    I personally performed the substantive portion of the medical decision making for this visit - please see the CH[facility]'s documentation for full details.    Key management decisions made by me and carried out under my direction: ongoing diarrhea, awaiting colonoscopy with path results Continue lower dose /360 and reduce FK to goal ~4.    Janel Winn MD  Date of Service (when I saw the patient): 10/05/23    Interval History   Colonoscopy today.  Mr. Torrez's creatinine is 3.01 (10/05 0542); Stable from 3.03 yesterday.    I/O last 3 completed shifts:  In: 5643.33 [P.O.:2670; I.V.:2973.33]  Out: 900 [Urine:900]   Other significant labs/tests/vitals: SBP 110s - 140s overnight. Afebrile.  WBC 1.8.  No acute events overnight.  No chest pain or shortness of breath.  No leg swelling.  No nausea and vomiting.        Review of Systems   4 point ROS was obtained and negative except as noted in the Interval History.    MEDICATIONS:   calcium gluconate  1 g Intravenous Once    carvedilol  25 mg Oral BID w/meals    cholecalciferol  10 mcg Oral Daily    clonazePAM  0.5 mg Oral At Bedtime    folic acid  1 mg Oral Daily    levothyroxine  100 mcg Oral Daily    mycophenolic acid  360 mg Oral BID    pantoprazole  40 mg Oral Daily    potassium chloride  20 mEq Oral Daily    potassium chloride  40 mEq Oral Daily    predniSONE  5 mg Oral Daily    rOPINIRole  1 mg Oral At Bedtime    sodium bicarbonate  1,950 mg Oral TID    [START ON 10/6/2023] sulfamethoxazole-trimethoprim  1 tablet Oral Once per day on Mon Wed Fri    tacrolimus  2.5 mg Oral QPM     tacrolimus  3 mg Oral QAM         Physical Exam   Temp  Av.7  F (36.5  C)  Min: 97.3  F (36.3  C)  Max: 98.1  F (36.7  C)      Pulse  Av.9  Min: 53  Max: 66 Resp  Av.6  Min: 16  Max: 19  SpO2  Av.6 %  Min: 98 %  Max: 100 %     BP (!) 146/73   Pulse 55   Temp 97.9  F (36.6  C) (Oral)   Resp 16   Wt 56.6 kg (124 lb 11.2 oz)   SpO2 99%   BMI 20.75 kg/m      Admit Weight: 57.1 kg (125 lb 14.1 oz)     GENERAL APPEARANCE: alert and no distress  RESP: lungs clear to auscultation - no rales, rhonchi or wheezes  CV: regular rhythm, normal rate, no rub, no murmur  EDEMA: no LE edema bilaterally  ABDOMEN: soft, nondistended, nontender, bowel sounds normal  MS: extremities normal - no gross deformities noted, no evidence of inflammation in joints, no muscle tenderness  SKIN: no rash  PSYCH: mentation appears normal and affect normal  DIALYSIS ACCESS:  RUE AV fistula +bruit/+thrill    Data   All labs reviewed by me.  CMP  Recent Labs   Lab 10/05/23  0542 10/04/23  1844 10/04/23  0504 10/03/23  2021 10/03/23  1240    145 145 144 144   POTASSIUM 3.1* 3.4 3.4 4.1 4.9   CHLORIDE 108* 109* 118* 119* 121*   CO2 26 22 16* 14* 12*   ANIONGAP 11 14 11 11 11   * 142* 114* 143* 145*   BUN 42.0* 50.3* 60.8* 61.9* 65.5*   CR 3.01* 3.03* 3.30* 3.39* 3.75*   GFRESTIMATED 23* 23* 21* 20* 18*   CRISTIAN 7.3* 8.2* 7.3* 7.9* 8.5*   MAG 2.1 1.8 1.1*  --   --    PHOS  --   --  2.9  --   --    PROTTOTAL  --   --   --   --  6.1*   ALBUMIN  --   --   --   --  3.9   BILITOTAL  --   --   --   --  0.3   ALKPHOS  --   --   --   --  62   AST  --   --   --   --  12   ALT  --   --   --   --  7     CBC  Recent Labs   Lab 10/05/23  0542 10/04/23  0504 10/03/23  1240   HGB 9.3* 8.7* 10.8*   WBC 1.8* 2.3* 3.2*   RBC 3.18* 2.91* 3.66*   HCT 29.1* 26.0* 35.0*   MCV 92 89 96   MCH 29.2 29.9 29.5   MCHC 32.0 33.5 30.9*   RDW 14.1 14.3 14.5   * 112* 122*     INR  Recent Labs   Lab 10/04/23  1203   INR 1.30*     ABGNo lab  results found in last 7 days.   Urine Studies  Recent Labs   Lab Test 10/03/23  2244 11/17/15  1410 10/14/15  2220 10/13/15  0725   COLOR Light Yellow Light Yellow Yellow Straw   APPEARANCE Clear Clear Clear Clear   URINEGLC Negative Negative Negative Negative   URINEBILI Negative Negative Negative Negative   URINEKETONE Negative Negative Negative Negative   SG 1.014 1.007 1.006 1.003   UBLD Negative Negative Large* Large*   URINEPH 6.0 6.5 5.5 5.0   PROTEIN 200* Negative 10* Negative   NITRITE Negative Negative Negative Negative   LEUKEST Negative Negative Small* Negative   RBCU 1 0 >182* 57*   WBCU 1 1 15* 1     Recent Labs   Lab Test 03/27/18  1516 09/26/17  1506   UTPG 0.12 0.10     PTH  Recent Labs   Lab Test 03/27/18  1510 10/15/15  0626   PTHI 126* 561*     Iron Studies  Recent Labs   Lab Test 03/27/18  1510 10/17/15  0700   IRON 67 25*    216*   IRONSAT 24 12*   * 883*       IMAGING:  All imaging studies reviewed by me.

## 2023-10-05 NOTE — OR NURSING
Pt tolerated colonoscopy with multiple biopsies, very well, under conscious sedation. Report was called to the pts floor nurse. Return pt to PCU

## 2023-10-06 LAB
ALBUMIN SERPL ELPH-MCNC: 3.7 G/DL (ref 3.7–5.1)
ALPHA1 GLOB SERPL ELPH-MCNC: 0.3 G/DL (ref 0.2–0.4)
ALPHA2 GLOB SERPL ELPH-MCNC: 0.5 G/DL (ref 0.5–0.9)
ANION GAP SERPL CALCULATED.3IONS-SCNC: 8 MMOL/L (ref 7–15)
B-GLOBULIN SERPL ELPH-MCNC: 0.6 G/DL (ref 0.6–1)
BUN SERPL-MCNC: 40.7 MG/DL (ref 6–20)
CALCIUM SERPL-MCNC: 7.5 MG/DL (ref 8.6–10)
CHLORIDE SERPL-SCNC: 110 MMOL/L (ref 98–107)
CMV DNA SPEC NAA+PROBE-ACNC: 252 IU/ML
CMV DNA SPEC NAA+PROBE-LOG#: 2.4 {LOG_COPIES}/ML
COLONOSCOPY: NORMAL
CREAT SERPL-MCNC: 2.97 MG/DL (ref 0.67–1.17)
DEPRECATED HCO3 PLAS-SCNC: 28 MMOL/L (ref 22–29)
EGFRCR SERPLBLD CKD-EPI 2021: 24 ML/MIN/1.73M2
GAMMA GLOB SERPL ELPH-MCNC: 0.8 G/DL (ref 0.7–1.6)
GLUCOSE SERPL-MCNC: 108 MG/DL (ref 70–99)
M PROTEIN SERPL ELPH-MCNC: 0 G/DL
MAGNESIUM SERPL-MCNC: 1.9 MG/DL (ref 1.7–2.3)
PATH REPORT.ADDENDUM SPEC: NORMAL
PATH REPORT.COMMENTS IMP SPEC: NORMAL
PATH REPORT.FINAL DX SPEC: NORMAL
PATH REPORT.GROSS SPEC: NORMAL
PATH REPORT.MICROSCOPIC SPEC OTHER STN: NORMAL
PATH REPORT.RELEVANT HX SPEC: NORMAL
PHOTO IMAGE: NORMAL
POTASSIUM SERPL-SCNC: 3.3 MMOL/L (ref 3.4–5.3)
PROT PATTERN SERPL ELPH-IMP: NORMAL
SODIUM SERPL-SCNC: 146 MMOL/L (ref 135–145)

## 2023-10-06 PROCEDURE — 250N000012 HC RX MED GY IP 250 OP 636 PS 637

## 2023-10-06 PROCEDURE — 250N000013 HC RX MED GY IP 250 OP 250 PS 637: Performed by: INTERNAL MEDICINE

## 2023-10-06 PROCEDURE — 250N000013 HC RX MED GY IP 250 OP 250 PS 637

## 2023-10-06 PROCEDURE — 80048 BASIC METABOLIC PNL TOTAL CA: CPT

## 2023-10-06 PROCEDURE — 99222 1ST HOSP IP/OBS MODERATE 55: CPT | Mod: FS | Performed by: STUDENT IN AN ORGANIZED HEALTH CARE EDUCATION/TRAINING PROGRAM

## 2023-10-06 PROCEDURE — 250N000011 HC RX IP 250 OP 636: Mod: JZ

## 2023-10-06 PROCEDURE — 99233 SBSQ HOSP IP/OBS HIGH 50: CPT | Mod: FS

## 2023-10-06 PROCEDURE — 258N000003 HC RX IP 258 OP 636

## 2023-10-06 PROCEDURE — 120N000011 HC R&B TRANSPLANT UMMC

## 2023-10-06 PROCEDURE — 83735 ASSAY OF MAGNESIUM: CPT

## 2023-10-06 PROCEDURE — 36415 COLL VENOUS BLD VENIPUNCTURE: CPT | Performed by: STUDENT IN AN ORGANIZED HEALTH CARE EDUCATION/TRAINING PROGRAM

## 2023-10-06 PROCEDURE — 36415 COLL VENOUS BLD VENIPUNCTURE: CPT

## 2023-10-06 RX ORDER — SODIUM BICARBONATE 650 MG/1
1300 TABLET ORAL 3 TIMES DAILY
Status: DISCONTINUED | OUTPATIENT
Start: 2023-10-06 | End: 2023-10-19 | Stop reason: HOSPADM

## 2023-10-06 RX ORDER — CALCIUM CARBONATE 500 MG/1
500 TABLET, CHEWABLE ORAL 2 TIMES DAILY
Status: DISCONTINUED | OUTPATIENT
Start: 2023-10-06 | End: 2023-10-19 | Stop reason: HOSPADM

## 2023-10-06 RX ORDER — POTASSIUM CHLORIDE 20MEQ/15ML
40 LIQUID (ML) ORAL ONCE
Status: COMPLETED | OUTPATIENT
Start: 2023-10-06 | End: 2023-10-06

## 2023-10-06 RX ADMIN — TACROLIMUS 2.5 MG: 1 CAPSULE ORAL at 07:54

## 2023-10-06 RX ADMIN — PREDNISONE 5 MG: 5 TABLET ORAL at 07:54

## 2023-10-06 RX ADMIN — SODIUM BICARBONATE 650 MG TABLET 1300 MG: at 20:15

## 2023-10-06 RX ADMIN — GANCICLOVIR SODIUM 70 MG: 500 INJECTION, POWDER, LYOPHILIZED, FOR SOLUTION INTRAVENOUS at 18:44

## 2023-10-06 RX ADMIN — CARVEDILOL 25 MG: 25 TABLET, FILM COATED ORAL at 07:54

## 2023-10-06 RX ADMIN — POTASSIUM CHLORIDE 40 MEQ: 40 SOLUTION ORAL at 07:53

## 2023-10-06 RX ADMIN — MYCOPHENILIC ACID 360 MG: 360 TABLET, DELAYED RELEASE ORAL at 07:54

## 2023-10-06 RX ADMIN — LEVOTHYROXINE SODIUM 100 MCG: 100 TABLET ORAL at 07:53

## 2023-10-06 RX ADMIN — CHOLECALCIFEROL (VITAMIN D3) 10 MCG (400 UNIT) TABLET 10 MCG: at 07:54

## 2023-10-06 RX ADMIN — TACROLIMUS 2.5 MG: 1 CAPSULE ORAL at 17:53

## 2023-10-06 RX ADMIN — ROPINIROLE HYDROCHLORIDE 1 MG: 1 TABLET, FILM COATED ORAL at 21:17

## 2023-10-06 RX ADMIN — SODIUM BICARBONATE 650 MG TABLET 1950 MG: at 07:54

## 2023-10-06 RX ADMIN — SULFAMETHOXAZOLE AND TRIMETHOPRIM 1 TABLET: 400; 80 TABLET ORAL at 08:02

## 2023-10-06 RX ADMIN — SODIUM BICARBONATE 650 MG TABLET 1300 MG: at 13:28

## 2023-10-06 RX ADMIN — FOLIC ACID 1 MG: 1 TABLET ORAL at 07:54

## 2023-10-06 RX ADMIN — MYCOPHENILIC ACID 360 MG: 360 TABLET, DELAYED RELEASE ORAL at 20:15

## 2023-10-06 RX ADMIN — CLONAZEPAM 0.5 MG: 0.5 TABLET ORAL at 21:17

## 2023-10-06 RX ADMIN — CARVEDILOL 25 MG: 25 TABLET, FILM COATED ORAL at 17:53

## 2023-10-06 RX ADMIN — PANTOPRAZOLE SODIUM 40 MG: 40 TABLET, DELAYED RELEASE ORAL at 07:53

## 2023-10-06 ASSESSMENT — ACTIVITIES OF DAILY LIVING (ADL)
ADLS_ACUITY_SCORE: 23

## 2023-10-06 NOTE — PROGRESS NOTES
[facility]    Medicine Progress Note - Medicine Service, DENA TEAM 2    Date of Admission:  10/3/2023    Assessment & Plan   Haider Torrez is a 58 year old male admitted on 10/3/2023. He has a PMH of IgA nephropathy s/p kidney transplant (2004, 2015), BK and CMV viremia, secondary hyperparathyroid presenting with persistent diarrhea, concerning for CMV colitis vs. MMF colitis and MATTHEW.     Today:  - CMV from colonic biopsy resulted: pos  - transplant ID suggest starting ganciclovir IV  - K replacement, Cr stable at 3  - nephro starting calcium carbonate between meals and decreasing bicarb     Kidney transplant  MATTHEW  Proteinuria  Baseline Cr 2.1-2.5, today 3.9. Proteinuria since 8/23.   - consult transplant nephrology  - Continue PJP prophylaxis with bactrim    - EBV PCR negative     CMV colitis  Diarrhea  He has a history of norovirus diarrhea and MATTHEW in March 2023. Patient has been having loose bowel movement, especially after eating for a month, and recently test positive for norovirus in stool on 9/21.   -Colonoscopy 10/2022 with mild archietectural distortion, focal crypt degeneration. Negative stain for CMV   - consult GI for concerns for CMV vs MMF colitis vs. Chronic norovirus: colonoscopy 10/5, pending biopsy  - C. Diff panel neg  - enteric stool panel: norovirus positive, but transplant ID will not start nitaxoxanide due to poor evidence. Would suggest weaning immunosuppression.   - CMV from colonic biopsy pos, ID suggests starting IV ganciclovir - renally dosing     NAGMA, resolved  Likely secondary to diarrhea. Normalized.   - discontinue PTA bicarb 1,950 mg TID --> 10/6 Decrease sodium bicarbonate to 1300 mg PO TID   - discontinue D5 with 150 mEq bicarb infusion in ED due to resolved acidosis.      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 This hase been managed conservatively with decrease in MPA (patient did not actually decrease dose)              -Pt is CMV IgG+ at time of transplant.               -IgG >600. CMV has been low level but present               -concern at this point with worsening diarrhea that he has CMV colitis. Recommend repeat colonoscopy with biopsies, and if CMV + would treat with IV GCV.   - IVIG 0.5g/kg x1 10/4, IgG level: normal 10/5     Hypertension  On carvedilol 25 mg BID     Anemia of CKD  Normal Iron study. 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  On immunosuppressants for transplanted kidney: mycophenolic acid 360 mg bid, prednisone 5 mg daily, tacrolimus 2.5 mg bid         10/04/23 05:04 10/05/23 05:42   WBC 2.3  1.8    Hemoglobin 8.7  9.3    Platelet Count 112  100    Absolute Neutrophils 1.5             Diet: Regular Diet Adult    DVT Prophylaxis: Ambulate every shift  Fernandez Catheter: Not present  Lines: None     Cardiac Monitoring: None  Code Status: Full Code      Clinically Significant Risk Factors        # Hypokalemia: Lowest K = 3.1 mmol/L in last 2 days, will replace as needed  # Hypernatremia: Highest Na = 146 mmol/L in last 2 days, will monitor as appropriate       # Coagulation Defect: INR = 1.30 (Ref range: 0.85 - 1.15) and/or PTT = N/A, will monitor for bleeding  # Thrombocytopenia: Lowest platelets = 100 in last 2 days, will monitor for bleeding   # Hypertension: Noted on problem list                   Disposition Plan      Expected Discharge Date: 10/08/2023         Discharge Comments: Likely home; Pending colonoscopy results, diarrhea and MATTHEW management in a transplanted kidney        The patient's care was discussed with the Attending Physician, Dr. Lane .    Yovana Flanagan MD  Medicine Service, [facility] TEAM [address] [place] [address]  Securely message with [employer] (more info)  Text page via [facility] Paging/Directory   See signed in provider for up to date coverage information  ______________________________________________________________________    Interval History   Patient doing well. Still having diarrhea after eating similar to at home. No abdominal pain. Able to tolerate regular diet.     Physical Exam   Vital Signs: Temp: 98  F (36.7  C) Temp src: Oral BP: (!) 145/77 Pulse: 66   Resp: 18 SpO2: 99 % O2 Device: None (Room air)    Weight: 124 lbs 11.2 oz        Medical Decision Making             Data

## 2023-10-06 NOTE — PLAN OF CARE
BP (!) 148/84   Pulse 65   Temp 98  F (36.7  C) (Oral)   Resp 18   Wt 56.6 kg (124 lb 11.2 oz)   SpO2 99%   BMI 20.75 kg/m      RN [id number]:    Patient is alert and oriented x 4.  Able to make needs known. Started IV ganciclovir for CMV from colonic biopsy resulting positive. Independent in the room. Continent B/B. R AV fistula, L PIV x 2 SL. Regular diet. Continue with plan of care

## 2023-10-06 NOTE — PROGRESS NOTES
[facility]   Transplant Nephrology Progress Note  Date of Admission:  10/3/2023  Today's Date: 10/06/2023    Recommendations:  - Check CBC with differential with morning labs (ordered).  - Decrease sodium bicarbonate to 1300 mg PO TID (ordered).  - Give calcium carbonate 500 mg PO BID between meals (ordered).    Assessment & Plan   # DDKT: Increased serum creatinine this admission.    - MATTHEW felt secondary to dehydration.    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    # Immunosuppression: Tacrolimus immediate release (goal 4-6), Mycophenolic acid (dose 360 mg every 12 hours), and Prednisone (dose 5 mg daily)   - Continue with intensive monitoring of immunosuppression for efficacy and toxicity.   - Changes: No.  Myfortic already reduced.    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   - Colonoscopy 10/2022 with mild archietectural distortion, focal crypt degeneration. Negative stain for CMV    - Recently admitted (3/1-3/3/23) for diarrhea and MATTHEW, found + for norovirus   - Stool studies positive for norovirus again.     - 10/4/23 IVIG 0.5 g/kg given.   - 10/5/23 Repeat colonoscopy.  Biopsy results show crypt distortion.  Active area with inflammation with ulceration and granulation tissue; features concerning  but not conclusive for viral cytopathic changes. CMV and adenovirus stain results pending.     10/4/2023 C. Diff Toxin PCR Negative    10/3/2023   EBV DNA PCR Quantitative Whole Blood Not detected    10/3/2023  IgG 810    10/3/2023  Enteric Bacteria and Virus Panel PCR Positive for norovirus.         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    - 10/4/23 IVIG 0.5 g/kg given.  - Concern at this point with worsening diarrhea that he has CMV colitis.   - If coloscopy biopsies CMV + would treat with IV GCV.   - CMV DNA recheck in process.     03/20/23  04/10/23  05/01/23    CMV DNA Quant (External) 622 !  460 !  318 !        # Hypertension: Variable;  Goal BP: < 140/90 (Hospitalization goal)   - Changes: Not at this time.  Continue carvedilol  25 mg PO BID.    # Anemia in Chronic Renal Disease: Hgb: Stable     DANNY: No   - Iron studies: Replete.    # Mineral Bone Disorder:   - Secondary renal hyperparathyroidism; PTH level: Mildly elevated (151-300 pg/ml)        On treatment: None  - Vitamin D; level: Not checked recently, but was normal last check        On supplement: Yes  - Calcium; level: Low           On supplement: No, give calcium carbonate 500 mg PO BID between meals.  - Phosphorus; level: Normal  10/4/23       On supplement: No     # Electrolytes:   - Potassium; level: Low         On supplement: No, received KCL 40 mEq PO once.  - Magnesium; level: Normal        On supplement: No  - Bicarbonate; level: Normal        On supplement: Yes, decrease sodium bicarbonate to 1300 mg PO TID    # Proteinuria:   -1.2g/g in previous check in 2/2023, 3.3g on 24h collection in 8/2023   - A1c 5.8%.    - 10/3/23 SPEP: monoclonal peak normal.   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    Discussed with Dr. Winn.    MARCUS Bynum CNP   Pager: [phone]    Physician Attestation  I saw and evaluated Haider Torrez as part of a shared APRN/PA visit.      I personally reviewed the vital signs, medications, labs, and imaging.     I personally performed the substantive portion of the medical decision making for this visit - please see the [facility]'s documentation for full details.    Key management decisions made by me and carried out under my direction: ongoing diarrhea, path results with colitis with ulceration concerning for viral colitis, awaiting CMV and adenovirus stain, CMV pcr. Continue lower dose /360 and reduce FK to goal ~4.     Janel Winn MD  Date of Service (when I saw the patient): 10/06/23     Interval History   GI reports no clear cause of diarrhea identified on this exam yesterday with coloscopy. Biopsy results show crypt distortion.  Active area with inflammation with ulceration and granulation tissue; features concerning but not conclusive for viral cytopathic changes. CMV and adenovirus stain results pending.    Mr. Torrez's creatinine is 2.97 (10/06 0537); Stable from 3.01 yesterday.   No intake/output data recorded.   Other significant labs/tests/vitals: SBP 130s - 150s overnight. Afebrile  No acute events overnight.  No chest pain or shortness of breath.  No leg swelling.  No nausea and vomiting.  Continues to have diarrhea.      Review of Systems   4 point ROS was obtained and negative except as noted in the Interval History.    MEDICATIONS:   carvedilol  25 mg Oral BID w/meals    cholecalciferol  10 mcg Oral Daily    clonazePAM  0.5 mg Oral At Bedtime    folic acid  1 mg Oral Daily     levothyroxine  100 mcg Oral Daily    mycophenolic acid  360 mg Oral BID    pantoprazole  40 mg Oral Daily    predniSONE  5 mg Oral Daily    rOPINIRole  1 mg Oral At Bedtime    sodium bicarbonate  1,950 mg Oral TID    sulfamethoxazole-trimethoprim  1 tablet Oral Once per day on     tacrolimus  2.5 mg Oral BID IS         Physical Exam   Temp  Av.7  F (36.5  C)  Min: 97.3  F (36.3  C)  Max: 98.1  F (36.7  C)      Pulse  Av.9  Min: 53  Max: 66 Resp  Av.6  Min: 16  Max: 19  SpO2  Av.6 %  Min: 98 %  Max: 100 %     /72 (BP Location: Left arm)   Pulse 60   Temp 97.7  F (36.5  C) (Oral)   Resp 18   Wt 56.6 kg (124 lb 11.2 oz)   SpO2 97%   BMI 20.75 kg/m      Admit Weight: 57.1 kg (125 lb 14.1 oz)     GENERAL APPEARANCE: alert and no distress  RESP: lungs clear to auscultation - no rales, rhonchi or wheezes  CV: regular rhythm, normal rate, no rub, no murmur  EDEMA: no LE edema bilaterally  ABDOMEN: soft, nondistended, nontender, bowel sounds normal  MS: extremities normal - no gross deformities noted, no evidence of inflammation in joints, no muscle tenderness  SKIN: no rash  PSYCH: mentation appears normal and affect normal  DIALYSIS ACCESS:  RUE AV fistula +bruit/+thrill    Data   All labs reviewed by me.  CMP  Recent Labs   Lab 10/06/23  0537 10/05/23  0542 10/04/23  1844 10/04/23  0504 10/03/23  2021 10/03/23  1240   * 145 145 145   < > 144   POTASSIUM 3.3* 3.1* 3.4 3.4   < > 4.9   CHLORIDE 110* 108* 109* 118*   < > 121*   CO2 28 26 22 16*   < > 12*   ANIONGAP 8 11 14 11   < > 11   * 109* 142* 114*   < > 145*   BUN 40.7* 42.0* 50.3* 60.8*   < > 65.5*   CR 2.97* 3.01* 3.03* 3.30*   < > 3.75*   GFRESTIMATED 24* 23* 23* 21*   < > 18*   CRISTIAN 7.5* 7.3* 8.2* 7.3*   < > 8.5*   MAG 1.9 2.1 1.8 1.1*  --   --    PHOS  --   --   --  2.9  --   --    PROTTOTAL  --   --   --   --   --  6.1*   ALBUMIN  --   --   --   --   --  3.9   BILITOTAL  --   --   --   --   --  0.3   ALKPHOS   --   --   --   --   --  62   AST  --   --   --   --   --  12   ALT  --   --   --   --   --  7    < > = values in this interval not displayed.     CBC  Recent Labs   Lab 10/05/23  0542 10/04/23  0504 10/03/23  1240   HGB 9.3* 8.7* 10.8*   WBC 1.8* 2.3* 3.2*   RBC 3.18* 2.91* 3.66*   HCT 29.1* 26.0* 35.0*   MCV 92 89 96   MCH 29.2 29.9 29.5   MCHC 32.0 33.5 30.9*   RDW 14.1 14.3 14.5   * 112* 122*     INR  Recent Labs   Lab 10/04/23  1203   INR 1.30*     ABGNo lab results found in last 7 days.   Urine Studies  Recent Labs   Lab Test 10/03/23  2244 11/17/15  1410 10/14/15  2220 10/13/15  0725   COLOR Light Yellow Light Yellow Yellow Straw   APPEARANCE Clear Clear Clear Clear   URINEGLC Negative Negative Negative Negative   URINEBILI Negative Negative Negative Negative   URINEKETONE Negative Negative Negative Negative   SG 1.014 1.007 1.006 1.003   UBLD Negative Negative Large* Large*   URINEPH 6.0 6.5 5.5 5.0   PROTEIN 200* Negative 10* Negative   NITRITE Negative Negative Negative Negative   LEUKEST Negative Negative Small* Negative   RBCU 1 0 >182* 57*   WBCU 1 1 15* 1     Recent Labs   Lab Test 03/27/18  1516 09/26/17  1506   UTPG 0.12 0.10     PTH  Recent Labs   Lab Test 03/27/18  1510 10/15/15  0626   PTHI 126* 561*     Iron Studies  Recent Labs   Lab Test 03/27/18  1510 10/17/15  0700   IRON 67 25*    216*   IRONSAT 24 12*   * 883*       IMAGING:  All imaging studies reviewed by me.

## 2023-10-06 NOTE — PLAN OF CARE
Goal Outcome Evaluation:       /72 (BP Location: Left arm)   Pulse 60   Temp 97.7  F (36.5  C) (Oral)   Resp 18   Wt 56.6 kg (124 lb 11.2 oz)   SpO2 97%   BMI 20.75 kg/m      Shift: 3720-9527    VS: hypertensive 140s systolic  Neuro: Aox4  Cardio: Hypertensive  Respiratory: WDL on RA  GI: Diarrhea, no BM this shift  : Voiding adequately  Skin: WDL   Diet: Regular  Labs: none overnight  BG: none  LDA: L PIV x2, R AV fistula  Infusions: none  Mobility: Independent   Pain/Nausea: denies  PRN medications: none given

## 2023-10-06 NOTE — PLAN OF CARE
Goal Outcome Evaluation:      Plan of Care Reviewed With: patient    Overall Patient Progress: no changeOverall Patient Progress: no change    Outcome Evaluation: Px alert and oriented x4, able to make needs known. No c/o pain nor nausea. Independent in room. Son stayed overnight.    Waiting for lab results, no clear plan of discharge at the moment. Continue with POC.

## 2023-10-06 NOTE — CONSULTS
[facility] INFECTIOUS DISEASES CONSULTATION     Patient:  Haider Torrez   YOB: 1965  Date of Visit:  10/06/2023  Date of Admission: 10/3/2023  Consult Requester:Phoenix Alcazar MD          Assessment and Recommendations:   Recommendations:  Follow up final pathology from colonoscopy for CMV staining.   Per primary team, pathology called to report +CMV stain though formal result not yet out. In that case, recommend start IV ganciclovir induction at 5 mg/kg/dose q12 hrs  He needs renal dose adjustment which would be 1.25 mg/kg/dose every 24 hours, will defer to pharmacy  Close monitoring of CBC and creatinine  CMV VL from blood (ordered for you), pending  No effective therapy for treatment of norovirus and prolonged shedding is common in transplant patients. Cornerstone of therapy is reducing immunosuppression and supportive care.   Appreciate reduction of his IS.    Assessment:  Haider Torrez is a 58 year old male with PMHx significant for IgA nephropathy s/p kidney transplant (2004, 2015) on tacrolimus + mycophenolic acid + prednisone 5 mg, history of BK viremia, CMV viremia, hypertension, and secondary hyperparathyroid who presented on 10/3/23 with persistent diarrhea x3-4 weeks with concern for possible CMV colitis.    Acute on chronic diarrhea  Chronic Norovirus  Previous +norovirus in 03/2023 that improved with supportive care. Ongoing diarrhea prompting admission with negative C difficile and enteric panel here. Does have leukopenia, no abdominal pain or other symptoms. Diarrhea may be related to chronic norovirus in immunocompromised host vs CMV colitis. S/p colonoscopy 10/5 with pathology pending. There is no effective therapy for treatment of norovirus and prolonged shedding is common in transplant patients. Cornerstone of therapy is reducing immunosuppression (mycophenolate reduced here) and supportive care. Agree with workup to date, symptoms more likely related to CMV  colitis.    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 No on any prophylaxis at present, treated with reduction of IS. Recommend repeat VL today. Awaiting colonoscopy pathology stains to evaluate for CMV colitis, per preliminary pathology and verbal report by pathology, it is positive for CMV colitis. Recommend start IV ganciclovir induction, with renal dose adjustment to 1.25 mg/kg/dose every 24 hours.     Previous ID Issues:  #COVID (+09/20/22 s/p 5 days remdesivir) c/b post-COVID bacterial pneumonia ([facility], 10/2022) - treated with vanc x1, 3 days of azithromycin, and 5 days cefepime.     Other ID issues:  - QTc interval:  443msec on 10/3/23  - Bacterial prophylaxis:    - Pneumocystis prophylaxis:  Bactrim  - Viral serostatus & prophylaxis:  CMV D-/R+, EBV D+/R+  - Fungal prophylaxis:    - Immunization status:  Due for annual COVID, flu, and RSV.   - Gamma globulin status:  810 on 10/3/23  - Isolation status: enteric (+norovirus)    Thank you for the consult. Transplant ID will continue to follow with you.     70 MINUTES SPENT BY ME on the date of service doing chart review, history, exam, documentation & further activities per the note.      Irma Redd PA-C  Infectious Diseases  Pager #[phone]          History of Present Illness:   Transplants:  10/13/2015 (Kidney), 4/15/2004 (Kidney), Postoperative day:  2915     Haider Torrez is a 58 year old male with PMHx significant for IgA nephropathy s/p kidney transplant (2004, 2015) on tacrolimus + mycophenolic acid + prednisone 5 mg, history of BK viremia, CMV viremia, hypertension, and secondary hyperparathyroid who presented on 10/3/23 with persistent diarrhea x3 weeks. ID consulted for CMV colitis.    Admission in March 2023 for diarrhea, found +norovirus and symptoms mildly improved with supportive care, no other treatment given. May have had imodium on 3/7 and this helped.  Colonoscopy 10/2022 without evidence of CMV, though notable for focal crypt degeneration and mild architectural distortion c/f possible medication related colitis. Remains with +norovirus on enteric panel 9/21/23.      He reports diarrhea for the last 3-4 weeks - water, undigested food that is postprandial 4-10x/day. Denies black or bloody stools. No incontinence, no nocturnal symptoms. Seen at outside ED 10/2 - notable for MATTHEW, given IV fluids and discharged. Saw transplant nephrology for virtual visit on 10/2 who recommended admission and he presented to [facility] ED 10/3. He also reported dizziness. Denies fever, chills, night sweats, chest pain, cough, dyspnea, nausea, vomiting, abdominal pain, graft pain, urinary symptoms, vision changes, headache, or skin rash. Endorses food avoidance due to fear of diarrhea and 10 lb weight loss in the last month. Vitals stable. Labs notable for WBC 3.2 --> 1.8, MATTHEW with creatinine 3.75 --> 2.97. C difficile negative, enteric panel +10/4. UA noninfectious appearing. EBV not detected. Hepatic panel unremarkable. Immunosuppression was just decreased MPA to 360 mg BID, remains on prednisone 5 mg, tacrolimus (goal 4-6). Was given IVIG once here.     He had inpatient colonoscopy completed 10/5/23 - notable for diverticulosis, granula mucosa in rectum up to 3 cm proximal, non-bleeding internal hemorrhoids (with a mucosal nodule on top of internal hemorrhoid - scarring vs tumor, s/p biopsy), and otherwise normal appearing mucosa throughout remaining colon. Pathology as below:  Final Diagnosis   A. COLON, RANDOM BIOPSY:  Colonic mucosa with mild crypt architectural distortion, evidence of prior injury; no current evidence of cryptitis, dysplasia, or other histologic abnormality; report of CMV & adenovirus immunohistochemistry to follow      B. DISTAL RECTUM, BIOPSY:  Colonic mucosa with mild reactive changes, otherwise no evidence of microscopic or chronic colitis or other significant  histologic abnormality; report of CMV & adenovirus immunohistochemistry to follow       3. ANORECTUM, BIOPSY OF NODULE AT TRANSITION:  Colonic mucosa with polypoid features, active inflammation with ulceration and granulation tissue; features concerning but not conclusive for viral cytopathic changes; report of adenovirus & CMV immunohistochemistry to follow      Has travelled to [facility] once to visit family. Also been to [place]. He has no unusual food exposures, no animal exposures, no recent sick contacts or antibiotic use. Lives with wife and 1 son.          Review of Systems:     CONSTITUTIONAL:  No fevers, chills, or sweats.  EYES: negative for icterus, conjunctival injection or drainage.  ENT:  negative for nasal congestion, rhinorrhea, or sore throat.   RESPIRATORY:  negative for cough, sputum production, or SOB.  CARDIOVASCULAR:  negative for chest pain or dyspnea.   GASTROINTESTINAL:  negative for nausea, vomiting, diarrhea and constipation.  GENITOURINARY:  negative for dysuria, frequency or urgency.   HEME:  No easy bruising.  INTEGUMENT:  negative for rash and pruritus.  NEURO:  Negative for headache, vision changes, or weakness.         Past Medical History:     Past Medical History:   Diagnosis Date    Anemia of chronic kidney failure     Carpal tunnel syndrome     Clinical diagnosis of COVID-19 9/22/2022    Dyslipidemia     History of respiratory failure     Hypertension     Hypomagnesemia 2015    IgA nephropathy     Insomnia     Kidney disease, chronic, end stage on dialysis (H)     Kidney transplant failure     Myoclonus     Obstructive sleep apnea     Papillary thyroid carcinoma (H)     PRES (posterior reversible encephalopathy syndrome)     2011 after thyroidectomy    Restless leg syndrome     Secondary hyperparathyroidism (H24)     Seizure disorder (H)     Post surgery. Keppra now discontinued    Urinoma of kidney transplant             Past Surgical History:     Past Surgical History:   Procedure  Laterality Date    BENCH KIDNEY  10/13/2015    Procedure: BENCH KIDNEY;  Surgeon: Seth Gibbons MD;  Location: [facility] OR    COLONOSCOPY N/A 10/5/2023    Procedure: COLONOSCOPY, WITH POLYPECTOMY AND BIOPSY;  Surgeon: Russ Dodge MD;  Location: [facility] GI    CREATE FISTULA ARTERIOVENOUS UPPER EXTREMITY      CYSTOSCOPY, REMOVE STENT(S), COMBINED N/A 2015    Procedure: COMBINED CYSTOSCOPY, REMOVE STENT(S);  Surgeon: Seth Gibbons MD;  Location: [facility] OR    CYSTOSCOPY, REMOVE STENT(S), COMBINED Bilateral 2015    Procedure: COMBINED CYSTOSCOPY, REMOVE STENT(S);  Surgeon: Seth Gibbons MD;  Location: [facility] OR    ELBOW SURGERY Right     hemithyroidectomy Left     HERNIA REPAIR Right 3/2009    THYROIDECTOMY      TRANSPLANT KIDNEY RECIPIENT  DONOR      TRANSPLANT KIDNEY RECIPIENT  DONOR N/A 10/13/2015    Procedure: TRANSPLANT KIDNEY RECIPIENT  DONOR;  Surgeon: Seth Gibbons MD;  Location: [facility] OR    ureteropyelostomy  2004            Family History:     Family History   Problem Relation Age of Onset    Diabetes Mother     Other Cancer Father         lung            Social History:     Social History     Tobacco Use    Smoking status: Never    Smokeless tobacco: Never   Substance Use Topics    Alcohol use: No     History   Sexual Activity    Sexual activity: Not on file            Current Medications:      carvedilol  25 mg Oral BID w/meals    cholecalciferol  10 mcg Oral Daily    clonazePAM  0.5 mg Oral At Bedtime    folic acid  1 mg Oral Daily    levothyroxine  100 mcg Oral Daily    mycophenolic acid  360 mg Oral BID    pantoprazole  40 mg Oral Daily    predniSONE  5 mg Oral Daily    rOPINIRole  1 mg Oral At Bedtime    sodium bicarbonate  1,950 mg Oral TID    sulfamethoxazole-trimethoprim  1 tablet Oral Once per day on     tacrolimus  2.5 mg Oral BID IS            Allergies:     Allergies   Allergen Reactions    Hydralazine Swelling     Betadine [Povidone Iodine]     Cephalexin Hives    Clindamycin Hives    Trazodone Swelling     Swelling of face/lips            Physical Exam:   Vitals were reviewed  Temp:  [97.4  F (36.3  C)-98.5  F (36.9  C)] 97.7  F (36.5  C)  Pulse:  [51-66] 60  Resp:  [0-20] 18  BP: (130-159)/(70-87) 131/72  SpO2:  [97 %-100 %] 97 %    Vitals:    10/03/23 1934 10/04/23 0539   Weight: 57.1 kg (125 lb 14.1 oz) 56.6 kg (124 lb 11.2 oz)       Constitutional: Pleasant adult male seen sitting up in bed, in NAD. Alert and interactive.   HEENT: NCAT, anicteric sclerae, conjunctiva clear. Moist mucous membranes without lesions or thrush. Dentition intact/well cared for. No cervical LAD.  Respiratory: Non-labored breathing, good air exchange. Lungs are clear to auscultation bilaterally, without wheezing, crackles or rhonchi. No cough noted.   Cardiovascular: Regular rate and rhythm with no murmur, rub or gallop.  GI: Normoactive BS. Abdomen is soft, non-distended, and non-tender to palpation. No rigidity or guarding. No graft tenderness.  Skin: Warm and dry. No rashes or lesions on exposed surfaces.  Musculoskeletal: Extremities grossly normal. No tenderness or edema present.   Neurologic: A &O x3, speech normal, answering questions appropriately. Moves all extremities spontaneously. Grossly non-focal.  Neuropsychiatric: Mentation and affect normal/appropriate.  VAD: PIV is c/d/i with no erythema, drainage, or tenderness.           Laboratory Data:     Microbiology:  No results found for: CULTURE    Culture Micro   Date Value Ref Range Status   11/17/2015 No growth  Final   10/14/2015 No growth  Final   10/13/2015   Final    Test canceled - Lab  error  Canceled, Test credited     10/13/2015 No growth  Final       Inflammatory Markers    No lab results found.    Metabolic Studies       Recent Labs   Lab Test 10/06/23  0537 10/05/23  0542 10/04/23  1844 10/04/23  0504 10/04/23  0504 10/03/23  2021 10/03/23  1240 05/08/18  0700  03/27/18  1510 10/14/15  1325 10/14/15  1021 10/14/15  0000 10/13/15  2231 10/13/15  2113 10/13/15  1215 10/12/15  1330   * 145 145  --  145 144 144  --  143   < >  --    < >  --   --    < > 136   POTASSIUM 3.3* 3.1* 3.4  --  3.4 4.1 4.9  --  4.2   < > 4.8   < > 5.4* 6.8*   < > 4.2   CHLORIDE 110* 108* 109*  --  118* 119* 121*   < > 112*   < >  --    < >  --   --    < > 97   CO2 28 26 22  --  16* 14* 12*  --  24   < >  --    < >  --   --    < > 31   ANIONGAP 8 11 14  --  11 11 11  --  6   < >  --    < >  --   --    < > 8   BUN 40.7* 42.0* 50.3*  --  60.8* 61.9* 65.5*  --  21   < >  --    < >  --   --    < > 41*   CR 2.97* 3.01* 3.03*  --  3.30* 3.39* 3.75*  --  0.94   < >  --    < >  --   --    < > 9.83*   GFRESTIMATED 24* 23* 23*  --  21* 20* 18*  --  84   < >  --    < >  --   --    < > 6*   * 109* 142*  --  114* 143* 145*  --  152*   < >  --    < >  --   --    < > 95   A1C  --   --   --   --   --   --   --   --   --   --   --   --   --   --   --  5.1   CRISTIAN 7.5* 7.3* 8.2*  --  7.3* 7.9* 8.5*  --  8.4*   < >  --    < >  --   --    < > 8.7   PHOS  --   --   --   --  2.9  --   --   --  2.3*   < >  --    < >  --   --    < >  --    MAG 1.9 2.1 1.8   < > 1.1*  --   --   --   --    < >  --    < >  --   --    < >  --    LACT  --   --   --   --   --   --   --   --   --   --  2.1  --   --  4.2*   < >  --    CKT  --   --   --   --   --   --   --   --   --   --   --   --  336*  --   --   --     < > = values in this interval not displayed.       Hepatic Studies    Recent Labs   Lab Test 10/03/23  1240 03/27/18  1510 10/12/15  1330   BILITOTAL 0.3  --  0.4   ALKPHOS 62  --  81   ALBUMIN 3.9 3.8 4.2   AST 12  --  10   ALT 7  --  16       Pancreatitis testing    Recent Labs   Lab Test 07/15/21  0722 05/08/18  0700 05/01/17  0707 06/24/16  0755 12/14/15  0715   TRIG 168* 253* 364* 300* 261*       Hematology Studies      Recent Labs   Lab Test 10/05/23  0542 10/04/23  0504 10/03/23  1240 09/26/17  1507 11/17/15  1139  10/30/15  0718 10/26/15  0707 10/25/15  0750 10/23/15  0652 10/21/15  0648 10/20/15  0715 10/19/15  0704   WBC 1.8* 2.3* 3.2* 5.7  --  4.7 7.3 7.6 5.4 3.5* 4.1 5.1   ANEU  --   --   --   --   --  3.3  --  6.4 3.9 3.0 3.7 4.7   ALYM  --   --   --   --   --  0.3*  --  0.1* 0.1* 0.1* 0.0* 0.3*   MELVIN  --   --   --   --   --  0.3  --  0.2 0.2 0.2 0.1 0.0   AEOS  --   --   --   --   --  0.3  --  0.1 0.0 0.0 0.0 0.0   HGB 9.3* 8.7* 10.8* 14.1 12.8* 11.9* 11.9* 11.8* 10.8* 10.5* 10.3* 10.3*   HCT 29.1* 26.0* 35.0* 43.4  --  38.4* 36.9* 35.8* 34.2* 32.6* 32.7* 32.4*   * 112* 122* 147*  --  282 267 269 232 163 136* 112*       Arterial Blood Gas Testing    Recent Labs   Lab Test 10/13/15  2113   PH 7.33*   PCO2 40   PO2 89   HCO3 21   O2PER 1L        Urine Studies     Recent Labs   Lab Test 10/03/23  2244 11/17/15  1410 10/14/15  2220 10/13/15  0725   URINEPH 6.0 6.5 5.5 5.0   NITRITE Negative Negative Negative Negative   LEUKEST Negative Negative Small* Negative   WBCU 1 1 15* 1       Vancomycin Levels     No lab results found.    Invalid input(s): VANCO  No lab results found.    Invalid input(s): VANCO        Hepatitis B Testing   Recent Labs   Lab Test 10/12/15  1330   HEPBANG Nonreactive   HBCM Nonreactive   A nonreactive result suggests lack of recent exposure to the virus in the   preceding 6 months.     HBEAGN Negative  Reference range: Negative  (Note)  Performed by [facility],  [address] [place] [address] [phone]  [email or web address], Timo Salcido MD, Lab. Director         Hepatitis C Testing     Hepatitis C Antibody   Date Value Ref Range Status   10/12/2015  NR Final    Nonreactive   Assay performance characteristics have not been established for newborns,   infants, and children     06/18/2015  NR Final    Nonreactive   Assay performance characteristics have not been established for newborns,   infants, and children         Respiratory Virus Testing    No results found for: RS, FLUAG    Last  check of C difficile  C Difficile Toxin B by PCR   Date Value Ref Range Status   10/04/2023 Negative Negative Final     Comment:     A negative result does not exclude actual disease due to C. difficile and may be due to improper collection, handling and storage of the specimen or the number of organisms in the specimen is below the detection limit of the assay.              Imaging:     Results for orders placed or performed in visit on 12/28/21   DEXA - HIM Scan    [facility] PROGRESS NOTE  FOUND IN CARE EVERYWHERE

## 2023-10-07 LAB
ANION GAP SERPL CALCULATED.3IONS-SCNC: 4 MMOL/L (ref 7–15)
BASO+EOS+MONOS # BLD AUTO: ABNORMAL 10*3/UL
BASO+EOS+MONOS NFR BLD AUTO: ABNORMAL %
BASOPHILS # BLD AUTO: 0 10E3/UL (ref 0–0.2)
BASOPHILS NFR BLD AUTO: 0 %
BUN SERPL-MCNC: 40.8 MG/DL (ref 6–20)
CALCIUM SERPL-MCNC: 7.8 MG/DL (ref 8.6–10)
CHLORIDE SERPL-SCNC: 112 MMOL/L (ref 98–107)
CREAT SERPL-MCNC: 2.99 MG/DL (ref 0.67–1.17)
DEPRECATED HCO3 PLAS-SCNC: 25 MMOL/L (ref 22–29)
EGFRCR SERPLBLD CKD-EPI 2021: 24 ML/MIN/1.73M2
EOSINOPHIL # BLD AUTO: 0 10E3/UL (ref 0–0.7)
EOSINOPHIL NFR BLD AUTO: 1 %
ERYTHROCYTE [DISTWIDTH] IN BLOOD BY AUTOMATED COUNT: 13.7 % (ref 10–15)
GLUCOSE SERPL-MCNC: 108 MG/DL (ref 70–99)
HCT VFR BLD AUTO: 27.3 % (ref 40–53)
HGB BLD-MCNC: 8.4 G/DL (ref 13.3–17.7)
IMM GRANULOCYTES # BLD: 0 10E3/UL
IMM GRANULOCYTES NFR BLD: 1 %
LYMPHOCYTES # BLD AUTO: 0.4 10E3/UL (ref 0.8–5.3)
LYMPHOCYTES NFR BLD AUTO: 16 %
MCH RBC QN AUTO: 29.1 PG (ref 26.5–33)
MCHC RBC AUTO-ENTMCNC: 30.8 G/DL (ref 31.5–36.5)
MCV RBC AUTO: 95 FL (ref 78–100)
MONOCYTES # BLD AUTO: 0.4 10E3/UL (ref 0–1.3)
MONOCYTES NFR BLD AUTO: 16 %
NEUTROPHILS # BLD AUTO: 1.5 10E3/UL (ref 1.6–8.3)
NEUTROPHILS NFR BLD AUTO: 66 %
NRBC # BLD AUTO: 0 10E3/UL
NRBC BLD AUTO-RTO: 1 /100
PLATELET # BLD AUTO: 63 10E3/UL (ref 150–450)
POTASSIUM SERPL-SCNC: 3.9 MMOL/L (ref 3.4–5.3)
RBC # BLD AUTO: 2.89 10E6/UL (ref 4.4–5.9)
SODIUM SERPL-SCNC: 141 MMOL/L (ref 135–145)
WBC # BLD AUTO: 2.3 10E3/UL (ref 4–11)

## 2023-10-07 PROCEDURE — 36415 COLL VENOUS BLD VENIPUNCTURE: CPT

## 2023-10-07 PROCEDURE — 120N000011 HC R&B TRANSPLANT UMMC

## 2023-10-07 PROCEDURE — 80048 BASIC METABOLIC PNL TOTAL CA: CPT

## 2023-10-07 PROCEDURE — 85004 AUTOMATED DIFF WBC COUNT: CPT

## 2023-10-07 PROCEDURE — 250N000011 HC RX IP 250 OP 636: Mod: JZ

## 2023-10-07 PROCEDURE — 99233 SBSQ HOSP IP/OBS HIGH 50: CPT | Performed by: INTERNAL MEDICINE

## 2023-10-07 PROCEDURE — 250N000013 HC RX MED GY IP 250 OP 250 PS 637

## 2023-10-07 PROCEDURE — 258N000003 HC RX IP 258 OP 636

## 2023-10-07 PROCEDURE — 250N000012 HC RX MED GY IP 250 OP 636 PS 637

## 2023-10-07 RX ADMIN — CLONAZEPAM 0.5 MG: 0.5 TABLET ORAL at 22:26

## 2023-10-07 RX ADMIN — GANCICLOVIR SODIUM 70 MG: 500 INJECTION, POWDER, LYOPHILIZED, FOR SOLUTION INTRAVENOUS at 18:29

## 2023-10-07 RX ADMIN — CALCIUM CARBONATE (ANTACID) CHEW TAB 500 MG 500 MG: 500 CHEW TAB at 15:58

## 2023-10-07 RX ADMIN — TACROLIMUS 2.5 MG: 1 CAPSULE ORAL at 08:05

## 2023-10-07 RX ADMIN — PREDNISONE 5 MG: 5 TABLET ORAL at 08:05

## 2023-10-07 RX ADMIN — SODIUM BICARBONATE 650 MG TABLET 1300 MG: at 20:30

## 2023-10-07 RX ADMIN — SODIUM BICARBONATE 650 MG TABLET 1300 MG: at 08:05

## 2023-10-07 RX ADMIN — SODIUM BICARBONATE 650 MG TABLET 1300 MG: at 15:58

## 2023-10-07 RX ADMIN — CALCIUM CARBONATE (ANTACID) CHEW TAB 500 MG 500 MG: 500 CHEW TAB at 09:54

## 2023-10-07 RX ADMIN — MYCOPHENILIC ACID 360 MG: 360 TABLET, DELAYED RELEASE ORAL at 08:05

## 2023-10-07 RX ADMIN — LEVOTHYROXINE SODIUM 100 MCG: 100 TABLET ORAL at 08:04

## 2023-10-07 RX ADMIN — FOLIC ACID 1 MG: 1 TABLET ORAL at 08:05

## 2023-10-07 RX ADMIN — CARVEDILOL 25 MG: 25 TABLET, FILM COATED ORAL at 18:16

## 2023-10-07 RX ADMIN — TACROLIMUS 2.5 MG: 1 CAPSULE ORAL at 18:16

## 2023-10-07 RX ADMIN — CHOLECALCIFEROL (VITAMIN D3) 10 MCG (400 UNIT) TABLET 10 MCG: at 08:05

## 2023-10-07 RX ADMIN — MYCOPHENILIC ACID 360 MG: 360 TABLET, DELAYED RELEASE ORAL at 20:30

## 2023-10-07 RX ADMIN — CARVEDILOL 25 MG: 25 TABLET, FILM COATED ORAL at 08:05

## 2023-10-07 RX ADMIN — ROPINIROLE HYDROCHLORIDE 1 MG: 1 TABLET, FILM COATED ORAL at 22:26

## 2023-10-07 RX ADMIN — PANTOPRAZOLE SODIUM 40 MG: 40 TABLET, DELAYED RELEASE ORAL at 08:04

## 2023-10-07 ASSESSMENT — ACTIVITIES OF DAILY LIVING (ADL)
ADLS_ACUITY_SCORE: 23

## 2023-10-07 NOTE — PLAN OF CARE
BP (!) 140/77 (BP Location: Left arm)   Pulse 64   Temp 98.1  F (36.7  C) (Oral)   Resp 16   Wt 59.3 kg (130 lb 12.8 oz)   SpO2 96%   BMI 21.77 kg/m      Assumed cares [id number]  Neuro: A/Ox4  Pain/Nausea: denies pain and nausea  Cardiac: rate and rhythm regular  Resp: lung sounds clear, equal bilaterally  GI/: voiding without issue; pt reports diarrhea  Diet/Appetite: Regular diet; adequate PO intake  Access: [facility]; R[facility]  Activity: SBA, pt ambulated around unit  Plan:   Will continue with plan of care and notify team of any changes.?  BOUBACAR MIRZA RN on 10/7/2023 at 4:08 PM

## 2023-10-07 NOTE — PROGRESS NOTES
[facility]   Transplant Nephrology Progress Note  Date of Admission:  10/3/2023  Today's Date: 10/07/2023    Recommendations:  - ID consult  - low grade CMV viremia, colonic ulcer and inflammation with +CMV stain suggestive of CMV disease (colitis) : Start iv ganciclovir renally dosed  - weekly CMV pcr  - pancytopenia noted likely multifactorial: MPA, CMV  - monitor CBC/diff with ANC  - Give calcium carbonate 500 mg PO BID between meals (ordered).  - Daily weights    Assessment & Plan   # DDKT: Increased serum creatinine this admission   - MATTHEW felt secondary to dehydration.    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    # Immunosuppression: Tacrolimus immediate release (goal 4-6), Mycophenolic acid (dose 360 mg every 12 hours), and Prednisone (dose 5 mg daily)   - Continue with intensive monitoring of immunosuppression for efficacy and toxicity.   - Changes: No.  Myfortic already reduced.    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  - Colonoscopy 10/2022 with mild archietectural distortion, focal crypt degeneration. 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     - 10/4/23 IVIG  0.5 g/kg given.   - 10/5/23 Repeat colonoscopy.  Biopsy results show crypt distortion.  Active area with inflammation with ulceration and granulation tissue; features concerning but not conclusive for viral cytopathic changes. CMV and adenovirus stain results pending.     10/4/2023 C. Diff Toxin PCR Negative    10/3/2023   EBV DNA PCR Quantitative Whole Blood Not detected    10/3/2023  IgG 810    10/3/2023  Enteric Bacteria and Virus Panel PCR Positive for norovirus.         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    - 10/4/23 IVIG 0.5 g/kg given.  - Concern at this point with worsening diarrhea that he has CMV colitis.   -  coloscopy biopsies suggestive of CMV + would treat with IV GCV.    -CMV weekly     03/20/23  04/10/23  05/01/23    CMV DNA Quant (External) 622 !  460 !  318 !      Component      Latest Ref Rng 10/6/2023  10:16 AM   CMV DNA IU/mL, Instrument      <1 IU/mL 252 (H)         # Hypertension: Variable;  Goal BP: < 140/90 (Hospitalization goal)   - Changes: Not at this time.  Continue carvedilol  25 mg PO BID.    # Anemia in Chronic Renal Disease: Hgb: Stable     DANNY: No   - Iron studies: Replete.    # Mineral Bone Disorder:   - Secondary renal hyperparathyroidism; PTH level: Mildly elevated (151-300 pg/ml)        On treatment: None  - Vitamin D; level: Not checked recently, but was normal last check        On supplement: Yes  - Calcium; level: Low           On supplement: No, give calcium carbonate 500 mg PO BID between meals.  - Phosphorus; level: Normal  10/4/23       On supplement: No     # Electrolytes:   - Potassium; level: Low normal         On supplement: No,   - Magnesium; level: Normal        On supplement: No  - Bicarbonate; level: Normal        On supplement: Yes,  sodium bicarbonate 1300 mg PO TID    # Proteinuria:   -1.2g/g in previous check in 2/2023, 3.3g on 24h collection in 8/2023   - A1c 5.8%.    - 10/3/23 SPEP:  monoclonal peak normal.   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      Janel Winn MD   Pager: [phone]      Interval History     Mr. Torrez's creatinine is 2.99 stable   I/O last 3 completed shifts:  In: 340 [P.O.:240; I.V.:100]  Out: -    Other significant labs/tests/vitals: SBP 140s - 150s overnight. Afebrile  No acute events overnight.  No chest pain or shortness of breath.  No leg swelling.  No nausea and vomiting.  Diarrhea. Slowed but reports continues to be watery      Review of Systems   4 point ROS was obtained and negative except as noted in the Interval History.    MEDICATIONS:   calcium carbonate  500 mg Oral BID    carvedilol  25 mg Oral BID w/meals    cholecalciferol  10 mcg Oral Daily    clonazePAM  0.5 mg Oral At Bedtime    folic acid  1 mg Oral Daily    ganciclovir (CYTOVENE) 70 mg in D5W 100 mL intermittent infusion  1.25 mg/kg Intravenous Q24H    levothyroxine  100 mcg Oral Daily    mycophenolic acid  360 mg Oral BID    pantoprazole  40 mg Oral Daily    predniSONE  5 mg Oral Daily    rOPINIRole  1 mg Oral At Bedtime    sodium bicarbonate  1,300 mg Oral TID    sulfamethoxazole-trimethoprim  1 tablet Oral Once per day on     tacrolimus  2.5 mg Oral BID IS         Physical Exam   Temp  Av.7  F (36.5  C)  Min: 97.3  F (36.3  C)  Max: 98.1  F (36.7  C)      Pulse  Av.9  Min: 53  Max: 66 Resp  Av.6  Min: 16  Max: 19  SpO2  Av.6 %  Min: 98 %  Max: 100 %     BP (!) 140/77 (BP Location: Left arm)   Pulse 64   Temp  98.1  F (36.7  C) (Oral)   Resp 16   Wt 56.6 kg (124 lb 11.2 oz)   SpO2 96%   BMI 20.75 kg/m      Admit Weight: 57.1 kg (125 lb 14.1 oz)     GENERAL APPEARANCE: alert and no distress  RESP: lungs clear to auscultation - no rales, rhonchi or wheezes  CV: regular rhythm, normal rate, no rub, no murmur  EDEMA: no LE edema bilaterally  ABDOMEN: soft, nondistended, nontender, bowel sounds hyperactive  MS: extremities normal - no gross deformities noted, no evidence of inflammation in joints, no muscle tenderness  SKIN: no rash  PSYCH: mentation appears normal and affect normal  DIALYSIS ACCESS:  RUE AV fistula +bruit/+thrill    Data   All labs reviewed by me.  CMP  Recent Labs   Lab 10/07/23  0542 10/06/23  0537 10/05/23  0542 10/04/23  1844 10/04/23  0504 10/03/23  2021 10/03/23  1240    146* 145 145 145   < > 144   POTASSIUM 3.9 3.3* 3.1* 3.4 3.4   < > 4.9   CHLORIDE 112* 110* 108* 109* 118*   < > 121*   CO2 25 28 26 22 16*   < > 12*   ANIONGAP 4* 8 11 14 11   < > 11   * 108* 109* 142* 114*   < > 145*   BUN 40.8* 40.7* 42.0* 50.3* 60.8*   < > 65.5*   CR 2.99* 2.97* 3.01* 3.03* 3.30*   < > 3.75*   GFRESTIMATED 24* 24* 23* 23* 21*   < > 18*   CRISTIAN 7.8* 7.5* 7.3* 8.2* 7.3*   < > 8.5*   MAG  --  1.9 2.1 1.8 1.1*  --   --    PHOS  --   --   --   --  2.9  --   --    PROTTOTAL  --   --   --   --   --   --  6.1*   ALBUMIN  --   --   --   --   --   --  3.9   BILITOTAL  --   --   --   --   --   --  0.3   ALKPHOS  --   --   --   --   --   --  62   AST  --   --   --   --   --   --  12   ALT  --   --   --   --   --   --  7    < > = values in this interval not displayed.     CBC  Recent Labs   Lab 10/07/23  0542 10/05/23  0542 10/04/23  0504 10/03/23  1240   HGB 8.4* 9.3* 8.7* 10.8*   WBC 2.3* 1.8* 2.3* 3.2*   RBC 2.89* 3.18* 2.91* 3.66*   HCT 27.3* 29.1* 26.0* 35.0*   MCV 95 92 89 96   MCH 29.1 29.2 29.9 29.5   MCHC 30.8* 32.0 33.5 30.9*   RDW 13.7 14.1 14.3 14.5   PLT 63* 100* 112* 122*     INR  Recent Labs   Lab  10/04/23  1203   INR 1.30*     ABGNo lab results found in last 7 days.   Urine Studies  Recent Labs   Lab Test 10/03/23  2244 11/17/15  1410 10/14/15  2220 10/13/15  0725   COLOR Light Yellow Light Yellow Yellow Straw   APPEARANCE Clear Clear Clear Clear   URINEGLC Negative Negative Negative Negative   URINEBILI Negative Negative Negative Negative   URINEKETONE Negative Negative Negative Negative   SG 1.014 1.007 1.006 1.003   UBLD Negative Negative Large* Large*   URINEPH 6.0 6.5 5.5 5.0   PROTEIN 200* Negative 10* Negative   NITRITE Negative Negative Negative Negative   LEUKEST Negative Negative Small* Negative   RBCU 1 0 >182* 57*   WBCU 1 1 15* 1     Recent Labs   Lab Test 03/27/18  1516 09/26/17  1506   UTPG 0.12 0.10     PTH  Recent Labs   Lab Test 03/27/18  1510 10/15/15  0626   PTHI 126* 561*     Iron Studies  Recent Labs   Lab Test 03/27/18  1510 10/17/15  0700   IRON 67 25*    216*   IRONSAT 24 12*   * 883*       IMAGING:  All imaging studies reviewed by me.

## 2023-10-07 NOTE — PROGRESS NOTES
[facility]    Medicine Progress Note - Medicine Service, [facility] TEAM 2    Date of Admission:  10/3/2023    Assessment & Plan   Haider Torrez is a 58 year old male admitted on 10/3/2023. He has a PMH of IgA nephropathy s/p kidney transplant (2004, 2015), BK and CMV viremia, secondary hyperparathyroid presenting with persistent diarrhea, concerning for CMV colitis vs. MMF colitis and MATTHEW.     Today:  - continue IV ganciclovir, renally dosing  - CMV PCR resulted, plan to monitor q weekly   - MATTHEW stable, Cr at 3  - HypoK resolved  - HypoCa treatment with oral Ca carbonate  - planning to check CBC, BMP to monitor ganciclovir side effect later in the week     Kidney transplant  MATTHEW  Proteinuria  Baseline Cr 2.1-2.5, today 3.9. Proteinuria since 8/23.   - consult transplant nephrology  - Continue PJP prophylaxis with bactrim    - EBV PCR negative     CMV colitis  Diarrhea  He has a history of norovirus diarrhea and MATTHEW in March 2023. Patient has been having loose bowel movement, especially after eating for a month, and recently test positive for norovirus in stool on 9/21.   -Colonoscopy 10/2022 with mild archietectural distortion, focal crypt degeneration. Negative stain for CMV   - consult GI for concerns for CMV vs MMF colitis vs. Chronic norovirus: colonoscopy 10/5, pending biopsy  - C. Diff panel neg  - enteric stool panel: norovirus positive, but transplant ID will not start nitaxoxanide due to poor evidence. Would suggest weaning immunosuppression.   - CMV from colonic biopsy pos, ID suggests starting IV ganciclovir - renally dosing     NAGMA, resolved  Likely secondary to diarrhea. Normalized.   - discontinue PTA bicarb 1,950 mg TID --> 10/6 Decrease sodium bicarbonate to 1300 mg PO TID   - discontinue D5 with 150 mEq bicarb infusion in ED due to resolved acidosis.      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 This hase been managed conservatively with decrease in MPA (patient did not actually decrease dose)              -Pt is CMV IgG+ at time of transplant.               -IgG >600. CMV has been low level but present               -concern at this point with worsening diarrhea that he has CMV colitis. Recommend repeat colonoscopy with biopsies, and if CMV + would treat with IV GCV.   - IVIG 0.5g/kg x1 10/4, IgG level: normal 10/5     Hypertension  On carvedilol 25 mg BID     Anemia of CKD  Normal Iron study. 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  On immunosuppressants for transplanted kidney: mycophenolic acid 360 mg bid, prednisone 5 mg daily, tacrolimus 2.5 mg bid         10/04/23 05:04 10/05/23 05:42   WBC 2.3  1.8    Hemoglobin 8.7  9.3    Platelet Count 112  100    Absolute Neutrophils 1.5             Diet: Regular Diet Adult    DVT Prophylaxis: Ambulate every shift  Fernandez Catheter: Not present  Lines: None     Cardiac Monitoring: None  Code Status: Full Code      Clinically Significant Risk Factors        # Hypokalemia: Lowest K = 3.3 mmol/L in last 2 days, will replace as needed  # Hypernatremia: Highest Na = 146 mmol/L in last 2 days, will monitor as appropriate        # Thrombocytopenia: Lowest platelets = 63 in last 2 days, will monitor for bleeding   # Hypertension: Noted on problem list                   Disposition Plan     Expected Discharge Date: 10/08/2023        Discharge Comments: Likely home; Pending colonoscopy  results, diarrhea and MATTHEW management in a transplanted kidney        The patient's care was discussed with the Attending Physician, Dr. Alcazar .    Yovana Flanagan MD  Medicine Service, [facility] TEAM [address] [place] [address]  Securely message with [employer] (more info)  Text page via [facility] Paging/Directory   See signed in provider for up to date coverage information  ______________________________________________________________________    Interval History   Patient still having diarrhea after eating. Otherwise, he is doing well and is frequently getting up to walk.     No side effects experienced with IV ganciclovir.     Physical Exam   Vital Signs: Temp: 98.1  F (36.7  C) Temp src: Oral BP: (!) 140/77 Pulse: 64   Resp: 16 SpO2: 96 % O2 Device: None (Room air)    Weight: 130 lbs 12.8 oz        Medical Decision Making             Data

## 2023-10-07 NOTE — PROGRESS NOTES
BP (!) 140/77 (BP Location: Left arm)   Pulse 64   Temp 98.1  F (36.7  C) (Oral)   Resp 16   Wt 56.6 kg (124 lb 11.2 oz)   SpO2 96%   BMI 20.75 kg/m      SHIFT: 0431-7069  ISOLATION: Enteric Precaution due to Norovirus  VITALS: Hypertensive on RA, afebrile  BG: NA  Recent Labs   Lab 10/07/23  0542 10/06/23  0537 10/05/23  0542 10/04/23  1844 10/04/23  0504 10/03/23  2021   * 108* 109* 142* 114* 143*   NEURO: Aox4, Calm, Cooperative, Pleasant  DIET: Regular Diet  PAIN: Denies Pain & Denies Nausea  RESPIRATORY: Clear Upper & Lower Lobes Bilaterally, No SOB or use of accessory muscles  CARDIAC: S1 & S2 audible  : Voids spontaneously w/o difficulty  GI: 1x BM (loose brown BM) TUBES: NA  MIVF/GTT/ABX: Ganciclovir completed at beginning of shift  PIV: 2x Left PIV, Right Hemodialysis Fistula  ASSIST: Independent  SAFETY: WDL  SKIN: Redness on Neck Area (pt stated due to thyroid cancer)  LABS: K 3.9, wbc 2.3, hgb 8.4, calcium 7.8  Recent Labs   Lab Test 10/07/23  0542 10/06/23  0537 10/05/23  0542 10/04/23  1844 10/04/23  0504 10/03/23  1240 03/27/18  1510 11/17/15  1139 10/30/15  0718   POTASSIUM 3.9 3.3* 3.1* 3.4 3.4   < > 4.2   < > 5.0   PHOS  --   --   --   --  2.9  --  2.3*  --  2.7   MAG  --  1.9 2.1 1.8 1.1*   < >  --   --  1.7   HGB 8.4*  --  9.3*  --  8.7*   < >  --    < > 11.9*    < > = values in this interval not displayed.   PLAN: continue w/poc & notify Md of any changes

## 2023-10-08 LAB
ALBUMIN SERPL BCG-MCNC: 2.9 G/DL (ref 3.5–5.2)
ALP SERPL-CCNC: 52 U/L (ref 40–129)
ALT SERPL W P-5'-P-CCNC: <5 U/L (ref 0–70)
ANION GAP SERPL CALCULATED.3IONS-SCNC: 10 MMOL/L (ref 7–15)
AST SERPL W P-5'-P-CCNC: 18 U/L (ref 0–45)
BILIRUB SERPL-MCNC: 0.2 MG/DL
BUN SERPL-MCNC: 39.3 MG/DL (ref 6–20)
CALCIUM SERPL-MCNC: 8.1 MG/DL (ref 8.6–10)
CHLORIDE SERPL-SCNC: 109 MMOL/L (ref 98–107)
CREAT SERPL-MCNC: 2.96 MG/DL (ref 0.67–1.17)
DEPRECATED HCO3 PLAS-SCNC: 27 MMOL/L (ref 22–29)
EGFRCR SERPLBLD CKD-EPI 2021: 24 ML/MIN/1.73M2
GLUCOSE SERPL-MCNC: 117 MG/DL (ref 70–99)
POTASSIUM SERPL-SCNC: 3.8 MMOL/L (ref 3.4–5.3)
PROT SERPL-MCNC: 5.1 G/DL (ref 6.4–8.3)
SODIUM SERPL-SCNC: 146 MMOL/L (ref 135–145)

## 2023-10-08 PROCEDURE — 120N000011 HC R&B TRANSPLANT UMMC

## 2023-10-08 PROCEDURE — 250N000012 HC RX MED GY IP 250 OP 636 PS 637

## 2023-10-08 PROCEDURE — 250N000013 HC RX MED GY IP 250 OP 250 PS 637

## 2023-10-08 PROCEDURE — 99232 SBSQ HOSP IP/OBS MODERATE 35: CPT | Performed by: INTERNAL MEDICINE

## 2023-10-08 PROCEDURE — 258N000003 HC RX IP 258 OP 636

## 2023-10-08 PROCEDURE — 36415 COLL VENOUS BLD VENIPUNCTURE: CPT

## 2023-10-08 PROCEDURE — 99233 SBSQ HOSP IP/OBS HIGH 50: CPT | Performed by: INTERNAL MEDICINE

## 2023-10-08 PROCEDURE — 80053 COMPREHEN METABOLIC PANEL: CPT

## 2023-10-08 PROCEDURE — 250N000011 HC RX IP 250 OP 636: Mod: JZ

## 2023-10-08 RX ADMIN — CARVEDILOL 25 MG: 25 TABLET, FILM COATED ORAL at 18:06

## 2023-10-08 RX ADMIN — CALCIUM CARBONATE (ANTACID) CHEW TAB 500 MG 500 MG: 500 CHEW TAB at 17:17

## 2023-10-08 RX ADMIN — TACROLIMUS 2.5 MG: 1 CAPSULE ORAL at 18:06

## 2023-10-08 RX ADMIN — MYCOPHENILIC ACID 360 MG: 360 TABLET, DELAYED RELEASE ORAL at 08:30

## 2023-10-08 RX ADMIN — CARVEDILOL 25 MG: 25 TABLET, FILM COATED ORAL at 08:30

## 2023-10-08 RX ADMIN — PANTOPRAZOLE SODIUM 40 MG: 40 TABLET, DELAYED RELEASE ORAL at 08:30

## 2023-10-08 RX ADMIN — GANCICLOVIR SODIUM 70 MG: 500 INJECTION, POWDER, LYOPHILIZED, FOR SOLUTION INTRAVENOUS at 20:15

## 2023-10-08 RX ADMIN — SODIUM BICARBONATE 650 MG TABLET 1300 MG: at 20:14

## 2023-10-08 RX ADMIN — TACROLIMUS 2.5 MG: 1 CAPSULE ORAL at 08:30

## 2023-10-08 RX ADMIN — FOLIC ACID 1 MG: 1 TABLET ORAL at 08:30

## 2023-10-08 RX ADMIN — ROPINIROLE HYDROCHLORIDE 1 MG: 1 TABLET, FILM COATED ORAL at 22:09

## 2023-10-08 RX ADMIN — LEVOTHYROXINE SODIUM 100 MCG: 100 TABLET ORAL at 08:30

## 2023-10-08 RX ADMIN — CHOLECALCIFEROL (VITAMIN D3) 10 MCG (400 UNIT) TABLET 10 MCG: at 08:30

## 2023-10-08 RX ADMIN — SODIUM BICARBONATE 650 MG TABLET 1300 MG: at 13:24

## 2023-10-08 RX ADMIN — PREDNISONE 5 MG: 5 TABLET ORAL at 08:30

## 2023-10-08 RX ADMIN — CLONAZEPAM 0.5 MG: 0.5 TABLET ORAL at 22:09

## 2023-10-08 RX ADMIN — CALCIUM CARBONATE (ANTACID) CHEW TAB 500 MG 500 MG: 500 CHEW TAB at 10:17

## 2023-10-08 RX ADMIN — MYCOPHENILIC ACID 360 MG: 360 TABLET, DELAYED RELEASE ORAL at 20:14

## 2023-10-08 RX ADMIN — SODIUM BICARBONATE 650 MG TABLET 1300 MG: at 08:30

## 2023-10-08 ASSESSMENT — ACTIVITIES OF DAILY LIVING (ADL)
ADLS_ACUITY_SCORE: 23

## 2023-10-08 NOTE — PROGRESS NOTES
BP (!) 144/75 (BP Location: Left arm)   Pulse 60   Temp 97.9  F (36.6  C) (Oral)   Resp 14   Wt 59.6 kg (131 lb 8 oz)   SpO2 96%   BMI 21.88 kg/m      SHIFT: 5347-3500  ISOLATION: Contact Precaution due to Norovirus  VITALS: Hypertensive on RA, afebrile  BG: NA  Recent Labs   Lab 10/07/23  0542 10/06/23  0537 10/05/23  0542 10/04/23  1844 10/04/23  0504 10/03/23  2021   * 108* 109* 142* 114* 143*   NEURO: Aox4, Calm, Cooperative, Pleasant  DIET: Regular Diet  PAIN: Denies  Nausea:Denies  RESPIRATORY: Clear Upper & Lower Lobes Bilaterally  CARDIAC: S1 & S2 audible  : Voids w/o difficulty  GI: pt states 5x BM within shift  TUBES: NA  MIVF/GTT/ABX: NA  PIV: 2 Right PIV (saline Locked)  ASSIST: Independent  SAFETY: WDL, uses call light appropriately  SKIN: Blanchable redness on Neck due to skin cancer  LABS: No New labs within shift  Recent Labs   Lab Test 10/07/23  0542 10/06/23  0537 10/05/23  0542 10/04/23  1844 10/04/23  0504 10/03/23  1240 03/27/18  1510 11/17/15  1139 10/30/15  0718   POTASSIUM 3.9 3.3* 3.1* 3.4 3.4   < > 4.2   < > 5.0   PHOS  --   --   --   --  2.9  --  2.3*  --  2.7   MAG  --  1.9 2.1 1.8 1.1*   < >  --   --  1.7   HGB 8.4*  --  9.3*  --  8.7*   < >  --    < > 11.9*    < > = values in this interval not displayed.   PLAN: Continue w/poc & Notify MD of any changes

## 2023-10-08 NOTE — PLAN OF CARE
Goal Outcome Evaluation:      Plan of Care Reviewed With: patient    Overall Patient Progress: improving  BP (!) 154/83 (BP Location: Left arm, Cuff Size: Adult Regular)   Pulse 68   Temp 97.5  F (36.4  C) (Oral)   Resp 16   Wt 59.3 kg (130 lb 12.8 oz)   SpO2 99%   BMI 21.77 kg/m      Shift: 9409-3610  Isolation Status: Contact isolation  VS: 154/83 hypertensive on RA, afebrile  Neuro: Aox4  Behaviors: Calm and cooperative  BG: N/A  Labs: creatinine: 2.99, WBC: 2.3, hemoglobin: 8.4, platelet count: 63  Respiratory: WDL  Cardiac: WDL  Pain/Nausea: denies pain and nausea  PRN: none given  Diet: regular diet  IV Access: 2 R PIV's  Infusion(s): ganciclovir with TKO  Lines/Drains: N/A  GI/: 3 bowel movements on 10/7  Skin: blanchable redness on neck due to skin cancer  Mobility: steady gait, independent  Plan: continue with plan of care, will notify MD with any changes

## 2023-10-08 NOTE — PLAN OF CARE
BP (!) 144/75 (BP Location: Left arm)   Pulse 60   Temp 97.9  F (36.6  C) (Oral)   Resp 14   Wt 59.6 kg (131 lb 8 oz)   SpO2 96%   BMI 21.88 kg/m      Assumed cares [id number]  Neuro: A/Ox4  Pain/Nausea: denies pain and nausea  Cardiac: rate and rhythm regular  Resp: lung sounds clear, equal bilaterally  GI/: voiding spontaneously, per pt; diarrhea x2  Diet/Appetite: Regular diet, adequate PO intake  Access: PIV x2 - saline locked  Activity: Up ad evelin  Plan:   Will continue with plan of care and notify team of any changes.?    BOUBACAR MIRZA RN on 10/8/2023 at 4:01 PM

## 2023-10-08 NOTE — PROGRESS NOTES
[facility]    Medicine Progress Note - Medicine Service, [facility] TEAM 2    Date of Admission:  10/3/2023    Assessment & Plan   Haider Torrez is a 58 year old male admitted on 10/3/2023. He has a PMH of IgA nephropathy s/p kidney transplant (2004, 2015), BK and CMV viremia, secondary hyperparathyroid presenting with persistent diarrhea, concerning for CMV colitis vs. MMF colitis and MATTHEW.     Today:  - continue IV ganciclovir, renally dosing  - planning to check CBC, BMP to monitor ganciclovir side effect later in the week     Kidney transplant  MATTHEW  Proteinuria  Baseline Cr 2.1-2.5, today 3.9. Proteinuria since 8/23.   - consult transplant nephrology  - Continue PJP prophylaxis with bactrim    - EBV PCR negative     CMV colitis  Diarrhea, improving  He has a history of norovirus diarrhea and MATTHEW in March 2023. Patient has been having loose bowel movement, especially after eating for a month, and recently test positive for norovirus in stool on 9/21. Colonoscopy showing CMV colitis and patient was started on gancyclovir 10/6 evening. Following CMV labs, diarrhea improving  - enteric stool panel: norovirus positive, but transplant ID will not start nitaxoxanide due to poor evidence. Would suggest weaning immunosuppression.   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 This hase been managed conservatively with decrease in MPA (patient did not actually decrease dose)              -Pt is CMV IgG+ at time of transplant.               -IgG >600. CMV has been low level but present               -concern at this point with worsening diarrhea that he has CMV colitis. Recommend repeat colonoscopy with biopsies, and if CMV + would treat with IV GCV.   - IVIG  0.5g/kg x1 10/4, IgG level: normal 10/5     Hypertension  On carvedilol 25 mg BID     NAGMA, resolved  Likely secondary to diarrhea. Normalized.   - discontinue PTA bicarb 1,950 mg TID --> 10/6 Decrease sodium bicarbonate to 1300 mg PO TID   - discontinue D5 with 150 mEq bicarb infusion in ED due to resolved acidos    Anemia of CKD  Normal Iron study. 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     Papillary thyroid carcinoma s/p surgical resection  Continue PTA levothyroxine     Hypocalcemia   - calcium gluconate 1 g 10/4, still low 10/5 repeated another dose  - 10/6 calcium carbonate 500 mg PO BID between meals (ordered).     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  On immunosuppressants for transplanted kidney: mycophenolic acid 360 mg bid, prednisone 5 mg daily, tacrolimus 2.5 mg bid      Diet: Regular Diet Adult    DVT Prophylaxis: Ambulate every shift  Fernandez Catheter: Not present  Lines: None     Cardiac Monitoring: None  Code Status: Full Code      Clinically Significant Risk Factors         # Hypernatremia: Highest Na = 146 mmol/L in last 2 days, will monitor as appropriate      # Hypoalbuminemia: Lowest albumin = 2.9 g/dL at 10/8/2023  9:46 AM, will monitor as appropriate     # Thrombocytopenia: Lowest platelets = 63 in last 2 days, will monitor for bleeding     # Hypertension: Noted on problem list                   Disposition Plan     Expected Discharge Date: 10/08/2023        Discharge Comments: Likely home; Pending colonoscopy results, diarrhea and MATTHEW management in a transplanted kidney  10/6: Ganciclovir started        The patient's care was discussed with the Attending Physician, Dr. Alcazar .    VEE SOSA MD  Medicine Service, [facility] TEAM [address] [place] [address]  Securely message with [employer] (more info)  Text page via [employer] Paging/Directory   See signed in provider for up to date  coverage information  ______________________________________________________________________    Interval History   Diarrhea better.  NAEO    No side effects experienced with IV ganciclovir.     Physical Exam   Vital Signs: Temp: 97.9  F (36.6  C) Temp src: Oral BP: (!) 144/75 Pulse: 60   Resp: 14 SpO2: 96 % O2 Device: None (Room air)    Weight: 131 lbs 8 oz    General: Pt NAD  Lung: on room air, no increased effort  Neuro: Alert and oriented X 3;        Medical Decision Making             Data     Reviewed; Cr stable

## 2023-10-08 NOTE — PROGRESS NOTES
[facility]   Transplant Nephrology Progress Note  Date of Admission:  10/3/2023  Today's Date: 10/08/2023    Recommendations:  -Continue gancyclovir, duration per Transplant ID  -Continue to monitor CBC with diff, Creat ,Tacrolimus Level    Assessment & Plan   # DDKT: Increased serum creatinine this admission   - MATTHEW felt secondary to dehydration.    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    # Immunosuppression: Tacrolimus immediate release (goal 4-6), Mycophenolic acid (dose 360 mg every 12 hours), and Prednisone (dose 5 mg daily)   - Continue with intensive monitoring of immunosuppression for efficacy and toxicity.   - Changes: No.  Myfortic already reduced.    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  - Colonoscopy 10/2022 with mild archietectural distortion, focal crypt degeneration. 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     - 10/4/23 IVIG 0.5 g/kg given.   - 10/5/23 Repeat colonoscopy.  Biopsy results show crypt distortion.  Active area with inflammation with ulceration and granulation tissue; features concerning but not conclusive for viral cytopathic changes. CMV  and adenovirus stain results pending.     10/4/2023 C. Diff Toxin PCR Negative    10/3/2023   EBV DNA PCR Quantitative Whole Blood Not detected    10/3/2023  IgG 810    10/3/2023  Enteric Bacteria and Virus Panel PCR Positive for norovirus.         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    - 10/4/23 IVIG 0.5 g/kg given.  - Concern at this point with worsening diarrhea that he has CMV colitis.   -  coloscopy biopsies suggestive of CMV + would treat with IV GCV.    -CMV weekly     03/20/23  04/10/23  05/01/23    CMV DNA Quant (External) 622 !  460 !  318 !      Component      Latest Ref Rng 10/6/2023  10:16 AM   CMV DNA IU/mL, Instrument      <1 IU/mL 252 (H)         # Hypertension: Variable;  Goal BP: < 140/90 (Hospitalization goal)   - Changes: Not at this time.  Continue carvedilol  25 mg PO BID.    # Anemia in Chronic Renal Disease: Hgb: Stable     DANNY: No   - Iron studies: Replete.    # Mineral Bone Disorder:   - Secondary renal hyperparathyroidism; PTH level: Mildly elevated (151-300 pg/ml)        On treatment: None  - Vitamin D; level: Not checked recently, but was normal last check        On supplement: Yes  - Calcium; level: Low           On supplement: No, give calcium carbonate 500 mg PO BID between meals.  - Phosphorus; level: Normal  10/4/23       On supplement: No     # Electrolytes:   - Potassium; level: Low normal         On supplement: No,   - Magnesium; level: Normal        On supplement: No  - Bicarbonate; level: Normal        On supplement: Yes,  sodium bicarbonate 1300 mg PO TID    # Proteinuria:   -1.2g/g in previous check in 2/2023, 3.3g on 24h collection in 8/2023   - A1c 5.8%.    - 10/3/23 SPEP: monoclonal peak normal.   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      Jenise Jennings NP   Pager: [phone]        Physician Attestation     I saw and evaluated Haider Torrez as part of a shared APRN/PA visit.     I personally reviewed the vital signs, medications, labs, and imaging.    I personally performed the substantive portion of the medical decision making for this visit - please see the [facility]'s documentation for full details.    Key management decisions made by me and carried out under my direction: continue renally dosed iv ganciclovir and reduced dose MPA. Diarrhea slightly improved    Janel Winn MD  Date of Service (when I saw the patient): 10/08/23    Interval History     Mr. Torrez's creatinine is 2.96 stable   I/O last 3 completed shifts:  In: 400 [P.O.:400]  Out: -    Other significant labs/tests/vitals: SBP 140s - 150s overnight. Afebrile  No acute events overnight.  No chest pain or shortness of breath.  No leg swelling.  No nausea and vomiting.  Diarrhea persists but reports less urgency, continued to be watery.    Review of Systems   4 point ROS was obtained and negative except as noted in the Interval History.    MEDICATIONS:   calcium carbonate  500 mg Oral BID    carvedilol  25 mg Oral BID w/meals    cholecalciferol  10 mcg Oral Daily    clonazePAM  0.5 mg Oral At Bedtime    folic acid  1 mg Oral Daily    ganciclovir (CYTOVENE) 70 mg in D5W 100 mL intermittent infusion  1.25 mg/kg Intravenous Q24H    levothyroxine  100 mcg Oral Daily    mycophenolic acid  360 mg Oral BID    pantoprazole  40 mg Oral Daily    predniSONE  5 mg Oral Daily    rOPINIRole  1 mg Oral At Bedtime    sodium bicarbonate  1,300 mg Oral TID    sulfamethoxazole-trimethoprim  1 tablet Oral Once per  day on     tacrolimus  2.5 mg Oral BID IS         Physical Exam   Temp  Av.7  F (36.5  C)  Min: 97.3  F (36.3  C)  Max: 98.1  F (36.7  C)      Pulse  Av.9  Min: 53  Max: 66 Resp  Av.6  Min: 16  Max: 19  SpO2  Av.6 %  Min: 98 %  Max: 100 %     BP (!) 144/75 (BP Location: Left arm)   Pulse 60   Temp 97.9  F (36.6  C) (Oral)   Resp 14   Wt 59.6 kg (131 lb 8 oz)   SpO2 96%   BMI 21.88 kg/m      Admit Weight: 57.1 kg (125 lb 14.1 oz)     GENERAL APPEARANCE: alert and no distress  RESP: lungs clear to auscultation - no rales, rhonchi or wheezes  CV: regular rhythm, normal rate, no rub, no murmur  EDEMA: no LE edema bilaterally  ABDOMEN: soft, nondistended, nontender, bowel sounds hyperactive  MS: extremities normal - no gross deformities noted, no evidence of inflammation in joints, no muscle tenderness  SKIN: no rash  PSYCH: mentation appears normal and affect normal  DIALYSIS ACCESS:  RUE AV fistula +bruit/+thrill    Data   All labs reviewed by me.  CMP  Recent Labs   Lab 10/07/23  0542 10/06/23  0537 10/05/23  0542 10/04/23  1844 10/04/23  0504 10/03/23  2021 10/03/23  1240    146* 145 145 145   < > 144   POTASSIUM 3.9 3.3* 3.1* 3.4 3.4   < > 4.9   CHLORIDE 112* 110* 108* 109* 118*   < > 121*   CO2 25 28 26 22 16*   < > 12*   ANIONGAP 4* 8 11 14 11   < > 11   * 108* 109* 142* 114*   < > 145*   BUN 40.8* 40.7* 42.0* 50.3* 60.8*   < > 65.5*   CR 2.99* 2.97* 3.01* 3.03* 3.30*   < > 3.75*   GFRESTIMATED 24* 24* 23* 23* 21*   < > 18*   CRISTIAN 7.8* 7.5* 7.3* 8.2* 7.3*   < > 8.5*   MAG  --  1.9 2.1 1.8 1.1*  --   --    PHOS  --   --   --   --  2.9  --   --    PROTTOTAL  --   --   --   --   --   --  6.1*   ALBUMIN  --   --   --   --   --   --  3.9   BILITOTAL  --   --   --   --   --   --  0.3   ALKPHOS  --   --   --   --   --   --  62   AST  --   --   --   --   --   --  12   ALT  --   --   --   --   --   --  7    < > = values in this interval not displayed.     CBC  Recent Labs    Lab 10/07/23  0542 10/05/23  0542 10/04/23  0504 10/03/23  1240   HGB 8.4* 9.3* 8.7* 10.8*   WBC 2.3* 1.8* 2.3* 3.2*   RBC 2.89* 3.18* 2.91* 3.66*   HCT 27.3* 29.1* 26.0* 35.0*   MCV 95 92 89 96   MCH 29.1 29.2 29.9 29.5   MCHC 30.8* 32.0 33.5 30.9*   RDW 13.7 14.1 14.3 14.5   PLT 63* 100* 112* 122*     INR  Recent Labs   Lab 10/04/23  1203   INR 1.30*     ABGNo lab results found in last 7 days.   Urine Studies  Recent Labs   Lab Test 10/03/23  2244 11/17/15  1410 10/14/15  2220 10/13/15  0725   COLOR Light Yellow Light Yellow Yellow Straw   APPEARANCE Clear Clear Clear Clear   URINEGLC Negative Negative Negative Negative   URINEBILI Negative Negative Negative Negative   URINEKETONE Negative Negative Negative Negative   SG 1.014 1.007 1.006 1.003   UBLD Negative Negative Large* Large*   URINEPH 6.0 6.5 5.5 5.0   PROTEIN 200* Negative 10* Negative   NITRITE Negative Negative Negative Negative   LEUKEST Negative Negative Small* Negative   RBCU 1 0 >182* 57*   WBCU 1 1 15* 1     Recent Labs   Lab Test 03/27/18  1516 09/26/17  1506   UTPG 0.12 0.10     PTH  Recent Labs   Lab Test 03/27/18  1510 10/15/15  0626   PTHI 126* 561*     Iron Studies  Recent Labs   Lab Test 03/27/18  1510 10/17/15  0700   IRON 67 25*    216*   IRONSAT 24 12*   * 883*       IMAGING:  All imaging studies reviewed by me.

## 2023-10-09 LAB
ANION GAP SERPL CALCULATED.3IONS-SCNC: 9 MMOL/L (ref 7–15)
BUN SERPL-MCNC: 42.4 MG/DL (ref 6–20)
CALCIUM SERPL-MCNC: 8 MG/DL (ref 8.6–10)
CHLORIDE SERPL-SCNC: 109 MMOL/L (ref 98–107)
CREAT SERPL-MCNC: 2.76 MG/DL (ref 0.67–1.17)
DEPRECATED HCO3 PLAS-SCNC: 25 MMOL/L (ref 22–29)
EGFRCR SERPLBLD CKD-EPI 2021: 26 ML/MIN/1.73M2
ERYTHROCYTE [DISTWIDTH] IN BLOOD BY AUTOMATED COUNT: 13.2 % (ref 10–15)
GLUCOSE SERPL-MCNC: 101 MG/DL (ref 70–99)
HCT VFR BLD AUTO: 28.5 % (ref 40–53)
HGB BLD-MCNC: 8.6 G/DL (ref 13.3–17.7)
MCH RBC QN AUTO: 29.6 PG (ref 26.5–33)
MCHC RBC AUTO-ENTMCNC: 30.2 G/DL (ref 31.5–36.5)
MCV RBC AUTO: 98 FL (ref 78–100)
PHOSPHATE SERPL-MCNC: 2.4 MG/DL (ref 2.5–4.5)
PLATELET # BLD AUTO: 79 10E3/UL (ref 150–450)
POTASSIUM SERPL-SCNC: 4.3 MMOL/L (ref 3.4–5.3)
RBC # BLD AUTO: 2.91 10E6/UL (ref 4.4–5.9)
SODIUM SERPL-SCNC: 143 MMOL/L (ref 135–145)
TACROLIMUS BLD-MCNC: 5.1 UG/L (ref 5–15)
TME LAST DOSE: NORMAL H
TME LAST DOSE: NORMAL H
WBC # BLD AUTO: 2.3 10E3/UL (ref 4–11)

## 2023-10-09 PROCEDURE — 250N000012 HC RX MED GY IP 250 OP 636 PS 637

## 2023-10-09 PROCEDURE — 36415 COLL VENOUS BLD VENIPUNCTURE: CPT

## 2023-10-09 PROCEDURE — 250N000011 HC RX IP 250 OP 636: Mod: JZ

## 2023-10-09 PROCEDURE — 120N000011 HC R&B TRANSPLANT UMMC

## 2023-10-09 PROCEDURE — 80197 ASSAY OF TACROLIMUS: CPT

## 2023-10-09 PROCEDURE — 99233 SBSQ HOSP IP/OBS HIGH 50: CPT | Mod: FS

## 2023-10-09 PROCEDURE — 250N000013 HC RX MED GY IP 250 OP 250 PS 637

## 2023-10-09 PROCEDURE — 80048 BASIC METABOLIC PNL TOTAL CA: CPT

## 2023-10-09 PROCEDURE — 84100 ASSAY OF PHOSPHORUS: CPT

## 2023-10-09 PROCEDURE — 99232 SBSQ HOSP IP/OBS MODERATE 35: CPT | Mod: GC | Performed by: STUDENT IN AN ORGANIZED HEALTH CARE EDUCATION/TRAINING PROGRAM

## 2023-10-09 PROCEDURE — 85027 COMPLETE CBC AUTOMATED: CPT

## 2023-10-09 PROCEDURE — 258N000003 HC RX IP 258 OP 636

## 2023-10-09 PROCEDURE — 250N000013 HC RX MED GY IP 250 OP 250 PS 637: Performed by: INTERNAL MEDICINE

## 2023-10-09 RX ADMIN — GANCICLOVIR SODIUM 70 MG: 500 INJECTION, POWDER, LYOPHILIZED, FOR SOLUTION INTRAVENOUS at 18:59

## 2023-10-09 RX ADMIN — CARVEDILOL 25 MG: 25 TABLET, FILM COATED ORAL at 08:24

## 2023-10-09 RX ADMIN — SODIUM BICARBONATE 650 MG TABLET 1300 MG: at 19:37

## 2023-10-09 RX ADMIN — CALCIUM CARBONATE (ANTACID) CHEW TAB 500 MG 500 MG: 500 CHEW TAB at 14:40

## 2023-10-09 RX ADMIN — SODIUM BICARBONATE 650 MG TABLET 1300 MG: at 08:23

## 2023-10-09 RX ADMIN — TACROLIMUS 2.5 MG: 1 CAPSULE ORAL at 17:38

## 2023-10-09 RX ADMIN — CLONAZEPAM 0.5 MG: 0.5 TABLET ORAL at 21:04

## 2023-10-09 RX ADMIN — FOLIC ACID 1 MG: 1 TABLET ORAL at 08:24

## 2023-10-09 RX ADMIN — TACROLIMUS 2.5 MG: 1 CAPSULE ORAL at 08:23

## 2023-10-09 RX ADMIN — CALCIUM CARBONATE (ANTACID) CHEW TAB 500 MG 500 MG: 500 CHEW TAB at 10:40

## 2023-10-09 RX ADMIN — MYCOPHENILIC ACID 360 MG: 360 TABLET, DELAYED RELEASE ORAL at 08:23

## 2023-10-09 RX ADMIN — CARVEDILOL 25 MG: 25 TABLET, FILM COATED ORAL at 17:38

## 2023-10-09 RX ADMIN — CHOLECALCIFEROL (VITAMIN D3) 10 MCG (400 UNIT) TABLET 10 MCG: at 08:24

## 2023-10-09 RX ADMIN — ROPINIROLE HYDROCHLORIDE 1 MG: 1 TABLET, FILM COATED ORAL at 21:04

## 2023-10-09 RX ADMIN — MYCOPHENILIC ACID 360 MG: 360 TABLET, DELAYED RELEASE ORAL at 19:37

## 2023-10-09 RX ADMIN — POTASSIUM PHOSPHATE, MONOBASIC 500 MG: 500 TABLET, SOLUBLE ORAL at 17:39

## 2023-10-09 RX ADMIN — PANTOPRAZOLE SODIUM 40 MG: 40 TABLET, DELAYED RELEASE ORAL at 08:24

## 2023-10-09 RX ADMIN — SULFAMETHOXAZOLE AND TRIMETHOPRIM 1 TABLET: 400; 80 TABLET ORAL at 08:24

## 2023-10-09 RX ADMIN — PREDNISONE 5 MG: 5 TABLET ORAL at 08:24

## 2023-10-09 RX ADMIN — LEVOTHYROXINE SODIUM 100 MCG: 100 TABLET ORAL at 08:24

## 2023-10-09 RX ADMIN — SODIUM BICARBONATE 650 MG TABLET 1300 MG: at 14:40

## 2023-10-09 ASSESSMENT — ACTIVITIES OF DAILY LIVING (ADL)
ADLS_ACUITY_SCORE: 23

## 2023-10-09 NOTE — PLAN OF CARE
/74 (BP Location: Left arm)   Pulse 67   Temp 98.1  F (36.7  C) (Oral)   Resp 16   Wt 59.6 kg (131 lb 8 oz)   SpO2 98%   BMI 21.88 kg/m      Shift: [id number]  Isolation Status: Enteric , cytotoxic precautions  VS: VSS on RA, afebrile  Neuro: Aox4  Behaviors: Calm and cooperative  Labs: Creat 2.76  Respiratory: WDL  Cardiac: WDL  Pain/Nausea: denies  Diet: Regular  IV Access: LPIV, KHOA fistula  GI/: voids without difficulty, Last BM today- loose   Skin: minor bruising on left lower arm   Mobility: UAL  Plan: continue plan of care

## 2023-10-09 NOTE — PROGRESS NOTES
[facility] INFECTIOUS DISEASES PROGRESS NOTE     Patient:  Haider Torrez   YOB: 1965  Date of Visit:  10/06/2023  Date of Admission: 10/3/2023  Consult Requester:Phoenix Alcazar MD          Assessment and Recommendations:   Recommendations:-  - Follow up final pathology from colonoscopy for CMV staining. Per primary team, pathology called to report +CMV stain though formal result not yet out.     - Cont IV ganciclovir induction at 5 mg/kg/dose q12 hrs  He needs renal dose adjustment which would be 1.25 mg/kg/dose every 24 hours, will defer to pharmacy  Daily monitoring of CBC and creatinine  Repeat CMV VL from blood (ordered for you), pending    - No effective therapy for treatment of norovirus and prolonged shedding is common in transplant patients. Cornerstone of therapy is reducing immunosuppression and supportive care.   Appreciate reduction of his IS if feasible    Assessment:  Haider Torrez is a 58 year old male with PMHx significant for IgA nephropathy s/p kidney transplant (2004, 2015) on tacrolimus + mycophenolic acid + prednisone 5 mg, history of BK viremia, CMV viremia, hypertension, and secondary hyperparathyroid who presented on 10/3/23 with persistent diarrhea x3-4 weeks with concern for CMV colitis.    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 No on any prophylaxis at present, treated with reduction of IS. Recommend repeat VL today. Awaiting colonoscopy pathology stains to evaluate for CMV colitis, per preliminary pathology and verbal report by pathology, it is positive for CMV colitis. Recommend start IV ganciclovir induction, with renal dose adjustment to 1.25 mg/kg/dose every 24 hours.     Acute on chronic diarrhea  Chronic Norovirus  Previous +norovirus in 03/2023 that improved with supportive care. Ongoing diarrhea prompting admission with negative C difficile and enteric panel here.  Does have leukopenia, no abdominal pain or other symptoms. Diarrhea may be related to chronic norovirus in immunocompromised host vs CMV colitis. S/p colonoscopy 10/5 with pathology pending. There is no effective therapy for treatment of norovirus and prolonged shedding is common in transplant patients. Cornerstone of therapy is reducing immunosuppression (mycophenolate reduced here) and supportive care. Agree with workup to date, symptoms more likely related to CMV colitis.    Previous ID Issues:  #COVID (+09/20/22 s/p 5 days remdesivir) c/b post-COVID bacterial pneumonia ([place], 10/2022) - treated with vanc x1, 3 days of azithromycin, and 5 days cefepime.     Other ID issues:  - QTc interval:  443msec on 10/3/23  - Bacterial prophylaxis:    - Pneumocystis prophylaxis:  Bactrim  - Viral serostatus & prophylaxis:  CMV D-/R+, EBV D+/R+  - Fungal prophylaxis:    - Immunization status:  Due for annual COVID, flu, and RSV.   - Gamma globulin status:  810 on 10/3/23  - Isolation status: enteric (+norovirus)    Thank you for the consult. Transplant ID will continue to follow with you.     45 MINUTES SPENT BY ME on the date of service doing chart review, history, exam, documentation & further activities per the note.      Mychal Zazueta MD, PhD  Transplant Infectious Diseases Attending Physician  [phone]        Interim History:   Diarrhea is improving. Down to 1 episode after meals instead of prior 3-4.           History of Present Illness:   Transplants:  10/13/2015 (Kidney), 4/15/2004 (Kidney), Postoperative day:  2915     Haider Torrez is a 58 year old male with PMHx significant for IgA nephropathy s/p kidney transplant (2004, 2015) on tacrolimus + mycophenolic acid + prednisone 5 mg, history of BK viremia, CMV viremia, hypertension, and secondary hyperparathyroid who presented on 10/3/23 with persistent diarrhea x3 weeks. ID consulted for CMV colitis.    Admission in March 2023 for diarrhea, found +norovirus and  symptoms mildly improved with supportive care, no other treatment given. May have had imodium on 3/7 and this helped. Colonoscopy 10/2022 without evidence of CMV, though notable for focal crypt degeneration and mild architectural distortion c/f possible medication related colitis. Remains with +norovirus on enteric panel 9/21/23.      He reports diarrhea for the last 3-4 weeks - water, undigested food that is postprandial 4-10x/day. Denies black or bloody stools. No incontinence, no nocturnal symptoms. Seen at outside ED 10/2 - notable for MATTHEW, given IV fluids and discharged. Saw transplant nephrology for virtual visit on 10/2 who recommended admission and he presented to [facility] ED 10/3. He also reported dizziness. Denies fever, chills, night sweats, chest pain, cough, dyspnea, nausea, vomiting, abdominal pain, graft pain, urinary symptoms, vision changes, headache, or skin rash. Endorses food avoidance due to fear of diarrhea and 10 lb weight loss in the last month. Vitals stable. Labs notable for WBC 3.2 --> 1.8, MATTHEW with creatinine 3.75 --> 2.97. C difficile negative, enteric panel +10/4. UA noninfectious appearing. EBV not detected. Hepatic panel unremarkable. Immunosuppression was just decreased MPA to 360 mg BID, remains on prednisone 5 mg, tacrolimus (goal 4-6). Was given IVIG once here.     He had inpatient colonoscopy completed 10/5/23 - notable for diverticulosis, granula mucosa in rectum up to 3 cm proximal, non-bleeding internal hemorrhoids (with a mucosal nodule on top of internal hemorrhoid - scarring vs tumor, s/p biopsy), and otherwise normal appearing mucosa throughout remaining colon. Pathology as below:  Final Diagnosis   A. COLON, RANDOM BIOPSY:  Colonic mucosa with mild crypt architectural distortion, evidence of prior injury; no current evidence of cryptitis, dysplasia, or other histologic abnormality; report of CMV & adenovirus immunohistochemistry to follow      B. DISTAL RECTUM,  BIOPSY:  Colonic mucosa with mild reactive changes, otherwise no evidence of microscopic or chronic colitis or other significant histologic abnormality; report of CMV & adenovirus immunohistochemistry to follow       3. ANORECTUM, BIOPSY OF NODULE AT TRANSITION:  Colonic mucosa with polypoid features, active inflammation with ulceration and granulation tissue; features concerning but not conclusive for viral cytopathic changes; report of adenovirus & CMV immunohistochemistry to follow      Has travelled to [facility] once to visit family. Also been to [place]. He has no unusual food exposures, no animal exposures, no recent sick contacts or antibiotic use. Lives with wife and 1 son.          Review of Systems:     CONSTITUTIONAL:  No fevers, chills, or sweats.  EYES: negative for icterus, conjunctival injection or drainage.  ENT:  negative for nasal congestion, rhinorrhea, or sore throat.   RESPIRATORY:  negative for cough, sputum production, or SOB.  CARDIOVASCULAR:  negative for chest pain or dyspnea.   GASTROINTESTINAL:  negative for nausea, vomiting, diarrhea and constipation.  GENITOURINARY:  negative for dysuria, frequency or urgency.   HEME:  No easy bruising.  INTEGUMENT:  negative for rash and pruritus.  NEURO:  Negative for headache, vision changes, or weakness.         Past Medical History:     Past Medical History:   Diagnosis Date    Anemia of chronic kidney failure     Carpal tunnel syndrome     Clinical diagnosis of COVID-19 9/22/2022    Dyslipidemia     History of respiratory failure     Hypertension     Hypomagnesemia 2015    IgA nephropathy     Insomnia     Kidney disease, chronic, end stage on dialysis (H)     Kidney transplant failure     Myoclonus     Obstructive sleep apnea     Papillary thyroid carcinoma (H)     PRES (posterior reversible encephalopathy syndrome)     2011 after thyroidectomy    Restless leg syndrome     Secondary hyperparathyroidism (H24)     Seizure disorder (H)     Post surgery.  Keppra now discontinued    Urinoma of kidney transplant             Past Surgical History:     Past Surgical History:   Procedure Laterality Date    BENCH KIDNEY  10/13/2015    Procedure: BENCH KIDNEY;  Surgeon: Seth Gibbons MD;  Location: [facility] OR    COLONOSCOPY N/A 10/5/2023    Procedure: COLONOSCOPY, WITH POLYPECTOMY AND BIOPSY;  Surgeon: Russ Dodge MD;  Location: [facility] GI    CREATE FISTULA ARTERIOVENOUS UPPER EXTREMITY      CYSTOSCOPY, REMOVE STENT(S), COMBINED N/A 2015    Procedure: COMBINED CYSTOSCOPY, REMOVE STENT(S);  Surgeon: Seth Gibbons MD;  Location: [facility] OR    CYSTOSCOPY, REMOVE STENT(S), COMBINED Bilateral 2015    Procedure: COMBINED CYSTOSCOPY, REMOVE STENT(S);  Surgeon: Seth Gibbons MD;  Location: [facility] OR    ELBOW SURGERY Right     hemithyroidectomy Left     HERNIA REPAIR Right 3/2009    THYROIDECTOMY      TRANSPLANT KIDNEY RECIPIENT  DONOR      TRANSPLANT KIDNEY RECIPIENT  DONOR N/A 10/13/2015    Procedure: TRANSPLANT KIDNEY RECIPIENT  DONOR;  Surgeon: Seth Gibbons MD;  Location: [facility] OR    ureteropyelostomy  2004            Family History:     Family History   Problem Relation Age of Onset    Diabetes Mother     Other Cancer Father         lung            Social History:     Social History     Tobacco Use    Smoking status: Never    Smokeless tobacco: Never   Substance Use Topics    Alcohol use: No     History   Sexual Activity    Sexual activity: Not on file            Current Medications:      carvedilol  25 mg Oral BID w/meals    cholecalciferol  10 mcg Oral Daily    clonazePAM  0.5 mg Oral At Bedtime    folic acid  1 mg Oral Daily    levothyroxine  100 mcg Oral Daily    mycophenolic acid  360 mg Oral BID    pantoprazole  40 mg Oral Daily    predniSONE  5 mg Oral Daily    rOPINIRole  1 mg Oral At Bedtime    sodium bicarbonate  1,950 mg Oral TID    sulfamethoxazole-trimethoprim  1 tablet Oral Once per day on Mon  Wed Fri    tacrolimus  2.5 mg Oral BID IS            Allergies:     Allergies   Allergen Reactions    Hydralazine Swelling    Betadine [Povidone Iodine]     Cephalexin Hives    Clindamycin Hives    Trazodone Swelling     Swelling of face/lips            Physical Exam:   Vitals were reviewed  Temp:  [97.4  F (36.3  C)-98.5  F (36.9  C)] 97.7  F (36.5  C)  Pulse:  [51-66] 60  Resp:  [0-20] 18  BP: (130-159)/(70-87) 131/72  SpO2:  [97 %-100 %] 97 %    Vitals:    10/03/23 1934 10/04/23 0539   Weight: 57.1 kg (125 lb 14.1 oz) 56.6 kg (124 lb 11.2 oz)       Constitutional: Pleasant adult male seen sitting up in bed, in NAD. Alert and interactive.   HEENT: NCAT, anicteric sclerae, conjunctiva clear. Moist mucous membranes without lesions or thrush. Dentition intact/well cared for. No cervical LAD.  Respiratory: Non-labored breathing, good air exchange. Lungs are clear to auscultation bilaterally, without wheezing, crackles or rhonchi. No cough noted.   Cardiovascular: Regular rate and rhythm with no murmur, rub or gallop.  GI: Normoactive BS. Abdomen is soft, non-distended, and non-tender to palpation. No rigidity or guarding. No graft tenderness.  Skin: Warm and dry. No rashes or lesions on exposed surfaces.  Musculoskeletal: Extremities grossly normal. No tenderness or edema present.   Neurologic: A &O x3, speech normal, answering questions appropriately. Moves all extremities spontaneously. Grossly non-focal.  Neuropsychiatric: Mentation and affect normal/appropriate.  VAD: PIV is c/d/i with no erythema, drainage, or tenderness.           Laboratory Data:     Microbiology:  No results found for: CULTURE    Culture Micro   Date Value Ref Range Status   11/17/2015 No growth  Final   10/14/2015 No growth  Final   10/13/2015   Final    Test canceled - Lab  error  Canceled, Test credited     10/13/2015 No growth  Final       Inflammatory Markers    No lab results found.    Metabolic Studies       Recent Labs   Lab  Test 10/06/23  0537 10/05/23  0542 10/04/23  1844 10/04/23  0504 10/04/23  0504 10/03/23  2021 10/03/23  1240 05/08/18  0700 03/27/18  1510 10/14/15  1325 10/14/15  1021 10/14/15  0000 10/13/15  2231 10/13/15  2113 10/13/15  1215 10/12/15  1330   * 145 145  --  145 144 144  --  143   < >  --    < >  --   --    < > 136   POTASSIUM 3.3* 3.1* 3.4  --  3.4 4.1 4.9  --  4.2   < > 4.8   < > 5.4* 6.8*   < > 4.2   CHLORIDE 110* 108* 109*  --  118* 119* 121*   < > 112*   < >  --    < >  --   --    < > 97   CO2 28 26 22  --  16* 14* 12*  --  24   < >  --    < >  --   --    < > 31   ANIONGAP 8 11 14  --  11 11 11  --  6   < >  --    < >  --   --    < > 8   BUN 40.7* 42.0* 50.3*  --  60.8* 61.9* 65.5*  --  21   < >  --    < >  --   --    < > 41*   CR 2.97* 3.01* 3.03*  --  3.30* 3.39* 3.75*  --  0.94   < >  --    < >  --   --    < > 9.83*   GFRESTIMATED 24* 23* 23*  --  21* 20* 18*  --  84   < >  --    < >  --   --    < > 6*   * 109* 142*  --  114* 143* 145*  --  152*   < >  --    < >  --   --    < > 95   A1C  --   --   --   --   --   --   --   --   --   --   --   --   --   --   --  5.1   CRISTIAN 7.5* 7.3* 8.2*  --  7.3* 7.9* 8.5*  --  8.4*   < >  --    < >  --   --    < > 8.7   PHOS  --   --   --   --  2.9  --   --   --  2.3*   < >  --    < >  --   --    < >  --    MAG 1.9 2.1 1.8   < > 1.1*  --   --   --   --    < >  --    < >  --   --    < >  --    LACT  --   --   --   --   --   --   --   --   --   --  2.1  --   --  4.2*   < >  --    CKT  --   --   --   --   --   --   --   --   --   --   --   --  336*  --   --   --     < > = values in this interval not displayed.       Hepatic Studies    Recent Labs   Lab Test 10/03/23  1240 03/27/18  1510 10/12/15  1330   BILITOTAL 0.3  --  0.4   ALKPHOS 62  --  81   ALBUMIN 3.9 3.8 4.2   AST 12  --  10   ALT 7  --  16       Pancreatitis testing    Recent Labs   Lab Test 07/15/21  0722 05/08/18  0700 05/01/17  0707 06/24/16  0755 12/14/15  0715   TRIG 168* 253* 364* 300* 261*        Hematology Studies      Recent Labs   Lab Test 10/05/23  0542 10/04/23  0504 10/03/23  1240 09/26/17  1507 11/17/15  1139 10/30/15  0718 10/26/15  0707 10/25/15  0750 10/23/15  0652 10/21/15  0648 10/20/15  0715 10/19/15  0704   WBC 1.8* 2.3* 3.2* 5.7  --  4.7 7.3 7.6 5.4 3.5* 4.1 5.1   ANEU  --   --   --   --   --  3.3  --  6.4 3.9 3.0 3.7 4.7   ALYM  --   --   --   --   --  0.3*  --  0.1* 0.1* 0.1* 0.0* 0.3*   MELVIN  --   --   --   --   --  0.3  --  0.2 0.2 0.2 0.1 0.0   AEOS  --   --   --   --   --  0.3  --  0.1 0.0 0.0 0.0 0.0   HGB 9.3* 8.7* 10.8* 14.1 12.8* 11.9* 11.9* 11.8* 10.8* 10.5* 10.3* 10.3*   HCT 29.1* 26.0* 35.0* 43.4  --  38.4* 36.9* 35.8* 34.2* 32.6* 32.7* 32.4*   * 112* 122* 147*  --  282 267 269 232 163 136* 112*       Arterial Blood Gas Testing    Recent Labs   Lab Test 10/13/15  2113   PH 7.33*   PCO2 40   PO2 89   HCO3 21   O2PER 1L        Urine Studies     Recent Labs   Lab Test 10/03/23  2244 11/17/15  1410 10/14/15  2220 10/13/15  0725   URINEPH 6.0 6.5 5.5 5.0   NITRITE Negative Negative Negative Negative   LEUKEST Negative Negative Small* Negative   WBCU 1 1 15* 1       Vancomycin Levels     No lab results found.    Invalid input(s): VANCO  No lab results found.    Invalid input(s): VANCO        Hepatitis B Testing   Recent Labs   Lab Test 10/12/15  1330   HEPBANG Nonreactive   HBCM Nonreactive   A nonreactive result suggests lack of recent exposure to the virus in the   preceding 6 months.     HBEAGN Negative  Reference range: Negative  (Note)  Performed by [facility],  [address] [place] [id number] [phone]  [email or web address], Timo Salcido MD, Lab. Director         Hepatitis C Testing     Hepatitis C Antibody   Date Value Ref Range Status   10/12/2015  NR Final    Nonreactive   Assay performance characteristics have not been established for newborns,   infants, and children     06/18/2015  NR Final    Nonreactive   Assay performance characteristics have not been  established for newborns,   infants, and children         Respiratory Virus Testing    No results found for: RS, FLUAG    Last check of C difficile  C Difficile Toxin B by PCR   Date Value Ref Range Status   10/04/2023 Negative Negative Final     Comment:     A negative result does not exclude actual disease due to C. difficile and may be due to improper collection, handling and storage of the specimen or the number of organisms in the specimen is below the detection limit of the assay.              Imaging:     Results for orders placed or performed in visit on 12/28/21   DEXA - HIM Scan    [facility] PROGRESS NOTE  FOUND IN CARE EVERYWHERE

## 2023-10-09 NOTE — PLAN OF CARE
BP (!) 143/72 (BP Location: Left arm)   Pulse 60   Temp 97.8  F (36.6  C) (Oral)   Resp 16   Wt 59.6 kg (131 lb 8 oz)   SpO2 97%   BMI 21.88 kg/m      Shift: 1140-8841  Isolation Status: enteric for Norovirus   VS: stable on RA, afebrile  Neuro: Aox4  Behaviors: none  BG: none   Labs: cr 2.9  Respiratory: RA  Cardiac: WNL   Pain/Nausea: denied pain and nausea   PRN: none  Diet: Regular   IV Access: PIV SL   Infusion(s): none   Lines/Drains: none  GI/: continent of bladder and last loose stool on 10/8 eves    Skin: intact   Mobility: UAL   Plan: Daily Ganciclovir will recheck CMV levels

## 2023-10-09 NOTE — PLAN OF CARE
BP (!) 143/72 (BP Location: Left arm)   Pulse 60   Temp 97.8  F (36.6  C) (Oral)   Resp 16   Wt 59.6 kg (131 lb 8 oz)   SpO2 97%   BMI 21.88 kg/m      Shift: 3024-2850  Isolation Status: Enteric  VS: stable on room air, afebrile  Neuro: Aox4  Behaviors: calm, cooperative; able to make needs known   BG: N/A  Labs: no new labs  Respiratory: WDL  Cardiac: WDL, slightly hypertensive  Pain/Nausea: denies pain, nausea  PRN: none given  Diet: regular, good appetite  IV Access: L PIV SL  Infusion(s): N/A  Lines/Drains: N/A  GI/: 1 loose BM on shift, voiding spontaneously not saving   Skin: WDL, redness on neck   Mobility: UAL  Events/Education: IV gancyclovir given   Plan: will continue POC and notify team of changes     normal

## 2023-10-09 NOTE — PROGRESS NOTES
[facility]    Medicine Progress Note - Medicine Service, [facility] TEAM 2    Date of Admission:  10/3/2023    Assessment & Plan   Haider Torrez is a 58 year old male admitted on 10/3/2023. He has a PMH of IgA nephropathy s/p kidney transplant (2004, 2015), BK and CMV viremia, secondary hyperparathyroid presenting with persistent diarrhea, concerning for CMV colitis vs. MMF colitis and MATTHEW.     Today:  - continue IV ganciclovir, renally dosing  - daily monitoring of CBC with diff and Cr  - pending repeat CMV VL from blood     Kidney transplant  MATTHEW  Proteinuria  Baseline Cr 2.1-2.5, today 3.9. Proteinuria since 8/23.   - consult transplant nephrology  - Continue PJP prophylaxis with bactrim    - EBV PCR negative     CMV colitis  Diarrhea  He has a history of norovirus diarrhea and MATTHEW in March 2023. Patient has been having loose bowel movement, especially after eating for a month, and recently test positive for norovirus in stool on 9/21.   -Colonoscopy 10/2022 with mild archietectural distortion, focal crypt degeneration. Negative stain for CMV   - consult GI for concerns for CMV vs MMF colitis vs. Chronic norovirus: colonoscopy 10/5, pending biopsy  - C. Diff panel neg  - enteric stool panel: norovirus positive, but transplant ID will not start nitaxoxanide due to poor evidence. Would suggest weaning immunosuppression.   - CMV from colonic biopsy pos, ID suggests starting IV ganciclovir - renally dosing     NAGMA, resolved  Likely secondary to diarrhea. Normalized.   - discontinue PTA bicarb 1,950 mg TID --> 10/6 Decrease sodium bicarbonate to 1300 mg PO TID   - discontinue D5 with 150 mEq bicarb infusion in ED due to resolved acidosis.      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 This hase been managed conservatively with decrease in MPA (patient  did not actually decrease dose)              -Pt is CMV IgG+ at time of transplant.               -IgG >600. CMV has been low level but present               -concern at this point with worsening diarrhea that he has CMV colitis. Recommend repeat colonoscopy with biopsies, and if CMV + would treat with IV GCV.   - IVIG 0.5g/kg x1 10/4, IgG level: normal 10/5     Hypertension  On carvedilol 25 mg BID     Anemia of CKD  Normal Iron study. 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  On immunosuppressants for transplanted kidney: mycophenolic acid 360 mg bid, prednisone 5 mg daily, tacrolimus 2.5 mg bid         10/04/23 05:04 10/05/23 05:42   WBC 2.3  1.8    Hemoglobin 8.7  9.3    Platelet Count 112  100    Absolute Neutrophils 1.5             Diet: Regular Diet Adult    DVT Prophylaxis: Ambulate every shift  Fernandez Catheter: Not present  Lines: None     Cardiac Monitoring: None  Code Status: Full Code      Clinically Significant Risk Factors         # Hypernatremia: Highest Na = 146 mmol/L in last 2 days, will monitor as appropriate      # Hypoalbuminemia: Lowest albumin = 2.9 g/dL at 10/8/2023  9:46 AM, will monitor as appropriate   # Thrombocytopenia: Lowest platelets = 79 in last 2 days, will monitor for bleeding   # Hypertension: Noted on problem list                   Disposition Plan      Expected Discharge Date: 10/10/2023        Discharge Comments: Likely home; pending improvements in diarrhea and lower CMV viral load        The patient's care was discussed with the Attending Physician,  Dr. Perez .    Yovana Flanagan MD  Medicine Service, [facility] TEAM 2  [facility]  Securely message with [employer] (more info)  Text page via [email or web address] Paging/Directory   See signed in provider for up to date coverage information  ______________________________________________________________________    Interval History   Patient feeling better. He is having less diarrhea. He has the appetite, and can eat. Is also able to take frequent walks around the unit.     Physical Exam   Vital Signs: Temp: 98  F (36.7  C) Temp src: Oral BP: 130/78 Pulse: 60   Resp: 16 SpO2: 100 % O2 Device: None (Room air)    Weight: 131 lbs 8 oz        Medical Decision Making             Data

## 2023-10-09 NOTE — PROGRESS NOTES
[facility]   Transplant Nephrology Progress Note  Date of Admission:  10/3/2023  Today's Date: 10/09/2023    Recommendations:  - Recheck tacrolimus level tomorrow morning (ordered).  - Will refer for retransplant evaluation as outpatient once acute issues resolve.    Assessment & Plan   # DDKT: Increased serum creatinine this admission.  - MATTHEW felt secondary to dehydration.    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    # Immunosuppression: Tacrolimus immediate release (goal 4-6), Mycophenolic acid (dose 360 mg every 12 hours), and Prednisone (dose 5 mg daily)   - Continue with intensive monitoring of immunosuppression for efficacy and toxicity.   - 10/09/23  Tacrolimus level: 5.1 (collected after morning dose)   - Changes: No.  Myfortic already reduced.    # Infection Prophylaxis:   - PJP: Sulfa/TMP (Bactrim)    # Norovirus  # CMV Colitis  # Diarrhea:  Pt presented 10/3/23 with persistent diarrhea.  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  - Colonoscopy 10/2022 with mild archietectural distortion, focal crypt degeneration. 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     - 10/4/23 IVIG 0.5 g/kg given.   - 10/5/23 Repeat  colonoscopy.  Biopsy results show crypt distortion.  Active area with inflammation with ulceration and granulation tissue; features concerning but not conclusive for viral cytopathic changes. 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     10/4/2023 C. Diff Toxin PCR Negative    10/3/2023   EBV DNA PCR Quantitative Whole Blood Not detected    10/3/2023  IgG 810    10/3/2023  Enteric Bacteria and Virus Panel PCR Positive for norovirus.      -Check CMV DNA PCR weekly  - IV ganciclovir 10/6/23 - present     03/20/23  04/10/23  05/01/23  10/6/23   CMV DNA Quant  622 !  460 !  318 !  252 !        # Hypertension: Borderline control;  Goal BP: < 140/90 (Hospitalization goal)   - Changes: Not at this time.  Continue carvedilol  25 mg PO BID.    # Anemia in Chronic Renal Disease: Hgb: Stable     DANNY: No   - Iron studies: Replete.    # Mineral Bone Disorder:   - Secondary renal hyperparathyroidism; PTH level: Mildly elevated (151-300 pg/ml)        On treatment: None  - Vitamin D; level: Not checked recently, but was normal last check         On supplement: Yes  - Calcium; level: Normal when corrected for low alb            On supplement: Yes, calcium carbonate 500 mg PO BID between meals.  - Phosphorus; level: Low  (will monitor)         On supplement: No, will start supplement if phos level drops to 2 or less.    # Electrolytes:   - Potassium; level: Normal         On supplement: No,   - Magnesium; level: Normal  10/6/23      On supplement: No  - Bicarbonate; level: Normal        On supplement: Yes,  sodium bicarbonate 1300 mg PO TID    # Proteinuria:   -1.2g/g in previous check in 2/2023, 3.3g on 24h collection in 8/2023   - A1c 5.8%.    - 10/3/23 SPEP: monoclonal peak normal.   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      Pt seen and discussed with Dr. Winn.    Kurt Shane, MARCUS CNP   Pager: [phone]        Physician Attestation     I saw and evaluated Haider Torrez as part of a shared APRN/PA visit.     I personally reviewed the vital signs, medications, labs, and imaging.    I personally performed the substantive portion of the medical decision making for this visit - please see the [facility]'s documentation for full details.    Key management decisions made by me and carried out under my direction: diarrhea improving on iv renally dosed GCV and reduced dose MPA. Appreciate TID recs.     Janel Winn MD  Date of Service (when I saw the patient): 10/09/23    Interval History   Mr. Torrez's creatinine is 2.76 (10/09 0534); Trend down from 2.96 yesterday.    Other significant labs/tests/vitals: SBP 140s - 150s overnight. Afebrile  No acute events overnight.  No chest pain or shortness of breath.  No leg swelling.  No nausea and vomiting.  Bowel movements are loose, but becoming less frequent.      Review of Systems   4 point ROS was obtained and negative except as noted in the Interval History.    MEDICATIONS:   calcium carbonate  500 mg Oral BID    carvedilol  25 mg Oral BID w/meals    cholecalciferol  10 mcg Oral Daily    clonazePAM  0.5 mg Oral At Bedtime    folic acid  1 mg Oral Daily    ganciclovir (CYTOVENE) 70 mg in D5W 100 mL intermittent infusion  1.25 mg/kg Intravenous Q24H    levothyroxine  100 mcg Oral Daily    mycophenolic acid  360 mg Oral BID    pantoprazole  40 mg Oral Daily    predniSONE  5 mg Oral Daily    rOPINIRole  1 mg Oral At Bedtime    sodium bicarbonate  1,300 mg Oral TID    sulfamethoxazole-trimethoprim  1 tablet Oral Once per day  on     tacrolimus  2.5 mg Oral BID IS         Physical Exam   Temp  Av.7  F (36.5  C)  Min: 97.3  F (36.3  C)  Max: 98.1  F (36.7  C)      Pulse  Av.9  Min: 53  Max: 66 Resp  Av.6  Min: 16  Max: 19  SpO2  Av.6 %  Min: 98 %  Max: 100 %     BP (!) 148/75 (BP Location: Left arm)   Pulse 56   Temp 98  F (36.7  C) (Oral)   Resp 16   Wt 59.6 kg (131 lb 8 oz)   SpO2 98%   BMI 21.88 kg/m      Admit Weight: 57.1 kg (125 lb 14.1 oz)     GENERAL APPEARANCE: alert and no distress  RESP: lungs clear to auscultation - no rales, rhonchi or wheezes  CV: regular rhythm, normal rate, no rub, no murmur  EDEMA: no LE edema bilaterally  ABDOMEN: soft, nondistended, nontender, bowel sounds hyperactive  MS: extremities normal - no gross deformities noted, no evidence of inflammation in joints, no muscle tenderness  SKIN: no rash  PSYCH: mentation appears normal and affect normal  DIALYSIS ACCESS:  RUE AV fistula +bruit/+thrill    Data   All labs reviewed by me.  CMP  Recent Labs   Lab 10/09/23  0534 10/08/23  0946 10/07/23  0542 10/06/23  0537 10/05/23  0542 10/04/23  1844 10/04/23  0504 10/03/23  2021 10/03/23  1240    146* 141 146* 145 145 145   < > 144   POTASSIUM 4.3 3.8 3.9 3.3* 3.1* 3.4 3.4   < > 4.9   CHLORIDE 109* 109* 112* 110* 108* 109* 118*   < > 121*   CO2 25 27 25 28 26 22 16*   < > 12*   ANIONGAP 9 10 4* 8 11 14 11   < > 11   * 117* 108* 108* 109* 142* 114*   < > 145*   BUN 42.4* 39.3* 40.8* 40.7* 42.0* 50.3* 60.8*   < > 65.5*   CR 2.76* 2.96* 2.99* 2.97* 3.01* 3.03* 3.30*   < > 3.75*   GFRESTIMATED 26* 24* 24* 24* 23* 23* 21*   < > 18*   CRISTIAN 8.0* 8.1* 7.8* 7.5* 7.3* 8.2* 7.3*   < > 8.5*   MAG  --   --   --  1.9 2.1 1.8 1.1*  --   --    PHOS  --   --   --   --   --   --  2.9  --   --    PROTTOTAL  --  5.1*  --   --   --   --   --   --  6.1*   ALBUMIN  --  2.9*  --   --   --   --   --   --  3.9   BILITOTAL  --  0.2  --   --   --   --   --   --  0.3   ALKPHOS  --  52  --   --    --   --   --   --  62   AST  --  18  --   --   --   --   --   --  12   ALT  --  <5  --   --   --   --   --   --  7    < > = values in this interval not displayed.     CBC  Recent Labs   Lab 10/09/23  0534 10/07/23  0542 10/05/23  0542 10/04/23  0504   HGB 8.6* 8.4* 9.3* 8.7*   WBC 2.3* 2.3* 1.8* 2.3*   RBC 2.91* 2.89* 3.18* 2.91*   HCT 28.5* 27.3* 29.1* 26.0*   MCV 98 95 92 89   MCH 29.6 29.1 29.2 29.9   MCHC 30.2* 30.8* 32.0 33.5   RDW 13.2 13.7 14.1 14.3   PLT 79* 63* 100* 112*     INR  Recent Labs   Lab 10/04/23  1203   INR 1.30*     ABGNo lab results found in last 7 days.   Urine Studies  Recent Labs   Lab Test 10/03/23  2244 11/17/15  1410 10/14/15  2220 10/13/15  0725   COLOR Light Yellow Light Yellow Yellow Straw   APPEARANCE Clear Clear Clear Clear   URINEGLC Negative Negative Negative Negative   URINEBILI Negative Negative Negative Negative   URINEKETONE Negative Negative Negative Negative   SG 1.014 1.007 1.006 1.003   UBLD Negative Negative Large* Large*   URINEPH 6.0 6.5 5.5 5.0   PROTEIN 200* Negative 10* Negative   NITRITE Negative Negative Negative Negative   LEUKEST Negative Negative Small* Negative   RBCU 1 0 >182* 57*   WBCU 1 1 15* 1     Recent Labs   Lab Test 03/27/18  1516 09/26/17  1506   UTPG 0.12 0.10     PTH  Recent Labs   Lab Test 03/27/18  1510 10/15/15  0626   PTHI 126* 561*     Iron Studies  Recent Labs   Lab Test 03/27/18  1510 10/17/15  0700   IRON 67 25*    216*   IRONSAT 24 12*   * 883*       IMAGING:  All imaging studies reviewed by me.

## 2023-10-09 NOTE — PLAN OF CARE
Vitals: BP (!) 148/75 (BP Location: Left arm)   Pulse 56   Temp 98  F (36.7  C) (Oral)   Resp 16   Wt 59.6 kg (131 lb 8 oz)   SpO2 98%   BMI 21.88 kg/m      Endocrine: n/a  Labs: Improving creatinine, stable labs.  Pain: Denies pain.  PRN's: n/a  Diet: Regular diet, eating well.  LDA: L PIV saline locked.  GI: One small loose BM overnight.  : Voids, not saving.  Skin: Skin is intact.  Neuro: Alert and oriented.  Mobility: Up ad evelin.  Education: n/a  Plan: Continue with Gancyclovir.

## 2023-10-10 LAB
BASO+EOS+MONOS # BLD AUTO: ABNORMAL 10*3/UL
BASO+EOS+MONOS NFR BLD AUTO: ABNORMAL %
BASOPHILS # BLD AUTO: 0 10E3/UL (ref 0–0.2)
BASOPHILS NFR BLD AUTO: 0 %
CMV DNA SPEC NAA+PROBE-ACNC: <35 IU/ML
CMV DNA SPEC NAA+PROBE-LOG#: <1.5 {LOG_COPIES}/ML
CREAT SERPL-MCNC: 2.86 MG/DL (ref 0.67–1.17)
EGFRCR SERPLBLD CKD-EPI 2021: 25 ML/MIN/1.73M2
EOSINOPHIL # BLD AUTO: 0.1 10E3/UL (ref 0–0.7)
EOSINOPHIL NFR BLD AUTO: 3 %
ERYTHROCYTE [DISTWIDTH] IN BLOOD BY AUTOMATED COUNT: 13.4 % (ref 10–15)
HCT VFR BLD AUTO: 27.8 % (ref 40–53)
HGB BLD-MCNC: 8.6 G/DL (ref 13.3–17.7)
IMM GRANULOCYTES # BLD: 0 10E3/UL
IMM GRANULOCYTES NFR BLD: 1 %
LYMPHOCYTES # BLD AUTO: 0.4 10E3/UL (ref 0.8–5.3)
LYMPHOCYTES NFR BLD AUTO: 18 %
MCH RBC QN AUTO: 29.7 PG (ref 26.5–33)
MCHC RBC AUTO-ENTMCNC: 30.9 G/DL (ref 31.5–36.5)
MCV RBC AUTO: 96 FL (ref 78–100)
MONOCYTES # BLD AUTO: 0.3 10E3/UL (ref 0–1.3)
MONOCYTES NFR BLD AUTO: 15 %
NEUTROPHILS # BLD AUTO: 1.3 10E3/UL (ref 1.6–8.3)
NEUTROPHILS NFR BLD AUTO: 63 %
NRBC # BLD AUTO: 0 10E3/UL
NRBC BLD AUTO-RTO: 0 /100
PHOSPHATE SERPL-MCNC: 2.8 MG/DL (ref 2.5–4.5)
PLATELET # BLD AUTO: 91 10E3/UL (ref 150–450)
RBC # BLD AUTO: 2.9 10E6/UL (ref 4.4–5.9)
TACROLIMUS BLD-MCNC: 4.8 UG/L (ref 5–15)
TME LAST DOSE: ABNORMAL H
TME LAST DOSE: ABNORMAL H
WBC # BLD AUTO: 2.1 10E3/UL (ref 4–11)

## 2023-10-10 PROCEDURE — 84100 ASSAY OF PHOSPHORUS: CPT

## 2023-10-10 PROCEDURE — 250N000012 HC RX MED GY IP 250 OP 636 PS 637

## 2023-10-10 PROCEDURE — 82565 ASSAY OF CREATININE: CPT

## 2023-10-10 PROCEDURE — 250N000013 HC RX MED GY IP 250 OP 250 PS 637

## 2023-10-10 PROCEDURE — 36415 COLL VENOUS BLD VENIPUNCTURE: CPT

## 2023-10-10 PROCEDURE — 258N000003 HC RX IP 258 OP 636

## 2023-10-10 PROCEDURE — 120N000011 HC R&B TRANSPLANT UMMC

## 2023-10-10 PROCEDURE — 85025 COMPLETE CBC W/AUTO DIFF WBC: CPT

## 2023-10-10 PROCEDURE — 99232 SBSQ HOSP IP/OBS MODERATE 35: CPT | Mod: GC | Performed by: STUDENT IN AN ORGANIZED HEALTH CARE EDUCATION/TRAINING PROGRAM

## 2023-10-10 PROCEDURE — 99232 SBSQ HOSP IP/OBS MODERATE 35: CPT | Performed by: INTERNAL MEDICINE

## 2023-10-10 PROCEDURE — 250N000011 HC RX IP 250 OP 636: Mod: JZ

## 2023-10-10 PROCEDURE — 80197 ASSAY OF TACROLIMUS: CPT

## 2023-10-10 PROCEDURE — 99233 SBSQ HOSP IP/OBS HIGH 50: CPT

## 2023-10-10 RX ADMIN — CLONAZEPAM 0.5 MG: 0.5 TABLET ORAL at 22:19

## 2023-10-10 RX ADMIN — CHOLECALCIFEROL (VITAMIN D3) 10 MCG (400 UNIT) TABLET 10 MCG: at 09:04

## 2023-10-10 RX ADMIN — FOLIC ACID 1 MG: 1 TABLET ORAL at 09:04

## 2023-10-10 RX ADMIN — SODIUM BICARBONATE 650 MG TABLET 1300 MG: at 09:04

## 2023-10-10 RX ADMIN — MYCOPHENILIC ACID 360 MG: 360 TABLET, DELAYED RELEASE ORAL at 09:04

## 2023-10-10 RX ADMIN — GANCICLOVIR SODIUM 70 MG: 500 INJECTION, POWDER, LYOPHILIZED, FOR SOLUTION INTRAVENOUS at 19:05

## 2023-10-10 RX ADMIN — TACROLIMUS 2.5 MG: 1 CAPSULE ORAL at 09:03

## 2023-10-10 RX ADMIN — TACROLIMUS 2.5 MG: 1 CAPSULE ORAL at 18:12

## 2023-10-10 RX ADMIN — CALCIUM CARBONATE (ANTACID) CHEW TAB 500 MG 500 MG: 500 CHEW TAB at 16:00

## 2023-10-10 RX ADMIN — SODIUM BICARBONATE 650 MG TABLET 1300 MG: at 20:31

## 2023-10-10 RX ADMIN — CARVEDILOL 25 MG: 25 TABLET, FILM COATED ORAL at 09:04

## 2023-10-10 RX ADMIN — PREDNISONE 5 MG: 5 TABLET ORAL at 09:04

## 2023-10-10 RX ADMIN — LEVOTHYROXINE SODIUM 100 MCG: 100 TABLET ORAL at 09:04

## 2023-10-10 RX ADMIN — MYCOPHENILIC ACID 360 MG: 360 TABLET, DELAYED RELEASE ORAL at 20:31

## 2023-10-10 RX ADMIN — CALCIUM CARBONATE (ANTACID) CHEW TAB 500 MG 500 MG: 500 CHEW TAB at 11:17

## 2023-10-10 RX ADMIN — ROPINIROLE HYDROCHLORIDE 1 MG: 1 TABLET, FILM COATED ORAL at 22:19

## 2023-10-10 RX ADMIN — SODIUM BICARBONATE 650 MG TABLET 1300 MG: at 14:23

## 2023-10-10 RX ADMIN — PANTOPRAZOLE SODIUM 40 MG: 40 TABLET, DELAYED RELEASE ORAL at 09:04

## 2023-10-10 RX ADMIN — CARVEDILOL 25 MG: 25 TABLET, FILM COATED ORAL at 18:12

## 2023-10-10 ASSESSMENT — ACTIVITIES OF DAILY LIVING (ADL)
ADLS_ACUITY_SCORE: 23

## 2023-10-10 NOTE — PLAN OF CARE
Goal Outcome Evaluation:      Plan of Care Reviewed With: patient    Overall Patient Progress: improving  /74 (BP Location: Left arm)   Pulse 67   Temp 98.1  F (36.7  C) (Oral)   Resp 16   Wt 59.6 kg (131 lb 8 oz)   SpO2 98%   BMI 21.88 kg/m      Neuro: A&Ox4.   Cardiac: Afebrile, VSS.   Respiratory: RA   GI/: Voiding spontaneously. Not saving.  No BM this shift. LBM 10/9  Diet/appetite: Tolerating diet. Denies nausea   Activity: Up independent in room  Isolation:  enteric precautions for + norovirus and cytoxic precautions maintained.  Pain: . Denies   Skin: bruising on L arm above PIV site  Lines: PIV sl'd. AVM R arm- wdl  Plan: awaiting improvement of diarrhea and CMV count    Rested btwn cares. Able to make needs known. Continue to monitor. Notify MD of changes/concerns

## 2023-10-10 NOTE — PROGRESS NOTES
[facility]   Transplant Nephrology Progress Note  Date of Admission:  10/3/2023  Today's Date: 10/10/2023    Recommendations:  - Agree with continuing IV ganciclovir for now given diarrhea.  - Continue current immune suppression.    Assessment & Plan   # DDKT: Increased serum creatinine this admission.  - MATTHEW felt secondary to dehydration.    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    # Immunosuppression: Tacrolimus immediate release (goal 4-6), Mycophenolic acid (dose 360 mg every 12 hours), and Prednisone (dose 5 mg daily)   - Continue with intensive monitoring of immunosuppression for efficacy and toxicity.   - 10/10/23  Tacrolimus level: 4.8 (~ 12.5 hour trough)   - Changes: No.  Myfortic already reduced.    # Infection Prophylaxis:   - PJP: Sulfa/TMP (Bactrim)    # Norovirus  # CMV Colitis  # Diarrhea:  Pt presented 10/3/23 with persistent diarrhea.  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  - Colonoscopy 10/2022 with mild archietectural distortion, focal crypt degeneration. 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     - 10/4/23 IVIG 0.5 g/kg given.   - 10/5/23 Repeat colonoscopy.  Biopsy results show crypt  distortion.  Active area with inflammation with ulceration and granulation tissue; features concerning but not conclusive for viral cytopathic changes. 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     10/4/2023 C. Diff Toxin PCR Negative    10/3/2023   EBV DNA PCR Quantitative Whole Blood Not detected    10/3/2023  IgG 810    10/3/2023  Enteric Bacteria and Virus Panel PCR Positive for norovirus.      -Check CMV DNA PCR weekly  - IV ganciclovir 10/6/23 - present     03/20/23  04/10/23  05/01/23  10/6/23 10/9/23   CMV DNA Quant  622 !  460 !  318 !  252 ! <35        # Hypertension: Borderline control;  Goal BP: < 140/90 (Hospitalization goal)   - Changes: Not at this time.  Continue carvedilol  25 mg PO BID.    # Anemia in Chronic Renal Disease: Hgb: Stable     DANNY: No   - Iron studies: Replete.    # Mineral Bone Disorder:   - Secondary renal hyperparathyroidism; PTH level: Mildly elevated (151-300 pg/ml)        On treatment: None  - Vitamin D; level: Not checked recently, but was normal last check         On supplement: Yes  - Calcium; level: Normal when corrected for low alb            On supplement: Yes, calcium carbonate 500 mg PO BID between meals.  - Phosphorus; level: Normal           On supplement: No    # Electrolytes:   - Potassium; level: Normal         On supplement: No,   - Magnesium; level: Normal  10/6/23      On supplement: No  - Bicarbonate; level: Normal        On supplement: Yes,  sodium bicarbonate 1300 mg PO TID    # Proteinuria:   -1.2g/g in previous check in 2/2023, 3.3g on 24h collection in 8/2023   - A1c 5.8%.    - 10/3/23 SPEP: monoclonal peak normal.   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      Discussed with Dr. Winn.    MARCUS Bynum CNP   Pager: [phone]      Interval History   Mr. Glover creatinine is 2.86 (10/10 0614); Increased from 2.76 yesterday.     Other significant labs/tests/vitals: SBP 120s - 151 overnight. Afebrile.  CMV < 35.  No acute events overnight.  No chest pain or shortness of breath.  No leg swelling.  No nausea and vomiting.  Bowel movements are loose, but are less frequent.    Review of Systems   4 point ROS was obtained and negative except as noted in the Interval History.    MEDICATIONS:   calcium carbonate  500 mg Oral BID    carvedilol  25 mg Oral BID w/meals    cholecalciferol  10 mcg Oral Daily    clonazePAM  0.5 mg Oral At Bedtime    folic acid  1 mg Oral Daily    ganciclovir (CYTOVENE) 70 mg in D5W 100 mL intermittent infusion  1.25 mg/kg Intravenous Q24H    levothyroxine  100 mcg Oral Daily    mycophenolic acid  360 mg Oral BID    pantoprazole  40 mg Oral Daily    predniSONE  5 mg Oral Daily    rOPINIRole  1 mg Oral At Bedtime    sodium bicarbonate  1,300 mg Oral TID    sulfamethoxazole-trimethoprim  1 tablet Oral Once per day on     tacrolimus  2.5 mg Oral BID IS         Physical Exam   Temp  Av.7  F (36.5  C)  Min: 97.3  F (36.3  C)  Max: 98.1  F (36.7  C)      Pulse  Av.9  Min: 53  Max: 66 Resp  Av.6  Min: 16  Max: 19  SpO2  Av.6 %  Min: 98 %  Max: 100 %     BP (!) 150/77 (BP Location: Left arm)   Pulse 56   Temp 97.6  F (36.4  C) (Oral)   Resp 16   Wt 59.6 kg (131 lb 8 oz)   SpO2 99%   BMI 21.88 kg/m      Admit Weight: 57.1 kg (125 lb 14.1 oz)     GENERAL APPEARANCE: alert and no distress  RESP: lungs clear to auscultation - no rales, rhonchi or wheezes  CV: regular rhythm, normal rate, no rub, no murmur  EDEMA: no LE edema bilaterally  ABDOMEN:  soft, nondistended, nontender, bowel sounds hyperactive  MS: extremities normal - no gross deformities noted, no evidence of inflammation in joints, no muscle tenderness  SKIN: no rash  PSYCH: mentation appears normal and affect normal  DIALYSIS ACCESS:  RUE AV fistula +bruit/+thrill    Data   All labs reviewed by me.  CMP  Recent Labs   Lab 10/10/23  0614 10/09/23  0534 10/08/23  0946 10/07/23  0542 10/06/23  0537 10/05/23  0542 10/04/23  1844 10/04/23  0504 10/03/23  2021 10/03/23  1240   NA  --  143 146* 141 146* 145 145 145   < > 144   POTASSIUM  --  4.3 3.8 3.9 3.3* 3.1* 3.4 3.4   < > 4.9   CHLORIDE  --  109* 109* 112* 110* 108* 109* 118*   < > 121*   CO2  --  25 27 25 28 26 22 16*   < > 12*   ANIONGAP  --  9 10 4* 8 11 14 11   < > 11   GLC  --  101* 117* 108* 108* 109* 142* 114*   < > 145*   BUN  --  42.4* 39.3* 40.8* 40.7* 42.0* 50.3* 60.8*   < > 65.5*   CR 2.86* 2.76* 2.96* 2.99* 2.97* 3.01* 3.03* 3.30*   < > 3.75*   GFRESTIMATED 25* 26* 24* 24* 24* 23* 23* 21*   < > 18*   CRISTIAN  --  8.0* 8.1* 7.8* 7.5* 7.3* 8.2* 7.3*   < > 8.5*   MAG  --   --   --   --  1.9 2.1 1.8 1.1*  --   --    PHOS 2.8 2.4*  --   --   --   --   --  2.9  --   --    PROTTOTAL  --   --  5.1*  --   --   --   --   --   --  6.1*   ALBUMIN  --   --  2.9*  --   --   --   --   --   --  3.9   BILITOTAL  --   --  0.2  --   --   --   --   --   --  0.3   ALKPHOS  --   --  52  --   --   --   --   --   --  62   AST  --   --  18  --   --   --   --   --   --  12   ALT  --   --  <5  --   --   --   --   --   --  7    < > = values in this interval not displayed.     CBC  Recent Labs   Lab 10/10/23  0614 10/09/23  0534 10/07/23  0542 10/05/23  0542   HGB 8.6* 8.6* 8.4* 9.3*   WBC 2.1* 2.3* 2.3* 1.8*   RBC 2.90* 2.91* 2.89* 3.18*   HCT 27.8* 28.5* 27.3* 29.1*   MCV 96 98 95 92   MCH 29.7 29.6 29.1 29.2   MCHC 30.9* 30.2* 30.8* 32.0   RDW 13.4 13.2 13.7 14.1   PLT 91* 79* 63* 100*     INR  Recent Labs   Lab 10/04/23  1203   INR 1.30*     ABGNo lab results  found in last 7 days.   Urine Studies  Recent Labs   Lab Test 10/03/23  2244 11/17/15  1410 10/14/15  2220 10/13/15  0725   COLOR Light Yellow Light Yellow Yellow Straw   APPEARANCE Clear Clear Clear Clear   URINEGLC Negative Negative Negative Negative   URINEBILI Negative Negative Negative Negative   URINEKETONE Negative Negative Negative Negative   SG 1.014 1.007 1.006 1.003   UBLD Negative Negative Large* Large*   URINEPH 6.0 6.5 5.5 5.0   PROTEIN 200* Negative 10* Negative   NITRITE Negative Negative Negative Negative   LEUKEST Negative Negative Small* Negative   RBCU 1 0 >182* 57*   WBCU 1 1 15* 1     Recent Labs   Lab Test 03/27/18  1516 09/26/17  1506   UTPG 0.12 0.10     PTH  Recent Labs   Lab Test 03/27/18  1510 10/15/15  0626   PTHI 126* 561*     Iron Studies  Recent Labs   Lab Test 03/27/18  1510 10/17/15  0700   IRON 67 25*    216*   IRONSAT 24 12*   * 883*       IMAGING:  All imaging studies reviewed by me.

## 2023-10-10 NOTE — PLAN OF CARE
/70 (BP Location: Left arm)   Pulse 62   Temp 97.8  F (36.6  C) (Oral)   Resp 16   Wt 59.6 kg (131 lb 8 oz)   SpO2 99%   BMI 21.88 kg/m     Neuro: A/Ox4  VS: VSS on RA  Pain: Denies  GI: on regular diet and tolerating good. Denies nausea. Diarrhea x2  : Voiding not saving  PIV: SL  Activity - Independent  Education - Infection prevention  Plan of Care - Continue with POC

## 2023-10-10 NOTE — PROGRESS NOTES
[facility]    Medicine Progress Note - Medicine Service, DENA TEAM 2    Date of Admission:  10/3/2023    Assessment & Plan   Haider Torrez is a 58 year old male admitted on 10/3/2023. He has a PMH of IgA nephropathy s/p kidney transplant (2004, 2015), BK and CMV viremia, secondary hyperparathyroid presenting with persistent diarrhea, concerning for CMV colitis vs. MMF colitis and MATTHEW.     Today:  - continue IV ganciclovir, renally dosing - as repeat CMV VL is lower but pt still having diarrhea  - daily monitoring of CBC with diff and Cr: WBC is trending down - will keep close watch     Kidney transplant  MATTHEW  Proteinuria  Baseline Cr 2.1-2.5, Cr 3.75 at admission trending down closer back to baseline. Proteinuria since 8/23.   - consult transplant nephrology  - Continue PJP prophylaxis with bactrim    - EBV PCR negative     CMV colitis  Diarrhea  He has a history of norovirus diarrhea and MATTHEW in March 2023. Patient has been having loose bowel movement, especially after eating for a month, and recently test positive for norovirus in stool on 9/21.   -Colonoscopy 10/2022 with mild archietectural distortion, focal crypt degeneration. Negative stain for CMV   - consult GI for concerns for CMV vs MMF colitis vs. Chronic norovirus: colonoscopy 10/5, pending biopsy  - C. Diff panel neg  - enteric stool panel: norovirus positive, but transplant ID will not start nitaxoxanide due to poor evidence. Would suggest weaning immunosuppression.   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 This hase been managed conservatively with decrease in MPA (patient did not actually decrease dose)              -Pt is CMV IgG+ at time of transplant.               -IgG >600. CMV has been low  level but present               -concern at this point with worsening diarrhea that he has CMV colitis. Recommend repeat colonoscopy with biopsies, and if CMV + would treat with IV GCV.   - IVIG 0.5g/kg x1 10/4, IgG level: normal 10/5    Chronic issues  NAGMA, resolved  Likely secondary to diarrhea. Normalized.   - discontinue PTA bicarb 1,950 mg TID --> 10/6 Decrease sodium bicarbonate to 1300 mg PO TID   - discontinue D5 with 150 mEq bicarb infusion in ED due to resolved acidosis.      Hypertension  On carvedilol 25 mg BID     Anemia of CKD  Normal Iron study. 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  On immunosuppressants for transplanted kidney: mycophenolic acid 360 mg bid, prednisone 5 mg daily, tacrolimus 2.5 mg bid         10/04/23 05:04 10/05/23 05:42   WBC 2.3  1.8    Hemoglobin 8.7  9.3    Platelet Count 112  100    Absolute Neutrophils 1.5             Diet: Regular Diet Adult    DVT Prophylaxis: Ambulate every shift  Fernandez Catheter: Not present  Lines: None     Cardiac Monitoring: None  Code Status: Full Code      Clinically Significant Risk Factors              # Hypoalbuminemia: Lowest albumin = 2.9 g/dL at 10/8/2023  9:46 AM, will monitor as appropriate   # Thrombocytopenia: Lowest platelets = 79 in last 2 days, will monitor for bleeding   # Hypertension: Noted on problem list                   Disposition Plan      Expected Discharge Date: 10/10/2023        Discharge Comments: Likely home; pending improvements in diarrhea and lower CMV viral load        The patient's care  was discussed with the Attending Physician, Dr. Perez .    Yvoana Flanagan MD  Medicine Service, [facility] ([facility]3] TEAM 2  [facility]  Securely message with [employer] (more info)  Text page via [employer] Paging/Directory   See signed in provider for up to date coverage information  ______________________________________________________________________    Interval History   Patient having 2 diarrhea this am. Still doing well with diet and walks.     Physical Exam   Vital Signs: Temp: 97.8  F (36.6  C) Temp src: Oral BP: 138/70 Pulse: 62   Resp: 16 SpO2: 99 % O2 Device: None (Room air)    Weight: 131 lbs 8 oz        Medical Decision Making             Data

## 2023-10-10 NOTE — PROGRESS NOTES
SOLID ORGAN INFECTIOUS DISEASES PROGRESS NOTE     Patient:  Haider Torrez   YOB: 1965  Date of Visit:  10/10/2023  Date of Admission: 10/3/2023  Consult Requester:Phoenix Alcazar MD          Assessment and Recommendations:   Recommendations:-  - Given continued diarrhea, would favor continuing IV ganciclovir induction for now  - Continue daily monitoring of CBC and creatinine; if WBC continues to downtrend may need to consider other options  - Repeat CMV VL from blood weekly  - Although CMV VL is <35, suspect this does not entirely reflect the extent of tissue involvement    Dr Serafin Rudd ([phone]) will assume this patient's care for transplant ID tomorrow.    Assessment:  Haider Torrez is a 58 year old male with PMHx significant for IgA nephropathy s/p kidney transplant (2004, 2015) on tacrolimus + mycophenolic acid + prednisone 5 mg, history of BK viremia, CMV viremia, hypertension, and secondary hyperparathyroid who presented on 10/3/23 with persistent diarrhea x3-4 weeks with concern for CMV proctitis/colitis.    CMV proctitis/colitis  CMV viremia, D-/R+  Leukopenia  Low level viremia has been ongoing with levels 100-600 (log 2.14-2.79) through this past year. Not on any prophylaxis at present, treated with reduction of IS. Colonoscopy pathology positive for CMV colitis. Initiated gancivlovir with good symptomatic response. Although CMV VL now <35, suspect a greater degree of tissue involvement and favor continuing IV induction for now until clear clinical improvement. Plan transition to PO valganciclovir when clearly improving, if WBCs stabilize.    Acute on chronic diarrhea  Chronic Norovirus  Previous +norovirus in 03/2023 that improved with supportive care. Ongoing diarrhea prompting admission with negative C difficile and enteric panel here. Does have leukopenia, no abdominal pain or other symptoms. Diarrhea may be related to chronic norovirus in immunocompromised host vs CMV  colitis. S/p colonoscopy 10/5 with pathology pending. There is no effective therapy for treatment of norovirus and prolonged shedding is common in transplant patients. Cornerstone of therapy is reducing immunosuppression (mycophenolate reduced here) and supportive care. Agree with workup to date, symptoms more likely related to CMV colitis.    Previous ID Issues:  #COVID (+09/20/22 s/p 5 days remdesivir) c/b post-COVID bacterial pneumonia ([place], 10/2022) - treated with vanc x1, 3 days of azithromycin, and 5 days cefepime.     Other ID issues:  - QTc interval:  443msec on 10/3/23  - Bacterial prophylaxis:    - Pneumocystis prophylaxis:  Bactrim  - Viral serostatus & prophylaxis:  CMV D-/R+, EBV D+/R+  - Fungal prophylaxis:    - Immunization status:  Due for annual COVID, flu, and RSV.   - Gamma globulin status:  810 on 10/3/23  - Isolation status: enteric (+norovirus)    Thank you for the consult. Transplant ID will continue to follow with you.     45 MINUTES SPENT BY ME on the date of service doing chart review, history, exam, documentation & further activities per the note.      Mychal Zazueta MD, PhD  Transplant Infectious Diseases Attending Physician  [phone]        Interim History:   Still having 1-2 episodes of diarrhea a day. Despite this, feeling better,           History of Present Illness:   Transplants:  10/13/2015 (Kidney), 4/15/2004 (Kidney), Postoperative day:  2919     Haider Torrez is a 58 year old male with PMHx significant for IgA nephropathy s/p kidney transplant (2004, 2015) on tacrolimus + mycophenolic acid + prednisone 5 mg, history of BK viremia, CMV viremia, hypertension, and secondary hyperparathyroid who presented on 10/3/23 with persistent diarrhea x3 weeks. ID consulted for CMV colitis.    Admission in March 2023 for diarrhea, found +norovirus and symptoms mildly improved with supportive care, no other treatment given. May have had imodium on 3/7 and this helped. Colonoscopy 10/2022  without evidence of CMV, though notable for focal crypt degeneration and mild architectural distortion c/f possible medication related colitis. Remains with +norovirus on enteric panel 9/21/23.      He reports diarrhea for the last 3-4 weeks - water, undigested food that is postprandial 4-10x/day. Denies black or bloody stools. No incontinence, no nocturnal symptoms. Seen at outside ED 10/2 - notable for MATTHEW, given IV fluids and discharged. Saw transplant nephrology for virtual visit on 10/2 who recommended admission and he presented to [facility] ED 10/3. He also reported dizziness. Denies fever, chills, night sweats, chest pain, cough, dyspnea, nausea, vomiting, abdominal pain, graft pain, urinary symptoms, vision changes, headache, or skin rash. Endorses food avoidance due to fear of diarrhea and 10 lb weight loss in the last month. Vitals stable. Labs notable for WBC 3.2 --> 1.8, MATTHEW with creatinine 3.75 --> 2.97. C difficile negative, enteric panel +10/4. UA noninfectious appearing. EBV not detected. Hepatic panel unremarkable. Immunosuppression was just decreased MPA to 360 mg BID, remains on prednisone 5 mg, tacrolimus (goal 4-6). Was given IVIG once here.     He had inpatient colonoscopy completed 10/5/23 - notable for diverticulosis, granula mucosa in rectum up to 3 cm proximal, non-bleeding internal hemorrhoids (with a mucosal nodule on top of internal hemorrhoid - scarring vs tumor, s/p biopsy), and otherwise normal appearing mucosa throughout remaining colon. Pathology as below:  Final Diagnosis   A. COLON, RANDOM BIOPSY:  Colonic mucosa with mild crypt architectural distortion, evidence of prior injury; no current evidence of cryptitis, dysplasia, or other histologic abnormality; report of CMV & adenovirus immunohistochemistry to follow      B. DISTAL RECTUM, BIOPSY:  Colonic mucosa with mild reactive changes, otherwise no evidence of microscopic or chronic colitis or other significant histologic abnormality;  report of CMV & adenovirus immunohistochemistry to follow       3. ANORECTUM, BIOPSY OF NODULE AT TRANSITION:  Colonic mucosa with polypoid features, active inflammation with ulceration and granulation tissue; features concerning but not conclusive for viral cytopathic changes; report of adenovirus & CMV immunohistochemistry to follow      Has travelled to [facility] once to visit family. Also been to [place]. He has no unusual food exposures, no animal exposures, no recent sick contacts or antibiotic use. Lives with wife and 1 son.          Review of Systems:     CONSTITUTIONAL:  No fevers, chills, or sweats.  EYES: negative for icterus, conjunctival injection or drainage.  ENT:  negative for nasal congestion, rhinorrhea, or sore throat.   RESPIRATORY:  negative for cough, sputum production, or SOB.  CARDIOVASCULAR:  negative for chest pain or dyspnea.   GASTROINTESTINAL:  negative for nausea, vomiting, diarrhea and constipation.  GENITOURINARY:  negative for dysuria, frequency or urgency.   HEME:  No easy bruising.  INTEGUMENT:  negative for rash and pruritus.  NEURO:  Negative for headache, vision changes, or weakness.         Past Medical History:     Past Medical History:   Diagnosis Date    Anemia of chronic kidney failure     Carpal tunnel syndrome     Clinical diagnosis of COVID-19 9/22/2022    Dyslipidemia     History of respiratory failure     Hypertension     Hypomagnesemia 2015    IgA nephropathy     Insomnia     Kidney disease, chronic, end stage on dialysis (H)     Kidney transplant failure     Myoclonus     Obstructive sleep apnea     Papillary thyroid carcinoma (H)     PRES (posterior reversible encephalopathy syndrome)     2011 after thyroidectomy    Restless leg syndrome     Secondary hyperparathyroidism (H24)     Seizure disorder (H)     Post surgery. Keppra now discontinued    Urinoma of kidney transplant             Past Surgical History:     Past Surgical History:   Procedure Laterality Date    BENCH  KIDNEY  10/13/2015    Procedure: BENCH KIDNEY;  Surgeon: Seth Gibbons MD;  Location: [facility] OR    COLONOSCOPY N/A 10/5/2023    Procedure: COLONOSCOPY, WITH POLYPECTOMY AND BIOPSY;  Surgeon: Russ Dodge MD;  Location: [facility] GI    CREATE FISTULA ARTERIOVENOUS UPPER EXTREMITY      CYSTOSCOPY, REMOVE STENT(S), COMBINED N/A 2015    Procedure: COMBINED CYSTOSCOPY, REMOVE STENT(S);  Surgeon: Seth Gibbons MD;  Location: [facility] OR    CYSTOSCOPY, REMOVE STENT(S), COMBINED Bilateral 2015    Procedure: COMBINED CYSTOSCOPY, REMOVE STENT(S);  Surgeon: Seth Gibbons MD;  Location: [facility] OR    ELBOW SURGERY Right     hemithyroidectomy Left     HERNIA REPAIR Right 3/2009    THYROIDECTOMY      TRANSPLANT KIDNEY RECIPIENT  DONOR      TRANSPLANT KIDNEY RECIPIENT  DONOR N/A 10/13/2015    Procedure: TRANSPLANT KIDNEY RECIPIENT  DONOR;  Surgeon: Seth Gibbons MD;  Location: [facility] OR    ureteropyelostomy  2004            Family History:     Family History   Problem Relation Age of Onset    Diabetes Mother     Other Cancer Father         lung            Social History:     Social History     Tobacco Use    Smoking status: Never    Smokeless tobacco: Never   Substance Use Topics    Alcohol use: No     History   Sexual Activity    Sexual activity: Not on file            Current Medications:      calcium carbonate  500 mg Oral BID    carvedilol  25 mg Oral BID w/    cholecalciferol  10 mcg Oral Daily    clonazePAM  0.5 mg Oral At Bedtime    folic acid  1 mg Oral Daily    ganciclovir (CYTOVENE) 70 mg in D5W 100 mL intermittent infusion  1.25 mg/kg Intravenous Q24H    levothyroxine  100 mcg Oral Daily    mycophenolic acid  360 mg Oral BID    pantoprazole  40 mg Oral Daily    predniSONE  5 mg Oral Daily    rOPINIRole  1 mg Oral At Bedtime    sodium bicarbonate  1,300 mg Oral TID    sulfamethoxazole-trimethoprim  1 tablet Oral Once per day on      tacrolimus  2.5 mg Oral BID IS            Allergies:     Allergies   Allergen Reactions    Hydralazine Swelling    Betadine [Povidone Iodine]     Cephalexin Hives    Clindamycin Hives    Trazodone Swelling     Swelling of face/lips            Physical Exam:   Vitals were reviewed  Temp:  [97.6  F (36.4  C)-98.1  F (36.7  C)] 97.6  F (36.4  C)  Pulse:  [56-67] 56  Resp:  [16] 16  BP: (129-152)/(72-81) 150/77  SpO2:  [98 %-100 %] 99 %    Vitals:    10/03/23 1934 10/04/23 0539 10/07/23 0900 10/08/23 0500   Weight: 57.1 kg (125 lb 14.1 oz) 56.6 kg (124 lb 11.2 oz) 59.3 kg (130 lb 12.8 oz) 59.6 kg (131 lb 8 oz)       Constitutional: Pleasant adult male seen sitting up in bed, in NAD. Alert and interactive.   HEENT: NCAT, anicteric sclerae, conjunctiva clear. Moist mucous membranes without lesions or thrush. Dentition intact/well cared for. No cervical LAD.  Respiratory: Non-labored breathing, good air exchange. Lungs are clear to auscultation bilaterally, without wheezing, crackles or rhonchi. No cough noted.   Cardiovascular: Regular rate and rhythm with no murmur, rub or gallop.  GI: Normoactive BS. Abdomen is soft, non-distended, and non-tender to palpation. No rigidity or guarding. No graft tenderness.  Skin: Warm and dry. No rashes or lesions on exposed surfaces.  Musculoskeletal: Extremities grossly normal. No tenderness or edema present.   Neurologic: A &O x3, speech normal, answering questions appropriately. Moves all extremities spontaneously. Grossly non-focal.  Neuropsychiatric: Mentation and affect normal/appropriate.  VAD: PIV is c/d/i with no erythema, drainage, or tenderness.           Laboratory Data:     Microbiology:  No results found for: CULTURE    Culture Micro   Date Value Ref Range Status   11/17/2015 No growth  Final   10/14/2015 No growth  Final   10/13/2015   Final    Test canceled - Lab  error  Canceled, Test credited     10/13/2015 No growth  Final       Inflammatory Markers    No  lab results found.    Metabolic Studies       Recent Labs   Lab Test 10/10/23  0614 10/09/23  0534 10/08/23  0946 10/07/23  0542 10/06/23  0537 10/05/23  0542 10/04/23  1844 10/14/15  1325 10/14/15  1021 10/14/15  0000 10/13/15  2231 10/13/15  2113 10/13/15  1215 10/12/15  1330   NA  --  143 146* 141 146* 145 145   < >  --    < >  --   --    < > 136   POTASSIUM  --  4.3 3.8 3.9 3.3* 3.1* 3.4   < > 4.8   < > 5.4* 6.8*   < > 4.2   CHLORIDE  --  109* 109* 112* 110* 108* 109*   < >  --    < >  --   --    < > 97   CO2  --  25 27 25 28 26 22   < >  --    < >  --   --    < > 31   ANIONGAP  --  9 10 4* 8 11 14   < >  --    < >  --   --    < > 8   BUN  --  42.4* 39.3* 40.8* 40.7* 42.0* 50.3*   < >  --    < >  --   --    < > 41*   CR 2.86* 2.76* 2.96* 2.99* 2.97* 3.01* 3.03*   < >  --    < >  --   --    < > 9.83*   GFRESTIMATED 25* 26* 24* 24* 24* 23* 23*   < >  --    < >  --   --    < > 6*   GLC  --  101* 117* 108* 108* 109* 142*   < >  --    < >  --   --    < > 95   A1C  --   --   --   --   --   --   --   --   --   --   --   --   --  5.1   CRISTIAN  --  8.0* 8.1* 7.8* 7.5* 7.3* 8.2*   < >  --    < >  --   --    < > 8.7   PHOS 2.8 2.4*  --   --   --   --   --    < >  --    < >  --   --    < >  --    MAG  --   --   --   --  1.9 2.1 1.8   < >  --    < >  --   --    < >  --    LACT  --   --   --   --   --   --   --   --  2.1  --   --  4.2*   < >  --    CKT  --   --   --   --   --   --   --   --   --   --  336*  --   --   --     < > = values in this interval not displayed.       Hepatic Studies    Recent Labs   Lab Test 10/08/23  0946 10/03/23  1240 03/27/18  1510 10/12/15  1330   BILITOTAL 0.2 0.3  --  0.4   ALKPHOS 52 62  --  81   ALBUMIN 2.9* 3.9 3.8 4.2   AST 18 12  --  10   ALT <5 7  --  16       Pancreatitis testing    Recent Labs   Lab Test 07/15/21  0722 05/08/18  0700 05/01/17  0707 06/24/16  0755 12/14/15  0715   TRIG 168* 253* 364* 300* 261*       Hematology Studies      Recent Labs   Lab Test 10/10/23  0614 10/09/23  0534  10/07/23  0542 10/05/23  0542 10/04/23  0504 10/03/23  1240 11/17/15  1139 10/30/15  0718 10/26/15  0707 10/25/15  0750 10/23/15  0652 10/21/15  0648 10/20/15  0715 10/19/15  0704   WBC 2.1* 2.3* 2.3* 1.8* 2.3* 3.2*   < > 4.7   < > 7.6 5.4 3.5* 4.1 5.1   ANEU  --   --   --   --   --   --   --  3.3  --  6.4 3.9 3.0 3.7 4.7   ALYM  --   --   --   --   --   --   --  0.3*  --  0.1* 0.1* 0.1* 0.0* 0.3*   MELVIN  --   --   --   --   --   --   --  0.3  --  0.2 0.2 0.2 0.1 0.0   AEOS  --   --   --   --   --   --   --  0.3  --  0.1 0.0 0.0 0.0 0.0   HGB 8.6* 8.6* 8.4* 9.3* 8.7* 10.8*   < > 11.9*   < > 11.8* 10.8* 10.5* 10.3* 10.3*   HCT 27.8* 28.5* 27.3* 29.1* 26.0* 35.0*   < > 38.4*   < > 35.8* 34.2* 32.6* 32.7* 32.4*   PLT 91* 79* 63* 100* 112* 122*   < > 282   < > 269 232 163 136* 112*    < > = values in this interval not displayed.       Arterial Blood Gas Testing    Recent Labs   Lab Test 10/13/15  2113   PH 7.33*   PCO2 40   PO2 89   HCO3 21   O2PER 1L        Urine Studies     Recent Labs   Lab Test 10/03/23  2244 11/17/15  1410 10/14/15  2220 10/13/15  0725   URINEPH 6.0 6.5 5.5 5.0   NITRITE Negative Negative Negative Negative   LEUKEST Negative Negative Small* Negative   WBCU 1 1 15* 1       Vancomycin Levels     No lab results found.    Invalid input(s): VANCO  No lab results found.    Invalid input(s): VANCO        Hepatitis B Testing   Recent Labs   Lab Test 10/12/15  1330   HEPBANG Nonreactive   HBCM Nonreactive   A nonreactive result suggests lack of recent exposure to the virus in the   preceding 6 months.     HBEAGN Negative  Reference range: Negative  (Note)  Performed by [facility],  [address] [place] [id number] [phone]  [email or web address] Timo Salcido MD, Lab. Director         Hepatitis C Testing     Hepatitis C Antibody   Date Value Ref Range Status   10/12/2015  NR Final    Nonreactive   Assay performance characteristics have not been established for newborns,   infants, and children      06/18/2015  NR Final    Nonreactive   Assay performance characteristics have not been established for newborns,   infants, and children         Respiratory Virus Testing    No results found for: RS, FLUAG    Last check of C difficile  C Difficile Toxin B by PCR   Date Value Ref Range Status   10/04/2023 Negative Negative Final     Comment:     A negative result does not exclude actual disease due to C. difficile and may be due to improper collection, handling and storage of the specimen or the number of organisms in the specimen is below the detection limit of the assay.     CMV viral loads    CMV DNA Quant (External)   Date Value Ref Range Status   05/01/2023 318 (A) Undetected IU/mL Final   04/10/2023 460 (A) Undetected IU/mL Final   03/20/2023 622 (A) Undetected IU/mL Final   03/13/2023 451 (A) UNDETECTED IU/mL Final   02/15/2023 301 (A) Undetected IU/mL Final   02/08/2023 311 (A) Undetected IU/mL Final   01/11/2023 138 (A) Undetected IU/mL Final     CMV viral loads    Recent Labs   Lab Test 10/09/23  0534 10/06/23  1016 05/01/23  0700   CMVLOG <1.5 2.4  --    16933  --   --  318*       CMV viral loads    CMV log   Date Value Ref Range Status   10/09/2023 <1.5  Final   10/06/2023 2.4  Final     CMV DNA Quant (External)   Date Value Ref Range Status   05/01/2023 318 (A) Undetected IU/mL Final   04/10/2023 460 (A) Undetected IU/mL Final   03/20/2023 622 (A) Undetected IU/mL Final   03/13/2023 451 (A) UNDETECTED IU/mL Final   02/15/2023 301 (A) Undetected IU/mL Final   02/08/2023 311 (A) Undetected IU/mL Final   01/11/2023 138 (A) Undetected IU/mL Final       CMV resistance testing  No lab results found.  No results found for: CMVCID, CMVFOS, CMVGAN           Imaging:     Results for orders placed or performed in visit on 12/28/21   DEXA - HIM Scan    [facility] PROGRESS NOTE  FOUND IN CARE EVERYWHERE

## 2023-10-11 LAB
BASO+EOS+MONOS # BLD AUTO: ABNORMAL 10*3/UL
BASO+EOS+MONOS NFR BLD AUTO: ABNORMAL %
BASOPHILS # BLD AUTO: 0 10E3/UL (ref 0–0.2)
BASOPHILS NFR BLD AUTO: 0 %
CREAT SERPL-MCNC: 2.89 MG/DL (ref 0.67–1.17)
EGFRCR SERPLBLD CKD-EPI 2021: 25 ML/MIN/1.73M2
EOSINOPHIL # BLD AUTO: 0.1 10E3/UL (ref 0–0.7)
EOSINOPHIL NFR BLD AUTO: 3 %
ERYTHROCYTE [DISTWIDTH] IN BLOOD BY AUTOMATED COUNT: 13.5 % (ref 10–15)
HCT VFR BLD AUTO: 30.4 % (ref 40–53)
HGB BLD-MCNC: 9.2 G/DL (ref 13.3–17.7)
IMM GRANULOCYTES # BLD: 0 10E3/UL
IMM GRANULOCYTES NFR BLD: 1 %
LYMPHOCYTES # BLD AUTO: 0.4 10E3/UL (ref 0.8–5.3)
LYMPHOCYTES NFR BLD AUTO: 17 %
MCH RBC QN AUTO: 29.5 PG (ref 26.5–33)
MCHC RBC AUTO-ENTMCNC: 30.3 G/DL (ref 31.5–36.5)
MCV RBC AUTO: 97 FL (ref 78–100)
MONOCYTES # BLD AUTO: 0.3 10E3/UL (ref 0–1.3)
MONOCYTES NFR BLD AUTO: 14 %
NEUTROPHILS # BLD AUTO: 1.4 10E3/UL (ref 1.6–8.3)
NEUTROPHILS NFR BLD AUTO: 65 %
NRBC # BLD AUTO: 0 10E3/UL
NRBC BLD AUTO-RTO: 0 /100
PLATELET # BLD AUTO: 95 10E3/UL (ref 150–450)
RBC # BLD AUTO: 3.12 10E6/UL (ref 4.4–5.9)
WBC # BLD AUTO: 2.1 10E3/UL (ref 4–11)

## 2023-10-11 PROCEDURE — 82565 ASSAY OF CREATININE: CPT

## 2023-10-11 PROCEDURE — 36415 COLL VENOUS BLD VENIPUNCTURE: CPT

## 2023-10-11 PROCEDURE — 250N000012 HC RX MED GY IP 250 OP 636 PS 637

## 2023-10-11 PROCEDURE — 250N000013 HC RX MED GY IP 250 OP 250 PS 637: Performed by: INTERNAL MEDICINE

## 2023-10-11 PROCEDURE — 258N000003 HC RX IP 258 OP 636

## 2023-10-11 PROCEDURE — 99233 SBSQ HOSP IP/OBS HIGH 50: CPT

## 2023-10-11 PROCEDURE — 85025 COMPLETE CBC W/AUTO DIFF WBC: CPT

## 2023-10-11 PROCEDURE — 250N000013 HC RX MED GY IP 250 OP 250 PS 637

## 2023-10-11 PROCEDURE — 120N000011 HC R&B TRANSPLANT UMMC

## 2023-10-11 PROCEDURE — 99232 SBSQ HOSP IP/OBS MODERATE 35: CPT | Mod: GC | Performed by: STUDENT IN AN ORGANIZED HEALTH CARE EDUCATION/TRAINING PROGRAM

## 2023-10-11 PROCEDURE — 250N000011 HC RX IP 250 OP 636: Mod: JZ

## 2023-10-11 RX ADMIN — CALCIUM CARBONATE (ANTACID) CHEW TAB 500 MG 500 MG: 500 CHEW TAB at 15:51

## 2023-10-11 RX ADMIN — SODIUM BICARBONATE 650 MG TABLET 1300 MG: at 20:20

## 2023-10-11 RX ADMIN — LEVOTHYROXINE SODIUM 100 MCG: 100 TABLET ORAL at 08:51

## 2023-10-11 RX ADMIN — FOLIC ACID 1 MG: 1 TABLET ORAL at 08:49

## 2023-10-11 RX ADMIN — PANTOPRAZOLE SODIUM 40 MG: 40 TABLET, DELAYED RELEASE ORAL at 08:49

## 2023-10-11 RX ADMIN — MYCOPHENILIC ACID 360 MG: 360 TABLET, DELAYED RELEASE ORAL at 20:19

## 2023-10-11 RX ADMIN — PREDNISONE 5 MG: 5 TABLET ORAL at 08:51

## 2023-10-11 RX ADMIN — GANCICLOVIR SODIUM 70 MG: 500 INJECTION, POWDER, LYOPHILIZED, FOR SOLUTION INTRAVENOUS at 19:37

## 2023-10-11 RX ADMIN — SULFAMETHOXAZOLE AND TRIMETHOPRIM 1 TABLET: 400; 80 TABLET ORAL at 10:07

## 2023-10-11 RX ADMIN — SODIUM BICARBONATE 650 MG TABLET 1300 MG: at 14:28

## 2023-10-11 RX ADMIN — CLONAZEPAM 0.5 MG: 0.5 TABLET ORAL at 21:51

## 2023-10-11 RX ADMIN — CARVEDILOL 25 MG: 25 TABLET, FILM COATED ORAL at 18:40

## 2023-10-11 RX ADMIN — ROPINIROLE HYDROCHLORIDE 1 MG: 1 TABLET, FILM COATED ORAL at 21:51

## 2023-10-11 RX ADMIN — CHOLECALCIFEROL (VITAMIN D3) 10 MCG (400 UNIT) TABLET 10 MCG: at 08:50

## 2023-10-11 RX ADMIN — CALCIUM CARBONATE (ANTACID) CHEW TAB 500 MG 500 MG: 500 CHEW TAB at 10:07

## 2023-10-11 RX ADMIN — TACROLIMUS 2.5 MG: 1 CAPSULE ORAL at 18:40

## 2023-10-11 RX ADMIN — MYCOPHENILIC ACID 360 MG: 360 TABLET, DELAYED RELEASE ORAL at 08:50

## 2023-10-11 RX ADMIN — SODIUM BICARBONATE 650 MG TABLET 1300 MG: at 08:51

## 2023-10-11 RX ADMIN — TACROLIMUS 2.5 MG: 1 CAPSULE ORAL at 08:49

## 2023-10-11 RX ADMIN — CARVEDILOL 25 MG: 25 TABLET, FILM COATED ORAL at 08:50

## 2023-10-11 ASSESSMENT — ACTIVITIES OF DAILY LIVING (ADL)
ADLS_ACUITY_SCORE: 23
ADLS_ACUITY_SCORE: 24
ADLS_ACUITY_SCORE: 24
ADLS_ACUITY_SCORE: 23
ADLS_ACUITY_SCORE: 24
ADLS_ACUITY_SCORE: 23
ADLS_ACUITY_SCORE: 24
ADLS_ACUITY_SCORE: 24
ADLS_ACUITY_SCORE: 23
ADLS_ACUITY_SCORE: 23

## 2023-10-11 NOTE — PROGRESS NOTES
[facility]   Transplant Nephrology Progress Note  Date of Admission:  10/3/2023  Today's Date: 10/11/2023    Recommendations:  - Would check BMP tomorrow given stage 4 CKD.  - Agree with continuing IV ganciclovir while having diarrhea.    Assessment & Plan   # DDKT: Increased serum creatinine this admission.  - MATTHEW felt secondary to dehydration.    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    # Immunosuppression: Tacrolimus immediate release (goal 4-6), Mycophenolic acid (dose 360 mg every 12 hours), and Prednisone (dose 5 mg daily)   - Continue with intensive monitoring of immunosuppression for efficacy and toxicity.   - Changes: No.  Myfortic already reduced.    # Infection Prophylaxis:   - PJP: Sulfa/TMP (Bactrim)    # Norovirus  # CMV Colitis  # Diarrhea:  Pt presented 10/3/23 with persistent diarrhea.  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  - Colonoscopy 10/2022 with mild archietectural distortion, focal crypt degeneration. 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     - 10/4/23 IVIG 0.5 g/kg given.   - 10/5/23 Repeat colonoscopy.  Biopsy results show crypt distortion.  Active area with inflammation with ulceration  and granulation tissue; features concerning but not conclusive for viral cytopathic changes. 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     10/4/2023 C. Diff Toxin PCR Negative    10/3/2023   EBV DNA PCR Quantitative Whole Blood Not detected    10/3/2023  IgG 810    10/3/2023  Enteric Bacteria and Virus Panel PCR Positive for norovirus.      -Check CMV DNA PCR weekly  - IV ganciclovir 10/6/23 - present     03/20/23  04/10/23  05/01/23  10/6/23 10/9/23   CMV DNA Quant  622 !  460 !  318 !  252 ! <35        # Hypertension: Borderline control;  Goal BP: < 140/90 (Hospitalization goal)   - Changes: Not at this time.  Continue carvedilol  25 mg PO BID.    # Anemia in Chronic Renal Disease: Hgb: Increased     DANNY: No   - Iron studies: Replete.    # Mineral Bone Disorder:   - Secondary renal hyperparathyroidism; PTH level: Mildly elevated (151-300 pg/ml)        On treatment: None  - Vitamin D; level: Not checked recently, but was normal last check         On supplement: Yes  - Calcium; level: Normal when corrected for low alb            On supplement: Yes, calcium carbonate 500 mg PO BID between meals.  - Phosphorus; level: Normal           On supplement: No    # Electrolytes:   - Potassium; level: Normal         On supplement: No,   - Magnesium; level: Normal  10/6/23      On supplement: No  - Bicarbonate; level: Normal        On supplement: Yes,  sodium bicarbonate 1300 mg PO TID    # Proteinuria:   -1.2g/g in previous check in 2/2023, 3.3g on 24h collection in 8/2023   - A1c 5.8%.    - 10/3/23 SPEP: monoclonal peak normal.   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      Discussed with Dr. Winn.    MARCUS Bynum CNP   Pager: [phone]      Interval History   Mr. Glover creatinine is 2.89 (10/11 0533); Stable from 2.86 yesterday.     Other significant labs/tests/vitals: SBP 130s - 153s overnight. Afebrile  No acute events overnight.  No chest pain or shortness of breath.  No leg swelling.  No nausea and vomiting.  Bowel movements are loose.      Review of Systems   4 point ROS was obtained and negative except as noted in the Interval History.    MEDICATIONS:   calcium carbonate  500 mg Oral BID    carvedilol  25 mg Oral BID w/meals    cholecalciferol  10 mcg Oral Daily    clonazePAM  0.5 mg Oral At Bedtime    folic acid  1 mg Oral Daily    ganciclovir (CYTOVENE) 70 mg in D5W 100 mL intermittent infusion  1.25 mg/kg Intravenous Q24H    levothyroxine  100 mcg Oral Daily    mycophenolic acid  360 mg Oral BID    pantoprazole  40 mg Oral Daily    predniSONE  5 mg Oral Daily    rOPINIRole  1 mg Oral At Bedtime    sodium bicarbonate  1,300 mg Oral TID    sulfamethoxazole-trimethoprim  1 tablet Oral Once per day on     tacrolimus  2.5 mg Oral BID IS         Physical Exam   Temp  Av.7  F (36.5  C)  Min: 97.3  F (36.3  C)  Max: 98.1  F (36.7  C)      Pulse  Av.9  Min: 53  Max: 66 Resp  Av.6  Min: 16  Max: 19  SpO2  Av.6 %  Min: 98 %  Max: 100 %     BP (!) 153/76 (BP Location: Left arm)   Pulse 62   Temp 98  F (36.7  C) (Oral)   Resp 18   Wt 59.6 kg (131 lb 8 oz)   SpO2 97%   BMI 21.88 kg/m      Admit Weight: 57.1 kg (125 lb 14.1 oz)     GENERAL APPEARANCE: alert and no distress  RESP: lungs clear to auscultation - no rales, rhonchi or wheezes  CV: regular rhythm, normal rate, no rub, no murmur  EDEMA: no LE edema bilaterally  ABDOMEN: soft, nondistended, nontender, bowel sounds hyperactive  MS: extremities normal - no gross  deformities noted, no evidence of inflammation in joints, no muscle tenderness  SKIN: no rash  PSYCH: mentation appears normal and affect normal  DIALYSIS ACCESS:  RUE AV fistula +bruit/+thrill    Data   All labs reviewed by me.  CMP  Recent Labs   Lab 10/11/23  0533 10/10/23  0614 10/09/23  0534 10/08/23  0946 10/07/23  0542 10/06/23  0537 10/05/23  0542 10/04/23  1844   NA  --   --  143 146* 141 146* 145 145   POTASSIUM  --   --  4.3 3.8 3.9 3.3* 3.1* 3.4   CHLORIDE  --   --  109* 109* 112* 110* 108* 109*   CO2  --   --  25 27 25 28 26 22   ANIONGAP  --   --  9 10 4* 8 11 14   GLC  --   --  101* 117* 108* 108* 109* 142*   BUN  --   --  42.4* 39.3* 40.8* 40.7* 42.0* 50.3*   CR 2.89* 2.86* 2.76* 2.96* 2.99* 2.97* 3.01* 3.03*   GFRESTIMATED 25* 25* 26* 24* 24* 24* 23* 23*   CRISTIAN  --   --  8.0* 8.1* 7.8* 7.5* 7.3* 8.2*   MAG  --   --   --   --   --  1.9 2.1 1.8   PHOS  --  2.8 2.4*  --   --   --   --   --    PROTTOTAL  --   --   --  5.1*  --   --   --   --    ALBUMIN  --   --   --  2.9*  --   --   --   --    BILITOTAL  --   --   --  0.2  --   --   --   --    ALKPHOS  --   --   --  52  --   --   --   --    AST  --   --   --  18  --   --   --   --    ALT  --   --   --  <5  --   --   --   --      CBC  Recent Labs   Lab 10/11/23  0533 10/10/23  0614 10/09/23  0534 10/07/23  0542   HGB 9.2* 8.6* 8.6* 8.4*   WBC 2.1* 2.1* 2.3* 2.3*   RBC 3.12* 2.90* 2.91* 2.89*   HCT 30.4* 27.8* 28.5* 27.3*   MCV 97 96 98 95   MCH 29.5 29.7 29.6 29.1   MCHC 30.3* 30.9* 30.2* 30.8*   RDW 13.5 13.4 13.2 13.7   PLT 95* 91* 79* 63*     INR  Recent Labs   Lab 10/04/23  1203   INR 1.30*     ABGNo lab results found in last 7 days.   Urine Studies  Recent Labs   Lab Test 10/03/23  2244 11/17/15  1410 10/14/15  2220 10/13/15  0725   COLOR Light Yellow Light Yellow Yellow Straw   APPEARANCE Clear Clear Clear Clear   URINEGLC Negative Negative Negative Negative   URINEBILI Negative Negative Negative Negative   URINEKETONE Negative Negative Negative  Negative   SG 1.014 1.007 1.006 1.003   UBLD Negative Negative Large* Large*   URINEPH 6.0 6.5 5.5 5.0   PROTEIN 200* Negative 10* Negative   NITRITE Negative Negative Negative Negative   LEUKEST Negative Negative Small* Negative   RBCU 1 0 >182* 57*   WBCU 1 1 15* 1     Recent Labs   Lab Test 03/27/18  1516 09/26/17  1506   UTPG 0.12 0.10     PTH  Recent Labs   Lab Test 03/27/18  1510 10/15/15  0626   PTHI 126* 561*     Iron Studies  Recent Labs   Lab Test 03/27/18  1510 10/17/15  0700   IRON 67 25*    216*   IRONSAT 24 12*   * 883*       IMAGING:  All imaging studies reviewed by me.

## 2023-10-11 NOTE — PLAN OF CARE
BP (!) 146/100 (BP Location: Left arm)   Pulse 64   Temp 97.9  F (36.6  C) (Oral)   Resp 18   Wt 59.6 kg (131 lb 8 oz)   SpO2 100%   BMI 21.88 kg/m     Neuro: A/Ox3  VS: VSS on RA  Pain: Denies  GI: On regular diet and tolerating good. Denies nausea. Loose BMx2  : Voiding not saving  PIV SL  R arm noted to be swollen, elevated by pillow and will notify provider  Activity - Independent  Plan of Care - Continue with POC

## 2023-10-11 NOTE — PROGRESS NOTES
[facility]    Medicine Progress Note - Medicine Service, [facility] TEAM 2    Date of Admission:  10/3/2023    Assessment & Plan   Haider Torrez is a 58 year old male admitted on 10/3/2023. He has a PMH of IgA nephropathy s/p kidney transplant (2004, 2015), BK and CMV viremia, secondary hyperparathyroid presenting with persistent diarrhea, concerning for CMV colitis vs. MMF colitis and MATTHEW.     Today:  - continue IV ganciclovir, renally dosing   - daily monitoring of CBC with diff and BMP     Kidney transplant  MATTHEW  Proteinuria  Baseline Cr 2.1-2.5, Cr 3.75 at admission trending down closer back to baseline. Proteinuria since 8/23.   - consult transplant nephrology  - Continue PJP prophylaxis with bactrim    - EBV PCR negative     CMV colitis  Diarrhea  He has a history of norovirus diarrhea and MATTHEW in March 2023. Patient has been having loose bowel movement, especially after eating for a month, and recently test positive for norovirus in stool on 9/21.   -Colonoscopy 10/2022 with mild archietectural distortion, focal crypt degeneration. Negative stain for CMV   - consult GI for concerns for CMV vs MMF colitis vs. Chronic norovirus: colonoscopy 10/5, pending biopsy  - C. Diff panel neg  - enteric stool panel: norovirus positive, but transplant ID will not start nitaxoxanide due to poor evidence. Would suggest weaning immunosuppression.   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 This hase been managed conservatively with decrease in MPA (patient did not actually decrease dose)              -Pt is CMV IgG+ at time of transplant.               -IgG >600. CMV has been low level but present               -concern at this point with worsening diarrhea that he has CMV  colitis. Recommend repeat colonoscopy with biopsies, and if CMV + would treat with IV GCV.   - IVIG 0.5g/kg x1 10/4, IgG level: normal 10/5    Chronic issues  NAGMA, resolved  Likely secondary to diarrhea. Normalized.   - discontinue PTA bicarb 1,950 mg TID --> 10/6 Decrease sodium bicarbonate to 1300 mg PO TID   - discontinue D5 with 150 mEq bicarb infusion in ED due to resolved acidosis.      Hypertension  On carvedilol 25 mg BID     Anemia of CKD  Normal Iron study. 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  On immunosuppressants for transplanted kidney: mycophenolic acid 360 mg bid, prednisone 5 mg daily, tacrolimus 2.5 mg bid         10/04/23 05:04 10/05/23 05:42   WBC 2.3  1.8    Hemoglobin 8.7  9.3    Platelet Count 112  100    Absolute Neutrophils 1.5                Diet: Regular Diet Adult    DVT Prophylaxis: Ambulate every shift  Fernandez Catheter: Not present  Lines: None     Cardiac Monitoring: None  Code Status: Full Code      Clinically Significant Risk Factors              # Hypoalbuminemia: Lowest albumin = 2.9 g/dL at 10/8/2023  9:46 AM, will monitor as appropriate   # Thrombocytopenia: Lowest platelets = 91 in last 2 days, will monitor for bleeding   # Hypertension: Noted on problem list                   Disposition Plan      Expected Discharge Date: 10/13/2023        Discharge Comments: Likely home; pending improvements in diarrhea and lower CMV viral load          The patient's care was discussed with the Attending Physician, Dr. Perez .    Yovana Flanagan  MD  Medicine Service, [facility] TEAM 2  [facility]  Securely message with [employer] (more info)  Text page via [employer] Paging/Directory   See signed in provider for up to date coverage information  ______________________________________________________________________    Interval History   Patient doing well. Diarrhea less frequent. WBC and Cr stable.     Physical Exam   Vital Signs: Temp: 97.9  F (36.6  C) Temp src: Oral BP: (!) 146/100 Pulse: 64   Resp: 18 SpO2: 100 % O2 Device: None (Room air)    Weight: 131 lbs 8 oz        Medical Decision Making             Data

## 2023-10-11 NOTE — PROGRESS NOTES
CLINICAL NUTRITION SERVICES    Reviewed nutrition risk factors due to LOS. Pt is tolerating diet, eating well per nursing documentation. No nutrition issues identified at this time. RD will follow via rounds at this time, unless consulted.    Marilin Sequeira MS, RD, LD, CCTD, CNSC  7A and 7B beds [phone], pager [phone]  Weekend pager [phone]

## 2023-10-11 NOTE — PLAN OF CARE
Goal Outcome Evaluation:      Plan of Care Reviewed With: patient    Overall Patient Progress: improvingOverall Patient Progress: improving     Pt AOx4, VSS, on RA, denies chest pain. Voiding adequately, no BM thi shift. Skin intact per pt. Up ad evelin. Continue POC

## 2023-10-11 NOTE — PLAN OF CARE
Goal Outcome Evaluation:    Assume cares from 8834-4865    No acute changes this shift  Denies pain or nausea  PIV SL/. Tolerating regular diet   R arm swollen - MD notified   Independent in room.   Will continue with POC            Plan of Care Reviewed With: patient    Overall Patient Progress: improvingOverall Patient Progress: improving            show

## 2023-10-11 NOTE — PLAN OF CARE
/72 (BP Location: Left arm)   Pulse 63   Temp 98.4  F (36.9  C) (Oral)   Resp 18   Wt 59.6 kg (131 lb 8 oz)   SpO2 97%   BMI 21.88 kg/m      Shift: 8440-4336  Isolation Status: Enteric  VS: VSS on RA, afebrile  Neuro: Aox4  Behaviors: Calm, pleasant  BG: None  Labs: Cr 2.86  Respiratory: WDL  Cardiac: WDL  Pain/Nausea: Denies pain or nausea  PRN: None  Diet: Regular. Tolerating well  IV Access: L PIV SL  Infusion(s): Gancivlovir q24h  Lines/Drains: None  GI/: Voiding without difficulty. BMx1 this shift, loose  Skin: WDI  Mobility: UAL  Events/Education: n/a  Plan: Continue with plan of care

## 2023-10-12 ENCOUNTER — HOME INFUSION (PRE-WILLOW HOME INFUSION) (OUTPATIENT)
Dept: PHARMACY | Facility: CLINIC | Age: 58
End: 2023-10-12
Payer: MEDICARE

## 2023-10-12 ENCOUNTER — APPOINTMENT (OUTPATIENT)
Dept: ULTRASOUND IMAGING | Facility: CLINIC | Age: 58
DRG: 698 | End: 2023-10-12
Payer: MEDICARE

## 2023-10-12 LAB
ANION GAP SERPL CALCULATED.3IONS-SCNC: 10 MMOL/L (ref 7–15)
BASO+EOS+MONOS # BLD AUTO: ABNORMAL 10*3/UL
BASO+EOS+MONOS NFR BLD AUTO: ABNORMAL %
BASOPHILS # BLD AUTO: 0 10E3/UL (ref 0–0.2)
BASOPHILS NFR BLD AUTO: 0 %
BUN SERPL-MCNC: 47.8 MG/DL (ref 6–20)
CALCIUM SERPL-MCNC: 7.8 MG/DL (ref 8.6–10)
CHLORIDE SERPL-SCNC: 116 MMOL/L (ref 98–107)
CREAT SERPL-MCNC: 2.86 MG/DL (ref 0.67–1.17)
DEPRECATED HCO3 PLAS-SCNC: 21 MMOL/L (ref 22–29)
EGFRCR SERPLBLD CKD-EPI 2021: 25 ML/MIN/1.73M2
EOSINOPHIL # BLD AUTO: 0 10E3/UL (ref 0–0.7)
EOSINOPHIL NFR BLD AUTO: 2 %
ERYTHROCYTE [DISTWIDTH] IN BLOOD BY AUTOMATED COUNT: 13.5 % (ref 10–15)
GLUCOSE SERPL-MCNC: 98 MG/DL (ref 70–99)
HCT VFR BLD AUTO: 27.1 % (ref 40–53)
HGB BLD-MCNC: 8.2 G/DL (ref 13.3–17.7)
IMM GRANULOCYTES # BLD: 0 10E3/UL
IMM GRANULOCYTES NFR BLD: 1 %
INR PPP: 1.05 (ref 0.85–1.15)
LYMPHOCYTES # BLD AUTO: 0.4 10E3/UL (ref 0.8–5.3)
LYMPHOCYTES NFR BLD AUTO: 22 %
MAGNESIUM SERPL-MCNC: 1.4 MG/DL (ref 1.7–2.3)
MCH RBC QN AUTO: 29.3 PG (ref 26.5–33)
MCHC RBC AUTO-ENTMCNC: 30.3 G/DL (ref 31.5–36.5)
MCV RBC AUTO: 97 FL (ref 78–100)
MONOCYTES # BLD AUTO: 0.3 10E3/UL (ref 0–1.3)
MONOCYTES NFR BLD AUTO: 15 %
NEUTROPHILS # BLD AUTO: 1 10E3/UL (ref 1.6–8.3)
NEUTROPHILS NFR BLD AUTO: 60 %
NRBC # BLD AUTO: 0 10E3/UL
NRBC BLD AUTO-RTO: 0 /100
PHOSPHATE SERPL-MCNC: 3.1 MG/DL (ref 2.5–4.5)
PLATELET # BLD AUTO: 95 10E3/UL (ref 150–450)
POTASSIUM SERPL-SCNC: 4.8 MMOL/L (ref 3.4–5.3)
RBC # BLD AUTO: 2.8 10E6/UL (ref 4.4–5.9)
SODIUM SERPL-SCNC: 147 MMOL/L (ref 135–145)
UFH PPP CHRO-ACNC: 0.64 IU/ML
WBC # BLD AUTO: 1.7 10E3/UL (ref 4–11)

## 2023-10-12 PROCEDURE — 99233 SBSQ HOSP IP/OBS HIGH 50: CPT | Performed by: INTERNAL MEDICINE

## 2023-10-12 PROCEDURE — 250N000011 HC RX IP 250 OP 636: Mod: JZ | Performed by: STUDENT IN AN ORGANIZED HEALTH CARE EDUCATION/TRAINING PROGRAM

## 2023-10-12 PROCEDURE — 85610 PROTHROMBIN TIME: CPT

## 2023-10-12 PROCEDURE — 36415 COLL VENOUS BLD VENIPUNCTURE: CPT

## 2023-10-12 PROCEDURE — 84100 ASSAY OF PHOSPHORUS: CPT

## 2023-10-12 PROCEDURE — 250N000011 HC RX IP 250 OP 636

## 2023-10-12 PROCEDURE — 85025 COMPLETE CBC W/AUTO DIFF WBC: CPT

## 2023-10-12 PROCEDURE — 99233 SBSQ HOSP IP/OBS HIGH 50: CPT | Mod: GC | Performed by: STUDENT IN AN ORGANIZED HEALTH CARE EDUCATION/TRAINING PROGRAM

## 2023-10-12 PROCEDURE — 250N000013 HC RX MED GY IP 250 OP 250 PS 637

## 2023-10-12 PROCEDURE — 99233 SBSQ HOSP IP/OBS HIGH 50: CPT

## 2023-10-12 PROCEDURE — 85520 HEPARIN ASSAY: CPT

## 2023-10-12 PROCEDURE — 999N000128 HC STATISTIC PERIPHERAL IV START W/O US GUIDANCE

## 2023-10-12 PROCEDURE — 80048 BASIC METABOLIC PNL TOTAL CA: CPT

## 2023-10-12 PROCEDURE — 93971 EXTREMITY STUDY: CPT | Mod: RT

## 2023-10-12 PROCEDURE — 258N000003 HC RX IP 258 OP 636

## 2023-10-12 PROCEDURE — 250N000012 HC RX MED GY IP 250 OP 636 PS 637

## 2023-10-12 PROCEDURE — 83735 ASSAY OF MAGNESIUM: CPT

## 2023-10-12 PROCEDURE — 93971 EXTREMITY STUDY: CPT | Mod: 26 | Performed by: RADIOLOGY

## 2023-10-12 PROCEDURE — 120N000011 HC R&B TRANSPLANT UMMC

## 2023-10-12 RX ORDER — MAGNESIUM SULFATE HEPTAHYDRATE 40 MG/ML
2 INJECTION, SOLUTION INTRAVENOUS ONCE
Status: COMPLETED | OUTPATIENT
Start: 2023-10-12 | End: 2023-10-12

## 2023-10-12 RX ORDER — HEPARIN SODIUM 10000 [USP'U]/100ML
0-5000 INJECTION, SOLUTION INTRAVENOUS CONTINUOUS
Status: DISCONTINUED | OUTPATIENT
Start: 2023-10-12 | End: 2023-10-14

## 2023-10-12 RX ADMIN — CHOLECALCIFEROL (VITAMIN D3) 10 MCG (400 UNIT) TABLET 10 MCG: at 08:08

## 2023-10-12 RX ADMIN — SODIUM BICARBONATE 650 MG TABLET 1300 MG: at 13:31

## 2023-10-12 RX ADMIN — CALCIUM CARBONATE (ANTACID) CHEW TAB 500 MG 500 MG: 500 CHEW TAB at 11:12

## 2023-10-12 RX ADMIN — HEPARIN SODIUM 1050 UNITS/HR: 10000 INJECTION, SOLUTION INTRAVENOUS at 17:02

## 2023-10-12 RX ADMIN — GANCICLOVIR SODIUM 70 MG: 500 INJECTION, POWDER, LYOPHILIZED, FOR SOLUTION INTRAVENOUS at 20:12

## 2023-10-12 RX ADMIN — CLONAZEPAM 0.5 MG: 0.5 TABLET ORAL at 22:45

## 2023-10-12 RX ADMIN — CALCIUM CARBONATE (ANTACID) CHEW TAB 500 MG 500 MG: 500 CHEW TAB at 15:54

## 2023-10-12 RX ADMIN — SODIUM BICARBONATE 650 MG TABLET 1300 MG: at 08:08

## 2023-10-12 RX ADMIN — CARVEDILOL 25 MG: 25 TABLET, FILM COATED ORAL at 18:18

## 2023-10-12 RX ADMIN — TACROLIMUS 2.5 MG: 1 CAPSULE ORAL at 18:17

## 2023-10-12 RX ADMIN — TACROLIMUS 2.5 MG: 1 CAPSULE ORAL at 08:09

## 2023-10-12 RX ADMIN — MYCOPHENILIC ACID 360 MG: 360 TABLET, DELAYED RELEASE ORAL at 20:26

## 2023-10-12 RX ADMIN — FILGRASTIM-AAFI 180 MCG: 300 INJECTION, SOLUTION INTRAVENOUS; SUBCUTANEOUS at 15:55

## 2023-10-12 RX ADMIN — ROPINIROLE HYDROCHLORIDE 1 MG: 1 TABLET, FILM COATED ORAL at 22:46

## 2023-10-12 RX ADMIN — LEVOTHYROXINE SODIUM 100 MCG: 100 TABLET ORAL at 08:08

## 2023-10-12 RX ADMIN — PREDNISONE 5 MG: 5 TABLET ORAL at 08:08

## 2023-10-12 RX ADMIN — FOLIC ACID 1 MG: 1 TABLET ORAL at 08:08

## 2023-10-12 RX ADMIN — CARVEDILOL 25 MG: 25 TABLET, FILM COATED ORAL at 08:08

## 2023-10-12 RX ADMIN — PANTOPRAZOLE SODIUM 40 MG: 40 TABLET, DELAYED RELEASE ORAL at 08:09

## 2023-10-12 RX ADMIN — MAGNESIUM SULFATE IN WATER 2 G: 40 INJECTION, SOLUTION INTRAVENOUS at 13:31

## 2023-10-12 RX ADMIN — MYCOPHENILIC ACID 360 MG: 360 TABLET, DELAYED RELEASE ORAL at 08:09

## 2023-10-12 RX ADMIN — SODIUM BICARBONATE 650 MG TABLET 1300 MG: at 20:26

## 2023-10-12 ASSESSMENT — ACTIVITIES OF DAILY LIVING (ADL)
ADLS_ACUITY_SCORE: 24
DEPENDENT_IADLS:: CLEANING;COOKING;LAUNDRY;SHOPPING;MEAL PREPARATION;MEDICATION MANAGEMENT;TRANSPORTATION
ADLS_ACUITY_SCORE: 24

## 2023-10-12 NOTE — PLAN OF CARE
Goal Outcome Evaluation:       BP (!) 142/81 (BP Location: Left arm)   Pulse 61   Temp 97.8  F (36.6  C) (Oral)   Resp 18   Wt 59.6 kg (131 lb 8 oz)   SpO2 99%   BMI 21.88 kg/m      Shift: 2477-6793  Isolation Status: enteric isolation  VS: hypertensive on RA, afebrile  Neuro: Aox4  Behaviors: calm & cooperative  BG: N/A  Labs: pending am lab draw  Respiratory: WDL   Cardiac: WDL  Pain/Nausea: denies pain and nausea  PRN: no PRN's given  Diet: regular diet, tolerating well  IV Access: R AV fistula, L PIV saline locked  Infusion(s): Q24h ganciclovir infusion completed.  Lines/Drains: none  GI/: 2 BM on 10/11  Skin: blanchable redness on neck, R arm swollen elevated with pillow  Mobility: UAL, steady gait  Plan: continue with plan of care, will notify MD with any changes.

## 2023-10-12 NOTE — PLAN OF CARE
BP (!) 143/75 (BP Location: Left arm)   Pulse 63   Temp 97.6  F (36.4  C) (Oral)   Resp 17   Wt 59.6 kg (131 lb 8 oz)   SpO2 100%   BMI 21.88 kg/m       Neuro: A/Ox4  VS: VSS on RA  Pain: Denies  GI: On regular diet. Denies nausea. BM x1  : Voiding without difficulty  Mg 1.4 and pt Magnesium 2gr replacement completed  PIV SL  Activity - Independent  Plan of Care - R arm swollen (provider aware and will check US to rule out DVR) Continue with POC

## 2023-10-12 NOTE — PROGRESS NOTES
Therapy: Ganciclovir  Insurance: [place]    Pt has coverage for Ganciclovir through [place]. Pt meets Medicare criteria for Ganciclovir and it must be done via [employer] for coverage. Between both their Medicare and [employer] plan pt should be covered at 100%. However, pt may have an additional copay upon dispense through their [place] plan     In reference to referral from [facility] on pt admitted 10/03/23 to check for IV Ganciclovir coverage.    Please contact Intake with any questions, [phone] or In Basket pool, [facility] ([phone]).

## 2023-10-12 NOTE — CONSULTS
"Dental Hygiene Consult Service        Haider Torrez MRN# [id number]   YOB: 1965 Age: 58 year old     Date of Admission: 10/3/2023  PCP is No Ref-Primary, Physician  Date of Service: 10/11/2023           Reason for Consult:   Referring MD & Reason for Visit: I was asked by No admitting provider for patient encounter., to see Haider Torrez for oral assessment regarding poor oral hygiene.           History of Present Illness:   This patient is a 58 year old male with a history of IgA nephropathy s/p kidney transplant (2004, 2015), BK and CMV viremia, secondary hyperparathyroid presenting with persistent diarrhea, concerning for CMV colitis vs. MMF colitis and MATTHEW. Dental and oropharyngeal history is pertinent for no dental home - pt states originally that last dental visit was \"10 years ago\" but then retracted that and said \"5-6 years ago\"; pt also has hx of kidney transplants and thyroidectomy due to cancer.  The patient reports no dental pain or concerns at this time.         General Observations   Level of support Patient is fully independent   Patient currently in pain No   Patient wears dentures No   Current oral care routine Pt reports toothbrushing with soft-bristled manual toothbrush and fluoridated toothpaste BID, daily flossing, daily antiseptic mouth rinse   Patient has oral care products Toothbrush, Toothpaste, Interdental Care (e.g., floss, floss picks) and Mouthrinse   Extraoral assessment   General appearance Symmetrical and Normal TMJ function   Lymph nodes Symmetrical, no abnormalities, non-tender   Oral Health Assessment Tool (OHAT)   Lips 0=Healthy: smooth, pink, moist   Tongue 1=Changes: (ie. patchy, fissured, red, coated ) - moderate white coating on posterior dorsal surface (consistent with dental biofilm)   Gums and Tissues 1=Changes: (ie. dry, shiny, rough, red, swollen, one ulcer/sore spot (under dentures)) - generalized dry, shiny gingival tissue; otherwise light pink and " healthy   Saliva 1=Changes: (ie. dry, sticky tissues, little saliva present, resident thinks they have dry mouth )- dry, sticky tissues, little saliva present, pt reports an increase in dry mouth (especially during hospitalization)   Natural Teeth Yes/No 0=Healthy: no decayed or broken teeth/roots (nothing clinically visible)   Dentures Yes/No Patient does not wear dentures   Oral Cleanliness 1=Changes:(ie. food particles/tartar/plaque in 1-2 areas of the mouth or on small area of dentures or halitosis (bad breath)) - very light dental tartar localized to mandibular anterior lingual surfaces; otherwise good biofilm control   Dental Pain 0=Healthy: no behavioural, verbal, or physical signs of dental pain   OHAT Total Score (0-16) 4   Other Dental Concerns Patient does not have dental home and Infrequent professional dental care   Care provided during assessment Oral Assessment and Patient education   Follow up DH assessments required? No   Connect with [facility] or [facility] care? No   Oral Care Plan Toothbrushing with soft-bristled manual toothbrush and fluoridated toothpaste BID. Continued daily flossing. Recommended brushing tongue daily (tongue scraper provided today, but can use toothbrush as well). Pt can continue daily antiseptic mouth rinses, but discouraged use of alcohol-based rinses, as this will only exacerbate dry mouth symptoms.     Suggested use of dry mouth rinse to alleviate dry mouth symptoms and protect oral cavity.     Encouraged pt to establish a dental home to receive routine preventive dental care and optimize oral and systemic health.             Assessment and Plan:   Assessment:  This patient is a 58 year old male with a history of IgA nephropathy s/p kidney transplant (2004, 2015), BK and CMV viremia, secondary hyperparathyroid presenting with persistent diarrhea, concerning for CMV colitis vs. MMF colitis and MATTHEW, oral exam concerning for no visible dental caries, infection, or pain reported by pt;  however, pt has xerostomia, contributing to increase in biofilm on tongue.      Plan:    Pt to implement oral care plan as described above. Pt is currently independent in oral cares.    Consider prescribing Biotene for prn use to alleviate dry mouth and cleanse oral cavity.    Pt to establish dental home and receive preventive dental care. (pt lives in the [facility])    Anju PuenteelliotdebbieNORBERTO  Pager: [phone]      Past Medical History   I have reviewed this patient's medical history and updated it with pertinent information if needed.  Past Medical History:   Diagnosis Date     Anemia of chronic kidney failure      Carpal tunnel syndrome      Clinical diagnosis of COVID-19 9/22/2022     Dyslipidemia      History of respiratory failure      Hypertension      Hypomagnesemia 2015     IgA nephropathy      Insomnia      Kidney disease, chronic, end stage on dialysis (H)      Kidney transplant failure      Myoclonus      Obstructive sleep apnea      Papillary thyroid carcinoma (H)      PRES (posterior reversible encephalopathy syndrome)     2011 after thyroidectomy     Restless leg syndrome      Secondary hyperparathyroidism (H24)      Seizure disorder (H)     Post surgery. Keppra now discontinued     Urinoma of kidney transplant        Past Surgical History   I have reviewed this patient's surgical history and updated it with pertinent information if needed.  Past Surgical History:   Procedure Laterality Date     BENCH KIDNEY  10/13/2015    Procedure: BENCH KIDNEY;  Surgeon: Seth Gibbons MD;  Location: [facility]     COLONOSCOPY N/A 10/5/2023    Procedure: COLONOSCOPY, WITH POLYPECTOMY AND BIOPSY;  Surgeon: Russ Dodge MD;  Location: [facility] GI     CREATE FISTULA ARTERIOVENOUS UPPER EXTREMITY  2011     CYSTOSCOPY, REMOVE STENT(S), COMBINED N/A 11/17/2015    Procedure: COMBINED CYSTOSCOPY, REMOVE STENT(S);  Surgeon: Seth Gibbons MD;  Location: [facility] OR     CYSTOSCOPY, REMOVE STENT(S), COMBINED  Bilateral 2015    Procedure: COMBINED CYSTOSCOPY, REMOVE STENT(S);  Surgeon: Seth Gibbons MD;  Location: [facility] OR     ELBOW SURGERY Right      hemithyroidectomy Left      HERNIA REPAIR Right 3/2009     THYROIDECTOMY  2011     TRANSPLANT KIDNEY RECIPIENT  DONOR       TRANSPLANT KIDNEY RECIPIENT  DONOR N/A 10/13/2015    Procedure: TRANSPLANT KIDNEY RECIPIENT  DONOR;  Surgeon: Seth Gibbons MD;  Location: [facility] OR     ureteropyelostomy  2004       Social History   I have reviewed this patient's social history and updated it with pertinent information if needed.  Social History     Tobacco Use     Smoking status: Never     Smokeless tobacco: Never   Substance Use Topics     Alcohol use: No     Drug use: No     Prior to Admission Medications   Prior to Admission Medications   Prescriptions Last Dose Informant Patient Reported? Taking?   carvedilol (COREG) 25 MG tablet 10/3/2023 at PM  Yes Yes   Sig: Take 1 tablet (25 mg) by mouth 2 times daily (with meals)   clonazePAM (KLONOPIN) 0.5 MG tablet 10/2/2023 at PM  Yes Yes   Sig: Take 1 tablet (0.5 mg) by mouth At Bedtime   folic acid (FOLVITE) 1 MG tablet 10/3/2023 at AM  Yes Yes   Sig: Take 1 tablet (1 mg) by mouth daily   levothyroxine (SYNTHROID/LEVOTHROID) 100 MCG tablet 10/3/2023 at AM  Yes Yes   Sig: Take 1 tablet (100 mcg) by mouth daily   mycophenolic acid (GENERIC EQUIVALENT) 180 MG EC tablet 10/3/2023 at AM  Yes Yes   Sig: Take 360 mg by mouth 2 times daily   nitazoxanide (ALINIA) 500 MG tablet Not started yet at n/a  No No   Sig: Take 1 tablet (500 mg) by mouth 2 times daily (with meals) for 14 days   pantoprazole (PROTONIX) 40 MG EC tablet 10/3/2023 at AM  Yes Yes   Sig: Take 1 tablet (40 mg) by mouth daily Take 30-60 minutes before a meal.   predniSONE (DELTASONE) 5 MG tablet 10/3/2023 at AM  Yes Yes   Sig: Take 1 tablet (5 mg) by mouth daily   rOPINIRole (REQUIP) 1 MG tablet 10/2/2023 at PM  Yes Yes   Sig:  Take 1 tablet (1 mg) by mouth At Bedtime   sodium bicarbonate 650 MG tablet Past Week  No Yes   Sig: Take 3 tablets (1,950 mg) by mouth 3 times daily for 90 days   sulfamethoxazole-trimethoprim (BACTRIM/SEPTRA) 400-80 MG per tablet 10/3/2023 at AM  No Yes   Sig: Take 1 tablet by mouth daily   tacrolimus (GENERIC EQUIVALENT) 0.5 MG capsule 10/3/2023 at 0700  Yes Yes   Sig: Total dose 3mg in AM and 2.5mg in PM   tacrolimus (GENERIC EQUIVALENT) 1 MG capsule 10/3/2023 at 1900  Yes Yes   Sig: Total dose 3mg in AM and 2.5mg in PM   vitamin D3 (CHOLECALCIFEROL) 10 MCG (400 UNIT) capsule 10/3/2023 at PM  Yes Yes   Sig: Take 1 capsule (400 Units) by mouth daily      Facility-Administered Medications: None     Allergies   Allergies   Allergen Reactions     Hydralazine Swelling     Betadine [Povidone Iodine]      Cephalexin Hives     Clindamycin Hives     Trazodone Swelling     Swelling of face/lips     Physical Exam

## 2023-10-12 NOTE — PROGRESS NOTES
SOLID ORGAN INFECTIOUS DISEASES PROGRESS NOTE     Patient:  Haider Torrez   YOB: 1965  Date of Visit:  10/12/2023  Date of Admission: 10/3/2023  Consult Requester:Phoenix Alcazar MD          Assessment and Recommendations:   Recommendations:-  - continue IV GCV 1.25 mg/kg/day for a total of 4 weeks, until 11/3/2023.   - PICC line.   - G-CSF 3 mcg/kg IV or subcutaneous when ANC <1000. Give one dose today please.   - please check if IV GCV and IV or subcutaneous G-CSF are covered as OP.   - follow up with me on 10/31/23 @6:30 pm in a virtual visit. Please make sure this appointment is added to his discharge instructions.   - weekly CMV PCR, CBC with diff, BMP.   - would check with nephrology if there is a room to decrease IS or an alternative to MMF.     Assessment:  Haider Torrez is a 58 year old male with PMHx significant for IgA nephropathy s/p kidney transplant (2004, 2015) on tacrolimus + mycophenolic acid + prednisone 5 mg, history of BK viremia, CMV viremia, hypertension, and secondary hyperparathyroid who presented on 10/3/23 with persistent diarrhea x3-4 weeks with concern for CMV proctitis/colitis.    CMV proctitis/colitis with diarrhea.   CMV viremia, D-/R+  The diarrhea is improving in frequency and consistency but I still do not feel comfortable switching to PO VGCV yet.   Will treat with a 4- week course of IV GCV.     Norovirus viral shedding.   This episode of diarrhea seems to be due to CMV colitis rather than norovirus. The patient stated that diarrhea in 3/2023 (the time of norovirus infection) resolved before it recurred in 9/2023. The positive norovirus represents shedding. No evidence that there is a role of nitazoxanide for the treatment of norovirus especially with chronic shedding.      Leukopenia  Likely due to GCV.   Will support with G-CSF.   No safe alternative to GCV.     Immunocompromised patient with viral infections.   The chronic viral shedding of norovirus,  "the recent norovirus infection and now CMV colitis following prolonged viremia, all indicate overly immunosuppressed patient. I would check with nephrology if there is a room to decrease IS.      Previous ID Issues:  #COVID (+09/20/22 s/p 5 days remdesivir) c/b post-COVID bacterial pneumonia ([place], 10/2022) - treated with vanc x1, 3 days of azithromycin, and 5 days cefepime.     Other ID issues:  - QTc interval:  443msec on 10/3/23  - Bacterial prophylaxis:    - Pneumocystis prophylaxis:  Bactrim  - Viral serostatus & prophylaxis:  CMV D-/R+, EBV D+/R+  - Fungal prophylaxis:    - Immunization status:  Due for annual COVID, flu, and RSV.   - Gamma globulin status:  810 on 10/3/23, received IVIG on 10/4/23.   - Isolation status: enteric (+norovirus)      Total duration of visit including chart review, reviewing labs and imaging, interviewing and examining the patient, documentation, and sending communication to the patient and to the primary treating team, all at the same day of this encounter, is: 60 minutes.   Serafin Rudd MD  [facility]  Contact information available via [facility] Paging/Directory         Interim History:   Diarrhea is now down to 3 times a day, is firming up but it is still mostly watery.   No fever.          History of Present Illness:   Transplants:  10/13/2015 (Kidney), 4/15/2004 (Kidney), Postoperative day:  2921   Per initial consult:  \"Haider Torrez is a 58 year old male with PMHx significant for IgA nephropathy s/p kidney transplant (2004, 2015) on tacrolimus + mycophenolic acid + prednisone 5 mg, history of BK viremia, CMV viremia, hypertension, and secondary hyperparathyroid who presented on 10/3/23 with persistent diarrhea x3 weeks. ID consulted for CMV colitis.    Admission in March 2023 for diarrhea, found +norovirus and symptoms mildly improved with supportive care, no other treatment given. May have had imodium on 3/7 and this helped. " Colonoscopy 10/2022 without evidence of CMV, though notable for focal crypt degeneration and mild architectural distortion c/f possible medication related colitis. Remains with +norovirus on enteric panel 9/21/23.      He reports diarrhea for the last 3-4 weeks - water, undigested food that is postprandial 4-10x/day. Denies black or bloody stools. No incontinence, no nocturnal symptoms. Seen at outside ED 10/2 - notable for MATTHEW, given IV fluids and discharged. Saw transplant nephrology for virtual visit on 10/2 who recommended admission and he presented to [facility] ED 10/3. He also reported dizziness. Denies fever, chills, night sweats, chest pain, cough, dyspnea, nausea, vomiting, abdominal pain, graft pain, urinary symptoms, vision changes, headache, or skin rash. Endorses food avoidance due to fear of diarrhea and 10 lb weight loss in the last month. Vitals stable. Labs notable for WBC 3.2 --> 1.8, MATTHEW with creatinine 3.75 --> 2.97. C difficile negative, enteric panel +10/4. UA noninfectious appearing. EBV not detected. Hepatic panel unremarkable. Immunosuppression was just decreased MPA to 360 mg BID, remains on prednisone 5 mg, tacrolimus (goal 4-6). Was given IVIG once here.     He had inpatient colonoscopy completed 10/5/23 - notable for diverticulosis, granula mucosa in rectum up to 3 cm proximal, non-bleeding internal hemorrhoids (with a mucosal nodule on top of internal hemorrhoid - scarring vs tumor, s/p biopsy), and otherwise normal appearing mucosa throughout remaining colon. Pathology as below:  Final Diagnosis   A. COLON, RANDOM BIOPSY:  Colonic mucosa with mild crypt architectural distortion, evidence of prior injury; no current evidence of cryptitis, dysplasia, or other histologic abnormality; report of CMV & adenovirus immunohistochemistry to follow      B. DISTAL RECTUM, BIOPSY:  Colonic mucosa with mild reactive changes, otherwise no evidence of microscopic or chronic colitis or other significant  "histologic abnormality; report of CMV & adenovirus immunohistochemistry to follow       3. ANORECTUM, BIOPSY OF NODULE AT TRANSITION:  Colonic mucosa with polypoid features, active inflammation with ulceration and granulation tissue; features concerning but not conclusive for viral cytopathic changes; report of adenovirus & CMV immunohistochemistry to follow      Has travelled to [facility] once to visit family. Also been to [place]. He has no unusual food exposures, no animal exposures, no recent sick contacts or antibiotic use. Lives with wife and 1 son\".            Current Medications:      calcium carbonate  500 mg Oral BID    carvedilol  25 mg Oral BID w/meals    cholecalciferol  10 mcg Oral Daily    clonazePAM  0.5 mg Oral At Bedtime    folic acid  1 mg Oral Daily    ganciclovir (CYTOVENE) 70 mg in D5W 100 mL intermittent infusion  1.25 mg/kg Intravenous Q24H    levothyroxine  100 mcg Oral Daily    mycophenolic acid  360 mg Oral BID    pantoprazole  40 mg Oral Daily    predniSONE  5 mg Oral Daily    rOPINIRole  1 mg Oral At Bedtime    sodium bicarbonate  1,300 mg Oral TID    sulfamethoxazole-trimethoprim  1 tablet Oral Once per day on Mon Wed Fri    tacrolimus  2.5 mg Oral BID IS            Allergies:     Allergies   Allergen Reactions    Hydralazine Swelling    Betadine [Povidone Iodine]     Cephalexin Hives    Clindamycin Hives    Trazodone Swelling     Swelling of face/lips            Physical Exam:   Vitals were reviewed  Temp:  [97.8  F (36.6  C)-98.1  F (36.7  C)] 98.1  F (36.7  C)  Pulse:  [57-64] 57  Resp:  [18] 18  BP: (142-150)/() 150/78  SpO2:  [99 %-100 %] 100 %    Vitals:    10/03/23 1934 10/04/23 0539 10/07/23 0900 10/08/23 0500   Weight: 57.1 kg (125 lb 14.1 oz) 56.6 kg (124 lb 11.2 oz) 59.3 kg (130 lb 12.8 oz) 59.6 kg (131 lb 8 oz)       Constitutional: Pleasant adult male seen sitting up in bed, in NAD. Alert and interactive.            Laboratory Data:     Microbiology:  No results found for: " "\"CULTURE\"    Culture Micro   Date Value Ref Range Status   11/17/2015 No growth  Final   10/14/2015 No growth  Final   10/13/2015   Final    Test canceled - Lab  error  Canceled, Test credited     10/13/2015 No growth  Final       Inflammatory Markers    No lab results found.    Metabolic Studies       Recent Labs   Lab Test 10/12/23  0456 10/11/23  0533 10/10/23  0614 10/09/23  0534 10/08/23  0946 10/07/23  0542 10/06/23  0537 10/05/23  0542 10/14/15  1325 10/14/15  1021 10/14/15  0000 10/13/15  2231 10/13/15  2113 10/13/15  1215 10/12/15  1330   *  --   --  143 146* 141 146* 145   < >  --    < >  --   --    < > 136   POTASSIUM 4.8  --   --  4.3 3.8 3.9 3.3* 3.1*   < > 4.8   < > 5.4* 6.8*   < > 4.2   CHLORIDE 116*  --   --  109* 109* 112* 110* 108*   < >  --    < >  --   --    < > 97   CO2 21*  --   --  25 27 25 28 26   < >  --    < >  --   --    < > 31   ANIONGAP 10  --   --  9 10 4* 8 11   < >  --    < >  --   --    < > 8   BUN 47.8*  --   --  42.4* 39.3* 40.8* 40.7* 42.0*   < >  --    < >  --   --    < > 41*   CR 2.86* 2.89* 2.86* 2.76* 2.96* 2.99* 2.97* 3.01*   < >  --    < >  --   --    < > 9.83*   GFRESTIMATED 25* 25* 25* 26* 24* 24* 24* 23*   < >  --    < >  --   --    < > 6*   GLC 98  --   --  101* 117* 108* 108* 109*   < >  --    < >  --   --    < > 95   A1C  --   --   --   --   --   --   --   --   --   --   --   --   --   --  5.1   CRISTIAN 7.8*  --   --  8.0* 8.1* 7.8* 7.5* 7.3*   < >  --    < >  --   --    < > 8.7   PHOS 3.1  --  2.8 2.4*  --   --   --   --    < >  --    < >  --   --    < >  --    MAG 1.4*  --   --   --   --   --  1.9 2.1   < >  --    < >  --   --    < >  --    LACT  --   --   --   --   --   --   --   --   --  2.1  --   --  4.2*   < >  --    CKT  --   --   --   --   --   --   --   --   --   --   --  336*  --   --   --     < > = values in this interval not displayed.       Hepatic Studies    Recent Labs   Lab Test 10/08/23  0946 10/03/23  1240 03/27/18  1510 10/12/15  1330 " "  BILITOTAL 0.2 0.3  --  0.4   ALKPHOS 52 62  --  81   ALBUMIN 2.9* 3.9 3.8 4.2   AST 18 12  --  10   ALT <5 7  --  16       Pancreatitis testing    Recent Labs   Lab Test 07/15/21  0722 05/08/18  0700 05/01/17  0707 06/24/16  0755 12/14/15  0715   TRIG 168* 253* 364* 300* 261*       Hematology Studies      Recent Labs   Lab Test 10/12/23  0456 10/11/23  0533 10/10/23  0614 10/09/23  0534 10/07/23  0542 10/05/23  0542 11/17/15  1139 10/30/15  0718 10/26/15  0707 10/25/15  0750 10/23/15  0652 10/21/15  0648 10/20/15  0715 10/19/15  0704   WBC 1.7* 2.1* 2.1* 2.3* 2.3* 1.8*   < > 4.7   < > 7.6 5.4 3.5* 4.1 5.1   ANEU  --   --   --   --   --   --   --  3.3  --  6.4 3.9 3.0 3.7 4.7   ALYM  --   --   --   --   --   --   --  0.3*  --  0.1* 0.1* 0.1* 0.0* 0.3*   MELVIN  --   --   --   --   --   --   --  0.3  --  0.2 0.2 0.2 0.1 0.0   AEOS  --   --   --   --   --   --   --  0.3  --  0.1 0.0 0.0 0.0 0.0   HGB 8.2* 9.2* 8.6* 8.6* 8.4* 9.3*   < > 11.9*   < > 11.8* 10.8* 10.5* 10.3* 10.3*   HCT 27.1* 30.4* 27.8* 28.5* 27.3* 29.1*   < > 38.4*   < > 35.8* 34.2* 32.6* 32.7* 32.4*   PLT 95* 95* 91* 79* 63* 100*   < > 282   < > 269 232 163 136* 112*    < > = values in this interval not displayed.       Arterial Blood Gas Testing    Recent Labs   Lab Test 10/13/15  2113   PH 7.33*   PCO2 40   PO2 89   HCO3 21   O2PER 1L        Urine Studies     Recent Labs   Lab Test 10/03/23  2244 11/17/15  1410 10/14/15  2220 10/13/15  0725   URINEPH 6.0 6.5 5.5 5.0   NITRITE Negative Negative Negative Negative   LEUKEST Negative Negative Small* Negative   WBCU 1 1 15* 1       Vancomycin Levels     No lab results found.    Invalid input(s): \"VANCO\"  No lab results found.    Invalid input(s): \"VANCO\"        Hepatitis B Testing   Recent Labs   Lab Test 10/12/15  1330   HEPBANG Nonreactive   HBCM Nonreactive   A nonreactive result suggests lack of recent exposure to the virus in the   preceding 6 months.     HBEAGN Negative  Reference range: " "Negative  (Note)  Performed by [facility],  [address] [place] [address] [phone]  [email or web address] Timo Salcido MD, Lab. Director         Hepatitis C Testing     Hepatitis C Antibody   Date Value Ref Range Status   10/12/2015  NR Final    Nonreactive   Assay performance characteristics have not been established for newborns,   infants, and children     06/18/2015  NR Final    Nonreactive   Assay performance characteristics have not been established for newborns,   infants, and children         Respiratory Virus Testing    No results found for: \"RS\", \"FLUAG\"    Last check of C difficile  C Difficile Toxin B by PCR   Date Value Ref Range Status   10/04/2023 Negative Negative Final     Comment:     A negative result does not exclude actual disease due to C. difficile and may be due to improper collection, handling and storage of the specimen or the number of organisms in the specimen is below the detection limit of the assay.     CMV viral loads    CMV DNA Quant (External)   Date Value Ref Range Status   05/01/2023 318 (A) Undetected IU/mL Final   04/10/2023 460 (A) Undetected IU/mL Final   03/20/2023 622 (A) Undetected IU/mL Final   03/13/2023 451 (A) UNDETECTED IU/mL Final   02/15/2023 301 (A) Undetected IU/mL Final   02/08/2023 311 (A) Undetected IU/mL Final   01/11/2023 138 (A) Undetected IU/mL Final     CMV viral loads    Recent Labs   Lab Test 10/09/23  0534 10/06/23  1016 05/01/23  0700   CMVLOG <1.5 2.4  --    43635  --   --  318*       CMV viral loads    CMV log   Date Value Ref Range Status   10/09/2023 <1.5  Final   10/06/2023 2.4  Final     CMV DNA Quant (External)   Date Value Ref Range Status   05/01/2023 318 (A) Undetected IU/mL Final   04/10/2023 460 (A) Undetected IU/mL Final   03/20/2023 622 (A) Undetected IU/mL Final   03/13/2023 451 (A) UNDETECTED IU/mL Final   02/15/2023 301 (A) Undetected IU/mL Final   02/08/2023 311 (A) Undetected IU/mL Final   01/11/2023 138 (A) Undetected " "IU/mL Final       CMV resistance testing  No lab results found.  No results found for: \"CMVCID\", \"CMVFOS\", \"CMVGAN\"           Imaging:     Results for orders placed or performed in visit on 12/28/21   DEXA - HIM Scan    Narrative    [facility] PROGRESS NOTE  FOUND IN CARE EVERYWHERE      "

## 2023-10-12 NOTE — PROGRESS NOTES
[facility]   Transplant Nephrology Progress Note  Date of Admission:  10/3/2023  Today's Date: 10/12/2023    Recommendations:  - RUE ultrasound for right arm swelling.  - Agree with PICC line placement and continuing IV ganciclovir.  - Give magnesium sulfate 2 g IV once.  - Encouraged PO water intake for sodium 147.  - Pt improving clinically and CMV <35 so would not reduce immune suppression further at this time.  Could consider reducing mycophenolate to 180 mg BID if symptoms worsen or CMV DNA PCR increases.    Assessment & Plan   # DDKT: Increased serum creatinine this admission.  - MATTHEW felt secondary to dehydration.    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    # Immunosuppression: Tacrolimus immediate release (goal 4-6), Mycophenolic acid (dose 360 mg every 12 hours), and Prednisone (dose 5 mg daily)   - Continue with intensive monitoring of immunosuppression for efficacy and toxicity.   - Pt improving clinically and CMV <35 so would not reduce immune suppression further at this time.  Could consider reducing mycophenolate to 180 mg BID if symptoms worsen or CMV DNA PCR increases.   - Changes: No.  Myfortic already reduced.    # Infection Prophylaxis:   - PJP: Sulfa/TMP (Bactrim)    # Norovirus  # CMV Colitis  # Diarrhea:  Pt presented 10/3/23 with persistent diarrhea.  Colonoscopy 10/5 showed +CMV stain suggestive of CMV  disease (colitis). CMV viremia has been managed with decrease in MPA.  - Colonoscopy 10/2022 with mild archietectural distortion, focal crypt degeneration. 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     - 10/4/23 IVIG 0.5 g/kg given.   - 10/5/23 Repeat colonoscopy.  Biopsy results show crypt distortion.  Active area with inflammation with ulceration and granulation tissue; features concerning but not conclusive for viral cytopathic changes. 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     10/4/2023 C. Diff Toxin PCR Negative    10/3/2023   EBV DNA PCR Quantitative Whole Blood Not detected    10/3/2023  IgG 810    10/3/2023  Enteric Bacteria and Virus Panel PCR Positive for norovirus.      -Check CMV DNA PCR weekly  - IV ganciclovir 10/6/23 - 11/3/23     03/20/23  04/10/23  05/01/23  10/6/23 10/9/23   CMV DNA Quant  622 !  460 !  318 !  252 ! <35        # Hypertension: Borderline control;  Goal BP: < 140/90 (Hospitalization goal)   - Changes: Not at this time.  Continue carvedilol  25 mg PO BID.    # Anemia in Chronic Renal Disease: Hgb: Increased     DANNY: No   - Iron studies: Replete.    # Leukopenia: Likely d/t ganciclovir. Plan for G-CSF 3 mcg/kg IV or subcutaneous when ANC <1000 per ID.    # Mineral Bone Disorder:   - Secondary renal hyperparathyroidism; PTH level: Mildly elevated (151-300 pg/ml)        On treatment: None  - Vitamin D; level: Not checked recently, but was normal last check         On supplement: Yes  - Calcium; level: Normal when corrected for low alb            On supplement: Yes, calcium carbonate 500 mg PO BID between meals.  - Phosphorus; level: Normal           On supplement: No    # Electrolytes:   - Potassium; level: Normal         On supplement: No,   - Magnesium; level: Low         On supplement: No, give magnesium sulfate 2 g IV once.  - Bicarbonate;  level: Low        On supplement: Yes,  sodium bicarbonate 1300 mg PO TID    # Proteinuria:   -1.2g/g in previous check in 2023, 3.3g on 24h collection in 2023   - A1c 5.8%.    - 10/3/23 SPEP: monoclonal peak normal.   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      Discussed with Dr. iWnn.    MARCUS Bynum CNP   Pager: [phone]      Interval History   Mr. Glover creatinine is 2.86 (10/12 0456); Stable      Other significant labs/tests/vitals: SBP 140s - 150s overnight. Afebrile  No acute events overnight.  No chest pain or shortness of breath.  No leg swelling.  Right arm swollen.  No nausea and vomiting.  Bowel movements are formed.      Review of Systems   4 point ROS was obtained and negative except as noted in the Interval History.    MEDICATIONS:   calcium carbonate  500 mg Oral BID    carvedilol  25 mg Oral BID w/meals    cholecalciferol  10 mcg Oral Daily    clonazePAM  0.5 mg Oral At Bedtime    folic acid  1 mg Oral Daily    ganciclovir (CYTOVENE) 70 mg in D5W 100 mL intermittent infusion  1.25 mg/kg Intravenous Q24H    levothyroxine  100 mcg Oral Daily    mycophenolic acid  360 mg Oral BID    pantoprazole  40 mg Oral Daily    predniSONE  5 mg Oral Daily    rOPINIRole  1 mg Oral At Bedtime    sodium bicarbonate  1,300 mg Oral TID    sulfamethoxazole-trimethoprim  1 tablet Oral Once per day on     tacrolimus  2.5 mg Oral BID IS         Physical Exam   Temp  Av.7  F (36.5  C)  Min: 97.3  F (36.3  C)  Max: 98.1  F (36.7  C)       Pulse  Av.9  Min: 53  Max: 66 Resp  Av.6  Min: 16  Max: 19  SpO2  Av.6 %  Min: 98 %  Max: 100 %     BP (!) 150/78 (BP Location: Left arm)   Pulse 57   Temp 98.1  F (36.7  C) (Oral)   Resp 18   Wt 59.6 kg (131 lb 8 oz)   SpO2 100%   BMI 21.88 kg/m      Admit Weight: 57.1 kg (125 lb 14.1 oz)     GENERAL APPEARANCE: alert and no distress  RESP: lungs clear to auscultation - no rales, rhonchi or wheezes  CV: regular rhythm, normal rate, no rub, no murmur  EDEMA: no LE edema bilaterally  ABDOMEN: soft, nondistended, nontender, bowel sounds hyperactive  MS: extremities normal - no gross deformities noted, no evidence of inflammation in joints, no muscle tenderness  SKIN: no rash  PSYCH: mentation appears normal and affect normal  DIALYSIS ACCESS:  RUE AV fistula +bruit/+thrill    Data   All labs reviewed by me.  CMP  Recent Labs   Lab 10/12/23  0456 10/11/23  0533 10/10/23  0614 10/09/23  0534 10/08/23  0946 10/07/23  0542 10/06/23  0537   *  --   --  143 146* 141 146*   POTASSIUM 4.8  --   --  4.3 3.8 3.9 3.3*   CHLORIDE 116*  --   --  109* 109* 112* 110*   CO2 21*  --   --  25 27 25 28   ANIONGAP 10  --   --  9 10 4* 8   GLC 98  --   --  101* 117* 108* 108*   BUN 47.8*  --   --  42.4* 39.3* 40.8* 40.7*   CR 2.86* 2.89* 2.86* 2.76* 2.96* 2.99* 2.97*   GFRESTIMATED 25* 25* 25* 26* 24* 24* 24*   CRISTIAN 7.8*  --   --  8.0* 8.1* 7.8* 7.5*   MAG  --   --   --   --   --   --  1.9   PHOS  --   --  2.8 2.4*  --   --   --    PROTTOTAL  --   --   --   --  5.1*  --   --    ALBUMIN  --   --   --   --  2.9*  --   --    BILITOTAL  --   --   --   --  0.2  --   --    ALKPHOS  --   --   --   --  52  --   --    AST  --   --   --   --  18  --   --    ALT  --   --   --   --  <5  --   --      CBC  Recent Labs   Lab 10/12/23  0456 10/11/23  0533 10/10/23  0614 10/09/23  0534   HGB 8.2* 9.2* 8.6* 8.6*   WBC 1.7* 2.1* 2.1* 2.3*   RBC 2.80* 3.12* 2.90* 2.91*   HCT 27.1* 30.4* 27.8* 28.5*   MCV 97 97 96 98   MCH 29.3  29.5 29.7 29.6   MCHC 30.3* 30.3* 30.9* 30.2*   RDW 13.5 13.5 13.4 13.2   PLT 95* 95* 91* 79*     INR  No lab results found in last 7 days.    ABGNo lab results found in last 7 days.   Urine Studies  Recent Labs   Lab Test 10/03/23  2244 11/17/15  1410 10/14/15  2220 10/13/15  0725   COLOR Light Yellow Light Yellow Yellow Straw   APPEARANCE Clear Clear Clear Clear   URINEGLC Negative Negative Negative Negative   URINEBILI Negative Negative Negative Negative   URINEKETONE Negative Negative Negative Negative   SG 1.014 1.007 1.006 1.003   UBLD Negative Negative Large* Large*   URINEPH 6.0 6.5 5.5 5.0   PROTEIN 200* Negative 10* Negative   NITRITE Negative Negative Negative Negative   LEUKEST Negative Negative Small* Negative   RBCU 1 0 >182* 57*   WBCU 1 1 15* 1     Recent Labs   Lab Test 03/27/18  1516 09/26/17  1506   UTPG 0.12 0.10     PTH  Recent Labs   Lab Test 03/27/18  1510 10/15/15  0626   PTHI 126* 561*     Iron Studies  Recent Labs   Lab Test 03/27/18  1510 10/17/15  0700   IRON 67 25*    216*   IRONSAT 24 12*   * 883*       IMAGING:  All imaging studies reviewed by me.

## 2023-10-12 NOTE — PROGRESS NOTES
"Prolonged Parenteral/Oral Antibiotic Recommendations and ID Follow up  This template provides final ID recommendations as of this date. If there are clinical changes or questions please call the ID team.     Infectious Diseases Indication: CMV colitis.     Antibiotic Information  Name of Antibiotic Dose of Antibiotic1 Pharmacy to assist with dosing Y/N Anticipated duration Effective start date2 End date   ganciclovir 1.25 mg/kg/day y Total of 4 weeks 10/6/23 11/3/23                   1.Dose of antibiotic will need to be renally adjusted if creatinine clearance changes  2.Effective start date is the date of therapy with appropriate spectrum    Method of antibiotic delivery:PICC line. At the end of therapy should the line be removed? otherpending ID eval as OP . Selecting \"yes\" will function as written order to remove PICC line at the end of therapy.    Tentative plans for disposition: Home    Weekly labs required: CBC with diff, BMP, and CMV PCR . Dr. Rudd will follow labs at discharge until ID follow up. Please have labs faxed to ID clinic.    Appointment to be scheduled: Within 4 weeks of discharge. Type of ID Clinic Appointment Virtual Video visit. Appointment will be scheduled with: Blaire. Routine hospital follow up appointments are 30 minutes. If 60 minutes is necessary due to complexity of case indicate that a 60 min appointment is required. Appointment time Appointment Time: 30 minutes. If the patient remains in the hospital at this date please re-consult ID.     Imaging for ID follow up: ID Imaging: No. Dr. Rudd will follow up on the results of the imaging. Image order will be placed by the primary team at the time of discharge.     ID provider to route this note to the appropriate [facility] pools: [facility] INFECTIOUS DISEASE ADULT [facility]SC, PANDA, [facility] HOME INFUSION    [facility] Emergency Department/ID Clinic Information:  [address]  [place]  [address]  Phone: [phone]  Fax: [phone] (Leila Moralez " Rolando

## 2023-10-12 NOTE — CONSULTS
Care Management Initial Consult    General Information  Assessment completed with: Patient, VM-chart review,    Type of CM/SW Visit: Initial Assessment    Primary Care Provider verified and updated as needed: Yes (Per pt [facility] Juan Alvarez for PCP and Nephrologist)   Readmission within the last 30 days:        Reason for Consult: discharge planning  Advance Care Planning:            Communication Assessment  Patient's communication style: spoken language (English or Bilingual)    Hearing Difficulty or Deaf: no   Wear Glasses or Blind: no    Cognitive  Cognitive/Neuro/Behavioral: WDL                      Living Environment:   People in home: child(gilles), adult, spouse (Son Jens)     Current living Arrangements: house      Able to return to prior arrangements: yes       Family/Social Support:  Care provided by: self, spouse/significant other, homecare agency (Per pt he has 4 hours of PCA services)  Provides care for: no one, unable/limited ability to care for self  Marital Status:   Wife, Children          Description of Support System: Supportive, Involved    Support Assessment: Adequate family and caregiver support    Current Resources:   Patient receiving home care services: No     Community Resources: PCA  Equipment currently used at home: walker, rolling  Supplies currently used at home:      Employment/Financial:  Employment Status: disabled        Financial Concerns:  (None)           Does the patient's insurance plan have a 3 day qualifying hospital stay waiver?  No    Lifestyle & Psychosocial Needs:  Social Determinants of Health     Food Insecurity: Not on file   Depression: Not at risk (10/2/2023)    PHQ-2     PHQ-2 Score: 0   Housing Stability: Not on file   Tobacco Use: Low Risk  (10/6/2023)    Patient History     Smoking Tobacco Use: Never     Smokeless Tobacco Use: Never     Passive Exposure: Not on file   Financial Resource Strain: Not on file   Alcohol Use: Not on file   Transportation Needs:  Not on file   Physical Activity: Not on file   Interpersonal Safety: Not on file   Stress: Not on file   Social Connections: Not on file       Functional Status:  Prior to admission patient needed assistance:   Dependent ADLs:: Ambulation-walker, Bathing  Dependent IADLs:: Cleaning, Cooking, Laundry, Shopping, Meal Preparation, Medication Management, Transportation       Mental Health Status:  Mental Health Status: No Current Concerns       Chemical Dependency Status:  Chemical Dependency Status: No Current Concerns             Values/Beliefs:  Spiritual, Cultural Beliefs, Scientology Practices, Values that affect care:                 Additional Information:  Notified by [employer] [employer] that agency received referral from Dr. Rudd, Infectios Disease inquiring if patient has coverage for IV Ganciclovir.    Writer received VM from [facility] [place] [facility] requesting return call.  Spoke with provider, team inquiring if patient has home IV coverage.  Team still evaluating plan.    Per chart review noted [facility] Benefit check: Therapy: Ganciclovir  Insurance: [place]     Pt has coverage for Ganciclovir [place]. Pt meets Medicare criteria for Ganciclovir and it must be done via [facility] for coverage. Between both their [place] and [place] pt should be covered at 100%. However, pt may have an additional copay upon dispense through [place] plan      In reference to referral from [facility] on pt admitted 10/03/23 to check for IV Ganciclovir coverage.    Writer spoke with PLC regarding IV education availability.  No availability for tomorrow.  Pt placed on waiting list.      Writer spoke with team at [phone].  Team still evaluating plan for discharge.  Team mainly wanting to ensure pt had coverage for IV Ganciclvior.  Team confirmed if discharged on IV Ganciclivor it would be q 24 hours.    Writer met with pt and his son Quique.  Quique lives in [place].  Per pt his wife will be here tomorrow late as she  works til 3 p.m.  Pt lives with his wife and son Jens.  Per pt he has 4 hours of PCA services per day.  Pt is concerned about managing IV at home d/t wife's schedule.   Pt aware that final plan is still pending.  Agreed to follow up with pt and care team tomorrow.    Erin Boucher, RN BSN, PHN, ACM-RN  7A RN Care Coordinator  Phone: [phone]  Pager [phone]    To contact the weekend RNCC  [place] ([phone]) Saturday and Sunday    Units: 5A,5B,5C [phone]    Units: 6A, [phone]    Units 6B, 6C, 6D [phone]    Units: 7A, 7B, 7C, 7D, [phone]    [facility] ([phone]) Saturday and Sunday    Units: 5 Ortho, 8A, 10 ICU, & Pediatric Units-Pager 4: [phone]    10/12/2023 4:55 PM

## 2023-10-13 LAB
ANION GAP SERPL CALCULATED.3IONS-SCNC: 8 MMOL/L (ref 7–15)
BASO+EOS+MONOS # BLD AUTO: ABNORMAL 10*3/UL
BASO+EOS+MONOS NFR BLD AUTO: ABNORMAL %
BASOPHILS # BLD AUTO: 0 10E3/UL (ref 0–0.2)
BASOPHILS NFR BLD AUTO: 0 %
BUN SERPL-MCNC: 46 MG/DL (ref 6–20)
CALCIUM SERPL-MCNC: 7.9 MG/DL (ref 8.6–10)
CHLORIDE SERPL-SCNC: 115 MMOL/L (ref 98–107)
CREAT SERPL-MCNC: 2.82 MG/DL (ref 0.67–1.17)
DEPRECATED HCO3 PLAS-SCNC: 22 MMOL/L (ref 22–29)
EGFRCR SERPLBLD CKD-EPI 2021: 25 ML/MIN/1.73M2
EOSINOPHIL # BLD AUTO: 0.1 10E3/UL (ref 0–0.7)
EOSINOPHIL NFR BLD AUTO: 1 %
ERYTHROCYTE [DISTWIDTH] IN BLOOD BY AUTOMATED COUNT: 13.6 % (ref 10–15)
GLUCOSE SERPL-MCNC: 98 MG/DL (ref 70–99)
HCT VFR BLD AUTO: 27.2 % (ref 40–53)
HGB BLD-MCNC: 8.3 G/DL (ref 13.3–17.7)
IMM GRANULOCYTES # BLD: 0.1 10E3/UL
IMM GRANULOCYTES NFR BLD: 1 %
LYMPHOCYTES # BLD AUTO: 0.4 10E3/UL (ref 0.8–5.3)
LYMPHOCYTES NFR BLD AUTO: 7 %
MCH RBC QN AUTO: 29.5 PG (ref 26.5–33)
MCHC RBC AUTO-ENTMCNC: 30.5 G/DL (ref 31.5–36.5)
MCV RBC AUTO: 97 FL (ref 78–100)
MONOCYTES # BLD AUTO: 0.4 10E3/UL (ref 0–1.3)
MONOCYTES NFR BLD AUTO: 6 %
NEUTROPHILS # BLD AUTO: 5.2 10E3/UL (ref 1.6–8.3)
NEUTROPHILS NFR BLD AUTO: 85 %
NRBC # BLD AUTO: 0 10E3/UL
NRBC BLD AUTO-RTO: 0 /100
PLATELET # BLD AUTO: 93 10E3/UL (ref 150–450)
POTASSIUM SERPL-SCNC: 4.7 MMOL/L (ref 3.4–5.3)
RBC # BLD AUTO: 2.81 10E6/UL (ref 4.4–5.9)
SODIUM SERPL-SCNC: 145 MMOL/L (ref 135–145)
UFH PPP CHRO-ACNC: 0.6 IU/ML
UFH PPP CHRO-ACNC: 0.79 IU/ML
UFH PPP CHRO-ACNC: 0.84 IU/ML
WBC # BLD AUTO: 6.1 10E3/UL (ref 4–11)

## 2023-10-13 PROCEDURE — 99222 1ST HOSP IP/OBS MODERATE 55: CPT | Performed by: STUDENT IN AN ORGANIZED HEALTH CARE EDUCATION/TRAINING PROGRAM

## 2023-10-13 PROCEDURE — 36415 COLL VENOUS BLD VENIPUNCTURE: CPT | Performed by: STUDENT IN AN ORGANIZED HEALTH CARE EDUCATION/TRAINING PROGRAM

## 2023-10-13 PROCEDURE — 250N000011 HC RX IP 250 OP 636: Mod: JZ

## 2023-10-13 PROCEDURE — 250N000012 HC RX MED GY IP 250 OP 636 PS 637

## 2023-10-13 PROCEDURE — 99232 SBSQ HOSP IP/OBS MODERATE 35: CPT | Mod: GC | Performed by: STUDENT IN AN ORGANIZED HEALTH CARE EDUCATION/TRAINING PROGRAM

## 2023-10-13 PROCEDURE — 85520 HEPARIN ASSAY: CPT | Performed by: STUDENT IN AN ORGANIZED HEALTH CARE EDUCATION/TRAINING PROGRAM

## 2023-10-13 PROCEDURE — 250N000013 HC RX MED GY IP 250 OP 250 PS 637

## 2023-10-13 PROCEDURE — 99233 SBSQ HOSP IP/OBS HIGH 50: CPT | Performed by: INTERNAL MEDICINE

## 2023-10-13 PROCEDURE — 85520 HEPARIN ASSAY: CPT

## 2023-10-13 PROCEDURE — 82374 ASSAY BLOOD CARBON DIOXIDE: CPT

## 2023-10-13 PROCEDURE — 99231 SBSQ HOSP IP/OBS SF/LOW 25: CPT | Performed by: INTERNAL MEDICINE

## 2023-10-13 PROCEDURE — 85025 COMPLETE CBC W/AUTO DIFF WBC: CPT

## 2023-10-13 PROCEDURE — 250N000013 HC RX MED GY IP 250 OP 250 PS 637: Performed by: INTERNAL MEDICINE

## 2023-10-13 PROCEDURE — 36415 COLL VENOUS BLD VENIPUNCTURE: CPT

## 2023-10-13 PROCEDURE — 250N000012 HC RX MED GY IP 250 OP 636 PS 637: Performed by: INTERNAL MEDICINE

## 2023-10-13 PROCEDURE — 258N000003 HC RX IP 258 OP 636

## 2023-10-13 PROCEDURE — 120N000011 HC R&B TRANSPLANT UMMC

## 2023-10-13 RX ORDER — MYCOPHENOLIC ACID 180 MG/1
180 TABLET, DELAYED RELEASE ORAL
Status: DISCONTINUED | OUTPATIENT
Start: 2023-10-13 | End: 2023-10-19

## 2023-10-13 RX ADMIN — MYCOPHENILIC ACID 360 MG: 360 TABLET, DELAYED RELEASE ORAL at 08:03

## 2023-10-13 RX ADMIN — CARVEDILOL 25 MG: 25 TABLET, FILM COATED ORAL at 18:31

## 2023-10-13 RX ADMIN — SODIUM BICARBONATE 650 MG TABLET 1300 MG: at 14:07

## 2023-10-13 RX ADMIN — FOLIC ACID 1 MG: 1 TABLET ORAL at 08:03

## 2023-10-13 RX ADMIN — SULFAMETHOXAZOLE AND TRIMETHOPRIM 1 TABLET: 400; 80 TABLET ORAL at 08:02

## 2023-10-13 RX ADMIN — CARVEDILOL 25 MG: 25 TABLET, FILM COATED ORAL at 08:02

## 2023-10-13 RX ADMIN — ROPINIROLE HYDROCHLORIDE 1 MG: 1 TABLET, FILM COATED ORAL at 21:18

## 2023-10-13 RX ADMIN — PREDNISONE 5 MG: 5 TABLET ORAL at 08:03

## 2023-10-13 RX ADMIN — SODIUM BICARBONATE 650 MG TABLET 1300 MG: at 08:03

## 2023-10-13 RX ADMIN — HEPARIN SODIUM 850 UNITS/HR: 10000 INJECTION, SOLUTION INTRAVENOUS at 14:39

## 2023-10-13 RX ADMIN — TACROLIMUS 2.5 MG: 1 CAPSULE ORAL at 18:36

## 2023-10-13 RX ADMIN — LEVOTHYROXINE SODIUM 100 MCG: 100 TABLET ORAL at 08:03

## 2023-10-13 RX ADMIN — CALCIUM CARBONATE (ANTACID) CHEW TAB 500 MG 500 MG: 500 CHEW TAB at 10:09

## 2023-10-13 RX ADMIN — PANTOPRAZOLE SODIUM 40 MG: 40 TABLET, DELAYED RELEASE ORAL at 08:02

## 2023-10-13 RX ADMIN — CHOLECALCIFEROL (VITAMIN D3) 10 MCG (400 UNIT) TABLET 10 MCG: at 08:03

## 2023-10-13 RX ADMIN — GANCICLOVIR SODIUM 70 MG: 500 INJECTION, POWDER, LYOPHILIZED, FOR SOLUTION INTRAVENOUS at 21:18

## 2023-10-13 RX ADMIN — CALCIUM CARBONATE (ANTACID) CHEW TAB 500 MG 500 MG: 500 CHEW TAB at 16:43

## 2023-10-13 RX ADMIN — MYCOPHENOLIC ACID 180 MG: 180 TABLET, DELAYED RELEASE ORAL at 18:31

## 2023-10-13 RX ADMIN — TACROLIMUS 2.5 MG: 1 CAPSULE ORAL at 08:03

## 2023-10-13 RX ADMIN — CLONAZEPAM 0.5 MG: 0.5 TABLET ORAL at 21:18

## 2023-10-13 RX ADMIN — SODIUM BICARBONATE 650 MG TABLET 1300 MG: at 21:17

## 2023-10-13 ASSESSMENT — ACTIVITIES OF DAILY LIVING (ADL)
ADLS_ACUITY_SCORE: 24

## 2023-10-13 NOTE — CONSULTS
Hematology Consult Note   Date of Service: 10/13/2023    Patient: Haider Torrez  MRN: [id number]  Admission Date: 10/3/2023  Hospital Day # Hospital Day: 11    Reason for Consult: Right internal jugular DVT with complete occlusion    History of Present Illness:    Haider Torrez is a 58 year old male with s/p kidney transplant (2004, 2015) on tacrolimus + MMF + prednisone, history of BK viremia, CMV viremia, HTN, now hospitalized with CMV colitis.     In the ED he reported diarrhea for the last 3-4 weeks - water, undigested food that is postprandial 4-10x/day. Denies black or bloody stools, incontinence. He also reported dizziness. Denies fever, chills, night sweats, chest pain, cough, dyspnea, nausea, vomiting, abdominal pain. CMV from colonic biopsy positive. Diarrhea greatly improving on GCV. Current plan for 4 weeks of outpatient IV ganciclovir via PICC line.     On 10/12 the patient developed new R arm swelling. At baseline he has no swelling of the R arm. No pain, warmth, redness, decreased ROM. Rt arm US showed acute DVT with complete occlusion, veins with collateral (compared to previous USG Rt arm in 2015: nonocclusive DVT). Start high intensity IV heparin drip. As of 10/14 the patient feels his R arm swelling is improving. No problems with fistula site (not currently on dialysis). No SOB. He had one episode of R internal jugular DVT before in 10/2015 with similar arm swelling which was treated with 6 months of warfarin (through June 2016). Has never had any central lines.     Review of Systems: Pertinent positive and negative systems described in HPI; the remainder of the 14 systems are negative    Past Medical History:  Past Medical History:   Diagnosis Date     Anemia of chronic kidney failure      Carpal tunnel syndrome      Clinical diagnosis of COVID-19 9/22/2022     Dyslipidemia      History of respiratory failure      Hypertension      Hypomagnesemia 2015     IgA nephropathy      Insomnia       Kidney disease, chronic, end stage on dialysis (H)      Kidney transplant failure      Myoclonus      Obstructive sleep apnea      Papillary thyroid carcinoma (H)      PRES (posterior reversible encephalopathy syndrome)     2011 after thyroidectomy     Restless leg syndrome      Secondary hyperparathyroidism (H24)      Seizure disorder (H)     Post surgery. Keppra now discontinued     Urinoma of kidney transplant        Social History:  Social History     Socioeconomic History     Marital status:      Spouse name: None     Number of children: None     Years of education: None     Highest education level: None   Tobacco Use     Smoking status: Never     Smokeless tobacco: Never   Substance and Sexual Activity     Alcohol use: No     Drug use: No        Alcohol - None  Smoking - Never  Drugs - None    Family History  Family History   Problem Relation Age of Onset     Diabetes Mother      Other Cancer Father         lung       Outpatient Medications reviewed.     Physical Exam:    BP (!) 142/75 (BP Location: Left arm)   Pulse 72   Temp 98.2  F (36.8  C) (Oral)   Resp 15   Wt 61.9 kg (136 lb 7.4 oz)   SpO2 98%   BMI 22.71 kg/m    Gen: Well appearing, in NAD  HEENT: EOMI, pupils equal and round, mmm, oropharynx clear  CV: Normal rate, regular rhythm. No m/r/g  Pulm: normal work of breathing on ambient room air, no use of accessory muscles  Abd: Soft, nt/nd, no rebound/guarding  Ext: Warm and well perfused. No lower extremity edema  Skin: No rash, cyanosis or petechial lesion    Labs & Studies: I personally reviewed the following studies:  CMP  Recent Labs   Lab 10/13/23  0454 10/12/23  0456 10/11/23  0533 10/10/23  0614 10/09/23  0534 10/08/23  0946    147*  --   --  143 146*   POTASSIUM 4.7 4.8  --   --  4.3 3.8   CHLORIDE 115* 116*  --   --  109* 109*   CO2 22 21*  --   --  25 27   ANIONGAP 8 10  --   --  9 10   GLC 98 98  --   --  101* 117*   BUN 46.0* 47.8*  --   --  42.4* 39.3*   CR 2.82* 2.86*  2.89* 2.86* 2.76* 2.96*   GFRESTIMATED 25* 25* 25* 25* 26* 24*   CRISTIAN 7.9* 7.8*  --   --  8.0* 8.1*   MAG  --  1.4*  --   --   --   --    PHOS  --  3.1  --  2.8 2.4*  --    PROTTOTAL  --   --   --   --   --  5.1*   ALBUMIN  --   --   --   --   --  2.9*   BILITOTAL  --   --   --   --   --  0.2   ALKPHOS  --   --   --   --   --  52   AST  --   --   --   --   --  18   ALT  --   --   --   --   --  <5     CBC  Recent Labs   Lab 10/13/23  0454 10/12/23  0456 10/11/23  0533 10/10/23  0614   WBC 6.1 1.7* 2.1* 2.1*   RBC 2.81* 2.80* 3.12* 2.90*   HGB 8.3* 8.2* 9.2* 8.6*   HCT 27.2* 27.1* 30.4* 27.8*   MCV 97 97 97 96   MCH 29.5 29.3 29.5 29.7   MCHC 30.5* 30.3* 30.3* 30.9*   RDW 13.6 13.5 13.5 13.4   PLT 93* 95* 95* 91*     INR  Recent Labs   Lab 10/12/23  1648   INR 1.05     US 10/12/2023  EXAM: Right upper extremity venous duplex ultrasound, 10/12/2023 2:44  PM       FINDINGS: In the right upper extremity, normal blood flow and  waveforms are demonstrated in the innominate, subclavian, and axillary  veins. The right internal jugular vein demonstrates intraluminal  hypoechoic thrombus with lack of compressibility and color  flow/waveform. On ultrasound 10/19/2015, there was a nonocclusive clot  in the right internal jugular vein. In the right lower neck there are  numerous collateral vessels surrounding the right IJV. The right  basilic and basilar veins are part of the patient's arteriovenous  fistula and demonstrate normal compressibility. There is normal  compressibility of the right upper extremity cephalic vein.                                                           IMPRESSION: New occlusive deep vein thrombus in the right internal  jugular vein with surrounding collaterals.    Assessment & Plan:   Haider Torrez is a 58 year old male s/p renal transplant in 2015, history of RIJ nonocclusive DVT treated with warfarin, who now developed recurrence of RIJ DVT during hospitalization for CMV colitis in absence of any  lines while off pharmacologic DVT prophylaxis.     Rt IJV DVT with complete occlusion   Developed recurrence of RIJ DVT during hospitalization in absence of any central lines while off pharmacologic DVT prophylaxis (on SCD's only). Given his history of internal jugular DVT  he is predisposed for recurrence in this area. Likely undergoing recent procedures and hospitalization without pharmacologic DVT prophylaxis contributed to this recurrence. Although with poor renal function, he is not on dialysis and we feel a DOAC is appropriate for likely 3 month treatment course. Plan to start apixaban 5 BID. It will be crucial to draw Xa level on day four of therapy, 3-4 hours after fifth dose, to ensure level is therapeutic especially as current CMV colitis could alter absorption, though diarrhea is improving. Ideally would wait to start therapy until after PICC line placed.     Arm swelling would be unlikely from internal jugular thrombosis alone. Review of US shows no other thrombi and fortunately his swelling is improving.    Recommendations:   - Plan to start apixaban 5 BID once no further procedures planned.   - He should have an apixaban level drawn after 4 days of therapy  (e.g., 3 to 4 hours after the morning dose on day 5) to ensure level is therapeutic especially given his renal function and GI symptoms concerning for malabsorption. This can be inpatient or outpatient.  - Can stop heparin gtt after first dose of apixaban.  - We will arrange hematology follow-up with this patient  - He should receive pharmacologic DVT prophylaxis in high-risk settings in the future (hospitalizations, surgery).     Patient was seen and plan of care was discussed with attending physician Dr. Cogan.       Audra Gray on 10/13/2023 at 9:44 AM  Hematology/Oncology/BMT  Pager: [phone]            Physician Attestation      I saw and evaluated Haider Torrez with the medical student.    I personally reviewed the vital signs,  medications, labs and imaging.    Key management decisions made by me and carried out under my direction include:     58-year-old man s/p renal transplants (last 2015) admitted with CMV colitis, course complicated by recurrent right internal jugular thrombosis.  He developed a right arm swelling on October 12.  Ultrasound showed complete occlusion of the right internal jugular vein.  Of note he had a thrombosis in this location in 2015.    This likely represents a provoked thrombosis at the site predisposed to thrombosis given the prior DVT.  The provoking factors in this case are hospitalization, active infection, and lack of pharmacologic VTE prophylaxis.    I would suggest starting apixaban 5 mg twice daily.  He should have a level drawn after 4 days of therapy to check if absorption is appropriate.  The level should be drawn 3 to 4 hours after a given dose.  This can be done in the hospital if he is not discharged, but if he is we can coordinate this as an outpatient.  Please reach out to us to let us know the plan.    - Continue heparin  - When no further procedures planned, switch to apixaban 5 mg twice daily  - Check an apixaban level after 4 full days of therapy, 3 to 4 hours after the morning dose on day 5 of therapy - this can be done either inpatient or outpatient  - Follow-up in hematology clinic    Counseling and/or coordination of care performed by me: Rounds    45 MINUTES SPENT BY ME on the date of service doing chart review, history, exam, documentation & further activities per the note.    Jacob Cogan, MD  Date of Service (when I saw the patient): 10/13/23

## 2023-10-13 NOTE — PROGRESS NOTES
Care Management Follow Up    Length of Stay (days): 10    Expected Discharge Date: 10/16/2023     Concerns to be Addressed:       Patient plan of care discussed at interdisciplinary rounds: Yes    Anticipated Discharge Disposition: Home, Home Infusion, Home Care     Anticipated Discharge Services: None  Anticipated Discharge DME: None    Patient/family educated on Medicare website which has current facility and service quality ratings: no  Education Provided on the Discharge Plan: No  Patient/Family in Agreement with the Plan: yes    Referrals Placed by CM/SW: Home Infusion  Private pay costs discussed: Not applicable    Additional Information:    Per chart review noted pt prefers to discharge home and not with home infusion and PICC line.  Noted ID note indicates anticipated plan for IV ganciclovir q 24 through 11/3/23.  Writer sent the following message via [employer] to Yael Milan 2:    0088 BX - I see pt started on hep gtt, DVT. Does not want go home on IV ganciclovir. ID note indicates pt will need IV ganciclovir til 11/3. IF the ganciclovir is truly going to be once a day we could look into having the patient go into a local infusion center - near his home - to receive the dose. Would need to know as soon as possible as it is Friday etc. Also would you anticipate discharge over the weekend? Assuming final IV plan has been confirmed    Also sent a general page to provider requesting return call to discuss anticipated plan for discharge for this patient.    1450: Received follow up call from Yael Milan 2.  No plan for discharge over the weekend.   Team still reviewing plan oral vs IV antiviral plan with ID.  Reviewed that OP Infusion center may be an option as long as we can confirm pt can receive infusion 7 days a week.  Agreed to follow up with provider team on Monday 10/14.      Erin Boucher RN BSN, PHN, ACM-RN  7A RN Care Coordinator  Phone: [phone]  Pager [phone]    To contact the  weekend RNCC  [facility] (0800 [phone]) Saturday and Sunday    Units: 5A,5B,5C [phone]    Units: 6A, [phone]    Units 6B, 6C, 6D [phone]    Units: 7A, 7B, 7C, 7D, [phone]    [facility] ([phone]) Saturday and Sunday    Units: 5 Ortho, 8A, 10 ICU, & Pediatric Units-Pager 4: [phone]    10/13/2023 10:28 AM

## 2023-10-13 NOTE — PROGRESS NOTES
[facility]   Transplant Nephrology Progress Note  Date of Admission:  10/3/2023  Today's Date: 10/13/2023    Recommendations:  - reduce MPA to 180 mg po bid (ordered)  - iv GCV per TID recs    Assessment & Plan   # DDKT: Increased serum creatinine this admission.  - MATTHEW felt secondary to dehydration.    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    # Current Immunosuppression: Tacrolimus immediate release (goal 4-6), Mycophenolic acid (dose 360 mg every 12 hours), and Prednisone (dose 5 mg daily)   - Continue with intensive monitoring of immunosuppression for efficacy and toxicity.   - Pt improving clinically and CMV <35 so would not reduce immune suppression further at this time.  Could consider reducing mycophenolate to 180 mg BID if symptoms worsen or CMV DNA PCR increases.   - Changes: yes reduce MPA to 180 mg po bid with improved yet persistent diarrhea, CMV colitis, neutropenia.    # Infection Prophylaxis:   - PJP: Sulfa/TMP (Bactrim)    # Norovirus  # CMV Colitis/proctitis  # Diarrhea:  Pt presented 10/3/23 with persistent diarrhea.  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  - Colonoscopy 10/2022 with mild archietectural distortion, focal crypt degeneration. CMV stain suggestive of CMV disease (colitis)   - Recently admitted (3/1-3/3/23) for  diarrhea and MATTHEW, found + for norovirus   - Stool studies positive for norovirus again.     - 10/4/23 IVIG 0.5 g/kg given.   - 10/5/23 Repeat colonoscopy.  Biopsy results show crypt distortion.  Active area with inflammation with ulceration and granulation tissue; features concerning but not conclusive for viral cytopathic changes. 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     10/4/2023 C. Diff Toxin PCR Negative    10/3/2023   EBV DNA PCR Quantitative Whole Blood Not detected    10/3/2023  IgG 810    10/3/2023  Enteric Bacteria and Virus Panel PCR Positive for norovirus.      -Check CMV DNA PCR weekly  - IV ganciclovir 10/6/23 - 11/3/23     03/20/23  04/10/23  05/01/23  10/6/23 10/9/23   CMV DNA Quant  622 !  460 !  318 !  252 ! <35        # Hypertension: Borderline control;  Goal BP: < 140/90 (Hospitalization goal)   - Changes: Not at this time.  Continue carvedilol  25 mg PO BID.    # Anemia in Chronic Renal Disease: Hgb: Increased     DANNY: No   - Iron studies: Replete.    # Leukopenia: Likely d/t ganciclovir. Plan for G-CSF 3 mcg/kg IV or subcutaneous when ANC <1000 per ID.    # Mineral Bone Disorder:   - Secondary renal hyperparathyroidism; PTH level: Mildly elevated (151-300 pg/ml)        On treatment: None  - Vitamin D; level: Not checked recently, but was normal last check         On supplement: Yes  - Calcium; level: Normal when corrected for low alb            On supplement: Yes, calcium carbonate 500 mg PO BID between meals.  - Phosphorus; level: Normal           On supplement: No    # Electrolytes:   - Potassium; level: Normal         On supplement: No,   - Magnesium; level: Low         On supplement: No, give magnesium sulfate 2 g IV once.  - Bicarbonate; level: Low        On supplement: Yes,  sodium bicarbonate 1300 mg PO TID    # Proteinuria:   -1.2g/g in previous check in 2/2023, 3.3g on 24h collection in 8/2023   - A1c 5.8%.    - 10/3/23 SPEP: monoclonal peak normal.   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      Janel Winn MD   Pager: [phone]      Interval History   Mr. Torrez's creatinine is 2.82 (10/13 0454); Stable.  good urine output.  Other significant labs/tests/vitals: uptrend in wbc post neupogen    No acute events overnight.  No chest pain or shortness of breath.  No leg swelling.  Right arm swollen.  No nausea and vomiting.  Bowel movements are formed.      Review of Systems   4 point ROS was obtained and negative except as noted in the Interval History.    MEDICATIONS:   calcium carbonate  500 mg Oral BID    carvedilol  25 mg Oral BID w/meals    cholecalciferol  10 mcg Oral Daily    clonazePAM  0.5 mg Oral At Bedtime    folic acid  1 mg Oral Daily    ganciclovir (CYTOVENE) 70 mg in D5W 100 mL intermittent infusion  1.25 mg/kg Intravenous Q24H    levothyroxine  100 mcg Oral Daily    mycophenolic acid  360 mg Oral BID    pantoprazole  40 mg Oral Daily    predniSONE  5 mg Oral Daily    rOPINIRole  1 mg Oral At Bedtime    sodium bicarbonate  1,300 mg Oral TID    sulfamethoxazole-trimethoprim  1 tablet Oral Once per day on     tacrolimus  2.5 mg Oral BID IS      heparin 950 Units/hr (10/13/23 0829)       Physical Exam   Temp  Av.7  F (36.5  C)  Min: 97.3  F (36.3  C)  Max: 98.1  F (36.7  C)      Pulse  Av.9  Min: 53  Max: 66 Resp  Av.6  Min: 16  Max: 19  SpO2  Av.6 %  Min: 98 %  Max: 100 %     BP (!) 142/75 (BP Location: Left arm)   Pulse 72   Temp 98.2  F (36.8  C) (Oral)   Resp 15   Wt 61.9 kg (136 lb 7.4  oz)   SpO2 98%   BMI 22.71 kg/m      Admit Weight: 57.1 kg (125 lb 14.1 oz)     GENERAL APPEARANCE: alert and no distress  RESP: lungs clear to auscultation - no rales, rhonchi or wheezes  CV: regular rhythm, normal rate, no rub, no murmur  EDEMA: no LE edema bilaterally  ABDOMEN: soft, nondistended, nontender, bowel sounds hyperactive  MS: extremities normal - no gross deformities noted, no evidence of inflammation in joints, no muscle tenderness  SKIN: no rash  PSYCH: mentation appears normal and affect normal  DIALYSIS ACCESS:  RUE AV fistula +bruit/+thrill    Data   All labs reviewed by me.  CMP  Recent Labs   Lab 10/13/23  0454 10/12/23  0456 10/11/23  0533 10/10/23  0614 10/09/23  0534 10/08/23  0946    147*  --   --  143 146*   POTASSIUM 4.7 4.8  --   --  4.3 3.8   CHLORIDE 115* 116*  --   --  109* 109*   CO2 22 21*  --   --  25 27   ANIONGAP 8 10  --   --  9 10   GLC 98 98  --   --  101* 117*   BUN 46.0* 47.8*  --   --  42.4* 39.3*   CR 2.82* 2.86* 2.89* 2.86* 2.76* 2.96*   GFRESTIMATED 25* 25* 25* 25* 26* 24*   CRISTIAN 7.9* 7.8*  --   --  8.0* 8.1*   MAG  --  1.4*  --   --   --   --    PHOS  --  3.1  --  2.8 2.4*  --    PROTTOTAL  --   --   --   --   --  5.1*   ALBUMIN  --   --   --   --   --  2.9*   BILITOTAL  --   --   --   --   --  0.2   ALKPHOS  --   --   --   --   --  52   AST  --   --   --   --   --  18   ALT  --   --   --   --   --  <5     CBC  Recent Labs   Lab 10/13/23  0454 10/12/23  0456 10/11/23  0533 10/10/23  0614   HGB 8.3* 8.2* 9.2* 8.6*   WBC 6.1 1.7* 2.1* 2.1*   RBC 2.81* 2.80* 3.12* 2.90*   HCT 27.2* 27.1* 30.4* 27.8*   MCV 97 97 97 96   MCH 29.5 29.3 29.5 29.7   MCHC 30.5* 30.3* 30.3* 30.9*   RDW 13.6 13.5 13.5 13.4   PLT 93* 95* 95* 91*     INR  Recent Labs   Lab 10/12/23  1648   INR 1.05       ABGNo lab results found in last 7 days.   Urine Studies  Recent Labs   Lab Test 10/03/23  2244 11/17/15  1410 10/14/15  2220 10/13/15  0725   COLOR Light Yellow Light Yellow Yellow Straw    APPEARANCE Clear Clear Clear Clear   URINEGLC Negative Negative Negative Negative   URINEBILI Negative Negative Negative Negative   URINEKETONE Negative Negative Negative Negative   SG 1.014 1.007 1.006 1.003   UBLD Negative Negative Large* Large*   URINEPH 6.0 6.5 5.5 5.0   PROTEIN 200* Negative 10* Negative   NITRITE Negative Negative Negative Negative   LEUKEST Negative Negative Small* Negative   RBCU 1 0 >182* 57*   WBCU 1 1 15* 1     Recent Labs   Lab Test 03/27/18  1516 09/26/17  1506   UTPG 0.12 0.10     PTH  Recent Labs   Lab Test 03/27/18  1510 10/15/15  0626   PTHI 126* 561*     Iron Studies  Recent Labs   Lab Test 03/27/18  1510 10/17/15  0700   IRON 67 25*    216*   IRONSAT 24 12*   * 883*       IMAGING:  All imaging studies reviewed by me.   bowel sounds normal/no bruit/no organomegaly/nontender/soft/no masses palpable/no guarding/no distention/no rigidity/no rebound tenderness

## 2023-10-13 NOTE — PROGRESS NOTES
SOLID ORGAN INFECTIOUS DISEASES PROGRESS NOTE     Patient:  Haider Torrez   YOB: 1965  Date of Visit:  10/13/2023  Date of Admission: 10/3/2023  Consult Requester:Phoenix Alcazar MD          Assessment and Recommendations:   Recommendations:-  - continue IV GCV 1.25 mg/kg/day for a total of 4 weeks, until 11/3/2023.   - PICC line.   - G-CSF 3 mcg/kg IV or subcutaneous when ANC <1000. Give one dose today please.   - please check if IV GCV and IV or subcutaneous G-CSF are covered as OP.   - follow up with me on 10/31/23 @6:30 pm in a virtual visit. Please make sure this appointment is added to his discharge instructions.   - weekly CMV PCR, CBC with diff, BMP.     Dr. Gonzalez is available over the weekend for questions. Dr. Zazueta will see the patient on Monday.     Assessment:  Haider Torrez is a 58 year old male with PMHx significant for IgA nephropathy s/p kidney transplant (2004, 2015) on tacrolimus + mycophenolic acid + prednisone 5 mg, history of BK viremia, CMV viremia, hypertension, and secondary hyperparathyroid who presented on 10/3/23 with persistent diarrhea x3-4 weeks with concern for CMV proctitis/colitis.    CMV proctitis/colitis with diarrhea.   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   Will treat with a 4- week course of IV GCV.     Norovirus viral shedding.   This episode of diarrhea seems to be due to CMV colitis rather than norovirus. The patient stated that diarrhea in 3/2023 (the time of norovirus infection) resolved before it recurred in 9/2023. The positive norovirus represents shedding. No evidence that there is a role of nitazoxanide for the treatment of norovirus especially with chronic shedding.      Leukopenia  Likely due to GCV.   Will support with G-CSF.   No safe alternative to GCV.     Immunocompromised patient with viral infections.   The chronic viral shedding  "of norovirus, the recent norovirus infection and now CMV colitis following prolonged viremia, all indicate overly immunosuppressed patient.     Previous ID Issues:  #COVID (+09/20/22 s/p 5 days remdesivir) c/b post-COVID bacterial pneumonia ([place], 10/2022) - treated with vanc x1, 3 days of azithromycin, and 5 days cefepime.     Other ID issues:  - QTc interval:  443msec on 10/3/23  - Bacterial prophylaxis:    - Pneumocystis prophylaxis:  Bactrim  - Viral serostatus & prophylaxis:  CMV D-/R+, EBV D+/R+  - Fungal prophylaxis:    - Immunization status:  Due for annual COVID, flu, and RSV.   - Gamma globulin status:  810 on 10/3/23, received IVIG on 10/4/23.   - Isolation status: enteric (+norovirus)      Total duration of visit including chart review, reviewing labs and imaging, interviewing and examining the patient, documentation, and sending communication to the patient and to the primary treating team, all at the same day of this encounter, is: 30 minutes.   Serafin Rudd MD  [facility]  Contact information available via [facility] Paging/Directory         Interim History:   Diarrhea is now down to 1-2 times a day, but it is still mostly watery.   No fever.   Still hesitant to continue IV GCV as OP.          History of Present Illness:   Transplants:  10/13/2015 (Kidney), 4/15/2004 (Kidney), Postoperative day:  2922   Per initial consult:  \"Haider Torrez is a 58 year old male with PMHx significant for IgA nephropathy s/p kidney transplant (2004, 2015) on tacrolimus + mycophenolic acid + prednisone 5 mg, history of BK viremia, CMV viremia, hypertension, and secondary hyperparathyroid who presented on 10/3/23 with persistent diarrhea x3 weeks. ID consulted for CMV colitis.    Admission in March 2023 for diarrhea, found +norovirus and symptoms mildly improved with supportive care, no other treatment given. May have had imodium on 3/7 and this helped. Colonoscopy 10/2022 " without evidence of CMV, though notable for focal crypt degeneration and mild architectural distortion c/f possible medication related colitis. Remains with +norovirus on enteric panel 9/21/23.      He reports diarrhea for the last 3-4 weeks - water, undigested food that is postprandial 4-10x/day. Denies black or bloody stools. No incontinence, no nocturnal symptoms. Seen at outside ED 10/2 - notable for MATTHEW, given IV fluids and discharged. Saw transplant nephrology for virtual visit on 10/2 who recommended admission and he [facility] ED 10/3. He also reported dizziness. Denies fever, chills, night sweats, chest pain, cough, dyspnea, nausea, vomiting, abdominal pain, graft pain, urinary symptoms, vision changes, headache, or skin rash. Endorses food avoidance due to fear of diarrhea and 10 lb weight loss in the last month. Vitals stable. Labs notable for WBC 3.2 --> 1.8, MATTHEW with creatinine 3.75 --> 2.97. C difficile negative, enteric panel +10/4. UA noninfectious appearing. EBV not detected. Hepatic panel unremarkable. Immunosuppression was just decreased MPA to 360 mg BID, remains on prednisone 5 mg, tacrolimus (goal 4-6). Was given IVIG once here.     He had inpatient colonoscopy completed 10/5/23 - notable for diverticulosis, granula mucosa in rectum up to 3 cm proximal, non-bleeding internal hemorrhoids (with a mucosal nodule on top of internal hemorrhoid - scarring vs tumor, s/p biopsy), and otherwise normal appearing mucosa throughout remaining colon. Pathology as below:  Final Diagnosis   A. COLON, RANDOM BIOPSY:  Colonic mucosa with mild crypt architectural distortion, evidence of prior injury; no current evidence of cryptitis, dysplasia, or other histologic abnormality; report of CMV & adenovirus immunohistochemistry to follow      B. DISTAL RECTUM, BIOPSY:  Colonic mucosa with mild reactive changes, otherwise no evidence of microscopic or chronic colitis or other significant histologic abnormality;  "report of CMV & adenovirus immunohistochemistry to follow       3. ANORECTUM, BIOPSY OF NODULE AT TRANSITION:  Colonic mucosa with polypoid features, active inflammation with ulceration and granulation tissue; features concerning but not conclusive for viral cytopathic changes; report of adenovirus & CMV immunohistochemistry to follow      Has travelled to [facility] once to visit family. Also been to [place]. He has no unusual food exposures, no animal exposures, no recent sick contacts or antibiotic use. Lives with wife and 1 son\".            Current Medications:      calcium carbonate  500 mg Oral BID    carvedilol  25 mg Oral BID w/meals    cholecalciferol  10 mcg Oral Daily    clonazePAM  0.5 mg Oral At Bedtime    folic acid  1 mg Oral Daily    ganciclovir (CYTOVENE) 70 mg in D5W 100 mL intermittent infusion  1.25 mg/kg Intravenous Q24H    levothyroxine  100 mcg Oral Daily    mycophenolic acid  180 mg Oral BID IS    pantoprazole  40 mg Oral Daily    predniSONE  5 mg Oral Daily    rOPINIRole  1 mg Oral At Bedtime    sodium bicarbonate  1,300 mg Oral TID    sulfamethoxazole-trimethoprim  1 tablet Oral Once per day on Mon Wed Fri    tacrolimus  2.5 mg Oral BID IS            Allergies:     Allergies   Allergen Reactions    Hydralazine Swelling    Betadine [Povidone Iodine]     Cephalexin Hives    Clindamycin Hives    Trazodone Swelling     Swelling of face/lips            Physical Exam:   Vitals were reviewed  Temp:  [97.9  F (36.6  C)-98.2  F (36.8  C)] 97.9  F (36.6  C)  Pulse:  [69-72] 69  Resp:  [15-17] 16  BP: (142-145)/(75-80) 145/80  SpO2:  [98 %-99 %] 98 %    Vitals:    10/03/23 1934 10/04/23 0539 10/07/23 0900 10/08/23 0500   Weight: 57.1 kg (125 lb 14.1 oz) 56.6 kg (124 lb 11.2 oz) 59.3 kg (130 lb 12.8 oz) 59.6 kg (131 lb 8 oz)    10/12/23 1648   Weight: 61.9 kg (136 lb 7.4 oz)       Constitutional: Pleasant adult male seen sitting up in bed, in NAD. Alert and interactive.            Laboratory Data: " "    Microbiology:  No results found for: \"CULTURE\"    Culture Micro   Date Value Ref Range Status   11/17/2015 No growth  Final   10/14/2015 No growth  Final   10/13/2015   Final    Test canceled - Lab  error  Canceled, Test credited     10/13/2015 No growth  Final       Inflammatory Markers    No lab results found.    Metabolic Studies       Recent Labs   Lab Test 10/13/23  0454 10/12/23  0456 10/11/23  0533 10/10/23  0614 10/09/23  0534 10/08/23  0946 10/07/23  0542 10/06/23  0537 10/14/15  1325 10/14/15  1021 10/14/15  0000 10/13/15  2231 10/13/15  2113 10/13/15  1215 10/12/15  1330    147*  --   --  143 146* 141 146*   < >  --    < >  --   --    < > 136   POTASSIUM 4.7 4.8  --   --  4.3 3.8 3.9 3.3*   < > 4.8   < > 5.4* 6.8*   < > 4.2   CHLORIDE 115* 116*  --   --  109* 109* 112* 110*   < >  --    < >  --   --    < > 97   CO2 22 21*  --   --  25 27 25 28   < >  --    < >  --   --    < > 31   ANIONGAP 8 10  --   --  9 10 4* 8   < >  --    < >  --   --    < > 8   BUN 46.0* 47.8*  --   --  42.4* 39.3* 40.8* 40.7*   < >  --    < >  --   --    < > 41*   CR 2.82* 2.86* 2.89* 2.86* 2.76* 2.96* 2.99* 2.97*   < >  --    < >  --   --    < > 9.83*   GFRESTIMATED 25* 25* 25* 25* 26* 24* 24* 24*   < >  --    < >  --   --    < > 6*   GLC 98 98  --   --  101* 117* 108* 108*   < >  --    < >  --   --    < > 95   A1C  --   --   --   --   --   --   --   --   --   --   --   --   --   --  5.1   CRISTIAN 7.9* 7.8*  --   --  8.0* 8.1* 7.8* 7.5*   < >  --    < >  --   --    < > 8.7   PHOS  --  3.1  --  2.8 2.4*  --   --   --    < >  --    < >  --   --    < >  --    MAG  --  1.4*  --   --   --   --   --  1.9   < >  --    < >  --   --    < >  --    LACT  --   --   --   --   --   --   --   --   --  2.1  --   --  4.2*   < >  --    CKT  --   --   --   --   --   --   --   --   --   --   --  336*  --   --   --     < > = values in this interval not displayed.       Hepatic Studies    Recent Labs   Lab Test 10/08/23  0946 " "10/03/23  1240 03/27/18  1510 10/12/15  1330   BILITOTAL 0.2 0.3  --  0.4   ALKPHOS 52 62  --  81   ALBUMIN 2.9* 3.9 3.8 4.2   AST 18 12  --  10   ALT <5 7  --  16       Pancreatitis testing    Recent Labs   Lab Test 07/15/21  0722 05/08/18  0700 05/01/17  0707 06/24/16  0755 12/14/15  0715   TRIG 168* 253* 364* 300* 261*       Hematology Studies      Recent Labs   Lab Test 10/13/23  0454 10/12/23  0456 10/11/23  0533 10/10/23  0614 10/09/23  0534 10/07/23  0542 11/17/15  1139 10/30/15  0718 10/26/15  0707 10/25/15  0750 10/23/15  0652 10/21/15  0648 10/20/15  0715 10/19/15  0704   WBC 6.1 1.7* 2.1* 2.1* 2.3* 2.3*   < > 4.7   < > 7.6 5.4 3.5* 4.1 5.1   ANEU  --   --   --   --   --   --   --  3.3  --  6.4 3.9 3.0 3.7 4.7   ALYM  --   --   --   --   --   --   --  0.3*  --  0.1* 0.1* 0.1* 0.0* 0.3*   MELVIN  --   --   --   --   --   --   --  0.3  --  0.2 0.2 0.2 0.1 0.0   AEOS  --   --   --   --   --   --   --  0.3  --  0.1 0.0 0.0 0.0 0.0   HGB 8.3* 8.2* 9.2* 8.6* 8.6* 8.4*   < > 11.9*   < > 11.8* 10.8* 10.5* 10.3* 10.3*   HCT 27.2* 27.1* 30.4* 27.8* 28.5* 27.3*   < > 38.4*   < > 35.8* 34.2* 32.6* 32.7* 32.4*   PLT 93* 95* 95* 91* 79* 63*   < > 282   < > 269 232 163 136* 112*    < > = values in this interval not displayed.       Arterial Blood Gas Testing    Recent Labs   Lab Test 10/13/15  2113   PH 7.33*   PCO2 40   PO2 89   HCO3 21   O2PER 1L        Urine Studies     Recent Labs   Lab Test 10/03/23  2244 11/17/15  1410 10/14/15  2220 10/13/15  0725   URINEPH 6.0 6.5 5.5 5.0   NITRITE Negative Negative Negative Negative   LEUKEST Negative Negative Small* Negative   WBCU 1 1 15* 1       Vancomycin Levels     No lab results found.    Invalid input(s): \"VANCO\"  No lab results found.    Invalid input(s): \"VANCO\"        Hepatitis B Testing   Recent Labs   Lab Test 10/12/15  1330   HEPBANG Nonreactive   HBCM Nonreactive   A nonreactive result suggests lack of recent exposure to the virus in the   preceding 6 months.   " "  HBEAGN Negative  Reference range: Negative  (Note)  Performed by [facility],  [address] [place] [address] [phone]  [email or web address], Timo Salcido MD, Lab. Director         Hepatitis C Testing     Hepatitis C Antibody   Date Value Ref Range Status   10/12/2015  NR Final    Nonreactive   Assay performance characteristics have not been established for newborns,   infants, and children     06/18/2015  NR Final    Nonreactive   Assay performance characteristics have not been established for newborns,   infants, and children         Respiratory Virus Testing    No results found for: \"RS\", \"FLUAG\"    Last check of C difficile  C Difficile Toxin B by PCR   Date Value Ref Range Status   10/04/2023 Negative Negative Final     Comment:     A negative result does not exclude actual disease due to C. difficile and may be due to improper collection, handling and storage of the specimen or the number of organisms in the specimen is below the detection limit of the assay.     CMV viral loads    CMV DNA Quant (External)   Date Value Ref Range Status   05/01/2023 318 (A) Undetected IU/mL Final   04/10/2023 460 (A) Undetected IU/mL Final   03/20/2023 622 (A) Undetected IU/mL Final   03/13/2023 451 (A) UNDETECTED IU/mL Final   02/15/2023 301 (A) Undetected IU/mL Final   02/08/2023 311 (A) Undetected IU/mL Final   01/11/2023 138 (A) Undetected IU/mL Final     CMV viral loads    Recent Labs   Lab Test 10/09/23  0534 10/06/23  1016 05/01/23  0700   CMVLOG <1.5 2.4  --    05825  --   --  318*       CMV viral loads    CMV log   Date Value Ref Range Status   10/09/2023 <1.5  Final   10/06/2023 2.4  Final     CMV DNA Quant (External)   Date Value Ref Range Status   05/01/2023 318 (A) Undetected IU/mL Final   04/10/2023 460 (A) Undetected IU/mL Final   03/20/2023 622 (A) Undetected IU/mL Final   03/13/2023 451 (A) UNDETECTED IU/mL Final   02/15/2023 301 (A) Undetected IU/mL Final   02/08/2023 311 (A) Undetected IU/mL Final " "  01/11/2023 138 (A) Undetected IU/mL Final       CMV resistance testing  No lab results found.  No results found for: \"CMVCID\", \"CMVFOS\", \"CMVGAN\"           Imaging:     Results for orders placed or performed in visit on 12/28/21   DEXA - HIM Scan    Narrative    [facility] PROGRESS NOTE  FOUND IN CARE EVERYWHERE      "

## 2023-10-13 NOTE — PLAN OF CARE
[id number]  Pt admitted for diarrhea r/t CMV colitis. While patient was hospitalized R arm became swollen found completely occluded DVT. Hypertensive but within parameters. Pt on heparin gtt 850units/hr that is not within therapeutic range yet. Recheck at 8:30pm. Per patient diarrhea episodes have decreased. Denies pain. Tolerating food fine. Skin intact. R AV fistula, and L PIV running the heparin gtt. Voiding spontaneously. Up ad evelin. Awaiting for Xa to become in range and then bridge oral anticoags and continue with IV anti-infectives for CMV

## 2023-10-13 NOTE — PLAN OF CARE
Shift: 4176-1792  BP (!) 142/75 (BP Location: Left arm)   Pulse 72   Temp 98.2  F (36.8  C) (Oral)   Resp 15   Wt 61.9 kg (136 lb 7.4 oz)   SpO2 98%   BMI 22.71 kg/m       VS- stable on RA  BG- None   Labs-  next 10A @ 1240   Pain/Nausea/PRN'S- denies pain and nausea  Diet- regular   LDA-PIV X2  SL. R AV fistula  Gtt/IVF- heparin gtt @ 950 units/hr.   GI- one BM this morning 0500 per patient  - voiding not saving   Skin- R arm near swollen, pt denies pain, numbness and tingling in that arm.   Activity- UAL   Plan-  continue with heparin gtt, notify team with any changes.

## 2023-10-13 NOTE — PROGRESS NOTES
[facility]    Medicine Progress Note - Medicine Service, [facility] TEAM 2    Date of Admission:  10/3/2023    Assessment & Plan   Haider Torrez is a 58 year old male admitted on 10/3/2023. He has a PMH of IgA nephropathy s/p kidney transplant (2004, 2015), BK and CMV viremia, secondary hyperparathyroid presenting with CMV colitis and new Rt IJV DVT.     Today:  - continue IV ganciclovir, renally dosing   - WBC trending up from G-CSF yesterday  - daily monitoring of CBC with diff and BMP  - heme suggests transitioning to apixaban after PICC line  - reduced MPA to 180 mg po bid per nephrology     Kidney transplant  MATTHEW  Proteinuria  Baseline Cr 2.1-2.5, Cr 3.75 at admission trending down closer back to baseline. Proteinuria since 8/23.   - consult transplant nephrology  - Continue PJP prophylaxis with bactrim    - EBV PCR negative    Rt IJV DVT with complete occlusion  10/12 pt reports Rt arm swelling, does not have pain/SOB and can move arm normally  Rt arm USG: acute DVT with complete occlusion, veins with collateral (compared to previous USG Rt arm in 2015: nonocclusive DVT)  - consulted heme: 10/12 start high intensity IV heparin drip, monitor for SOB/increased pain/swelling --> if the case, consult IR for thrombolysis  - planning to continue IV heparin until PICC line in place, then planning to transition to apixaban 5 mg BID (no loading needed, due to renal function) then plan to follow-up Xa level at D4 after starting apixaban (can be done as outpt if discharged)     CMV colitis  Diarrhea  He has a history of norovirus diarrhea and MATTHEW in March 2023. Patient has been having loose bowel movement, especially after eating for a month, and recently test positive for norovirus in stool on 9/21.   -Colonoscopy 10/2022 with mild archietectural distortion, focal crypt degeneration. Negative stain for CMV   - consult GI for concerns for CMV vs MMF colitis vs. Chronic  norovirus: colonoscopy 10/5, pending biopsy  - C. Diff panel neg  - enteric stool panel: norovirus positive, but transplant ID will not start nitaxoxanide due to poor evidence. Would suggest weaning immunosuppression.   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 This hase been managed conservatively with decrease in MPA (patient did not actually decrease dose)              -Pt is CMV IgG+ at time of transplant.               -IgG >600. CMV has been low level but present               -concern at this point with worsening diarrhea that he has CMV colitis. Recommend repeat colonoscopy with biopsies, and if CMV + would treat with IV GCV.   - IVIG 0.5g/kg x1 10/4, IgG level: normal 10/5    Chronic issues  NAGMA, resolved  Likely secondary to diarrhea. Normalized.   - discontinue PTA bicarb 1,950 mg TID --> 10/6 Decrease sodium bicarbonate to 1300 mg PO TID   - discontinue D5 with 150 mEq bicarb infusion in ED due to resolved acidosis.      Hypertension  On carvedilol 25 mg BID     Anemia of CKD  Normal Iron study. 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  On immunosuppressants for transplanted kidney: mycophenolic acid 360 mg bid, prednisone 5 mg daily, tacrolimus 2.5 mg bid         10/04/23 05:04 10/05/23 05:42   WBC 2.3  1.8    Hemoglobin 8.7  9.3    Platelet Count 112  100    Absolute  Neutrophils 1.5                Diet: Regular Diet Adult    DVT Prophylaxis: Heparin IV  Fernandez Catheter: Not present  Lines: None     Cardiac Monitoring: None  Code Status: Full Code      Clinically Significant Risk Factors         # Hypernatremia: Highest Na = 147 mmol/L in last 2 days, will monitor as appropriate    # Hypomagnesemia: Lowest Mg = 1.4 mg/dL in last 2 days, will replace as needed   # Hypoalbuminemia: Lowest albumin = 2.9 g/dL at 10/8/2023  9:46 AM, will monitor as appropriate   # Thrombocytopenia: Lowest platelets = 93 in last 2 days, will monitor for bleeding   # Hypertension: Noted on problem list                   Disposition Plan      Expected Discharge Date: 10/16/2023        Discharge Comments: Likely home; pending improvements in diarrhea and lower CMV viral load          The patient's care was discussed with the Attending Physician, Dr. Perez .    Yovana Flanagan MD  Medicine Service, [facility] TEAM [address] [place] [address]  Securely message with [employer] (more info)  Text page via [employer] Paging/Directory   See signed in provider for up to date coverage information  ______________________________________________________________________    Interval History   Patient still having diarrhea. Stable arm swelling, no pain, no SOB.     Physical Exam   Vital Signs: Temp: 97.9  F (36.6  C) Temp src: Oral BP: (!) 145/80 Pulse: 69   Resp: 16 SpO2: 98 % O2 Device: None (Room air)    Weight: 136 lbs 7.44 oz        Medical Decision Making             Data

## 2023-10-13 NOTE — PLAN OF CARE
BP (!) 143/75 (BP Location: Left arm)   Pulse 63   Temp 97.6  F (36.4  C) (Oral)   Resp 17   Wt 61.9 kg (136 lb 7.4 oz)   SpO2 100%   BMI 22.71 kg/m      Shift: 6606-5980  Isolation Status: Enteric  VS: slightly hypertensive on room air, afebrile  Neuro: Aox4  Behaviors: calm and pleasant  BG: none  Respiratory: WDL  Cardiac: WDL  Pain/Nausea: Denies nausea and pain  PRN: None given  Diet: Regular-good appetite   IV Access: 2L PIV  Infusion(s): Heparin at rate 1,050 units per hour continuous, nurse managed  Lines/Drains: none  GI/: passing gas, voiding, no BM this shift  Mobility: Independent   Plan: Continue plan of care and notify team of any changes

## 2023-10-14 ENCOUNTER — APPOINTMENT (OUTPATIENT)
Dept: GENERAL RADIOLOGY | Facility: CLINIC | Age: 58
DRG: 698 | End: 2023-10-14
Payer: MEDICARE

## 2023-10-14 LAB
ANION GAP SERPL CALCULATED.3IONS-SCNC: 10 MMOL/L (ref 7–15)
BASO+EOS+MONOS # BLD AUTO: ABNORMAL 10*3/UL
BASO+EOS+MONOS NFR BLD AUTO: ABNORMAL %
BASOPHILS # BLD AUTO: 0 10E3/UL (ref 0–0.2)
BASOPHILS NFR BLD AUTO: 0 %
BUN SERPL-MCNC: 43.8 MG/DL (ref 6–20)
CALCIUM SERPL-MCNC: 7.8 MG/DL (ref 8.6–10)
CHLORIDE SERPL-SCNC: 113 MMOL/L (ref 98–107)
CREAT SERPL-MCNC: 2.82 MG/DL (ref 0.67–1.17)
DEPRECATED HCO3 PLAS-SCNC: 20 MMOL/L (ref 22–29)
EGFRCR SERPLBLD CKD-EPI 2021: 25 ML/MIN/1.73M2
EOSINOPHIL # BLD AUTO: 0 10E3/UL (ref 0–0.7)
EOSINOPHIL NFR BLD AUTO: 1 %
ERYTHROCYTE [DISTWIDTH] IN BLOOD BY AUTOMATED COUNT: 13.5 % (ref 10–15)
GLUCOSE SERPL-MCNC: 94 MG/DL (ref 70–99)
HCT VFR BLD AUTO: 28.2 % (ref 40–53)
HGB BLD-MCNC: 8.4 G/DL (ref 13.3–17.7)
IMM GRANULOCYTES # BLD: 0.1 10E3/UL
IMM GRANULOCYTES NFR BLD: 2 %
LYMPHOCYTES # BLD AUTO: 0.4 10E3/UL (ref 0.8–5.3)
LYMPHOCYTES NFR BLD AUTO: 7 %
MAGNESIUM SERPL-MCNC: 1.5 MG/DL (ref 1.7–2.3)
MCH RBC QN AUTO: 29.6 PG (ref 26.5–33)
MCHC RBC AUTO-ENTMCNC: 29.8 G/DL (ref 31.5–36.5)
MCV RBC AUTO: 99 FL (ref 78–100)
MONOCYTES # BLD AUTO: 0.3 10E3/UL (ref 0–1.3)
MONOCYTES NFR BLD AUTO: 5 %
NEUTROPHILS # BLD AUTO: 5.4 10E3/UL (ref 1.6–8.3)
NEUTROPHILS NFR BLD AUTO: 85 %
NRBC # BLD AUTO: 0 10E3/UL
NRBC BLD AUTO-RTO: 0 /100
PLATELET # BLD AUTO: 101 10E3/UL (ref 150–450)
POTASSIUM SERPL-SCNC: 4.6 MMOL/L (ref 3.4–5.3)
RBC # BLD AUTO: 2.84 10E6/UL (ref 4.4–5.9)
SODIUM SERPL-SCNC: 143 MMOL/L (ref 135–145)
UFH PPP CHRO-ACNC: 0.52 IU/ML
WBC # BLD AUTO: 6.3 10E3/UL (ref 4–11)

## 2023-10-14 PROCEDURE — 250N000011 HC RX IP 250 OP 636

## 2023-10-14 PROCEDURE — 85520 HEPARIN ASSAY: CPT | Performed by: STUDENT IN AN ORGANIZED HEALTH CARE EDUCATION/TRAINING PROGRAM

## 2023-10-14 PROCEDURE — 999N000065 XR CHEST PORT 1 VIEW

## 2023-10-14 PROCEDURE — 258N000003 HC RX IP 258 OP 636

## 2023-10-14 PROCEDURE — 250N000013 HC RX MED GY IP 250 OP 250 PS 637

## 2023-10-14 PROCEDURE — 272N000450 HC KIT 4FR POWER PICC SINGLE LUMEN

## 2023-10-14 PROCEDURE — 83735 ASSAY OF MAGNESIUM: CPT | Performed by: NURSE PRACTITIONER

## 2023-10-14 PROCEDURE — 272N000020 HC KIT OPEN ENDED SINGLE LUMEN

## 2023-10-14 PROCEDURE — 82310 ASSAY OF CALCIUM: CPT

## 2023-10-14 PROCEDURE — 36569 INSJ PICC 5 YR+ W/O IMAGING: CPT

## 2023-10-14 PROCEDURE — 120N000011 HC R&B TRANSPLANT UMMC

## 2023-10-14 PROCEDURE — 99233 SBSQ HOSP IP/OBS HIGH 50: CPT | Mod: FS | Performed by: NURSE PRACTITIONER

## 2023-10-14 PROCEDURE — 250N000012 HC RX MED GY IP 250 OP 636 PS 637: Performed by: INTERNAL MEDICINE

## 2023-10-14 PROCEDURE — 99232 SBSQ HOSP IP/OBS MODERATE 35: CPT | Mod: GC | Performed by: STUDENT IN AN ORGANIZED HEALTH CARE EDUCATION/TRAINING PROGRAM

## 2023-10-14 PROCEDURE — 250N000011 HC RX IP 250 OP 636: Mod: JZ

## 2023-10-14 PROCEDURE — 36415 COLL VENOUS BLD VENIPUNCTURE: CPT

## 2023-10-14 PROCEDURE — 250N000012 HC RX MED GY IP 250 OP 636 PS 637

## 2023-10-14 PROCEDURE — 85025 COMPLETE CBC W/AUTO DIFF WBC: CPT

## 2023-10-14 PROCEDURE — 71045 X-RAY EXAM CHEST 1 VIEW: CPT | Mod: 26 | Performed by: RADIOLOGY

## 2023-10-14 RX ORDER — HEPARIN SODIUM 10000 [USP'U]/100ML
0-5000 INJECTION, SOLUTION INTRAVENOUS CONTINUOUS
Status: ACTIVE | OUTPATIENT
Start: 2023-10-14 | End: 2023-10-14

## 2023-10-14 RX ORDER — MAGNESIUM SULFATE 1 G/100ML
1 INJECTION INTRAVENOUS ONCE
Qty: 100 ML | Refills: 0 | Status: COMPLETED | OUTPATIENT
Start: 2023-10-14 | End: 2023-10-14

## 2023-10-14 RX ORDER — LIDOCAINE 40 MG/G
CREAM TOPICAL
Status: ACTIVE | OUTPATIENT
Start: 2023-10-14 | End: 2023-10-17

## 2023-10-14 RX ORDER — ACETAMINOPHEN 325 MG/1
650 TABLET ORAL EVERY 8 HOURS PRN
Status: DISCONTINUED | OUTPATIENT
Start: 2023-10-14 | End: 2023-10-19 | Stop reason: HOSPADM

## 2023-10-14 RX ADMIN — CALCIUM CARBONATE (ANTACID) CHEW TAB 500 MG 500 MG: 500 CHEW TAB at 12:13

## 2023-10-14 RX ADMIN — CALCIUM CARBONATE (ANTACID) CHEW TAB 500 MG 500 MG: 500 CHEW TAB at 16:01

## 2023-10-14 RX ADMIN — PREDNISONE 5 MG: 5 TABLET ORAL at 07:42

## 2023-10-14 RX ADMIN — MYCOPHENOLIC ACID 180 MG: 180 TABLET, DELAYED RELEASE ORAL at 07:42

## 2023-10-14 RX ADMIN — MAGNESIUM SULFATE IN DEXTROSE 1 G: 10 INJECTION, SOLUTION INTRAVENOUS at 15:53

## 2023-10-14 RX ADMIN — SODIUM BICARBONATE 650 MG TABLET 1300 MG: at 14:28

## 2023-10-14 RX ADMIN — MYCOPHENOLIC ACID 180 MG: 180 TABLET, DELAYED RELEASE ORAL at 17:43

## 2023-10-14 RX ADMIN — SODIUM BICARBONATE 650 MG TABLET 1300 MG: at 07:41

## 2023-10-14 RX ADMIN — TACROLIMUS 2.5 MG: 1 CAPSULE ORAL at 17:49

## 2023-10-14 RX ADMIN — PANTOPRAZOLE SODIUM 40 MG: 40 TABLET, DELAYED RELEASE ORAL at 07:42

## 2023-10-14 RX ADMIN — CLONAZEPAM 0.5 MG: 0.5 TABLET ORAL at 21:24

## 2023-10-14 RX ADMIN — APIXABAN 5 MG: 5 TABLET, FILM COATED ORAL at 19:54

## 2023-10-14 RX ADMIN — ROPINIROLE HYDROCHLORIDE 1 MG: 1 TABLET, FILM COATED ORAL at 21:24

## 2023-10-14 RX ADMIN — TACROLIMUS 2.5 MG: 1 CAPSULE ORAL at 07:42

## 2023-10-14 RX ADMIN — GANCICLOVIR SODIUM 70 MG: 500 INJECTION, POWDER, LYOPHILIZED, FOR SOLUTION INTRAVENOUS at 17:34

## 2023-10-14 RX ADMIN — SODIUM BICARBONATE 650 MG TABLET 1300 MG: at 19:54

## 2023-10-14 RX ADMIN — LEVOTHYROXINE SODIUM 100 MCG: 100 TABLET ORAL at 07:41

## 2023-10-14 RX ADMIN — FOLIC ACID 1 MG: 1 TABLET ORAL at 07:41

## 2023-10-14 RX ADMIN — CHOLECALCIFEROL (VITAMIN D3) 10 MCG (400 UNIT) TABLET 10 MCG: at 07:41

## 2023-10-14 RX ADMIN — CARVEDILOL 25 MG: 25 TABLET, FILM COATED ORAL at 07:41

## 2023-10-14 RX ADMIN — CARVEDILOL 25 MG: 25 TABLET, FILM COATED ORAL at 18:34

## 2023-10-14 ASSESSMENT — ACTIVITIES OF DAILY LIVING (ADL)
ADLS_ACUITY_SCORE: 24

## 2023-10-14 NOTE — PROGRESS NOTES
Care Management Follow Up    Length of Stay (days): 11  Expected Discharge Date: 10/15/2023  Concerns to be Addressed: discharge planning      Patient plan of care discussed at interdisciplinary rounds: Yes  Anticipated Discharge Disposition: Home  Anticipated Discharge Services:  Home Infusion, Home Care  Anticipated Discharge DME: CADD Pump; PICC supplies  Patient/family educated on Medicare website which has current facility and service quality ratings: no  Education Provided on the Discharge Plan: No  Patient/Family in Agreement with the Plan: yes    Referrals Placed by CM/SW:    [address] [address]  Phone: [phone]  Fax: [phone]  - 100% Coverage, 4 week course IV ganciclovir via CADD Pump    Private pay costs discussed: Not applicable    Additional Information:  RNCC confirmed Rx for home infusion; relayed to [facility] which returned full coverage for pt and their dual payer source(s). Sr Resident to clarify order/med entry with [facility] support team. RNCC to continue to follow and support safe discharge planning.      Chip Daniels RN BSN  7D RNCC  Phone: [phone]  Pager: [phone]    For Weekend & Holiday on call RN Care Coordinator:  (Tasks: Home care, home infusion, medical equipment, transportation, IMM & ALCALA forms, etc.)  [place] (0800 - 1630) Saturday and Sunday    Units: 5A, 5B, & 5C: [phone]    Units: 6B, 6C, 6D: [phone]    Units: 7A, 7B, 7C, 7D: [phone]    Units: 6A/ICU : [phone]    [facility] [place][id number][place] Saturday and Sunday    Units: 6 Med/Surg, 8A, 10 ICU, & Pediatric Units: [phone]    Units: 5 Ortho, 5Med/Surg & WB ED: [phone]             Attending Discharge Physical Examination:

## 2023-10-14 NOTE — PROCEDURES
[facility]    Single Lumen PICC Placement    Date/Time: 10/14/2023 3:01 PM    Performed by: Tio Gordon RN  Authorized by: Bennett Perez MD  Indications: vascular access      UNIVERSAL PROTOCOL   Site Marked: Yes  Prior Images Obtained and Reviewed:  Yes  Required items: Required blood products, implants, devices and special equipment available    Patient identity confirmed:  Verbally with patient and arm band  NA - No sedation, light sedation, or local anesthesia  Confirmation Checklist:  Patient's identity using two indicators and relevant allergies  Time out: Immediately prior to the procedure a time out was called    Universal Protocol: the Joint Commission Universal Protocol was followed    Preparation: Patient was prepped and draped in usual sterile fashion       ANESTHESIA    Anesthesia:  Local infiltration  Local Anesthetic:  Lidocaine 1% without epinephrine  Anesthetic Total (mL):  3      SEDATION    Patient Sedated: No        Preparation: skin prepped with ChloraPrep  Skin prep agent: skin prep agent completely dried prior to procedure  Sterile barriers: maximum sterile barriers were used: cap, mask, sterile gown, sterile gloves, and large sterile sheet  Hand hygiene: hand hygiene performed prior to central venous catheter insertion  Type of line used: PICC  Catheter type: single lumen  Lumen type: power PICC and non-valved  Catheter size: 4 Fr  Brand: Bard  Lot number: [id number]  Placement method: venipuncture, MST, ultrasound and tip navigation system  Number of attempts: 1  Difficulty threading catheter: no  Successful placement: yes  Orientation: left  Catheter to Vein (%): 21  Location: basilic vein  Tip Location: SVC  Arm circumference: adults 10 cm  Extremity circumference: 24  Visible catheter length: 1  Total catheter length: 44  Dressing and securement: adhesive securement device, alcohol impregnated caps, chlorhexidine disc  applied, glue, securement device, site cleansed and transparent securement dressing  Post procedure assessment: blood return through all ports, placement verified by 3CG technology and free fluid flow  PROCEDURE   Patient Tolerance:  Patient tolerated the procedure well with no immediate complicationsDescribe Procedure: PICC ok to use  Disposal: sharps and needle count correct at the end of procedure, needles and guidewire disposed in sharps container

## 2023-10-14 NOTE — PLAN OF CARE
BP (!) 182/78 (BP Location: Left leg)   Pulse 61   Temp 98  F (36.7  C) (Oral)   Resp 16   Wt 61.9 kg (136 lb 7.4 oz)   SpO2 99%   BMI 22.71 kg/m        Shift: 5548-6627  Isolation Status: Enteric   VS: hypertensive otherwise all other VSS on RA, afebrile  Neuro: Aox4  Behaviors: cooperative  Labs: Creat 2.82  Respiratory: WDL  Cardiac: WDL  Pain/Nausea: Denies  Diet: regular  IV Access: LPIV, LPICC, KHOA fistula  GI/: voids adequately, Last BM today  Skin: No new issues, R arm edema  Mobility: UAL  Events/Education: Heparin drip discontinued this evening  Plan: continue plan of care. Pt hypertensive since BP checks are being done on Legs- Provider aware, continue to monitor

## 2023-10-14 NOTE — CARE PLAN
Vitals: BP (!) 150/78 (BP Location: Left arm)   Pulse 81   Temp 97.8  F (36.6  C) (Oral)   Resp 16   Wt 61.9 kg (136 lb 7.4 oz)   SpO2 100%   BMI 22.71 kg/m    Time: 4189-4858  Neuro: A/O x 4, calls appropriately and makes needs known.  Activity: up ad evelin   Pain and N/V: Denies Pain and n/v.  GI/: Diarrhea x4 over night, Voiding spontaneously. AUOP.   Cardiac: Denies cardiac chest pain.   Diet: Regular.   Respiratory: Denies SOB, on RA  Lines: R AV fistula and L PIV x2 infusing heparin and TKO.   Incisions/Drains/Skin: WDL, No new deficit.   Lab: Reviewed.  New changes this shift: No significant changes this shift, Continue POC.

## 2023-10-14 NOTE — PROGRESS NOTES
[facility]    Medicine Progress Note - Medicine Service, [facility] TEAM 2    Date of Admission:  10/3/2023    Assessment & Plan   Haider Torrez is a 58 year old male admitted on 10/3/2023. He has a PMH of IgA nephropathy s/p kidney transplant (2004, 2015), BK and CMV viremia, secondary hyperparathyroid presenting with CMV colitis and new Rt IJV DVT.      Today:  - continue IV ganciclovir, renally dosing , total 4 week course per transplant nephro and ID  - PICC today, will talk to SW about home infusion  - Switch heparin gtt to apixaban tonight  - Discharge in the am     Kidney transplant  MATTHEW  Proteinuria  Baseline Cr 2.1-2.5, Cr 3.75 at admission trending down closer back to baseline. Proteinuria since 8/23.   - consult transplant nephrology  - Continue PJP prophylaxis with bactrim    - EBV PCR negative    Rt IJV DVT with complete occlusion  10/12 pt reports Rt arm swelling, does not have pain/SOB and can move arm normally  Rt arm USG: acute DVT with complete occlusion, veins with collateral (compared to previous USG Rt arm in 2015: nonocclusive DVT)  - consulted heme: 10/12 start high intensity IV heparin drip, monitor for SOB/increased pain/swelling --> if the case, consult IR for thrombolysis  - planning to continue IV heparin until PICC line in place, then planning to transition to apixaban 5 mg BID (no loading needed, due to renal function) then plan to follow-up Xa level at D4 after starting apixaban (can be done as outpt if discharged)     CMV colitis  Diarrhea  He has a history of norovirus diarrhea and MATTHEW in March 2023. Patient has been having loose bowel movement, especially after eating for a month, and recently test positive for norovirus in stool on 9/21.   -Colonoscopy 10/2022 with mild archietectural distortion, focal crypt degeneration. Negative stain for CMV   - consult GI for concerns for CMV vs MMF colitis vs. Chronic norovirus: colonoscopy 10/5,  pending biopsy  - C. Diff panel neg  - enteric stool panel: norovirus positive, but transplant ID will not start nitaxoxanide due to poor evidence. Would suggest weaning immunosuppression.   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 This hase been managed conservatively with decrease in MPA (patient did not actually decrease dose)              -Pt is CMV IgG+ at time of transplant.               -IgG >600. CMV has been low level but present               -concern at this point with worsening diarrhea that he has CMV colitis. Recommend repeat colonoscopy with biopsies, and if CMV + would treat with IV GCV.   - IVIG 0.5g/kg x1 10/4, IgG level: normal 10/5    Chronic issues  NAGMA, resolved  Likely secondary to diarrhea. Normalized.   - discontinue PTA bicarb 1,950 mg TID --> 10/6 Decrease sodium bicarbonate to 1300 mg PO TID   - discontinue D5 with 150 mEq bicarb infusion in ED due to resolved acidosis.      Hypertension  On carvedilol 25 mg BID     Anemia of CKD  Normal Iron study. 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  On immunosuppressants for transplanted kidney: mycophenolic acid 360 mg bid, prednisone 5 mg daily, tacrolimus 2.5 mg bid         10/04/23 05:04 10/05/23 05:42   WBC 2.3  1.8    Hemoglobin 8.7  9.3    Platelet  Count 112  100    Absolute Neutrophils 1.5                Diet: Regular Diet Adult    DVT Prophylaxis: Heparin IV  Fernandez Catheter: Not present  Lines: None     Cardiac Monitoring: None  Code Status: Full Code      Clinically Significant Risk Factors            # Hypomagnesemia: Lowest Mg = 1.5 mg/dL in last 2 days, will replace as needed   # Hypoalbuminemia: Lowest albumin = 2.9 g/dL at 10/8/2023  9:46 AM, will monitor as appropriate     # Thrombocytopenia: Lowest platelets = 93 in last 2 days, will monitor for bleeding     # Hypertension: Noted on problem list                   Disposition Plan      Expected Discharge Date: 10/15/2023        Discharge Comments: Home tomorrow as long as stable and home infusions are set up for daily gancyclovir (till 11/3)          The patient's care was discussed with the Attending Physician, Dr. Perez .    VEE SOSA MD  Medicine Service, [facility] TEAM [address] [place] [address]  Securely message with [employer] (more info)  Text page via [employer] Paging/Directory   See signed in provider for up to date coverage information  ______________________________________________________________________    Interval History   Diarrhea much better, none since evening. Stable arm swelling, no pain, no SOB.     Physical Exam   Vital Signs: Temp: 98.1  F (36.7  C) Temp src: Oral BP: (!) 145/81 Pulse: 60   Resp: 16 SpO2: 98 % O2 Device: None (Room air)    Weight: 136 lbs 7.44 oz    General: NAD  Abd: sot and nontender, BS+  Edema: none    Medical Decision Making             Data   Reviewed

## 2023-10-14 NOTE — PROGRESS NOTES
[facility]   Transplant Nephrology Progress Note  Date of Admission:  10/3/2023  Today's Date: 10/14/2023    Recommendations:  - iv GCV per TID recs  - Recheck mag (ordered for you)    Assessment & Plan   # DDKT: Increased serum creatinine this admission.  - MATTHEW felt secondary to dehydration.    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    # Current Immunosuppression: Tacrolimus immediate release (goal 4-6), Mycophenolic acid (dose 360 mg every 12 hours), and Prednisone (dose 5 mg daily)   - Continue with intensive monitoring of immunosuppression for efficacy and toxicity.   - Pt improving clinically and CMV <35 but with on going symptoms MPA was reduced further    - Changes: yes reduce MPA to 180 mg po bid with improved yet persistent diarrhea, CMV colitis, neutropenia.    # Infection Prophylaxis:   - PJP: Sulfa/TMP (Bactrim)    # Norovirus  # CMV Colitis/proctitis  # Diarrhea:  Pt presented 10/3/23 with persistent diarrhea.  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  - Colonoscopy 10/2022 with mild archietectural distortion, focal crypt degeneration. CMV stain suggestive of CMV disease (colitis)   - Recently admitted (3/1-3/3/23) for diarrhea and MATTEHW, found + for norovirus   - Stool studies positive for norovirus again.     - 10/4/23 IVIG 0.5 g/kg  given.   - 10/5/23 Repeat colonoscopy.  Biopsy results show crypt distortion.  Active area with inflammation with ulceration and granulation tissue; features concerning but not conclusive for viral cytopathic changes. 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     10/4/2023 C. Diff Toxin PCR Negative    10/3/2023   EBV DNA PCR Quantitative Whole Blood Not detected    10/3/2023  IgG 810    10/3/2023  Enteric Bacteria and Virus Panel PCR Positive for norovirus.      -Check CMV DNA PCR weekly  - IV ganciclovir 10/6/23 - 11/3/23     03/20/23  04/10/23  05/01/23  10/6/23 10/9/23   CMV DNA Quant  622 !  460 !  318 !  252 ! <35        # Hypertension: Borderline control;  Goal BP: < 140/90 (Hospitalization goal)   - Changes: Not at this time.  Continue carvedilol  25 mg PO BID.    # Anemia in Chronic Renal Disease: Hgb: Stable     DANNY: No   - Iron studies: Replete.    # Leukopenia: Likely d/t ganciclovir. Plan for G-CSF 3 mcg/kg IV or subcutaneous when ANC <1000 per ID.    # Mineral Bone Disorder:   - Secondary renal hyperparathyroidism; PTH level: Mildly elevated (151-300 pg/ml)        On treatment: None  - Vitamin D; level: Not checked recently, but was normal last check         On supplement: Yes  - Calcium; level: Normal when corrected for low alb            On supplement: Yes, calcium carbonate 500 mg PO BID between meals.  - Phosphorus; level: Normal           On supplement: No    # Electrolytes:   - Potassium; level: Normal         On supplement: No,   - Magnesium; level: Low         On supplement: No, given magnesium sulfate 2 g IV once.  - Bicarbonate; level: Low        On supplement: Yes,  sodium bicarbonate 1300 mg PO TID    # Proteinuria:   -1.2g/g in previous check in 2/2023, 3.3g on 24h collection in 8/2023   - A1c 5.8%.    - 10/3/23 SPEP: monoclonal peak normal.   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    Patient seen and discussed with Dr. Camron Cassidy, NP   Pager: [phone]    Physician Attestation     I saw and evaluated Haider Torrez as part of a shared APRN/PA visit.     I personally reviewed the vital signs, medications, and labs.    I personally performed the substantive portion of the medical decision making for this visit - please see the [facility]'s documentation for full details.    Key management decisions made by me and carried out under my direction: No acute indications for dialysis.  Would make no changes in immunosuppression.  Continue on IV ganciclovir.  Would recheck serum magnesium level.    Nicola Lyon MD  Date of Service (when I saw the patient): 10/14/23    Interval History   Mr. Torrez's creatinine is 2.82 (10/14 0404); Stable.  Good urine output.  Other significant labs/tests/vitals: No  No events overnight.  no chest pain or shortness of breath.  no leg swelling.  no nausea and vomiting.  Bowel movements are improving and are more formed.  No fever, sweats or chills.     Review of Systems   4 point ROS was obtained and negative except as noted in the Interval History.    MEDICATIONS:   calcium carbonate  500 mg Oral BID    carvedilol  25 mg Oral BID w/meals    cholecalciferol  10 mcg Oral Daily    clonazePAM  0.5 mg Oral At Bedtime    folic acid  1 mg Oral Daily    ganciclovir (CYTOVENE) 70 mg in D5W 100 mL intermittent infusion  1.25 mg/kg Intravenous Q24H    levothyroxine  100 mcg Oral Daily    mycophenolic acid  180 mg Oral BID IS    pantoprazole  40 mg Oral Daily    predniSONE  5 mg Oral Daily    rOPINIRole  1 mg  Oral At Bedtime    sodium bicarbonate  1,300 mg Oral TID    sulfamethoxazole-trimethoprim  1 tablet Oral Once per day on     tacrolimus  2.5 mg Oral BID IS      heparin 850 Units/hr (10/14/23 0718)       Physical Exam   Temp  Av.7  F (36.5  C)  Min: 97.3  F (36.3  C)  Max: 98.1  F (36.7  C)      Pulse  Av.9  Min: 53  Max: 66 Resp  Av.6  Min: 16  Max: 19  SpO2  Av.6 %  Min: 98 %  Max: 100 %     BP (!) 145/81 (BP Location: Left arm)   Pulse 60   Temp 98.1  F (36.7  C) (Oral)   Resp 16   Wt 61.9 kg (136 lb 7.4 oz)   SpO2 98%   BMI 22.71 kg/m      Admit Weight: 57.1 kg (125 lb 14.1 oz)     GENERAL APPEARANCE: alert and no distress  RESP: lungs clear to auscultation - no rales, rhonchi or wheezes  CV: regular rhythm, normal rate, no rub, no murmur  EDEMA: no LE edema bilaterally  ABDOMEN: soft, nondistended, nontender, bowel sounds hyperactive  MS: extremities normal - no gross deformities noted, no evidence of inflammation in joints, no muscle tenderness  SKIN: no rash  PSYCH: mentation appears normal and affect normal  DIALYSIS ACCESS:  RUE AV fistula +bruit/+thrill    Data   All labs reviewed by me.  CMP  Recent Labs   Lab 10/14/23  0404 10/13/23  0454 10/12/23  0456 10/11/23  0533 10/10/23  0614 10/09/23  0534 10/08/23  0946    145 147*  --   --  143 146*   POTASSIUM 4.6 4.7 4.8  --   --  4.3 3.8   CHLORIDE 113* 115* 116*  --   --  109* 109*   CO2 20* 22 21*  --   --  25 27   ANIONGAP 10 8 10  --   --  9 10   GLC 94 98 98  --   --  101* 117*   BUN 43.8* 46.0* 47.8*  --   --  42.4* 39.3*   CR 2.82* 2.82* 2.86* 2.89* 2.86* 2.76* 2.96*   GFRESTIMATED 25* 25* 25* 25* 25* 26* 24*   CRISTIAN 7.8* 7.9* 7.8*  --   --  8.0* 8.1*   MAG  --   --  1.4*  --   --   --   --    PHOS  --   --  3.1  --  2.8 2.4*  --    PROTTOTAL  --   --   --   --   --   --  5.1*   ALBUMIN  --   --   --   --   --   --  2.9*   BILITOTAL  --   --   --   --   --   --  0.2   ALKPHOS  --   --   --   --   --   --  52    AST  --   --   --   --   --   --  18   ALT  --   --   --   --   --   --  <5     CBC  Recent Labs   Lab 10/14/23  0404 10/13/23  0454 10/12/23  0456 10/11/23  0533   HGB 8.4* 8.3* 8.2* 9.2*   WBC 6.3 6.1 1.7* 2.1*   RBC 2.84* 2.81* 2.80* 3.12*   HCT 28.2* 27.2* 27.1* 30.4*   MCV 99 97 97 97   MCH 29.6 29.5 29.3 29.5   MCHC 29.8* 30.5* 30.3* 30.3*   RDW 13.5 13.6 13.5 13.5   * 93* 95* 95*     INR  Recent Labs   Lab 10/12/23  1648   INR 1.05       ABGNo lab results found in last 7 days.   Urine Studies  Recent Labs   Lab Test 10/03/23  2244 11/17/15  1410 10/14/15  2220 10/13/15  0725   COLOR Light Yellow Light Yellow Yellow Straw   APPEARANCE Clear Clear Clear Clear   URINEGLC Negative Negative Negative Negative   URINEBILI Negative Negative Negative Negative   URINEKETONE Negative Negative Negative Negative   SG 1.014 1.007 1.006 1.003   UBLD Negative Negative Large* Large*   URINEPH 6.0 6.5 5.5 5.0   PROTEIN 200* Negative 10* Negative   NITRITE Negative Negative Negative Negative   LEUKEST Negative Negative Small* Negative   RBCU 1 0 >182* 57*   WBCU 1 1 15* 1     Recent Labs   Lab Test 03/27/18  1516 09/26/17  1506   UTPG 0.12 0.10     PTH  Recent Labs   Lab Test 03/27/18  1510 10/15/15  0626   PTHI 126* 561*     Iron Studies  Recent Labs   Lab Test 03/27/18  1510 10/17/15  0700   IRON 67 25*    216*   IRONSAT 24 12*   * 883*       IMAGING:  All imaging studies reviewed by me.

## 2023-10-14 NOTE — PLAN OF CARE
BP (!) 145/81 (BP Location: Left arm)   Pulse 60   Temp 98.1  F (36.7  C) (Oral)   Resp 16   Wt 61.9 kg (136 lb 7.4 oz)   SpO2 98%   BMI 22.71 kg/m    Endocrine: n/a  Labs: Stable labs.  Pain: Denies pain.  PRN's: n/a  Diet: Regular diet, good appetite.  LDA: PIV X2, PICC to be placed today. Heparin drip at 850U/hr. R arm fistula.  GI: No BM's overnight.  : Voids, not saving.  Skin: R arm edema, skin is intact.  Neuro: Alert and oriented.  Mobility: Up ad evelin.  Education: n/a  Plan: PICC line placement for home Ganciclovir. Heparin drip to discontinued tonight after oral Apixaban.                           none

## 2023-10-15 ENCOUNTER — APPOINTMENT (OUTPATIENT)
Dept: ULTRASOUND IMAGING | Facility: CLINIC | Age: 58
DRG: 698 | End: 2023-10-15
Payer: MEDICARE

## 2023-10-15 LAB
ANION GAP SERPL CALCULATED.3IONS-SCNC: 10 MMOL/L (ref 7–15)
ANION GAP SERPL CALCULATED.3IONS-SCNC: 8 MMOL/L (ref 7–15)
BASO+EOS+MONOS # BLD AUTO: ABNORMAL 10*3/UL
BASO+EOS+MONOS NFR BLD AUTO: ABNORMAL %
BASOPHILS # BLD AUTO: 0 10E3/UL (ref 0–0.2)
BASOPHILS NFR BLD AUTO: 0 %
BUN SERPL-MCNC: 42.1 MG/DL (ref 6–20)
BUN SERPL-MCNC: 42.9 MG/DL (ref 6–20)
CALCIUM SERPL-MCNC: 7.7 MG/DL (ref 8.6–10)
CALCIUM SERPL-MCNC: 9.1 MG/DL (ref 8.6–10)
CHLORIDE SERPL-SCNC: 111 MMOL/L (ref 98–107)
CHLORIDE SERPL-SCNC: 112 MMOL/L (ref 98–107)
CREAT SERPL-MCNC: 2.88 MG/DL (ref 0.67–1.17)
CREAT SERPL-MCNC: 2.92 MG/DL (ref 0.67–1.17)
DEPRECATED HCO3 PLAS-SCNC: 20 MMOL/L (ref 22–29)
DEPRECATED HCO3 PLAS-SCNC: 22 MMOL/L (ref 22–29)
EGFRCR SERPLBLD CKD-EPI 2021: 24 ML/MIN/1.73M2
EGFRCR SERPLBLD CKD-EPI 2021: 25 ML/MIN/1.73M2
EOSINOPHIL # BLD AUTO: 0 10E3/UL (ref 0–0.7)
EOSINOPHIL NFR BLD AUTO: 1 %
ERYTHROCYTE [DISTWIDTH] IN BLOOD BY AUTOMATED COUNT: 13.8 % (ref 10–15)
ERYTHROCYTE [DISTWIDTH] IN BLOOD BY AUTOMATED COUNT: 13.9 % (ref 10–15)
GLUCOSE SERPL-MCNC: 100 MG/DL (ref 70–99)
GLUCOSE SERPL-MCNC: 109 MG/DL (ref 70–99)
HCT VFR BLD AUTO: 25.1 % (ref 40–53)
HCT VFR BLD AUTO: 26.4 % (ref 40–53)
HGB BLD-MCNC: 7.4 G/DL (ref 13.3–17.7)
HGB BLD-MCNC: 7.8 G/DL (ref 13.3–17.7)
HOLD SPECIMEN: NORMAL
IMM GRANULOCYTES # BLD: 0.1 10E3/UL
IMM GRANULOCYTES NFR BLD: 2 %
LYMPHOCYTES # BLD AUTO: 0.4 10E3/UL (ref 0.8–5.3)
LYMPHOCYTES NFR BLD AUTO: 8 %
MCH RBC QN AUTO: 29.1 PG (ref 26.5–33)
MCH RBC QN AUTO: 29.7 PG (ref 26.5–33)
MCHC RBC AUTO-ENTMCNC: 29.5 G/DL (ref 31.5–36.5)
MCHC RBC AUTO-ENTMCNC: 29.5 G/DL (ref 31.5–36.5)
MCV RBC AUTO: 100 FL (ref 78–100)
MCV RBC AUTO: 99 FL (ref 78–100)
MONOCYTES # BLD AUTO: 0.2 10E3/UL (ref 0–1.3)
MONOCYTES NFR BLD AUTO: 5 %
NEUTROPHILS # BLD AUTO: 3.9 10E3/UL (ref 1.6–8.3)
NEUTROPHILS NFR BLD AUTO: 84 %
NRBC # BLD AUTO: 0 10E3/UL
NRBC BLD AUTO-RTO: 0 /100
PLATELET # BLD AUTO: 107 10E3/UL (ref 150–450)
PLATELET # BLD AUTO: 109 10E3/UL (ref 150–450)
POTASSIUM SERPL-SCNC: 4.5 MMOL/L (ref 3.4–5.3)
POTASSIUM SERPL-SCNC: 4.6 MMOL/L (ref 3.4–5.3)
RBC # BLD AUTO: 2.54 10E6/UL (ref 4.4–5.9)
RBC # BLD AUTO: 2.63 10E6/UL (ref 4.4–5.9)
SODIUM SERPL-SCNC: 141 MMOL/L (ref 135–145)
SODIUM SERPL-SCNC: 142 MMOL/L (ref 135–145)
WBC # BLD AUTO: 4.5 10E3/UL (ref 4–11)
WBC # BLD AUTO: 4.7 10E3/UL (ref 4–11)

## 2023-10-15 PROCEDURE — 99233 SBSQ HOSP IP/OBS HIGH 50: CPT | Mod: FS | Performed by: NURSE PRACTITIONER

## 2023-10-15 PROCEDURE — 250N000013 HC RX MED GY IP 250 OP 250 PS 637

## 2023-10-15 PROCEDURE — 80048 BASIC METABOLIC PNL TOTAL CA: CPT | Performed by: NURSE PRACTITIONER

## 2023-10-15 PROCEDURE — 85027 COMPLETE CBC AUTOMATED: CPT

## 2023-10-15 PROCEDURE — 250N000012 HC RX MED GY IP 250 OP 636 PS 637

## 2023-10-15 PROCEDURE — 250N000011 HC RX IP 250 OP 636: Mod: JZ

## 2023-10-15 PROCEDURE — 93971 EXTREMITY STUDY: CPT | Mod: 26 | Performed by: RADIOLOGY

## 2023-10-15 PROCEDURE — 99232 SBSQ HOSP IP/OBS MODERATE 35: CPT | Mod: GC | Performed by: STUDENT IN AN ORGANIZED HEALTH CARE EDUCATION/TRAINING PROGRAM

## 2023-10-15 PROCEDURE — 250N000012 HC RX MED GY IP 250 OP 636 PS 637: Performed by: INTERNAL MEDICINE

## 2023-10-15 PROCEDURE — 85025 COMPLETE CBC W/AUTO DIFF WBC: CPT

## 2023-10-15 PROCEDURE — 82310 ASSAY OF CALCIUM: CPT

## 2023-10-15 PROCEDURE — 36592 COLLECT BLOOD FROM PICC: CPT

## 2023-10-15 PROCEDURE — 258N000003 HC RX IP 258 OP 636

## 2023-10-15 PROCEDURE — 120N000011 HC R&B TRANSPLANT UMMC

## 2023-10-15 PROCEDURE — 93971 EXTREMITY STUDY: CPT | Mod: LT

## 2023-10-15 RX ORDER — HYDROMORPHONE HYDROCHLORIDE 1 MG/ML
0.3 INJECTION, SOLUTION INTRAMUSCULAR; INTRAVENOUS; SUBCUTANEOUS ONCE
Status: COMPLETED | OUTPATIENT
Start: 2023-10-16 | End: 2023-10-16

## 2023-10-15 RX ORDER — AMLODIPINE BESYLATE 2.5 MG/1
2.5 TABLET ORAL DAILY
Status: DISCONTINUED | OUTPATIENT
Start: 2023-10-15 | End: 2023-10-17

## 2023-10-15 RX ADMIN — CARVEDILOL 25 MG: 25 TABLET, FILM COATED ORAL at 08:56

## 2023-10-15 RX ADMIN — MYCOPHENOLIC ACID 180 MG: 180 TABLET, DELAYED RELEASE ORAL at 17:53

## 2023-10-15 RX ADMIN — APIXABAN 5 MG: 5 TABLET, FILM COATED ORAL at 21:08

## 2023-10-15 RX ADMIN — ROPINIROLE HYDROCHLORIDE 1 MG: 1 TABLET, FILM COATED ORAL at 21:08

## 2023-10-15 RX ADMIN — TACROLIMUS 2.5 MG: 1 CAPSULE ORAL at 08:57

## 2023-10-15 RX ADMIN — CALCIUM CARBONATE (ANTACID) CHEW TAB 500 MG 500 MG: 500 CHEW TAB at 14:30

## 2023-10-15 RX ADMIN — AMLODIPINE BESYLATE 2.5 MG: 2.5 TABLET ORAL at 08:56

## 2023-10-15 RX ADMIN — PANTOPRAZOLE SODIUM 40 MG: 40 TABLET, DELAYED RELEASE ORAL at 08:55

## 2023-10-15 RX ADMIN — SODIUM BICARBONATE 650 MG TABLET 1300 MG: at 08:55

## 2023-10-15 RX ADMIN — PREDNISONE 5 MG: 5 TABLET ORAL at 08:55

## 2023-10-15 RX ADMIN — ACETAMINOPHEN 650 MG: 325 TABLET, FILM COATED ORAL at 12:11

## 2023-10-15 RX ADMIN — CALCIUM CARBONATE (ANTACID) CHEW TAB 500 MG 500 MG: 500 CHEW TAB at 12:10

## 2023-10-15 RX ADMIN — MYCOPHENOLIC ACID 180 MG: 180 TABLET, DELAYED RELEASE ORAL at 08:58

## 2023-10-15 RX ADMIN — LEVOTHYROXINE SODIUM 100 MCG: 100 TABLET ORAL at 08:55

## 2023-10-15 RX ADMIN — TACROLIMUS 2.5 MG: 1 CAPSULE ORAL at 17:53

## 2023-10-15 RX ADMIN — APIXABAN 5 MG: 5 TABLET, FILM COATED ORAL at 08:57

## 2023-10-15 RX ADMIN — CARVEDILOL 25 MG: 25 TABLET, FILM COATED ORAL at 17:52

## 2023-10-15 RX ADMIN — GANCICLOVIR SODIUM 70 MG: 500 INJECTION, POWDER, LYOPHILIZED, FOR SOLUTION INTRAVENOUS at 17:53

## 2023-10-15 RX ADMIN — CLONAZEPAM 0.5 MG: 0.5 TABLET ORAL at 21:08

## 2023-10-15 RX ADMIN — SODIUM BICARBONATE 650 MG TABLET 1300 MG: at 14:30

## 2023-10-15 RX ADMIN — ACETAMINOPHEN 650 MG: 325 TABLET, FILM COATED ORAL at 22:20

## 2023-10-15 RX ADMIN — SODIUM BICARBONATE 650 MG TABLET 1300 MG: at 21:08

## 2023-10-15 RX ADMIN — FOLIC ACID 1 MG: 1 TABLET ORAL at 08:55

## 2023-10-15 RX ADMIN — CHOLECALCIFEROL (VITAMIN D3) 10 MCG (400 UNIT) TABLET 10 MCG: at 08:55

## 2023-10-15 ASSESSMENT — ACTIVITIES OF DAILY LIVING (ADL)
ADLS_ACUITY_SCORE: 23
ADLS_ACUITY_SCORE: 24
ADLS_ACUITY_SCORE: 23
ADLS_ACUITY_SCORE: 24
ADLS_ACUITY_SCORE: 23
ADLS_ACUITY_SCORE: 24
ADLS_ACUITY_SCORE: 23
ADLS_ACUITY_SCORE: 24
ADLS_ACUITY_SCORE: 23
ADLS_ACUITY_SCORE: 23

## 2023-10-15 NOTE — PROGRESS NOTES
[facility]   Transplant Nephrology Progress Note  Date of Admission:  10/3/2023  Today's Date: 10/15/2023    Recommendations:  - iv GCV per TID recs  - Recheck mag (ordered for you)  - Agree with adding amlodipine but will likely need to go up.    Assessment & Plan   # DDKT: Increased serum creatinine this admission.  - MATTHEW felt secondary to dehydration.    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    # Current Immunosuppression: Tacrolimus immediate release (goal 4-6), Mycophenolic acid (dose 360 mg every 12 hours), and Prednisone (dose 5 mg daily)   - Continue with intensive monitoring of immunosuppression for efficacy and toxicity.   - Pt improving clinically and CMV <35 but with on going symptoms MPA was reduced further    - Changes: yes reduce MPA to 180 mg po bid with improved yet persistent diarrhea, CMV colitis, neutropenia.    # Infection Prophylaxis:   - PJP: Sulfa/TMP (Bactrim)    # Norovirus  # CMV Colitis/proctitis  # Diarrhea:  Pt presented 10/3/23 with persistent diarrhea.  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  - Colonoscopy 10/2022 with mild archietectural distortion, focal crypt degeneration. CMV stain suggestive of CMV disease (colitis)   - Recently admitted (3/1-3/3/23) for diarrhea and MATTHEW, found + for norovirus   - Stool studies  positive for norovirus again.     - 10/4/23 IVIG 0.5 g/kg given.   - 10/5/23 Repeat colonoscopy.  Biopsy results show crypt distortion.  Active area with inflammation with ulceration and granulation tissue; features concerning but not conclusive for viral cytopathic changes. 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     10/4/2023 C. Diff Toxin PCR Negative    10/3/2023   EBV DNA PCR Quantitative Whole Blood Not detected    10/3/2023  IgG 810    10/3/2023  Enteric Bacteria and Virus Panel PCR Positive for norovirus.      -Check CMV DNA PCR weekly  - IV ganciclovir 10/6/23 - 11/3/23     03/20/23  04/10/23  05/01/23  10/6/23 10/9/23   CMV DNA Quant  622 !  460 !  318 !  252 ! <35        # Hypertension: Borderline control;  Goal BP: < 140/90 (Hospitalization goal)   - Changes: Yes - agree with amlodipine, but will ,likely need to go up .  Continue carvedilol  25 mg PO BID.    # Anemia in Chronic Renal Disease: Hgb: Trend down     DANNY: No   - Iron studies: Replete.    # Leukopenia: Likely d/t ganciclovir. Plan for G-CSF 3 mcg/kg IV or subcutaneous when ANC <1000 per ID.    # Mineral Bone Disorder:   - Secondary renal hyperparathyroidism; PTH level: Mildly elevated (151-300 pg/ml)        On treatment: None  - Vitamin D; level: Not checked recently, but was normal last check         On supplement: Yes  - Calcium; level: Normal when corrected for low alb            On supplement: Yes, calcium carbonate 500 mg PO BID between meals.  - Phosphorus; level: Normal           On supplement: No    # Electrolytes:   - Potassium; level: Normal         On supplement: No,   - Magnesium; level: Low         On supplement: No, given magnesium sulfate 2 g IV once.  - Bicarbonate; level: Low        On supplement: Yes,  sodium bicarbonate 1300 mg PO TID    # Proteinuria:   -1.2g/g in previous check in 2/2023, 3.3g on 24h collection in 8/2023   - A1c 5.8%.    - 10/3/23 SPEP: monoclonal peak normal.   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    Patient seen and discussed with Dr. Camron Cassidy NP   Pager: [phone]    Physician Attestation     I saw and evaluated Haider Torrez as part of a shared APRN/PA visit.     I personally reviewed the vital signs, medications, and labs.    I personally performed the substantive portion of the medical decision making for this visit - please see the [facility]'s documentation for full details.    Key management decisions made by me and carried out under my direction: No acute indications for dialysis.  Would make no changes in immunosuppression.  Would continue on IV ganciclovir.    Nicola Lyon MD  Date of Service (when I saw the patient): 10/15/23    Interval History   Mr. Torrez's creatinine is 2.82 (10/14 0404); Stable.  good urine output.  Other significant labs/tests/vitals: VSS  No events overnight.  no chest pain or shortness of breath.  no leg swelling.  no nausea and vomiting.  Bowel movements are improving slightly.  no fever, sweats or chills.     Review of Systems   4 point ROS was obtained and negative except as noted in the Interval History.    MEDICATIONS:   amLODIPine  2.5 mg Oral Daily    apixaban ANTICOAGULANT  5 mg Oral BID    calcium carbonate  500 mg Oral BID    carvedilol  25 mg Oral BID w/meals    cholecalciferol  10 mcg Oral Daily    clonazePAM  0.5 mg Oral At Bedtime    folic acid  1 mg Oral Daily    ganciclovir (CYTOVENE) 70 mg in D5W 100 mL intermittent infusion  1.25 mg/kg Intravenous Q24H    levothyroxine  100 mcg  Oral Daily    mycophenolic acid  180 mg Oral BID IS    pantoprazole  40 mg Oral Daily    predniSONE  5 mg Oral Daily    rOPINIRole  1 mg Oral At Bedtime    sodium bicarbonate  1,300 mg Oral TID    sulfamethoxazole-trimethoprim  1 tablet Oral Once per day on     tacrolimus  2.5 mg Oral BID IS           Physical Exam   Temp  Av.7  F (36.5  C)  Min: 97.3  F (36.3  C)  Max: 98.1  F (36.7  C)      Pulse  Av.9  Min: 53  Max: 66 Resp  Av.6  Min: 16  Max: 19  SpO2  Av.6 %  Min: 98 %  Max: 100 %     BP (!) 197/75   Pulse 58   Temp 98.1  F (36.7  C) (Oral)   Resp 16   Wt 61.9 kg (136 lb 7.4 oz)   SpO2 99%   BMI 22.71 kg/m      Admit Weight: 57.1 kg (125 lb 14.1 oz)     GENERAL APPEARANCE: alert and no distress  RESP: lungs clear to auscultation - no rales, rhonchi or wheezes  CV: regular rhythm, normal rate, no rub, no murmur  EDEMA: no LE edema bilaterally  ABDOMEN: soft, nondistended, nontender, bowel sounds hyperactive  MS: extremities normal - no gross deformities noted, no evidence of inflammation in joints, no muscle tenderness  SKIN: no rash  PSYCH: mentation appears normal and affect normal  DIALYSIS ACCESS:  RUE AV fistula +bruit/+thrill    Data   All labs reviewed by me.  CMP  Recent Labs   Lab 10/14/23  0404 10/13/23  0454 10/12/23  0456 10/11/23  0533 10/10/23  0614 10/09/23  0534    145 147*  --   --  143   POTASSIUM 4.6 4.7 4.8  --   --  4.3   CHLORIDE 113* 115* 116*  --   --  109*   CO2 20* 22 21*  --   --  25   ANIONGAP 10 8 10  --   --  9   GLC 94 98 98  --   --  101*   BUN 43.8* 46.0* 47.8*  --   --  42.4*   CR 2.82* 2.82* 2.86* 2.89* 2.86* 2.76*   GFRESTIMATED 25* 25* 25* 25* 25* 26*   CRISTIAN 7.8* 7.9* 7.8*  --   --  8.0*   MAG 1.5*  --  1.4*  --   --   --    PHOS  --   --  3.1  --  2.8 2.4*     CBC  Recent Labs   Lab 10/15/23  0615 10/14/23  0404 10/13/23  0454 10/12/23  0456   HGB 7.4* 8.4* 8.3* 8.2*   WBC 4.7 6.3 6.1 1.7*   RBC 2.54* 2.84* 2.81* 2.80*   HCT 25.1*  28.2* 27.2* 27.1*   MCV 99 99 97 97   MCH 29.1 29.6 29.5 29.3   MCHC 29.5* 29.8* 30.5* 30.3*   RDW 13.9 13.5 13.6 13.5   * 101* 93* 95*     INR  Recent Labs   Lab 10/12/23  1648   INR 1.05       ABGNo lab results found in last 7 days.   Urine Studies  Recent Labs   Lab Test 10/03/23  2244 11/17/15  1410 10/14/15  2220 10/13/15  0725   COLOR Light Yellow Light Yellow Yellow Straw   APPEARANCE Clear Clear Clear Clear   URINEGLC Negative Negative Negative Negative   URINEBILI Negative Negative Negative Negative   URINEKETONE Negative Negative Negative Negative   SG 1.014 1.007 1.006 1.003   UBLD Negative Negative Large* Large*   URINEPH 6.0 6.5 5.5 5.0   PROTEIN 200* Negative 10* Negative   NITRITE Negative Negative Negative Negative   LEUKEST Negative Negative Small* Negative   RBCU 1 0 >182* 57*   WBCU 1 1 15* 1     Recent Labs   Lab Test 03/27/18  1516 09/26/17  1506   UTPG 0.12 0.10     PTH  Recent Labs   Lab Test 03/27/18  1510 10/15/15  0626   PTHI 126* 561*     Iron Studies  Recent Labs   Lab Test 03/27/18  1510 10/17/15  0700   IRON 67 25*    216*   IRONSAT 24 12*   * 883*       IMAGING:  All imaging studies reviewed by me.

## 2023-10-15 NOTE — CARE PLAN
Vitals: BP (!) 182/78 (BP Location: Left leg)   Pulse 61   Temp 98  F (36.7  C) (Oral)   Resp 16   Wt 61.9 kg (136 lb 7.4 oz)   SpO2 99%   BMI 22.71 kg/m     Time: 9975-3278  Neuro: A/O x 4, calls appropriately and makes needs known.  Activity: up ad evelin   Pain and N/V: Denies Pain and n/v.  GI/: frequent loose stools, Voiding spontaneously. AUOP.   Cardiac: Denies cardiac chest pain.   Diet: Regular.   Respiratory: Denies SOB and on RA  Lines: R AV fistula. L PICC and PIV SL.  Incisions/Drains/Skin: WDL, No new deficit.   Lab: Reviewed.  New changes this shift: No significant changes this shift, Continue POC.

## 2023-10-15 NOTE — PLAN OF CARE
/75 (BP Location: Left arm)   Pulse 60   Temp 97.5  F (36.4  C) (Oral)   Resp 16   Wt 61.9 kg (136 lb 7.4 oz)   SpO2 97%   BMI 22.71 kg/m         1077-5009  AVSS on RA. BP's need to be taken on left arm; below PICC line- helps accuracy and BP taken on legs have been drastically different than on arm (130's vs. 180's). Team has been updated. Alert and oriented x4; calls appropriately. Up ad evelin. Some pain in right lower abdomen this AM; managed with PRN tylenol given x1. Denies nausea. Regular diet; fair appetite and PO intake. Adequate urine output- saving most today. 2 Loose BM's today; per patient report diarrhea continues to lessen and overall he is feeling better. Left PICC line; single lumen- saline locked. When ready for discharge- will discharge with PICC and continue ganciclovir infusions outpatient. Anticipating PLC tomorrow (10/16) at noon. Wife and patient is aware; wife will be by bedside for teaching. Possible discharge after teaching is completed tomorrow? Continue plan of care, please notify MD with any changes.

## 2023-10-15 NOTE — PLAN OF CARE
/75 (BP Location: Left arm)   Pulse 60   Temp 97.5  F (36.4  C) (Oral)   Resp 16   Wt 61.9 kg (136 lb 7.4 oz)   SpO2 97%   BMI 22.71 kg/m      Shift: 5585-3676   Isolation Status: Enteric & Cytotoxic  VS: Stable on RA, afebrile  Neuro: Aox4  Behaviors: Calm, cooperative, call appropriately   BG: N/A  Labs: Cr: 2.92  Respiratory: WDL  Cardiac: WDL  Pain/Nausea: Denies Nausea, Tenderness noted in L arm where PICC line placed, managed with PRN Tylenol  PRN: Tylenol x1  Diet: Regular  IV Access: L PICC, R AV Fistula, removed L PIV  Infusion(s): N/A  Lines/Drains: N/A  GI/: Voids adequately, last BM in AM  Skin: Trace edema in R arm  Mobility: Independent   Events/Education: Ultrasound of L PICC - no evidence of DVT, Trending high BPs (190-200s) in AM when BP taken on leg, removed L PIV and took BP on L lower arm, BP improved to 138/75, Alerted team of update. PICC education @ noon on 10/16.   Plan: Discharge on 10/16 after PICC education

## 2023-10-15 NOTE — PROGRESS NOTES
[facility]    Medicine Progress Note - Medicine Service, [facility] TEAM 2    Date of Admission:  10/3/2023    Assessment & Plan   Haider Torrez is a 58 year old male admitted on 10/3/2023. He has a PMH of IgA nephropathy s/p kidney transplant (, ), BK and CMV viremia, secondary hyperparathyroid presenting with CMV colitis and new Rt IJV DVT.       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 Baseline Cr 2.1-2.5, with Cr 3.75 at admission trending down but at discharge Cr is stable at 2.8. He has started having proteinuria since . Ongoing care with transplant nephrology. He is to continue PJP prophylaxis with bactrim, and his latest EBV PCR on 10/3/23.     - Will refer for retransplant evaluation as outpatient once acute issues resolve.      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 He has not had pain, SOB and can move arm normally.  Rt arm USG: acute DVT with complete occlusion, veins with collateral (compared to previous USG Rt arm in 2015: nonocclusive DVT), likely acute on chronic IJV DVT. Heme consultant recommended high intensity IV heparin drip. Arm swelling is stable without any additional complication. 10/14 patient switched to Apixaban 5 mg BID after PICC line insertion. Lt arm swelling with pain reported after PICC insertion, USG Lt arm did not reveal DVT.   - Plan is to follow Xa level at 4th day after DOAC initiation (10/18).      CMV viremia with CMV colitis  Norovirus positive  He has a history of  norovirus diarrhea and MATTHEW in March 2023. Patient has been having loose bowel movement, especially after eating for a month, and recently test positive for norovirus in stool on 9/21.     Per transplant nephrology note:    -Noted to have CMV viremia on 10/9/22 with 41 copies, up to 736 copies on 3/2/23. This hase been managed conservatively with decrease in MPA (patient did not actually decrease dose)              -Pt is CMV IgG+ at time of transplant.               -IgG >600. CMV has been low level but present   - IVIG 0.5g/kg x1 10/4, IgG level: normal 10/5    10/5 Colonoscopy biopsy confirmed CMV colitis. C. Diff panel negative. Enteric stool panel still positive for Norovirus, however transplant ID will not start nitaxoxanide due to poor evidence for efficacy and suggests weaning immunosuppression as able.   - ID recommends IV ganciclovir - renally dosing 4 week course (10/6/23 - 11/3/23), patient discharged on home infusion after education.    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  On immunosuppressants for transplanted kidney: mycophenolic acid 360 mg bid, prednisone 5 mg daily, tacrolimus 2.5 mg bid. Patient had downtrending WBC, likely due to ganciclovir and received one G-CSF which brought the WBC to normal.   - follow-up CBC with diff while receiving home infusion IV ganciclovir.      Chronic issues  NAGMA, resolved  Likely secondary to diarrhea. Normalized following bicarb IV infusion. Continue with 1300 mg PO TID for CKD maintenance as ordered by transplant nephrology.     Hypertension  On carvedilol 25 mg BID. Started amlodipine 2.5 mg po daily.      Anemia of CKD  Normal Iron study. Hb at baseline of 11.        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      Diet:  Regular Diet Adult    DVT Prophylaxis: Heparin IV  Fernandez Catheter: Not present  Lines: PRESENT      PICC 10/14/23 Single Lumen Left Basilic ok to use PICC-Site Assessment: WDL      Cardiac Monitoring: None  Code Status: Full Code      Clinically Significant Risk Factors            # Hypomagnesemia: Lowest Mg = 1.5 mg/dL in last 2 days, will replace as needed   # Hypoalbuminemia: Lowest albumin = 2.9 g/dL at 10/8/2023  9:46 AM, will monitor as appropriate   # Thrombocytopenia: Lowest platelets = 101 in last 2 days, will monitor for bleeding   # Hypertension: Noted on problem list                   Disposition Plan      Expected Discharge Date: 10/15/2023        Discharge Comments: Dispo: Home with HI  Progress: 100% Coverage - [facility] QD gancyclovir   Plan: IV Abx w/ CADD pump, u 11/3          The patient's care was discussed with the Attending Physician, Dr. Perez .    Yovana Flanagan MD  Medicine Service, [facility] TEAM 2  [facility]  Securely message with [employer] (more info)  Text page via [employer] Paging/Directory   See signed in provider for up to date coverage information  ______________________________________________________________________    Interval History   Patient still having diarrhea. High BP measured from legs. BP from arms were high-normal, started amlodipine. Home infusion education and supplies to be given to patient tomorrow.     Physical Exam   Vital Signs: Temp: 98.1  F (36.7  C) Temp src: Oral BP: (!) 180/83 Pulse: 58   Resp: 16 SpO2: 99 % O2 Device: None (Room air)    Weight: 136 lbs 7.44 oz        Medical Decision Making             Data

## 2023-10-16 ENCOUNTER — APPOINTMENT (OUTPATIENT)
Dept: EDUCATION SERVICES | Facility: CLINIC | Age: 58
DRG: 698 | End: 2023-10-16
Attending: STUDENT IN AN ORGANIZED HEALTH CARE EDUCATION/TRAINING PROGRAM
Payer: MEDICARE

## 2023-10-16 ENCOUNTER — APPOINTMENT (OUTPATIENT)
Dept: ULTRASOUND IMAGING | Facility: CLINIC | Age: 58
DRG: 698 | End: 2023-10-16
Payer: MEDICARE

## 2023-10-16 LAB
ANION GAP SERPL CALCULATED.3IONS-SCNC: 7 MMOL/L (ref 7–15)
BASO+EOS+MONOS # BLD AUTO: ABNORMAL 10*3/UL
BASO+EOS+MONOS NFR BLD AUTO: ABNORMAL %
BASOPHILS # BLD AUTO: 0 10E3/UL (ref 0–0.2)
BASOPHILS NFR BLD AUTO: 0 %
BUN SERPL-MCNC: 42.5 MG/DL (ref 6–20)
CALCIUM SERPL-MCNC: 7.7 MG/DL (ref 8.6–10)
CHLORIDE SERPL-SCNC: 113 MMOL/L (ref 98–107)
CREAT SERPL-MCNC: 2.8 MG/DL (ref 0.67–1.17)
DEPRECATED HCO3 PLAS-SCNC: 21 MMOL/L (ref 22–29)
EGFRCR SERPLBLD CKD-EPI 2021: 26 ML/MIN/1.73M2
EOSINOPHIL # BLD AUTO: 0 10E3/UL (ref 0–0.7)
EOSINOPHIL NFR BLD AUTO: 1 %
ERYTHROCYTE [DISTWIDTH] IN BLOOD BY AUTOMATED COUNT: 13.8 % (ref 10–15)
FERRITIN SERPL-MCNC: 456 NG/ML (ref 31–409)
GLUCOSE SERPL-MCNC: 96 MG/DL (ref 70–99)
HCT VFR BLD AUTO: 25.3 % (ref 40–53)
HGB BLD-MCNC: 7.5 G/DL (ref 13.3–17.7)
IMM GRANULOCYTES # BLD: 0.1 10E3/UL
IMM GRANULOCYTES NFR BLD: 2 %
IRON BINDING CAPACITY (ROCHE): 173 UG/DL (ref 240–430)
IRON SATN MFR SERPL: 32 % (ref 15–46)
IRON SERPL-MCNC: 55 UG/DL (ref 61–157)
LYMPHOCYTES # BLD AUTO: 0.4 10E3/UL (ref 0.8–5.3)
LYMPHOCYTES NFR BLD AUTO: 11 %
MCH RBC QN AUTO: 29.8 PG (ref 26.5–33)
MCHC RBC AUTO-ENTMCNC: 29.6 G/DL (ref 31.5–36.5)
MCV RBC AUTO: 100 FL (ref 78–100)
MONOCYTES # BLD AUTO: 0.2 10E3/UL (ref 0–1.3)
MONOCYTES NFR BLD AUTO: 6 %
NEUTROPHILS # BLD AUTO: 2.9 10E3/UL (ref 1.6–8.3)
NEUTROPHILS NFR BLD AUTO: 80 %
NRBC # BLD AUTO: 0 10E3/UL
NRBC BLD AUTO-RTO: 0 /100
PLATELET # BLD AUTO: 124 10E3/UL (ref 150–450)
POTASSIUM SERPL-SCNC: 4.7 MMOL/L (ref 3.4–5.3)
RBC # BLD AUTO: 2.52 10E6/UL (ref 4.4–5.9)
SODIUM SERPL-SCNC: 141 MMOL/L (ref 135–145)
TACROLIMUS BLD-MCNC: 4.2 UG/L (ref 5–15)
TME LAST DOSE: ABNORMAL H
TME LAST DOSE: ABNORMAL H
WBC # BLD AUTO: 3.6 10E3/UL (ref 4–11)

## 2023-10-16 PROCEDURE — 250N000013 HC RX MED GY IP 250 OP 250 PS 637

## 2023-10-16 PROCEDURE — 250N000012 HC RX MED GY IP 250 OP 636 PS 637

## 2023-10-16 PROCEDURE — 258N000003 HC RX IP 258 OP 636

## 2023-10-16 PROCEDURE — 82728 ASSAY OF FERRITIN: CPT | Performed by: INTERNAL MEDICINE

## 2023-10-16 PROCEDURE — 83550 IRON BINDING TEST: CPT | Performed by: INTERNAL MEDICINE

## 2023-10-16 PROCEDURE — 250N000013 HC RX MED GY IP 250 OP 250 PS 637: Performed by: INTERNAL MEDICINE

## 2023-10-16 PROCEDURE — 36592 COLLECT BLOOD FROM PICC: CPT | Performed by: STUDENT IN AN ORGANIZED HEALTH CARE EDUCATION/TRAINING PROGRAM

## 2023-10-16 PROCEDURE — 99233 SBSQ HOSP IP/OBS HIGH 50: CPT | Mod: FS

## 2023-10-16 PROCEDURE — 76882 US LMTD JT/FCL EVL NVASC XTR: CPT | Mod: RT

## 2023-10-16 PROCEDURE — 250N000011 HC RX IP 250 OP 636: Performed by: STUDENT IN AN ORGANIZED HEALTH CARE EDUCATION/TRAINING PROGRAM

## 2023-10-16 PROCEDURE — 76705 ECHO EXAM OF ABDOMEN: CPT | Mod: 26 | Performed by: RADIOLOGY

## 2023-10-16 PROCEDURE — 250N000011 HC RX IP 250 OP 636: Mod: JZ

## 2023-10-16 PROCEDURE — 99232 SBSQ HOSP IP/OBS MODERATE 35: CPT | Mod: GC | Performed by: STUDENT IN AN ORGANIZED HEALTH CARE EDUCATION/TRAINING PROGRAM

## 2023-10-16 PROCEDURE — 120N000011 HC R&B TRANSPLANT UMMC

## 2023-10-16 PROCEDURE — 85025 COMPLETE CBC W/AUTO DIFF WBC: CPT

## 2023-10-16 PROCEDURE — 80048 BASIC METABOLIC PNL TOTAL CA: CPT

## 2023-10-16 PROCEDURE — 80197 ASSAY OF TACROLIMUS: CPT | Performed by: STUDENT IN AN ORGANIZED HEALTH CARE EDUCATION/TRAINING PROGRAM

## 2023-10-16 PROCEDURE — 250N000012 HC RX MED GY IP 250 OP 636 PS 637: Performed by: INTERNAL MEDICINE

## 2023-10-16 RX ORDER — AMLODIPINE BESYLATE 2.5 MG/1
2.5 TABLET ORAL DAILY
Qty: 30 TABLET | Refills: 0 | Status: CANCELLED | OUTPATIENT
Start: 2023-10-17

## 2023-10-16 RX ADMIN — SODIUM BICARBONATE 650 MG TABLET 1300 MG: at 20:08

## 2023-10-16 RX ADMIN — CALCIUM CARBONATE (ANTACID) CHEW TAB 500 MG 500 MG: 500 CHEW TAB at 11:47

## 2023-10-16 RX ADMIN — ROPINIROLE HYDROCHLORIDE 1 MG: 1 TABLET, FILM COATED ORAL at 21:39

## 2023-10-16 RX ADMIN — PREDNISONE 5 MG: 5 TABLET ORAL at 07:52

## 2023-10-16 RX ADMIN — PANTOPRAZOLE SODIUM 40 MG: 40 TABLET, DELAYED RELEASE ORAL at 07:52

## 2023-10-16 RX ADMIN — LEVOTHYROXINE SODIUM 100 MCG: 100 TABLET ORAL at 07:52

## 2023-10-16 RX ADMIN — MYCOPHENOLIC ACID 180 MG: 180 TABLET, DELAYED RELEASE ORAL at 07:51

## 2023-10-16 RX ADMIN — SODIUM BICARBONATE 650 MG TABLET 1300 MG: at 14:03

## 2023-10-16 RX ADMIN — CARVEDILOL 25 MG: 25 TABLET, FILM COATED ORAL at 07:52

## 2023-10-16 RX ADMIN — GANCICLOVIR SODIUM 70 MG: 500 INJECTION, POWDER, LYOPHILIZED, FOR SOLUTION INTRAVENOUS at 14:29

## 2023-10-16 RX ADMIN — CALCIUM CARBONATE (ANTACID) CHEW TAB 500 MG 500 MG: 500 CHEW TAB at 16:06

## 2023-10-16 RX ADMIN — APIXABAN 5 MG: 5 TABLET, FILM COATED ORAL at 07:52

## 2023-10-16 RX ADMIN — TACROLIMUS 2.5 MG: 1 CAPSULE ORAL at 18:04

## 2023-10-16 RX ADMIN — APIXABAN 5 MG: 5 TABLET, FILM COATED ORAL at 20:08

## 2023-10-16 RX ADMIN — HYDROMORPHONE HYDROCHLORIDE 0.3 MG: 1 INJECTION, SOLUTION INTRAMUSCULAR; INTRAVENOUS; SUBCUTANEOUS at 00:23

## 2023-10-16 RX ADMIN — CHOLECALCIFEROL (VITAMIN D3) 10 MCG (400 UNIT) TABLET 10 MCG: at 08:00

## 2023-10-16 RX ADMIN — MYCOPHENOLIC ACID 180 MG: 180 TABLET, DELAYED RELEASE ORAL at 18:04

## 2023-10-16 RX ADMIN — AMLODIPINE BESYLATE 2.5 MG: 2.5 TABLET ORAL at 07:52

## 2023-10-16 RX ADMIN — SODIUM BICARBONATE 650 MG TABLET 1300 MG: at 07:51

## 2023-10-16 RX ADMIN — TACROLIMUS 2.5 MG: 1 CAPSULE ORAL at 07:51

## 2023-10-16 RX ADMIN — SULFAMETHOXAZOLE AND TRIMETHOPRIM 1 TABLET: 400; 80 TABLET ORAL at 08:00

## 2023-10-16 RX ADMIN — FOLIC ACID 1 MG: 1 TABLET ORAL at 07:52

## 2023-10-16 RX ADMIN — CARVEDILOL 25 MG: 25 TABLET, FILM COATED ORAL at 18:04

## 2023-10-16 RX ADMIN — CLONAZEPAM 0.5 MG: 0.5 TABLET ORAL at 21:39

## 2023-10-16 ASSESSMENT — ACTIVITIES OF DAILY LIVING (ADL)
ADLS_ACUITY_SCORE: 23

## 2023-10-16 NOTE — PROGRESS NOTES
[facility]    Medicine Progress Note - Medicine Service, [facility] TEAM 2    Date of Admission:  10/3/2023    Assessment & Plan   Haider Torrez is a 58 year old male admitted on 10/3/2023. He has a PMH of IgA nephropathy s/p kidney transplant (, ), BK and CMV viremia, secondary hyperparathyroid presenting with CMV colitis and new Rt IJV DVT.       Today:  - home infusion education performed today, patient and family would prefer infusion center --> pending answer from primary care provider if they can coordinate this (care coordinator helping)  - continue IV ganciclovir, renally dosing , total 4 week course   - at discharge, schedule a Xa level and CBC with diff check on Wed 10/18  - Rt flank USG no hematoma, blood collection     CMV viremia with CMV colitis  Norovirus positive  He has a history of norovirus diarrhea and MATTHEW in 2023. Patient has been having loose bowel movement, especially after eating for a month, and recently test positive for norovirus in stool on .      Per transplant nephrology note:    -Noted to have CMV viremia on 10/9/22 with 41 copies, up to 736 copies on 3/2/23. This hase been managed conservatively with decrease in MPA (patient did not actually decrease dose)              -Pt is CMV IgG+ at time of transplant.               -IgG >600. CMV has been low level but present   - IVIG 0.5g/kg x1 10/4, IgG level: normal 10/5     10/5 Colonoscopy biopsy confirmed CMV colitis. C. Diff panel negative. Enteric stool panel still positive for Norovirus, however transplant ID will not start nitaxoxanide due to poor evidence for efficacy and suggests weaning immunosuppression as able.   - ID recommends IV ganciclovir - renally dosing 4 week course (10/6/23 - 11/3/23), patient discharged on home infusion after education.  - Check CMV PCR weekly (ordered).      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 Baseline Cr 2.1-2.5, with Cr 3.75 at admission trending down but at discharge Cr is stable at 2.8. He has started having proteinuria since . Ongoing care with transplant nephrology. He is to continue PJP prophylaxis with bactrim, and his latest EBV PCR on 10/3/23.     - Will refer for retransplant evaluation as outpatient once acute issues resolve.      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 He has not had pain, SOB and can move arm normally.  Rt arm USG: acute DVT with complete occlusion, veins with collateral (compared to previous USG Rt arm in : nonocclusive DVT), likely acute on chronic IJV DVT. Heme consultant recommended high intensity IV heparin drip. Arm swelling is stable without any additional complication. 10/14 patient switched to Apixaban 5 mg BID after PICC line insertion. Lt arm swelling with pain reported after PICC insertion, USG Lt arm did not reveal DVT.   - Plan is to follow Xa level at 4th day after DOAC initiation (10/18): can be as outpatient       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  On immunosuppressants for transplanted kidney: mycophenolic acid 360 mg bid, prednisone 5 mg daily, tacrolimus 2.5 mg bid. Patient had downtrending WBC, likely due to ganciclovir and received one G-CSF which brought the WBC to normal.   - follow-up CBC with diff while receiving home infusion IV ganciclovir.   - May need to consider changing mycophenolate to alternative agent if diarrhea persists after 1m of treatment with IV GCV     Chronic issues  NAGMA, resolved  Likely secondary to diarrhea. Normalized following bicarb IV infusion. Continue with 1300 mg PO TID for CKD maintenance as ordered by transplant nephrology.     Hypertension  On carvedilol 25 mg BID. Started amlodipine 2.5 mg po daily.       Anemia of CKD  Normal Iron study. Hb at baseline of 11.     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          Diet: Regular Diet Adult    DVT Prophylaxis: DOAC  Fernandez Catheter: Not present  Lines: PRESENT      PICC 10/14/23 Single Lumen Left Basilic ok to use PICC-Site Assessment: WDL  Hemodialysis Vascular Access Arteriovenous fistula Right Arm-Site Assessment: WDL;Thrill present;Bruit present      Cardiac Monitoring: None  Code Status: Full Code      Clinically Significant Risk Factors              # Hypoalbuminemia: Lowest albumin = 2.9 g/dL at 10/8/2023  9:46 AM, will monitor as appropriate   # Thrombocytopenia: Lowest platelets = 107 in last 2 days, will monitor for bleeding   # Hypertension: Noted on problem list                   Disposition Plan      Expected Discharge Date: 10/16/2023        Discharge Comments: Dispo: Home with HI  Progress: 100% Coverage - [facility] QD gancyclovir   Plan: IV Abx w/ CADD pump, thu 11/3  10/16 home with PICC pending ultrasound results after PICC ed at noon          The patient's care was discussed with the Attending Physician, Dr. Perez .    Yovana Flanagan MD  Medicine Service, [address]  Securely message with [employer] (more info)  Text page via [facility] Paging/Directory   See signed in provider for up to date coverage information  ______________________________________________________________________    Interval History   Superficial bruising is more noticeable than last night, but patient feels less pain on Rt flank.     Physical Exam   Vital Signs: Temp: 97.8  F (36.6  C) Temp src: Oral BP: (!) 145/81 Pulse: 60   Resp: 16 SpO2: 100 % O2 Device: None (Room air)    Weight: 136 lbs 7.44 oz    General  Appearance: Comfortable, sitting up in bed  Skin: Rt flank bruising ~ 5*5 cm, no fluctuation, mild tenderness    Medical Decision Making             Data

## 2023-10-16 NOTE — PROGRESS NOTES
Care Management Follow Up    Length of Stay (days): 13    Expected Discharge Date: 10/16/2023     Concerns to be Addressed:       Patient plan of care discussed at interdisciplinary rounds: Yes    Anticipated Discharge Disposition: Home, Home Infusion, Home Care     Anticipated Discharge Services: None  Anticipated Discharge DME: None    Patient/family educated on Medicare website which has current facility and service quality ratings: no  Education Provided on the Discharge Plan: No  Patient/Family in Agreement with the Plan: yes    Referrals Placed by CM/SW: Home Infusion  Private pay costs discussed: Not applicable    Additional Information:     Per team today patient will be discharging today.   [facility] notified.   They want to want patient to receive his does today at the hospital and they can see him at home tomorrow. Nurse notified.     1248: Per PLC, wife said that it might be better to go to an infusion center if it is possible otherwise another nursing visit might be ok.    RN CC will follow up .    Rebecca Sheikh RN, PHN, BSN   Float Nurse Care Coordinator  Covering for [address]  Phone [phone]

## 2023-10-16 NOTE — DISCHARGE SUMMARY
[facility]  Discharge Summary - Medicine & Pediatrics       Date of Admission:  10/3/2023  Date of Discharge:  10/16/2023  Discharging Provider: Dr. Bennett Perez  Discharge Service: Medicine Service, [facility] TEAM 2    Discharge Diagnoses   CMV colitis  Acute on chronic Rt IJV DVT with complete occlusion  MATTHEW on top CKD  Kidney transplant  CMV viremia  Norovirus infection  Anemia of CKD  Papillary thyroid carcinoma s/p surgical resection  Secondary hyperparathyroidism  Hypocalcemia    Follow-ups Needed After Discharge   10/18: CBC with diff and Xa level then weekly (10/23, 10/30, 11/6, 11/13) CMV PCR, CBC with diff, BMP  Follow-up Rt arm swelling and Rt flank bruising.    - Follow-up with Dr. Rudd 10/31/23 @6:30 pm in a virtual visit.  - Follow-up with transplant nephrology     Discharge Disposition   Discharged to home  Condition at discharge: Stable    Hospital Course   Haider Torrez was admitted on 10/3/2023 for CMV colitis.  The following problems were addressed during his hospitalization:    Haider Torrez is a 58 year old male admitted on 10/3/2023. He has a PMH of IgA nephropathy s/p kidney transplant (2004, 2015), BK and CMV viremia, secondary hyperparathyroid admitted for CMV colitis and new Rt IJV DVT.       CMV viremia with CMV colitis  Norovirus positive  He has a history of norovirus diarrhea and MATTHEW in March 2023. Patient has been having loose bowel movement, especially after eating for a month, and recently test positive for norovirus in stool on 9/21.      Per transplant nephrology note:    -Noted to have CMV viremia on 10/9/22 with 41 copies, up to 736 copies on 3/2/23. This hase been managed conservatively with decrease in MPA (patient did not actually decrease dose)              -Pt is CMV IgG+ at time of transplant.               -IgG >600. CMV has been low level but present   - IVIG 0.5g/kg x1 10/4, IgG level: normal 10/5     10/5 Colonoscopy  biopsy confirmed CMV colitis. C. Diff panel negative. Enteric stool panel still positive for Norovirus, however transplant ID will not start nitaxoxanide due to poor evidence for efficacy and suggests weaning immunosuppression as able.   - ID recommends IV ganciclovir:  - renally dosing 4 week course (10/6/23 - 11/3/23), patient discharged on home infusion after education.  - G-CSF 3 mcg/kg IV or subcutaneous when ANC <1000.   - Follow-up with Dr. Rudd 10/31/23 @6:30 pm in a virtual visit.  - weekly CMV PCR, CBC with diff, BMP    New acute on chronic Rt IJV DVT with complete occlusion  10/12 pt reports Rt arm swelling. He has not had pain, SOB and can move arm normally.  Rt arm USG: acute DVT with complete occlusion, veins with collateral (compared to previous USG Rt arm in : nonocclusive DVT), likely acute on chronic IJV DVT. Heme consultant recommended high intensity IV heparin drip. Arm swelling is stable without any additional complication. 10/14 patient switched to Apixaban 5 mg BID after PICC line insertion. Lt arm swelling with pain reported after PICC insertion, USG Lt arm did not reveal DVT. Rt flank also showed bruising, 10/16 USG Rt flank did not show hematoma/blood collection.   - Plan is to follow Xa level at 4th day after DOAC initiation (10/18).       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 Baseline Cr 2.1-2.5, with Cr 3.75 at admission trending down but at discharge Cr is stable at 2.8. He has started having proteinuria since . Ongoing care with transplant nephrology. He is to continue PJP prophylaxis with bactrim, and his latest EBV PCR on 10/3/23.     - Will refer for retransplant evaluation as outpatient once acute issues resolve.     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  Patient had downtrending  WBC, likely due to ganciclovir and received one G-CSF which brought the WBC to normal.   - follow-up CBC with diff while receiving home infusion IV ganciclovir: G-CSF 3 mcg/kg IV or subcutaneous when ANC <1000.   - Immunosuppressant has been adjusted during hospitalization: Will be continuing on mycophenolic acid 180 mg BID, prednisone 5 mg daily, tacrolimus 2.5 mg BID     Chronic issues  NAGMA, resolved  Likely secondary to diarrhea. Normalized following bicarb IV infusion. Continue with 1300 mg PO TID for CKD maintenance as ordered by transplant nephrology.     Hypertension  On carvedilol 25 mg BID. Started amlodipine 2.5 mg po daily.      Anemia of CKD  Normal Iron study. Hb at baseline of 11.     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     Consultations This Hospital Stay   GI LUMINAL ADULT IP CONSULT  NEPHROLOGY KIDNEY/PANCREAS TRANSPLANT ADULT IP CONSULT  NURSING TO CONSULT FOR VASCULAR ACCESS CARE IP CONSULT  NURSING TO CONSULT FOR VASCULAR ACCESS CARE IP CONSULT  INFECTIOUS DISEASE TRANSPLANT SOT ADULT IP CONSULT  INFECTIOUS DISEASE TRANSPLANT SOT ADULT IP CONSULT  DENTAL HYGIENE IP ADULT CONSULT - UU  HEMATOLOGY ADULT IP CONSULT  PHARMACY IP CONSULT  CARE MANAGEMENT / SOCIAL WORK IP CONSULT  NURSING TO CONSULT FOR VASCULAR ACCESS CARE IP CONSULT  VASCULAR ACCESS FOR PICC PLACEMENT ADULT IP CONSULT  PATIENT LEARNING CENTER IP CONSULT    Code Status   Full Code       The patient was discussed with Dr. Chris Flanagan MD  [facility], Medicine Service  [facility] [address] [address]  Phone: [phone]  ______________________________________________________________________    Physical Exam   Vital Signs: Temp: 97.9  F (36.6  C)  Temp src: Oral BP: (!) 146/66 Pulse: 60   Resp: 16 SpO2: 99 % O2 Device: None (Room air)    Weight: 136 lbs 7.44 oz        Primary Care Physician   Physician No Ref-Primary    Discharge Orders       Significant Results and Procedures   Most Recent 3 CBC's:  Recent Labs   Lab Test 10/16/23  0449 10/15/23  1153 10/15/23  0615   WBC 3.6* 4.5 4.7   HGB 7.5* 7.8* 7.4*    100 99   * 107* 109*     Most Recent 3 BMP's:  Recent Labs   Lab Test 10/16/23  0449 10/15/23  1153 10/15/23  0615    142 141   POTASSIUM 4.7 4.6 4.5   CHLORIDE 113* 112* 111*   CO2 21* 20* 22   BUN 42.5* 42.1* 42.9*   CR 2.80* 2.92* 2.88*   ANIONGAP 7 10 8   CRISTIAN 7.7* 7.7* 9.1   GLC 96 109* 100*       Discharge Medications   Current Discharge Medication List        START taking these medications    Details   ganciclovir 70 mg Inject 70 mg into the vein every 24 hours for 19 days    Comments: This is a home infusion. Started on 10/6 to finish 4 week course;   Administer around 11AM  Associated Diagnoses: CMV colitis (H)           CONTINUE these medications which have NOT CHANGED    Details   carvedilol (COREG) 25 MG tablet Take 1 tablet (25 mg) by mouth 2 times daily (with meals)      clonazePAM (KLONOPIN) 0.5 MG tablet Take 1 tablet (0.5 mg) by mouth At Bedtime      folic acid (FOLVITE) 1 MG tablet Take 1 tablet (1 mg) by mouth daily      levothyroxine (SYNTHROID/LEVOTHROID) 100 MCG tablet Take 1 tablet (100 mcg) by mouth daily      mycophenolic acid (GENERIC EQUIVALENT) 180 MG EC tablet Take 360 mg by mouth 2 times daily      pantoprazole (PROTONIX) 40 MG EC tablet Take 1 tablet (40 mg) by mouth daily Take 30-60 minutes before a meal.  Qty: 30 tablet, Refills: 0      predniSONE (DELTASONE) 5 MG tablet Take 1 tablet (5 mg) by mouth daily      rOPINIRole (REQUIP) 1 MG tablet Take 1 tablet (1 mg) by mouth At Bedtime      sodium bicarbonate 650 MG tablet Take 3 tablets (1,950 mg) by mouth 3 times daily for 90 days  Qty: 810 tablet,  Refills: 3    Associated Diagnoses: Metabolic acidosis      sulfamethoxazole-trimethoprim (BACTRIM/SEPTRA) 400-80 MG per tablet Take 1 tablet by mouth daily  Qty: 30 tablet, Refills: 11    Associated Diagnoses: Kidney replaced by transplant      !! tacrolimus (GENERIC EQUIVALENT) 0.5 MG capsule Total dose 3mg in AM and 2.5mg in PM      !! tacrolimus (GENERIC EQUIVALENT) 1 MG capsule Total dose 3mg in AM and 2.5mg in PM  Qty: 180 capsule, Refills: 3    Associated Diagnoses: Kidney replaced by transplant      vitamin D3 (CHOLECALCIFEROL) 10 MCG (400 UNIT) capsule Take 1 capsule (400 Units) by mouth daily      nitazoxanide (ALINIA) 500 MG tablet Take 1 tablet (500 mg) by mouth 2 times daily (with meals) for 14 days  Qty: 28 tablet, Refills: 0    Associated Diagnoses: Norovirus       !! - Potential duplicate medications found. Please discuss with provider.        STOP taking these medications       MYFORTIC (BRAND) 180 MG EC tablet Comments:   Reason for Stopping:             Allergies   Allergies   Allergen Reactions    Hydralazine Swelling    Betadine [Povidone Iodine]     Cephalexin Hives    Clindamycin Hives    Trazodone Swelling     Swelling of face/lips

## 2023-10-16 NOTE — PROGRESS NOTES
"SPIRITUAL HEALTH SERVICES Progress Note  [facility] ([place]A    Saw pt Haider Torrez and spouse Mica per Length of Stay.    Patient/Family Understanding of Illness and Goals of Care - Haider has had 2 kidney transplants since 2000, and said, \"my kidneys are at 20% function now, so they want me to go back on the list.\" He is here now for \"diarrhea.\"    Distress and Loss - Haider and Mica live in [place] and \"are ready to go home.\" They are hoping to go home today. Mica Acknowledged and Haider affirmed that he is depressed. Haider expressed fatigue at how long his kidney troubles have been, yet still wanting to get on the list again to live longer and see his grandchildren grow up.    Strengths, Coping, and Resources  - Haider and Mica said they have a network of friends and family that have been supportive throughout this process.    Meaning, Beliefs, and Spirituality - Desmond are Hmong and practice their traditional spirituality. They moved here from [facility].    Plan of Care - Pt and family were oriented towards [facility]. I will check in in a few days if Haider is still here.    Jhonatan Dumont  Chaplain Resident  Pager [phone]    * [facility] remains available 24/7 for emergent requests/referrals, either by having the switchboard page the on-call  or by entering an ASAP/STAT consult in Epic (this will also page the on-call ). Routine Epic consults receive an initial response within 24 hours.*    "

## 2023-10-16 NOTE — PROGRESS NOTES
Brief Cross Cover Note    Called by the nursing staff to assess right flank tenderness and bruising.  Patient states he noticed tenderness on Saturday and noticed the bruising this evening when he was showering.  On exam there is ecchymosis to the right flank with some tenderness. He is hemodynamically stable hemoglobin roughly unchanged from yesterday from 7.4 to 7.8.  Gave a one-time 0.3 mg of Dilaudid, ordered right flank ultrasound. Will reassess after results.        Kojo Jaffe M.D., Ph.D.  PGY1  Internal Medicine/Gastroenterology PSTP  [facility]

## 2023-10-16 NOTE — PLAN OF CARE
BP (!) 146/66 (BP Location: Left arm)   Pulse 60   Temp 97.9  F (36.6  C) (Oral)   Resp 16   Wt 61.9 kg (136 lb 7.4 oz)   SpO2 99%   BMI 22.71 kg/m      Shift: 4227-8707  VS: HTN in 140s on RA, afebrile  Neuro: AOx4  BG: none  Labs: am labs  Respiratory: WNL  Pain/Nausea/PRN: denies nausea. Pt complained of RLQ pain (6/10) with new onset bruising. Tylenol x1, provider paged, once dose IV dilaudid given, US ordered. Pt has not needed pain meds since.  Diet: regular  LDA: PICC SL  GI/: voids in urinal, BM  Skin: bruising around PICC site into forearm. RLQ bruising  Mobility: IND  Plan: will have PICC education at noon, then discharge.    Handoff given to following RN.

## 2023-10-16 NOTE — PHARMACY
[facility]  Parenteral ANtibiotic Review at Departure from Acute Care Collaborative Note     Patient: Haider Torrez  MRN: [id number]  Allergies: Hydralazine, Betadine [povidone iodine], Cephalexin, Clindamycin, and Trazodone    Current Location: [facility]  OPAT to be provided by: [facility]       Line Type: PICC    Diagnosis/Indications: CMV viremia (D-/R+) and CMV proctitis/colitis with diarrhea  Organism(s): CMV  MRDO? No  Pending Cultures/Microbiological Tests: no      Inpatient ID involved in developing OPAT plan: Yes - discharge OPAT plan has no changes from ID provider, Dr. Serafin Rudd, OPAT plan charted on 10/12/2023    Outpatient ID Follow-up: ID OPAT Clinic Referral Placed ([facility] Ph: [phone] and Fax: [phone]) - appointment scheduled  Designated Provider: Dr. Serafin Rudd    Antimicrobial Regimen / Route Anticipated  Duration Start Date Stop /  Reassess Date   Ganciclovir 70 mg (~1.25 mg/kg ActBW of 56.6 kg) every 24 hours via CADD pump per insurance/IV 4 weeks 10/6/2023 Tentative 11/3/2023 - definitive end date to be determined by ID provider     Laboratory Tests and Monitoring Frequency: CBC with Diff, BMP Once Weekly    Imaging/Miscellaneous Monitoring: Weekly CMV PCR    ID Pharmacist Interventions: Dose Change If patient's CrCl > 30 mL/min, consider adjusting ganciclovir dose to 2.5 mg/kg every 24 hours. I have a low suspicion this change will occur as CrCl has been ~20-25 mL/min while inpatient.                        Betty Madrid, PharmD, BCIDP  Pager: [phone]

## 2023-10-16 NOTE — PROGRESS NOTES
Home Infusion  Received referral from Erin Boucher [facility] for IV ganciclovir.  Benefits verified.  Patient has [place] and [place] plans and is covered 100% for home infusion with possible copay per dispense on pharmacy plan.  Met with patient and spouse at bedside to review home infusion services, review benefits and offer choice of providers.  Patient would like to use [facility] for home infusion.  Confirmed discharge address, phone, and emergency contact information.  Haider is expected to dc today and will be going home on IV ganciclovir q24hrs.  He has not done home IV therapy before however he and his spouse plan to meet with the [facility] today for initial teaching.  Provided patient and spouse with information about I services.  Explained about CADD pump administration method, and explained that a home nurse can provide additional teaching if needed for self-administration.  Informed them about supplies and delivery of supplies, storage of medication, dosing times, plan for SNV and 24/7 availability of I staff while on IV therapy.     Haider and Rosana verbalized understanding of all information given.   They will wait until [facility] appt to decide if they can manage home IV therapy themselves.  Questions answered.  Plan for [facility] to provide home care services after discharge.  Will continue to follow until discharge and update pt once final orders are determined.    Thank you for the referral      MARIBEL De Dios  [facility] Nurse Liaison   [email or web address]  Cell: [phone] M-F  [facility] Main: [phone]

## 2023-10-16 NOTE — PLAN OF CARE
Vitals: BP (!) 146/66 (BP Location: Left arm)   Pulse 60   Temp 97.9  F (36.6  C) (Oral)   Resp 16   Wt 61.9 kg (136 lb 7.4 oz)   SpO2 99%   BMI 22.71 kg/m      Endocrine: n/a  Labs: Stable labs.  Pain: Mild RLQ flank pain, declines medication.  PRN's: n/a  Diet: Regular diet, good appetite.  LDA: PICC line for IV Gancyclovir.  GI: Loose BM's yesterday.  : Voids spontaneously.  Skin: Pale, bruising RLQ flank. Per patient ultrasound was ok.   Neuro: Alert and oriented.  Mobility: Up ad evelin.  Education: n/a  Plan: Discharge home after IV Gancyclovir if medication can be ordered for infusion at [place].

## 2023-10-16 NOTE — PROGRESS NOTES
[facility]   Transplant Nephrology Progress Note  Date of Admission:  10/3/2023  Today's Date: 10/16/2023    Recommendations:  - Check CMV PCR weekly (ordered).   - May need to consider changing mycophenolate to alternative agent if diarrhea persists after 1m of treatment with IV GCV    Assessment & Plan   # DDKT: Increased serum creatinine this admission.  - MATTHEW felt secondary to dehydration.    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    # Current Immunosuppression: Tacrolimus immediate release (goal 4-6), Mycophenolic acid (dose 180 mg every 12 hours), and Prednisone (dose 5 mg daily)   - Continue with intensive monitoring of immunosuppression for efficacy and toxicity.   - Pt improving clinically and CMV <35 but with on going symptoms MPA was reduced further    - 10/16/23  Tacrolimus level: 4.2 (~ 11 hour trough)   - Changes: No.  May need to consider changing mycophenolate to alternative agent if diarrhea persists after 1m of treatment with IV GCV    # Infection Prophylaxis:   - PJP: Sulfa/TMP (Bactrim)    # Norovirus  # CMV Colitis/proctitis  # Diarrhea:  Pt presented 10/3/23 with persistent diarrhea.  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  - Colonoscopy 10/2022 with mild archietectural distortion, focal crypt degeneration. CMV stain  suggestive of CMV disease (colitis)   - Recently admitted (3/1-3/3/23) for diarrhea and MATTHEW, found + for norovirus   - Stool studies positive for norovirus again.     - 10/4/23 IVIG 0.5 g/kg given   - 10/5/23 Repeat colonoscopy.  Biopsy results show crypt distortion.  Active area with inflammation with ulceration and granulation tissue; features concerning but not conclusive for viral cytopathic changes. 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     10/4/2023 C. Diff Toxin PCR Negative    10/3/2023   EBV DNA PCR Quantitative Whole Blood Not detected    10/3/2023  IgG 810    10/3/2023  Enteric Bacteria and Virus Panel PCR Positive for norovirus.      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 Treat CMV colitis as above and if continues to have diarrhea would consider mTORi     03/20/23  04/10/23  05/01/23  10/6/23 10/9/23   CMV DNA Quant  622 !  460 !  318 !  252 ! <35        # RUE DVT: noted on ultrasound 10/12/23.  Started on apixiban.    # Right flank bruise: noted right abdominal pain and bruising evening of 10/15/23.  Likely d/t anticoagulation.  US 10/16 shows no discrete fluid collection or hematoma in the right   flank.     # Hypertension: Borderline control;  Goal BP: < 140/90 (Hospitalization goal)   - Changes: No.  Continue amlodipine 2.5 mg PO daily and carvedilol  25 mg PO BID.    # Anemia in Chronic Renal Disease: Hgb: Decreased     DANNY: No   - Iron studies: Replete.    # Leukopenia: Likely d/t ganciclovir. Plan for G-CSF 3 mcg/kg IV or subcutaneous when ANC <1000 per ID.    # Mineral Bone Disorder:   - Secondary renal hyperparathyroidism; PTH level: Mildly elevated (151-300 pg/ml)        On treatment: None  - Vitamin D; level: Not checked recently, but was normal last check         On supplement: Yes  - Calcium; level: Normal when corrected for low alb            On supplement: Yes, calcium carbonate 500 mg PO BID between meals.  -  Phosphorus; level: Normal   10/12        On supplement: No    # Electrolytes:   - Potassium; level: Normal            On supplement: No,   - Magnesium; level: Not checked recently           On supplement: No  - Bicarbonate; level: slightly Low           On supplement: Yes,  sodium bicarbonate 1300 mg PO TID    # Proteinuria:   -1.2g/g in previous check in 2/2023, 3.3g on 24h collection in 8/2023   - A1c 5.8%.    - 10/3/23 SPEP: monoclonal peak normal.   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    Patient seen and discussed with Dr. Bullard.    Kurt Shane, APRN CNP   Pager: [phone]      Physician Attestation     I saw and evaluated Haider Torrez as part of a shared APRN/PA visit.     I personally reviewed the vital signs, medications, labs and imaging.    I personally performed the substantive portion of the medical decision making for this visit - please see the CH[facility]'s documentation for full details.    Key management decisions made by me and carried out under my direction: Ok to discharge on current immunosuppression. Continue MPA 180mg bid , consider changing to mTORi or AZA  if continued diarrhea after treatment for IV GCV x1m. CMV PCR weekly.     I personally performed the substantive portion of the history for this visit - please see the CH[facility]'s documentation for full details.  Key additional history findings made by me: Pt with decreasing diarrhea but still present. ANC 2.9. Scr remains elevated from baseline but stable. Plan for discharge today    Maurice  MD Juan Miguel  Date of Service (when I saw the patient): 10/16/23        Interval History   Mr. Torrez's creatinine is 2.80 (10/16 0449); Stable     Other significant labs/tests/vitals: SBP 140s. Afebrile  Noted right abdominal pain and bruising overnight.  US unremarkable.  No chest pain or shortness of breath.  No leg swelling.  No nausea and vomiting.  Pt reports he continues to have diarrhea, but it is less frequent.      Review of Systems   4 point ROS was obtained and negative except as noted in the Interval History.    MEDICATIONS:    amLODIPine  2.5 mg Oral Daily     apixaban ANTICOAGULANT  5 mg Oral BID     calcium carbonate  500 mg Oral BID     carvedilol  25 mg Oral BID w/meals     cholecalciferol  10 mcg Oral Daily     clonazePAM  0.5 mg Oral At Bedtime     folic acid  1 mg Oral Daily     ganciclovir (CYTOVENE) 70 mg in D5W 100 mL intermittent infusion  1.25 mg/kg Intravenous Q24H     levothyroxine  100 mcg Oral Daily     mycophenolic acid  180 mg Oral BID IS     pantoprazole  40 mg Oral Daily     predniSONE  5 mg Oral Daily     rOPINIRole  1 mg Oral At Bedtime     sodium bicarbonate  1,300 mg Oral TID     sulfamethoxazole-trimethoprim  1 tablet Oral Once per day on      tacrolimus  2.5 mg Oral BID IS           Physical Exam   Temp  Av.7  F (36.5  C)  Min: 97.3  F (36.3  C)  Max: 98.1  F (36.7  C)      Pulse  Av.9  Min: 53  Max: 66 Resp  Av.6  Min: 16  Max: 19  SpO2  Av.6 %  Min: 98 %  Max: 100 %     BP (!) 145/81 (BP Location: Left arm)   Pulse 60   Temp 97.8  F (36.6  C) (Oral)   Resp 16   Wt 61.9 kg (136 lb 7.4 oz)   SpO2 100%   BMI 22.71 kg/m      Admit Weight: 57.1 kg (125 lb 14.1 oz)     GENERAL APPEARANCE: alert and no distress  RESP: lungs clear to auscultation - no rales, rhonchi or wheezes  CV: regular rhythm, normal rate, no rub, no murmur  EDEMA: no LE edema bilaterally  ABDOMEN: soft, nondistended, nontender, bowel sounds hyperactive  MS: extremities normal  - no gross deformities noted, no evidence of inflammation in joints, no muscle tenderness  SKIN: no rash, right flank bruise noted (no palpable mass)  PSYCH: mentation appears normal and affect normal  DIALYSIS ACCESS:  RUE AV fistula +bruit/+thrill    Data   All labs reviewed by me.  CMP  Recent Labs   Lab 10/16/23  0449 10/15/23  1153 10/15/23  0615 10/14/23  0404 10/13/23  0454 10/12/23  0456 10/11/23  0533 10/10/23  0614    142 141 143   < > 147*  --   --    POTASSIUM 4.7 4.6 4.5 4.6   < > 4.8  --   --    CHLORIDE 113* 112* 111* 113*   < > 116*  --   --    CO2 21* 20* 22 20*   < > 21*  --   --    ANIONGAP 7 10 8 10   < > 10  --   --    GLC 96 109* 100* 94   < > 98  --   --    BUN 42.5* 42.1* 42.9* 43.8*   < > 47.8*  --   --    CR 2.80* 2.92* 2.88* 2.82*   < > 2.86*   < > 2.86*   GFRESTIMATED 26* 24* 25* 25*   < > 25*   < > 25*   CRISTIAN 7.7* 7.7* 9.1 7.8*   < > 7.8*  --   --    MAG  --   --   --  1.5*  --  1.4*  --   --    PHOS  --   --   --   --   --  3.1  --  2.8    < > = values in this interval not displayed.     CBC  Recent Labs   Lab 10/16/23  0449 10/15/23  1153 10/15/23  0615 10/14/23  0404   HGB 7.5* 7.8* 7.4* 8.4*   WBC 3.6* 4.5 4.7 6.3   RBC 2.52* 2.63* 2.54* 2.84*   HCT 25.3* 26.4* 25.1* 28.2*    100 99 99   MCH 29.8 29.7 29.1 29.6   MCHC 29.6* 29.5* 29.5* 29.8*   RDW 13.8 13.8 13.9 13.5   * 107* 109* 101*     INR  Recent Labs   Lab 10/12/23  1648   INR 1.05       ABGNo lab results found in last 7 days.   Urine Studies  Recent Labs   Lab Test 10/03/23  2244 11/17/15  1410 10/14/15  2220 10/13/15  0725   COLOR Light Yellow Light Yellow Yellow Straw   APPEARANCE Clear Clear Clear Clear   URINEGLC Negative Negative Negative Negative   URINEBILI Negative Negative Negative Negative   URINEKETONE Negative Negative Negative Negative   SG 1.014 1.007 1.006 1.003   UBLD Negative Negative Large* Large*   URINEPH 6.0 6.5 5.5 5.0   PROTEIN 200* Negative 10* Negative   NITRITE Negative Negative  Negative Negative   LEUKEST Negative Negative Small* Negative   RBCU 1 0 >182* 57*   WBCU 1 1 15* 1     Recent Labs   Lab Test 03/27/18  1516 09/26/17  1506   UTPG 0.12 0.10     PTH  Recent Labs   Lab Test 03/27/18  1510 10/15/15  0626   PTHI 126* 561*     Iron Studies  Recent Labs   Lab Test 10/16/23  0449 03/27/18  1510 10/17/15  0700   IRON 55* 67 25*   * 276 216*   IRONSAT 32 24 12*   * 695* 883*       IMAGING:  All imaging studies reviewed by me.

## 2023-10-16 NOTE — PROVIDER NOTIFICATION
" Yael intern Ld paged @ [phone]  \"7A 201 Xiong, B  pt is having R low abdominal pain (sharp, 6/10). noticed new bruising around site, slightly firm compared to other side. on eliquis. imaging?  Cris De León [phone]\"       Provider repaged at 2330  \"7A 201 Xiong, B  repaging about pt RLQ pain. sharp with 6/10 pain. pt noticed new bruising in the area, slightly firm compared to LLQ. on Eliquis. any imaging indicated?  Cris De León [phone]\"     Awaiting response.    Provider came to assess patient, ordered ultrasound and once dose of IV dilaudid 0.3mg. Ultrasound order placed as routine, repaging to get clarification if US ok to wait until morning or wants completed tonight.    0110 provider repaged  \"7A 201 Xiong, B  are you wanting US for RLQ pain done tonight? if so order needs to be changed to STAT. orders placed as routine will not be completed until late morning. please clarify  Cris De León [phone]\"    No response. Reached out the 2nd call resident. As long as bruising does not increase or have increased pain, provider ok with US in AM. Will also follow this morning Hgb.  "

## 2023-10-16 NOTE — CONSULTS
Haider and Rosana were seen bedside for PICC and IV gancyclovir education. Rosana did well with all skills with step by step verbal instructions from myself. They both stated they would feel more comfortable going to the infusion center to receive medication if possible. Rosana said she could do it but is worried about doing something wrong and causing Haider harm. I notified the CC, his nurse and the CN Marycruz.    Literature given: Learning About Handwashing, Getting Ready for Your PICC, Caring for Your PICC at Home, Changing the End Cap, Flushing the Line with Heparin, Saline or Citrate    Using the [facility] [place] Pump for a Single Dose of Medicine.

## 2023-10-16 NOTE — PROGRESS NOTES
Care Management Follow Up    Length of Stay (days): 13    Expected Discharge Date: 10/16/2023     Concerns to be Addressed: discharge planning       Patient plan of care discussed at interdisciplinary rounds: Yes    Anticipated Discharge Disposition: Home, Home Infusion, Home Care vs OP infusion     Anticipated Discharge Services: None  Anticipated Discharge DME: None    Patient/family educated on Medicare website which has current facility and service quality ratings: no  Education Provided on the Discharge Plan: No  Patient/Family in Agreement with the Plan: yes    Referrals Placed by CM/SW: Home Infusion  Private pay costs discussed: Not applicable    Additional Information:  Received notification from [facility] that pt's spouse does not feel comfortable infusing the IV ganciclovir and is interested in OP infusion.  Called and spoke with pt's spouse, Rosana, and she confirmed that even with further education she does not feel comfortable with administering the IV medication.  Pt lives in [place] and would prefer to go to [facility](Ph: [phone]).  Called and spoke with [facility] and they do these types of infusions but the orders need to come from a [place] provider.  Pt has a PCP( [facility] Fax: [phone]) and nephrologist(Dr. Pennington) through [facility].  Left a message with pt's PCP's care team and faxed them provider and antimicrobial team notes.  They said they will juan m request as urgent but this could take 24-48 hours for them to get back to us. Updated pt's spouse and the providers that until this is arranged pt is unable to discharge.  RNCC will continue to follow and assist with discharge planning     EDWIN Prasad  Phone: [phone]  Pager: [phone]    SEARCHABLE in [facility] - search CARE COORDINATOR     [place] ([phone]) Saturday & Sunday; ([phone]) [facility] Recognized Holidays     Units: 5A, 5B & 5C  Pager: [phone]    Units: 6B, 6C &  6D    Pager: [phone]    Units: 7A, 7B, 7C & 7D    Pager: [phone]    Units: 6A & ICU   Pager: [phone]    Units: 5 Ortho, 5MS & WB ED Pager: [phone]    Units: 6MS, 8A & 10 ICU  Pager [phone]

## 2023-10-17 LAB
ALBUMIN SERPL BCG-MCNC: 2.9 G/DL (ref 3.5–5.2)
ANION GAP SERPL CALCULATED.3IONS-SCNC: 9 MMOL/L (ref 7–15)
BASO+EOS+MONOS # BLD AUTO: ABNORMAL 10*3/UL
BASO+EOS+MONOS NFR BLD AUTO: ABNORMAL %
BASOPHILS # BLD AUTO: 0 10E3/UL (ref 0–0.2)
BASOPHILS NFR BLD AUTO: 0 %
BUN SERPL-MCNC: 44.3 MG/DL (ref 6–20)
CALCIUM SERPL-MCNC: 7.5 MG/DL (ref 8.6–10)
CHLORIDE SERPL-SCNC: 113 MMOL/L (ref 98–107)
CMV DNA SPEC NAA+PROBE-ACNC: 144 IU/ML
CMV DNA SPEC NAA+PROBE-LOG#: 2.2 {LOG_COPIES}/ML
CREAT SERPL-MCNC: 2.88 MG/DL (ref 0.67–1.17)
DEPRECATED HCO3 PLAS-SCNC: 21 MMOL/L (ref 22–29)
EGFRCR SERPLBLD CKD-EPI 2021: 25 ML/MIN/1.73M2
EOSINOPHIL # BLD AUTO: 0 10E3/UL (ref 0–0.7)
EOSINOPHIL NFR BLD AUTO: 1 %
ERYTHROCYTE [DISTWIDTH] IN BLOOD BY AUTOMATED COUNT: 13.9 % (ref 10–15)
GLUCOSE SERPL-MCNC: 101 MG/DL (ref 70–99)
HCT VFR BLD AUTO: 24.7 % (ref 40–53)
HGB BLD-MCNC: 7.3 G/DL (ref 13.3–17.7)
IMM GRANULOCYTES # BLD: 0.1 10E3/UL
IMM GRANULOCYTES NFR BLD: 4 %
LYMPHOCYTES # BLD AUTO: 0.4 10E3/UL (ref 0.8–5.3)
LYMPHOCYTES NFR BLD AUTO: 17 %
MAGNESIUM SERPL-MCNC: 1.5 MG/DL (ref 1.7–2.3)
MCH RBC QN AUTO: 29.7 PG (ref 26.5–33)
MCHC RBC AUTO-ENTMCNC: 29.6 G/DL (ref 31.5–36.5)
MCV RBC AUTO: 100 FL (ref 78–100)
MONOCYTES # BLD AUTO: 0.2 10E3/UL (ref 0–1.3)
MONOCYTES NFR BLD AUTO: 8 %
NEUTROPHILS # BLD AUTO: 1.7 10E3/UL (ref 1.6–8.3)
NEUTROPHILS NFR BLD AUTO: 70 %
NRBC # BLD AUTO: 0 10E3/UL
NRBC BLD AUTO-RTO: 1 /100
PLATELET # BLD AUTO: 116 10E3/UL (ref 150–450)
POTASSIUM SERPL-SCNC: 4.6 MMOL/L (ref 3.4–5.3)
RBC # BLD AUTO: 2.46 10E6/UL (ref 4.4–5.9)
SODIUM SERPL-SCNC: 143 MMOL/L (ref 135–145)
WBC # BLD AUTO: 2.4 10E3/UL (ref 4–11)

## 2023-10-17 PROCEDURE — 120N000011 HC R&B TRANSPLANT UMMC

## 2023-10-17 PROCEDURE — 250N000013 HC RX MED GY IP 250 OP 250 PS 637

## 2023-10-17 PROCEDURE — 83735 ASSAY OF MAGNESIUM: CPT

## 2023-10-17 PROCEDURE — 250N000012 HC RX MED GY IP 250 OP 636 PS 637

## 2023-10-17 PROCEDURE — 99233 SBSQ HOSP IP/OBS HIGH 50: CPT

## 2023-10-17 PROCEDURE — 80299 QUANTITATIVE ASSAY DRUG: CPT

## 2023-10-17 PROCEDURE — 250N000012 HC RX MED GY IP 250 OP 636 PS 637: Performed by: INTERNAL MEDICINE

## 2023-10-17 PROCEDURE — 250N000011 HC RX IP 250 OP 636: Mod: JZ

## 2023-10-17 PROCEDURE — 36592 COLLECT BLOOD FROM PICC: CPT

## 2023-10-17 PROCEDURE — 99232 SBSQ HOSP IP/OBS MODERATE 35: CPT | Mod: GC | Performed by: PEDIATRICS

## 2023-10-17 PROCEDURE — 258N000003 HC RX IP 258 OP 636

## 2023-10-17 PROCEDURE — 80048 BASIC METABOLIC PNL TOTAL CA: CPT

## 2023-10-17 PROCEDURE — 85025 COMPLETE CBC W/AUTO DIFF WBC: CPT

## 2023-10-17 PROCEDURE — 82040 ASSAY OF SERUM ALBUMIN: CPT

## 2023-10-17 RX ORDER — AMLODIPINE BESYLATE 5 MG/1
5 TABLET ORAL DAILY
Status: DISCONTINUED | OUTPATIENT
Start: 2023-10-18 | End: 2023-10-19 | Stop reason: HOSPADM

## 2023-10-17 RX ADMIN — FOLIC ACID 1 MG: 1 TABLET ORAL at 08:39

## 2023-10-17 RX ADMIN — AMLODIPINE BESYLATE 2.5 MG: 2.5 TABLET ORAL at 08:38

## 2023-10-17 RX ADMIN — TACROLIMUS 2.5 MG: 1 CAPSULE ORAL at 18:33

## 2023-10-17 RX ADMIN — APIXABAN 5 MG: 5 TABLET, FILM COATED ORAL at 20:02

## 2023-10-17 RX ADMIN — APIXABAN 5 MG: 5 TABLET, FILM COATED ORAL at 08:39

## 2023-10-17 RX ADMIN — CLONAZEPAM 0.5 MG: 0.5 TABLET ORAL at 21:03

## 2023-10-17 RX ADMIN — CHOLECALCIFEROL (VITAMIN D3) 10 MCG (400 UNIT) TABLET 10 MCG: at 08:38

## 2023-10-17 RX ADMIN — CALCIUM CARBONATE (ANTACID) CHEW TAB 500 MG 500 MG: 500 CHEW TAB at 11:13

## 2023-10-17 RX ADMIN — MYCOPHENOLIC ACID 180 MG: 180 TABLET, DELAYED RELEASE ORAL at 18:33

## 2023-10-17 RX ADMIN — SODIUM BICARBONATE 650 MG TABLET 1300 MG: at 14:46

## 2023-10-17 RX ADMIN — ACETAMINOPHEN 650 MG: 325 TABLET, FILM COATED ORAL at 08:51

## 2023-10-17 RX ADMIN — GANCICLOVIR SODIUM 70 MG: 500 INJECTION, POWDER, LYOPHILIZED, FOR SOLUTION INTRAVENOUS at 12:38

## 2023-10-17 RX ADMIN — PANTOPRAZOLE SODIUM 40 MG: 40 TABLET, DELAYED RELEASE ORAL at 08:38

## 2023-10-17 RX ADMIN — ROPINIROLE HYDROCHLORIDE 1 MG: 1 TABLET, FILM COATED ORAL at 21:03

## 2023-10-17 RX ADMIN — SODIUM BICARBONATE 650 MG TABLET 1300 MG: at 08:38

## 2023-10-17 RX ADMIN — TACROLIMUS 2.5 MG: 1 CAPSULE ORAL at 08:38

## 2023-10-17 RX ADMIN — MYCOPHENOLIC ACID 180 MG: 180 TABLET, DELAYED RELEASE ORAL at 08:38

## 2023-10-17 RX ADMIN — CARVEDILOL 25 MG: 25 TABLET, FILM COATED ORAL at 18:33

## 2023-10-17 RX ADMIN — LEVOTHYROXINE SODIUM 100 MCG: 100 TABLET ORAL at 08:38

## 2023-10-17 RX ADMIN — CALCIUM CARBONATE (ANTACID) CHEW TAB 500 MG 500 MG: 500 CHEW TAB at 16:08

## 2023-10-17 RX ADMIN — SODIUM BICARBONATE 650 MG TABLET 1300 MG: at 20:02

## 2023-10-17 RX ADMIN — PREDNISONE 5 MG: 5 TABLET ORAL at 08:51

## 2023-10-17 RX ADMIN — CARVEDILOL 25 MG: 25 TABLET, FILM COATED ORAL at 08:38

## 2023-10-17 ASSESSMENT — ACTIVITIES OF DAILY LIVING (ADL)
ADLS_ACUITY_SCORE: 23

## 2023-10-17 NOTE — PROGRESS NOTES
[facility]   Transplant Nephrology Progress Note  Date of Admission:  10/3/2023  Today's Date: 10/17/2023    Recommendations:  - Increase amlodipine to 5 mg PO daily (ordered).   - May need to consider changing mycophenolate to alternative agent if diarrhea persists after 1m of treatment with IV GCV.    Assessment & Plan   # DDKT: Increased serum creatinine this admission.  - MATTHEW felt secondary to dehydration.    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    # Current Immunosuppression: Tacrolimus immediate release (goal 4-6), Mycophenolic acid (dose 180 mg every 12 hours), and Prednisone (dose 5 mg daily)   - Continue with intensive monitoring of immunosuppression for efficacy and toxicity.   - Pt improving clinically and CMV <35 but with on going symptoms MPA was reduced further    - Changes: No.  May need to consider changing mycophenolate to alternative agent if diarrhea persists after 1m of treatment with IV GCV    # Infection Prophylaxis:   - PJP: Sulfa/TMP (Bactrim)    # Norovirus  # CMV Colitis/proctitis  # Diarrhea:  Pt presented 10/3/23 with persistent diarrhea.  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  - Colonoscopy 10/2022 with mild archietectural distortion, focal crypt degeneration. CMV stain suggestive of CMV disease (colitis)   -  Recently admitted (3/1-3/3/23) for diarrhea and MATTHEW, found + for norovirus   - Stool studies positive for norovirus again.     - 10/4/23 IVIG 0.5 g/kg given   - 10/5/23 Repeat colonoscopy.  Biopsy results show crypt distortion.  Active area with inflammation with ulceration and granulation tissue; features concerning but not conclusive for viral cytopathic changes. 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     10/4/2023 C. Diff Toxin PCR Negative    10/3/2023   EBV DNA PCR Quantitative Whole Blood Not detected    10/3/2023  IgG 810    10/3/2023  Enteric Bacteria and Virus Panel PCR Positive for norovirus.      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 Treat CMV colitis as above and if continues to have diarrhea would consider mTORi     03/20/23  04/10/23  05/01/23  10/6/23 10/9/23   CMV DNA Quant  622 !  460 !  318 !  252 ! <35        # RUE DVT: noted on ultrasound 10/12/23.  Started on apixiban.    # Right flank bruise: noted right abdominal pain and bruising evening of 10/15/23.  Likely d/t anticoagulation.  US 10/16 shows no discrete fluid collection or hematoma in the right   flank.     # Hypertension: Inadequate control;  Goal BP: < 140/90 (Hospitalization goal)   - Changes:  Increase amlodipine to 5 mg PO daily .  Continue carvedilol  25 mg PO BID.    # Anemia in Chronic Renal Disease: Hgb: Trend down     DANNY: No   - Iron studies: Replete.    # Leukopenia: Likely d/t ganciclovir. Plan for G-CSF 3 mcg/kg IV or subcutaneous when ANC <1000 per ID.    # Mineral Bone Disorder:   - Secondary renal hyperparathyroidism; PTH level: Mildly elevated (151-300 pg/ml)        On treatment: None  - Vitamin D; level: Not checked recently, but was normal last check         On supplement: Yes  - Calcium; level: Normal when corrected for low alb            On supplement: Yes, calcium carbonate 500 mg PO BID between meals.  - Phosphorus; level: Normal   10/12         On supplement: No    # Electrolytes:   - Potassium; level: Normal           On supplement: No,   - Magnesium; level: slightly Low  (will monitor)         On supplement: No  - Bicarbonate; level: slightly Low   (will monitor)        On supplement: Yes,  sodium bicarbonate 1300 mg PO TID    # Proteinuria:   -1.2g/g in previous check in 2/2023, 3.3g on 24h collection in 8/2023   - A1c 5.8%.    - 10/3/23 SPEP: monoclonal peak normal.   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    Discussed with Dr. Bullard.    MARCUS yBnum CNP   Pager: [phone]      Interval History   Mr. Glover creatinine is 2.88 (10/17 0444); Stable      Other significant labs/tests/vitals: SBP 140s - 160s overnight. Afebrile  No acute events overnight.  No chest pain or shortness of breath.  No leg swelling.  No nausea and vomiting.  Bowel movements are loose, but less frequent.  Pt reports bruising at left arm PICC site.  No swelling.  Good capillary refill.        Review of Systems   4 point ROS was obtained and negative except as noted in the Interval History.    MEDICATIONS:   amLODIPine  2.5 mg Oral Daily    apixaban ANTICOAGULANT  5 mg Oral BID    calcium carbonate  500 mg Oral BID    carvedilol  25 mg Oral BID w/meals    cholecalciferol  10 mcg Oral Daily    clonazePAM  0.5 mg Oral At Bedtime    folic acid  1 mg Oral Daily    ganciclovir (CYTOVENE) 70 mg in D5W 100 mL intermittent infusion  1.25 mg/kg Intravenous Q24H    levothyroxine  100 mcg Oral Daily    mycophenolic acid  180 mg  Oral BID IS    pantoprazole  40 mg Oral Daily    predniSONE  5 mg Oral Daily    rOPINIRole  1 mg Oral At Bedtime    sodium bicarbonate  1,300 mg Oral TID    sulfamethoxazole-trimethoprim  1 tablet Oral Once per day on     tacrolimus  2.5 mg Oral BID IS           Physical Exam   Temp  Av.7  F (36.5  C)  Min: 97.3  F (36.3  C)  Max: 98.1  F (36.7  C)      Pulse  Av.9  Min: 53  Max: 66 Resp  Av.6  Min: 16  Max: 19  SpO2  Av.6 %  Min: 98 %  Max: 100 %     BP (!) 152/72 (BP Location: Left arm)   Pulse 62   Temp 98.2  F (36.8  C) (Oral)   Resp 16   Wt 61.9 kg (136 lb 7.4 oz)   SpO2 99%   BMI 22.71 kg/m      Admit Weight: 57.1 kg (125 lb 14.1 oz)     GENERAL APPEARANCE: alert and no distress  RESP: lungs clear to auscultation - no rales, rhonchi or wheezes  CV: regular rhythm, normal rate, no rub, no murmur  EDEMA: no LE edema bilaterally  ABDOMEN: soft, nondistended, nontender, bowel sounds hyperactive  MS: extremities normal - no gross deformities noted, no evidence of inflammation in joints, no muscle tenderness  SKIN: no rash, right flank bruise noted (no palpable mass), left arm bruised, no swelling  PSYCH: mentation appears normal and affect normal  DIALYSIS ACCESS:  RUE AV fistula +bruit/+thrill    Data   All labs reviewed by me.  CMP  Recent Labs   Lab 10/17/23  0444 10/16/23  0449 10/15/23  1153 10/15/23  0615 10/14/23  0404 10/13/23  0454 10/12/23  0456    141 142 141 143   < > 147*   POTASSIUM 4.6 4.7 4.6 4.5 4.6   < > 4.8   CHLORIDE 113* 113* 112* 111* 113*   < > 116*   CO2 21* 21* 20* 22 20*   < > 21*   ANIONGAP 9 7 10 8 10   < > 10   * 96 109* 100* 94   < > 98   BUN 44.3* 42.5* 42.1* 42.9* 43.8*   < > 47.8*   CR 2.88* 2.80* 2.92* 2.88* 2.82*   < > 2.86*   GFRESTIMATED 25* 26* 24* 25* 25*   < > 25*   CRISTIAN 7.5* 7.7* 7.7* 9.1 7.8*   < > 7.8*   MAG  --   --   --   --  1.5*  --  1.4*   PHOS  --   --   --   --   --   --  3.1    < > = values in this interval not  displayed.     CBC  Recent Labs   Lab 10/17/23  0444 10/16/23  0449 10/15/23  1153 10/15/23  0615   HGB 7.3* 7.5* 7.8* 7.4*   WBC 2.4* 3.6* 4.5 4.7   RBC 2.46* 2.52* 2.63* 2.54*   HCT 24.7* 25.3* 26.4* 25.1*    100 100 99   MCH 29.7 29.8 29.7 29.1   MCHC 29.6* 29.6* 29.5* 29.5*   RDW 13.9 13.8 13.8 13.9   * 124* 107* 109*     INR  Recent Labs   Lab 10/12/23  1648   INR 1.05       ABGNo lab results found in last 7 days.   Urine Studies  Recent Labs   Lab Test 10/03/23  2244 11/17/15  1410 10/14/15  2220 10/13/15  0725   COLOR Light Yellow Light Yellow Yellow Straw   APPEARANCE Clear Clear Clear Clear   URINEGLC Negative Negative Negative Negative   URINEBILI Negative Negative Negative Negative   URINEKETONE Negative Negative Negative Negative   SG 1.014 1.007 1.006 1.003   UBLD Negative Negative Large* Large*   URINEPH 6.0 6.5 5.5 5.0   PROTEIN 200* Negative 10* Negative   NITRITE Negative Negative Negative Negative   LEUKEST Negative Negative Small* Negative   RBCU 1 0 >182* 57*   WBCU 1 1 15* 1     Recent Labs   Lab Test 03/27/18  1516 09/26/17  1506   UTPG 0.12 0.10     PTH  Recent Labs   Lab Test 03/27/18  1510 10/15/15  0626   PTHI 126* 561*     Iron Studies  Recent Labs   Lab Test 10/16/23  0449 03/27/18  1510 10/17/15  0700   IRON 55* 67 25*   * 276 216*   IRONSAT 32 24 12*   * 695* 883*       IMAGING:  All imaging studies reviewed by me.

## 2023-10-17 NOTE — PLAN OF CARE
Goal Outcome Evaluation:       /73 (BP Location: Left arm)   Pulse 62   Temp 97.7  F (36.5  C) (Oral)   Resp 16   Wt 61.9 kg (136 lb 7.4 oz)   SpO2 99%   BMI 22.71 kg/m      Shift: 4915-5447  Isolation Status: Contact  VSS on RA, afebrile  Neuro: Aox4  Behaviors: cooperative  BG: NA  Labs: stable  Respiratory: WNL  Cardiac: WNL  Pain/Nausea: Denies  PRN: NA  Diet: Regular  IV Access: PICC  Infusion(s): NA  GI/: Voids, not saving  Skin: No new concerns  Mobility: UAL  Plan: POC, update team as needed.

## 2023-10-17 NOTE — PROGRESS NOTES
Care Management Follow Up    Length of Stay (days): 14    Expected Discharge Date: 10/18/2023     Concerns to be Addressed:       Patient plan of care discussed at interdisciplinary rounds: Yes    Anticipated Discharge Disposition: Home, Home Infusion, Home Care     Anticipated Discharge Services: None  Anticipated Discharge DME: None    Patient/family educated on Medicare website which has current facility and service quality ratings: no  Education Provided on the Discharge Plan: No  Patient/Family in Agreement with the Plan: yes    Referrals Placed by CM/SW: Home Infusion  Private pay costs discussed: Not applicable    Additional Information:    Per chart review noted team anticipates pt will discharge on q day IV Ganciclovir.  Pt/spouse prefer OP infusion appointments be arranged through [facility].  [facility] requires that a [facility] provider, PCP sign off on infusion orders.  Covering RNCC sent clinical request to pt PCP, Dr. Nogueira.  Per RNCC note PCP has 24-48 hours to respond to request.      Writer spoke with Yuki,  [facility] regarding status of order request.  Per discussion with Yuki request is still pending PCP review.      1340: Received VM from [place] PCP [facility] - person did not identify themselves just provided clinic # to call.  Writer called clinic back.  Spoke with Shalom, who noted that a VM was left for writer however she was not able to connect writer directly to a nurse to discuss patient situation.  Writer left another message which Shalom noted she sent to the nursing staff requesting return call.  Writer spoke with Marilin,  [facility] who reconfirmed that all orders would need to come from pt PCP.  Marilin confirmed she could see messages that coverage RNCC left as well as one's writer left this morning and a few minutes ago.  Messaged provider  team with update.      1400:Received call from pt wife inquiring about status of OP infusion.  Reviewed awaiting confirm/approval from pt PCP.  Pt wife reconfirmed she did not feel comfortable managing infusion at home.   Agreed to keep pt/spouse updated.      1430: Reviewed above with Yael Garcia 2 Attending.  Provider will attempt to do a provider to provider call to review outpatient infusion order request.      1615: Received follow up [facility].  Per discussion with Nicole PCP will not sign off on orders and requested [place] ID team review.  [place] ID team does not feel comfortable writing orders as they are not involved with the patients care.  Reviewed that [facility] is not able to accept an order from [facility] Provider. Reviewed that [facility] Infectious Disease would manage IV ganciclovir should [facility] be able to accept patient.  Nicole agreed to follow up with MARIBEL Calero from [facility] requesting [place] contact writer to discuss plan/orders etc.        Anticipate pt will discharge once pt PCP confirms outpatient infusion orders so OP infusion appointments can be arranged/confirmed.       Pending Outpatient Infusion Clinic:  [facility] Juan Alvarez  [phone]    Erin Boucher RN BSN, PHN, ACM-RN  7A RN Care Coordinator  Phone: [phone]  Pager [phone]    To contact the weekend RNCC  [place] (0800 - 1630) Saturday and Sunday    Units: 5A,5B,5C [phone]    Units: 6A, [phone]    Units 6B, 6C, 6D [phone]    Units: 7A, 7B, 7C, 7D, [phone]    [facility] ([phone]) Saturday and Sunday    Units: 5 Ortho, 8A, 10 ICU, & Pediatric Units-Pager 4: [phone]    10/17/2023 11:35 AM

## 2023-10-17 NOTE — PLAN OF CARE
Goal Outcome Evaluation:  BP (!) 156/75 (BP Location: Left arm)   Pulse 63   Temp 97.8  F (36.6  C) (Oral)   Resp 17   Wt 61.9 kg (136 lb 7.4 oz)   SpO2 99%   BMI 22.71 kg/m      Shift: 6690-2926  Isolation Status: Enteric precautions   VS: HTN, all other vitals stable, room air, afebrile  Neuro: Alert and oriented x4   BG: None   Labs: Awaiting AM labs   Pain/Nausea: Denies pain or nausea   Diet: Regular diet   IV Access: L single lumen PICC saline locked   GI/: Voiding not saving, LBM 10/15  Skin: RLQ bruising, bruising around PICC site   Mobility: Up ab evelin in room   Plan: Continue with POC and notify team with any changes. Awaiting infusion orders from [place].

## 2023-10-17 NOTE — PLAN OF CARE
Vitals: BP (!) 152/72 (BP Location: Left arm)   Pulse 62   Temp 98.2  F (36.8  C) (Oral)   Resp 16   Wt 61.9 kg (136 lb 7.4 oz)   SpO2 99%   BMI 22.71 kg/m    Congested cough, feels like he is getting a head cold.  Endocrine: n/a  Labs: Stable labs.  Pain: Mild left arm pain.  PRN's: n/a  Diet: Regular diet.  LDA: L PICC, bruising and edema.  GI: Soft BM 10/16.  : Voids, not saving.  Skin: Bruising on right flank, bruising on left arm underneath PICC line.  Neuro: Alert and oriented.  Mobility: Up ad evelin.  Education: n/a  Plan: Discharge home when medications approved for out patient.

## 2023-10-17 NOTE — PROGRESS NOTES
[facility]    Medicine Progress Note - Medicine Service, [facility] TEAM 2    Date of Admission:  10/3/2023    Assessment & Plan   Haider Torrez is a 58 year old male admitted on 10/3/2023. He has a PMH of IgA nephropathy s/p kidney transplant (, ), BK and CMV viremia, secondary hyperparathyroid presenting with CMV colitis and new Rt IJV DVT.       Today:  - Plan discharge tomorrow pending PCP ordering infusion Iv gancyclovir  - Xa level per heme   - At discharge, schedule a Xa level and CBC with diff check on Wed 10/18     CMV viremia with CMV colitis  Norovirus positive  He has a history of norovirus diarrhea and MATTHEW in 2023. Patient has been having loose bowel movement, especially after eating for a month, and recently test positive for norovirus in stool on .      Per transplant nephrology note:    -Noted to have CMV viremia on 10/9/22 with 41 copies, up to 736 copies on 3/2/23. This hase been managed conservatively with decrease in MPA (patient did not actually decrease dose)              -Pt is CMV IgG+ at time of transplant.               -IgG >600. CMV has been low level but present   - IVIG 0.5g/kg x1 10/4, IgG level: normal 10/5     10/5 Colonoscopy biopsy confirmed CMV colitis. C. Diff panel negative. Enteric stool panel still positive for Norovirus, however transplant ID will not start nitaxoxanide due to poor evidence for efficacy and suggests weaning immunosuppression as able.   - ID recommends IV ganciclovir - renally dosing 4 week course (10/6/23 - 11/3/23), patient discharged on home infusion after education.  - Check CMV PCR weekly (ordered).      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 Baseline Cr 2.1-2.5, with Cr 3.75 at admission trending down but at discharge Cr is stable  at 2.8. He has started having proteinuria since 8/23. Ongoing care with transplant nephrology. He is to continue PJP prophylaxis with bactrim, and his latest EBV PCR on 10/3/23.     - Will refer for retransplant evaluation as outpatient once acute issues resolve.      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 He has not had pain, SOB and can move arm normally.  Rt arm USG: acute DVT with complete occlusion, veins with collateral (compared to previous USG Rt arm in 2015: nonocclusive DVT), likely acute on chronic IJV DVT. Heme consultant recommended high intensity IV heparin drip. Arm swelling is stable without any additional complication. 10/14 patient switched to Apixaban 5 mg BID after PICC line insertion. Lt arm swelling with pain reported after PICC insertion, USG Lt arm did not reveal DVT. Pt with swelling and bruising in this area, keeping it elevated as already on DOAC improved over the course of the day.   - Plan is to follow Xa level at 4th day after DOAC initiation (10/18): ordered.       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  On immunosuppressants for transplanted kidney: mycophenolic acid 360 mg bid, prednisone 5 mg daily, tacrolimus 2.5 mg bid. Patient had downtrending WBC, likely due to ganciclovir and received one G-CSF which brought the WBC to normal.   - follow-up CBC with diff while receiving home infusion IV ganciclovir.   - May need to consider changing mycophenolate to alternative agent if diarrhea persists after 1m of treatment with IV GCV     Chronic issues  NAGMA, resolved  Likely secondary to diarrhea. Normalized following bicarb IV infusion. Continue with 1300 mg PO TID for CKD maintenance as ordered by transplant nephrology.     Hypertension  On carvedilol 25 mg BID. Started amlodipine 2.5 mg po daily.      Anemia of CKD  Normal Iron study. Hb at baseline of 11.     Papillary thyroid carcinoma s/p surgical  resection  Continue PTA levothyroxine     Mineral bone disorder due to secondary hyperparathyroidism  Hypocalcemia  Nephrology recommends calcium gluconate 1 g and calcium carbonate 500 mg PO BID between meals.          Diet: Regular Diet Adult    DVT Prophylaxis: DOAC  Fernandez Catheter: Not present  Lines: PRESENT      PICC 10/14/23 Single Lumen Left Basilic ok to use PICC-Site Assessment: WDL except;Edematous  Hemodialysis Vascular Access Arteriovenous fistula Right Arm-Site Assessment: WDL;Bruit present;Edematous;Thrill present      Cardiac Monitoring: None  Code Status: Full Code      Clinically Significant Risk Factors            # Hypomagnesemia: Lowest Mg = 1.5 mg/dL in last 2 days, will replace as needed   # Hypoalbuminemia: Lowest albumin = 2.9 g/dL at 10/17/2023  4:44 AM, will monitor as appropriate     # Thrombocytopenia: Lowest platelets = 116 in last 2 days, will monitor for bleeding     # Hypertension: Noted on problem list                   Disposition Plan      Expected Discharge Date: 10/18/2023    Discharge Delays: *Medically Ready for Discharge  Other (Add Comment)    Discharge Comments: 10/17: Pt medically stable for discharge. Discharge pending confirmation of OP infusion being arranged.  Awaiting PCP to write orders as [facility] will only accept orders from a [facility] provider          The patient's care was discussed with the Attending Physician, Dr. Panchal .    Vanessa Bryant MD  Medicine Service, [facility] TEAM [address] [place] [address]  Securely message with iPow[employer] (more info)  Text page via [facility] Paging/Directory   See signed in provider for up to date coverage information  ______________________________________________________________________    Interval History   Increased swelling and bruising on L upper arm/elbow, but improved through the course of the day. RNCC reaching out to PCP at [facility] who needs to place order for gancyclovir  so that pt can establish with OP infusion center.     Physical Exam   Vital Signs: Temp: 97.7  F (36.5  C) Temp src: Oral BP: 131/73 Pulse: 62   Resp: 16 SpO2: 99 % O2 Device: None (Room air)    Weight: 136 lbs 7.44 oz    General Appearance: Comfortable, sitting up in bed  Skin: Rt flank bruising ~ 5*5 cm, no fluctuation. L upper arm with swelling distal to elbow and ecchymosis.   Neuro:  strength symmetrical bilaterally in upper extremities.     Medical Decision Making             Data

## 2023-10-17 NOTE — PLAN OF CARE
BP (!) 164/82 (BP Location: Left arm)   Pulse 61   Temp 97.8  F (36.6  C) (Oral)   Resp 16   Wt 61.9 kg (136 lb 7.4 oz)   SpO2 100%   BMI 22.71 kg/m      Shift: 5900-3854  Isolation Status: Enteric for CMV and recent norovirus  VS: VSS on A, afebrile  Neuro: Aox4  Behaviors: Calm, pleasant. Wife at bedside.  BG: None  Labs: WDL. Cr 2.80  Respiratory: WDL  Cardiac: WDL  Pain/Nausea: Denies pain or nausea  PRN: none  Diet: Regular, tolerating well  IV Access: L single lumen PICC SL  Infusion(s): Ganciclovir  Lines/Drains: None  GI/: Voiding without difficulty, not saving. No BM this shift  Skin: WDI  Mobility: UAL  Events/Education: PICC education done today  Plan: Possible discharge to home tomorrow 10/17 or Weds 10/18 pending Ganciclovir orders at home hospital

## 2023-10-18 PROBLEM — A08.39: Status: ACTIVE | Noted: 2023-10-03

## 2023-10-18 PROBLEM — A08.11 NOROVIRUS: Status: ACTIVE | Noted: 2023-10-18

## 2023-10-18 PROBLEM — Z79.2 ADMINISTRATION OF LONG-TERM PROPHYLACTIC ANTIBIOTICS: Status: ACTIVE | Noted: 2023-03-07

## 2023-10-18 PROBLEM — I82.621 ACUTE DEEP VEIN THROMBOSIS (DVT) OF RIGHT UPPER EXTREMITY (H): Status: ACTIVE | Noted: 2023-10-18

## 2023-10-18 PROBLEM — B25.9: Status: ACTIVE | Noted: 2023-10-03

## 2023-10-18 LAB
ABO/RH(D): NORMAL
ANION GAP SERPL CALCULATED.3IONS-SCNC: 9 MMOL/L (ref 7–15)
ANTIBODY SCREEN: NEGATIVE
APIXABAN PPP CHRO-MCNC: 104 NG/ML
BASO+EOS+MONOS # BLD AUTO: ABNORMAL 10*3/UL
BASO+EOS+MONOS NFR BLD AUTO: ABNORMAL %
BASOPHILS # BLD AUTO: ABNORMAL 10*3/UL
BASOPHILS # BLD MANUAL: 0 10E3/UL (ref 0–0.2)
BASOPHILS NFR BLD AUTO: ABNORMAL %
BASOPHILS NFR BLD MANUAL: 0 %
BLD PROD TYP BPU: NORMAL
BLOOD COMPONENT TYPE: NORMAL
BUN SERPL-MCNC: 40.8 MG/DL (ref 6–20)
CALCIUM SERPL-MCNC: 7.4 MG/DL (ref 8.6–10)
CD19 CELLS # BLD: 34 CELLS/UL (ref 107–698)
CD19 CELLS NFR BLD: 9 % (ref 6–27)
CD3 CELLS # BLD: 324 CELLS/UL (ref 603–2990)
CD3 CELLS NFR BLD: 86 % (ref 49–84)
CD3+CD4+ CELLS # BLD: 109 CELLS/UL (ref 441–2156)
CD3+CD4+ CELLS NFR BLD: 29 % (ref 28–63)
CD3+CD4+ CELLS/CD3+CD8+ CLL BLD: 0.57 % (ref 1.4–2.6)
CD3+CD8+ CELLS # BLD: 191 CELLS/UL (ref 125–1312)
CD3+CD8+ CELLS NFR BLD: 51 % (ref 10–40)
CD3-CD16+CD56+ CELLS # BLD: 19 CELLS/UL (ref 95–640)
CD3-CD16+CD56+ CELLS NFR BLD: 5 % (ref 4–25)
CHLORIDE SERPL-SCNC: 115 MMOL/L (ref 98–107)
CODING SYSTEM: NORMAL
CREAT SERPL-MCNC: 2.79 MG/DL (ref 0.67–1.17)
CROSSMATCH: NORMAL
DEPRECATED HCO3 PLAS-SCNC: 21 MMOL/L (ref 22–29)
EGFRCR SERPLBLD CKD-EPI 2021: 26 ML/MIN/1.73M2
EOSINOPHIL # BLD AUTO: ABNORMAL 10*3/UL
EOSINOPHIL # BLD MANUAL: 0 10E3/UL (ref 0–0.7)
EOSINOPHIL NFR BLD AUTO: ABNORMAL %
EOSINOPHIL NFR BLD MANUAL: 1 %
ERYTHROCYTE [DISTWIDTH] IN BLOOD BY AUTOMATED COUNT: 14.1 % (ref 10–15)
FOLATE SERPL-MCNC: 19.3 NG/ML (ref 4.6–34.8)
GLUCOSE SERPL-MCNC: 115 MG/DL (ref 70–99)
HCT VFR BLD AUTO: 23.8 % (ref 40–53)
HGB BLD-MCNC: 7 G/DL (ref 13.3–17.7)
IMM GRANULOCYTES # BLD: ABNORMAL 10*3/UL
IMM GRANULOCYTES NFR BLD: ABNORMAL %
ISSUE DATE AND TIME: NORMAL
LYMPHOCYTES # BLD AUTO: ABNORMAL 10*3/UL
LYMPHOCYTES # BLD MANUAL: 0.3 10E3/UL (ref 0.8–5.3)
LYMPHOCYTES NFR BLD AUTO: ABNORMAL %
LYMPHOCYTES NFR BLD MANUAL: 13 %
MAGNESIUM SERPL-MCNC: 1.4 MG/DL (ref 1.7–2.3)
MCH RBC QN AUTO: 29 PG (ref 26.5–33)
MCHC RBC AUTO-ENTMCNC: 29.4 G/DL (ref 31.5–36.5)
MCV RBC AUTO: 99 FL (ref 78–100)
MONOCYTES # BLD AUTO: ABNORMAL 10*3/UL
MONOCYTES # BLD MANUAL: 0.1 10E3/UL (ref 0–1.3)
MONOCYTES NFR BLD AUTO: ABNORMAL %
MONOCYTES NFR BLD MANUAL: 6 %
NEUTROPHILS # BLD AUTO: ABNORMAL 10*3/UL
NEUTROPHILS # BLD MANUAL: 1.9 10E3/UL (ref 1.6–8.3)
NEUTROPHILS NFR BLD AUTO: ABNORMAL %
NEUTROPHILS NFR BLD MANUAL: 80 %
NRBC # BLD AUTO: 0 10E3/UL
NRBC BLD AUTO-RTO: 0 /100
PHOSPHATE SERPL-MCNC: 2.8 MG/DL (ref 2.5–4.5)
PLAT MORPH BLD: ABNORMAL
PLATELET # BLD AUTO: 134 10E3/UL (ref 150–450)
POTASSIUM SERPL-SCNC: 4.4 MMOL/L (ref 3.4–5.3)
RBC # BLD AUTO: 2.41 10E6/UL (ref 4.4–5.9)
RBC MORPH BLD: ABNORMAL
SODIUM SERPL-SCNC: 145 MMOL/L (ref 135–145)
SPECIMEN EXPIRATION DATE: NORMAL
T CELL EXTENDED COMMENT: ABNORMAL
T4 FREE SERPL-MCNC: 0.82 NG/DL (ref 0.9–1.7)
TSH SERPL DL<=0.005 MIU/L-ACNC: 12.6 UIU/ML (ref 0.3–4.2)
UNIT ABO/RH: NORMAL
UNIT NUMBER: NORMAL
UNIT STATUS: NORMAL
UNIT TYPE ISBT: 7300
VIT B12 SERPL-MCNC: 422 PG/ML (ref 232–1245)
WBC # BLD AUTO: 2.4 10E3/UL (ref 4–11)

## 2023-10-18 PROCEDURE — 250N000011 HC RX IP 250 OP 636

## 2023-10-18 PROCEDURE — 86901 BLOOD TYPING SEROLOGIC RH(D): CPT

## 2023-10-18 PROCEDURE — 99233 SBSQ HOSP IP/OBS HIGH 50: CPT | Performed by: INTERNAL MEDICINE

## 2023-10-18 PROCEDURE — 258N000003 HC RX IP 258 OP 636: Performed by: INTERNAL MEDICINE

## 2023-10-18 PROCEDURE — P9016 RBC LEUKOCYTES REDUCED: HCPCS

## 2023-10-18 PROCEDURE — 250N000013 HC RX MED GY IP 250 OP 250 PS 637

## 2023-10-18 PROCEDURE — 85027 COMPLETE CBC AUTOMATED: CPT

## 2023-10-18 PROCEDURE — 82607 VITAMIN B-12: CPT

## 2023-10-18 PROCEDURE — 84439 ASSAY OF FREE THYROXINE: CPT

## 2023-10-18 PROCEDURE — 250N000012 HC RX MED GY IP 250 OP 636 PS 637

## 2023-10-18 PROCEDURE — 258N000003 HC RX IP 258 OP 636

## 2023-10-18 PROCEDURE — 99233 SBSQ HOSP IP/OBS HIGH 50: CPT

## 2023-10-18 PROCEDURE — 80375 DRUG/SUBSTANCE NOS 1-3: CPT | Performed by: PEDIATRICS

## 2023-10-18 PROCEDURE — 84100 ASSAY OF PHOSPHORUS: CPT

## 2023-10-18 PROCEDURE — 84443 ASSAY THYROID STIM HORMONE: CPT

## 2023-10-18 PROCEDURE — 86923 COMPATIBILITY TEST ELECTRIC: CPT

## 2023-10-18 PROCEDURE — 36592 COLLECT BLOOD FROM PICC: CPT

## 2023-10-18 PROCEDURE — 86360 T CELL ABSOLUTE COUNT/RATIO: CPT | Performed by: INTERNAL MEDICINE

## 2023-10-18 PROCEDURE — 85007 BL SMEAR W/DIFF WBC COUNT: CPT

## 2023-10-18 PROCEDURE — 250N000013 HC RX MED GY IP 250 OP 250 PS 637: Performed by: INTERNAL MEDICINE

## 2023-10-18 PROCEDURE — 250N000012 HC RX MED GY IP 250 OP 636 PS 637: Performed by: INTERNAL MEDICINE

## 2023-10-18 PROCEDURE — 250N000011 HC RX IP 250 OP 636: Mod: JZ

## 2023-10-18 PROCEDURE — 120N000011 HC R&B TRANSPLANT UMMC

## 2023-10-18 PROCEDURE — 99232 SBSQ HOSP IP/OBS MODERATE 35: CPT | Mod: GC | Performed by: PEDIATRICS

## 2023-10-18 PROCEDURE — 82746 ASSAY OF FOLIC ACID SERUM: CPT | Performed by: PEDIATRICS

## 2023-10-18 PROCEDURE — 80048 BASIC METABOLIC PNL TOTAL CA: CPT

## 2023-10-18 PROCEDURE — 83735 ASSAY OF MAGNESIUM: CPT

## 2023-10-18 PROCEDURE — 250N000011 HC RX IP 250 OP 636: Mod: JZ | Performed by: INTERNAL MEDICINE

## 2023-10-18 RX ORDER — MYCOPHENOLIC ACID 180 MG/1
180 TABLET, DELAYED RELEASE ORAL 2 TIMES DAILY
COMMUNITY
Start: 2023-10-18 | End: 2023-10-19

## 2023-10-18 RX ORDER — SODIUM BICARBONATE 650 MG/1
1300 TABLET ORAL 3 TIMES DAILY
COMMUNITY
Start: 2023-10-18 | End: 2023-12-20

## 2023-10-18 RX ORDER — SULFAMETHOXAZOLE AND TRIMETHOPRIM 400; 80 MG/1; MG/1
1 TABLET ORAL
COMMUNITY
Start: 2023-10-18

## 2023-10-18 RX ORDER — MAGNESIUM SULFATE HEPTAHYDRATE 40 MG/ML
2 INJECTION, SOLUTION INTRAVENOUS ONCE
Status: COMPLETED | OUTPATIENT
Start: 2023-10-18 | End: 2023-10-18

## 2023-10-18 RX ORDER — AMLODIPINE BESYLATE 5 MG/1
5 TABLET ORAL DAILY
Qty: 30 TABLET | Refills: 0 | Status: SHIPPED | OUTPATIENT
Start: 2023-10-19 | End: 2023-11-08

## 2023-10-18 RX ORDER — TACROLIMUS 0.5 MG/1
2.5 CAPSULE ORAL 2 TIMES DAILY
Qty: 300 CAPSULE | Refills: 0 | Status: SHIPPED | OUTPATIENT
Start: 2023-10-18 | End: 2023-11-08

## 2023-10-18 RX ADMIN — MYCOPHENOLIC ACID 180 MG: 180 TABLET, DELAYED RELEASE ORAL at 08:17

## 2023-10-18 RX ADMIN — CHOLECALCIFEROL (VITAMIN D3) 10 MCG (400 UNIT) TABLET 10 MCG: at 08:18

## 2023-10-18 RX ADMIN — LEVOTHYROXINE SODIUM 100 MCG: 100 TABLET ORAL at 08:18

## 2023-10-18 RX ADMIN — SODIUM BICARBONATE 650 MG TABLET 1300 MG: at 19:59

## 2023-10-18 RX ADMIN — TACROLIMUS 2.5 MG: 1 CAPSULE ORAL at 18:12

## 2023-10-18 RX ADMIN — CLONAZEPAM 0.5 MG: 0.5 TABLET ORAL at 22:17

## 2023-10-18 RX ADMIN — MYCOPHENOLIC ACID 180 MG: 180 TABLET, DELAYED RELEASE ORAL at 18:12

## 2023-10-18 RX ADMIN — ROPINIROLE HYDROCHLORIDE 1 MG: 1 TABLET, FILM COATED ORAL at 22:17

## 2023-10-18 RX ADMIN — Medication 1 MG: at 22:17

## 2023-10-18 RX ADMIN — CALCIUM CARBONATE (ANTACID) CHEW TAB 500 MG 500 MG: 500 CHEW TAB at 10:08

## 2023-10-18 RX ADMIN — TACROLIMUS 2.5 MG: 1 CAPSULE ORAL at 08:17

## 2023-10-18 RX ADMIN — CARVEDILOL 25 MG: 25 TABLET, FILM COATED ORAL at 18:12

## 2023-10-18 RX ADMIN — FOLIC ACID 1 MG: 1 TABLET ORAL at 08:18

## 2023-10-18 RX ADMIN — MAGNESIUM SULFATE IN WATER 2 G: 40 INJECTION, SOLUTION INTRAVENOUS at 13:04

## 2023-10-18 RX ADMIN — APIXABAN 5 MG: 5 TABLET, FILM COATED ORAL at 08:18

## 2023-10-18 RX ADMIN — GANCICLOVIR SODIUM 70 MG: 500 INJECTION, POWDER, LYOPHILIZED, FOR SOLUTION INTRAVENOUS at 20:50

## 2023-10-18 RX ADMIN — APIXABAN 5 MG: 5 TABLET, FILM COATED ORAL at 19:59

## 2023-10-18 RX ADMIN — SODIUM BICARBONATE 650 MG TABLET 1300 MG: at 08:18

## 2023-10-18 RX ADMIN — AMLODIPINE BESYLATE 5 MG: 5 TABLET ORAL at 08:18

## 2023-10-18 RX ADMIN — GANCICLOVIR SODIUM 70 MG: 500 INJECTION, POWDER, LYOPHILIZED, FOR SOLUTION INTRAVENOUS at 11:45

## 2023-10-18 RX ADMIN — SULFAMETHOXAZOLE AND TRIMETHOPRIM 1 TABLET: 400; 80 TABLET ORAL at 08:18

## 2023-10-18 RX ADMIN — PANTOPRAZOLE SODIUM 40 MG: 40 TABLET, DELAYED RELEASE ORAL at 08:18

## 2023-10-18 RX ADMIN — PREDNISONE 5 MG: 5 TABLET ORAL at 08:18

## 2023-10-18 RX ADMIN — CARVEDILOL 25 MG: 25 TABLET, FILM COATED ORAL at 08:18

## 2023-10-18 RX ADMIN — SODIUM BICARBONATE 650 MG TABLET 1300 MG: at 13:11

## 2023-10-18 RX ADMIN — CALCIUM CARBONATE (ANTACID) CHEW TAB 500 MG 500 MG: 500 CHEW TAB at 17:14

## 2023-10-18 ASSESSMENT — ACTIVITIES OF DAILY LIVING (ADL)
ADLS_ACUITY_SCORE: 23

## 2023-10-18 NOTE — PROGRESS NOTES
[facility]   Transplant Nephrology Progress Note  Date of Admission:  10/3/2023  Today's Date: 10/18/2023    Recommendations:  - I discussed with the office of primary nephrologist Dr. Silvio Butler (with [place]) and he will order ganciclovir at local infusion center to complete course.  - Would wait at least a month to give epo given recent RUE DVT.  - Appreciate transplant ID recommendations.    Assessment & Plan   # DDKT: Increased serum creatinine this admission.  - MATTHEW felt secondary to dehydration.    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    # Current Immunosuppression: Tacrolimus immediate release (goal 4-6), Mycophenolic acid (dose 180 mg every 12 hours), and Prednisone (dose 5 mg daily)   - Continue with intensive monitoring of immunosuppression for efficacy and toxicity.   - Pt improving clinically and CMV <35 but with on going symptoms MPA was reduced further    - Changes: No.  May need to consider changing mycophenolate to alternative agent if diarrhea persists after 1m of treatment with IV GCV    # Infection Prophylaxis:   - PJP: Sulfa/TMP (Bactrim)    # Norovirus  # CMV Colitis/proctitis  # Diarrhea:  Pt presented 10/3/23 with persistent diarrhea.  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  - Colonoscopy 10/2022 with mild archietectural  distortion, focal crypt degeneration. 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     - 10/4/23 IVIG 0.5 g/kg given   - 10/5/23 Repeat colonoscopy.  Biopsy results show crypt distortion.  Active area with inflammation with ulceration and granulation tissue; features concerning but not conclusive for viral cytopathic changes. 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     10/4/2023 C. Diff Toxin PCR Negative    10/3/2023   EBV DNA PCR Quantitative Whole Blood Not detected    10/3/2023  IgG 810    10/3/2023  Enteric Bacteria and Virus Panel PCR Positive for norovirus.      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 Treat CMV colitis as above and if continues to have diarrhea would consider mTORi     03/20/23  04/10/23  05/01/23  10/6/23 10/9/23 10/17/23   CMV DNA Quant  622 !  460 !  318 !  252 ! <35 144!        # RUE DVT: noted on ultrasound 10/12/23.  Started on apixiban.    # Right flank bruise: noted right abdominal pain and bruising evening of 10/15/23.  Likely d/t anticoagulation.  US 10/16 shows no discrete fluid collection or hematoma in the right   flank.     # Hypertension: Borderline control;  Goal BP: < 140/90 (Hospitalization goal)   - Changes: No.  Continue amlodipine 5 mg PO daily and carvedilol  25 mg PO BID.    # Anemia in Chronic Renal Disease: Hgb: Trend down     DANNY: No   - Iron studies: Replete.    # Leukopenia: Likely d/t ganciclovir. Plan for G-CSF 3 mcg/kg IV or subcutaneous when ANC <1000 per ID.    # Mineral Bone Disorder:   - Secondary renal hyperparathyroidism; PTH level: Mildly elevated (151-300 pg/ml)        On treatment: None  - Vitamin D; level: Not checked recently, but was normal last check         On supplement: Yes  - Calcium; level: Normal when corrected for low alb            On  supplement: Yes, calcium carbonate 500 mg PO BID between meals.  - Phosphorus; level: Normal           On supplement: No    # Electrolytes:   - Potassium; level: Normal           On supplement: No,   - Magnesium; level: slightly Low           On supplement: No  - Bicarbonate; level: slightly Low       On supplement: Yes,  sodium bicarbonate 1300 mg PO TID    # Proteinuria:   -1.2g/g in previous check in 2/2023, 3.3g on 24h collection in 8/2023   - A1c 5.8%.    - 10/3/23 SPEP: monoclonal peak normal.   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    Discussed with Dr. Bullard.    MARCUS Bynum CNP   Pager: [phone]      Interval History   Mr. Torrez's creatinine is 2.79 (10/18 0453); Stable      Other significant labs/tests/vitals: SBP 120s - 149 overnight. Afebrile.    - 10/17/23 CMV DNA PCR: 144  No acute events overnight.  No chest pain or shortness of breath.  No leg swelling.  No nausea and vomiting.  Bowel movements are loose (2x/day).      Review of Systems   4 point ROS was obtained and negative except as noted in the Interval History.    MEDICATIONS:   amLODIPine  5 mg Oral Daily    apixaban ANTICOAGULANT  5 mg Oral BID    calcium carbonate  500 mg Oral BID    carvedilol  25 mg Oral BID w/meals    cholecalciferol  10 mcg Oral Daily    clonazePAM  0.5 mg Oral At Bedtime    folic acid  1 mg Oral Daily    ganciclovir (CYTOVENE) 70 mg in D5W 100 mL intermittent infusion  1.25 mg/kg Intravenous Q24H    levothyroxine  100 mcg Oral Daily    mycophenolic acid  180 mg  Oral BID IS    pantoprazole  40 mg Oral Daily    predniSONE  5 mg Oral Daily    rOPINIRole  1 mg Oral At Bedtime    sodium bicarbonate  1,300 mg Oral TID    sodium chloride (PF)  3 mL Intracatheter Q8H    sulfamethoxazole-trimethoprim  1 tablet Oral Once per day on     tacrolimus  2.5 mg Oral BID IS           Physical Exam   Temp  Av.7  F (36.5  C)  Min: 97.3  F (36.3  C)  Max: 98.1  F (36.7  C)      Pulse  Av.9  Min: 53  Max: 66 Resp  Av.6  Min: 16  Max: 19  SpO2  Av.6 %  Min: 98 %  Max: 100 %     BP (!) 149/74 (BP Location: Left arm)   Pulse 67   Temp 97.7  F (36.5  C) (Oral)   Resp 16   Wt 61.9 kg (136 lb 7.4 oz)   SpO2 97%   BMI 22.71 kg/m      Admit Weight: 57.1 kg (125 lb 14.1 oz)     GENERAL APPEARANCE: alert and no distress  RESP: lungs clear to auscultation - no rales, rhonchi or wheezes  CV: regular rhythm, normal rate, no rub, no murmur  EDEMA: no LE edema bilaterally  ABDOMEN: soft, nondistended, nontender, bowel sounds hyperactive  MS: extremities normal - no gross deformities noted, no evidence of inflammation in joints, no muscle tenderness  SKIN: no rash, right flank bruise noted (no palpable mass), left arm bruised, no swelling  PSYCH: mentation appears normal and affect normal  DIALYSIS ACCESS:  RUE AV fistula +bruit/+thrill    Data   All labs reviewed by me.  CMP  Recent Labs   Lab 10/18/23  0453 10/17/23  0444 10/16/23  0449 10/15/23  1153 10/15/23  0615 10/14/23  0404 10/13/23  0454 10/12/23  0456    143 141 142   < > 143   < > 147*   POTASSIUM 4.4 4.6 4.7 4.6   < > 4.6   < > 4.8   CHLORIDE 115* 113* 113* 112*   < > 113*   < > 116*   CO2 21* 21* 21* 20*   < > 20*   < > 21*   ANIONGAP 9 9 7 10   < > 10   < > 10   * 101* 96 109*   < > 94   < > 98   BUN 40.8* 44.3* 42.5* 42.1*   < > 43.8*   < > 47.8*   CR 2.79* 2.88* 2.80* 2.92*   < > 2.82*   < > 2.86*   GFRESTIMATED 26* 25* 26* 24*   < > 25*   < > 25*   CRISTIAN 7.4* 7.5* 7.7* 7.7*   < > 7.8*   < > 7.8*    MAG  --  1.5*  --   --   --  1.5*  --  1.4*   PHOS  --   --   --   --   --   --   --  3.1   ALBUMIN  --  2.9*  --   --   --   --   --   --     < > = values in this interval not displayed.     CBC  Recent Labs   Lab 10/18/23  0453 10/17/23  0444 10/16/23  0449 10/15/23  1153   HGB 7.0* 7.3* 7.5* 7.8*   WBC 2.4* 2.4* 3.6* 4.5   RBC 2.41* 2.46* 2.52* 2.63*   HCT 23.8* 24.7* 25.3* 26.4*   MCV 99 100 100 100   MCH 29.0 29.7 29.8 29.7   MCHC 29.4* 29.6* 29.6* 29.5*   RDW 14.1 13.9 13.8 13.8   * 116* 124* 107*     INR  Recent Labs   Lab 10/12/23  1648   INR 1.05       ABGNo lab results found in last 7 days.   Urine Studies  Recent Labs   Lab Test 10/03/23  2244 11/17/15  1410 10/14/15  2220 10/13/15  0725   COLOR Light Yellow Light Yellow Yellow Straw   APPEARANCE Clear Clear Clear Clear   URINEGLC Negative Negative Negative Negative   URINEBILI Negative Negative Negative Negative   URINEKETONE Negative Negative Negative Negative   SG 1.014 1.007 1.006 1.003   UBLD Negative Negative Large* Large*   URINEPH 6.0 6.5 5.5 5.0   PROTEIN 200* Negative 10* Negative   NITRITE Negative Negative Negative Negative   LEUKEST Negative Negative Small* Negative   RBCU 1 0 >182* 57*   WBCU 1 1 15* 1     Recent Labs   Lab Test 03/27/18  1516 09/26/17  1506   UTPG 0.12 0.10     PTH  Recent Labs   Lab Test 03/27/18  1510 10/15/15  0626   PTHI 126* 561*     Iron Studies  Recent Labs   Lab Test 10/16/23  0449 03/27/18  1510 10/17/15  0700   IRON 55* 67 25*   * 276 216*   IRONSAT 32 24 12*   * 695* 883*       IMAGING:  All imaging studies reviewed by me.

## 2023-10-18 NOTE — PROGRESS NOTES
"SPIRITUAL HEALTH SERVICES Progress Note  [facility] 7A    Saw pt Haider Isaac Shan and spouse Rosana per Follow-Up. Haider and Rosana expressed they are ready to go home.     Rosana said she is \"nervous to give Haider his infusion because it goes directly into the vein. If it was just a normal shot I'd be fine.\"     Plan: I will continue to follow up while Haider is at [facility].    Jhonatan Dumont  Chaplain Resident  Pager [phone]    * [facility] remains available 24/7 for emergent requests/referrals, either by having the switchboard page the on-call  or by entering an ASAP/STAT consult in Epic (this will also page the on-call ). Routine Epic consults receive an initial response within 24 hours.*    "

## 2023-10-18 NOTE — DISCHARGE INSTRUCTIONS
Outpatient Infusion Clinic:  [facility] Al[facility]  [phone]  [address] [place] [address]  [place]varez [place]  First outpatient infusion appointment Friday 10/20/23 at 10:15 a.m.

## 2023-10-18 NOTE — PROGRESS NOTES
[facility]  Transplant Infectious Disease Progress Note     Patient:  Haider Torrez, Date of birth 1965, Medical record number [id number]  Date of Visit:  10/18/2023         Assessment and Recommendations:   Recommendations:  - Will increase IV GCV from 1.25 mg/kg/d to 2.5 mg/kg/d (done).   - Since he appears over-immune suppressed, will check an extended T-cell subset profile (added on for you).  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 Favor asking ophtho to perform a dilated eye exam.   - I have added on a GCV level to bloodwork from 4:53 am, which will reflect his IV GCV dosing interval of 1.25 mg/kg/d.  - Given hx of travel to [facility] once, will check a stool O&P, strongy ab, schisto ab, IgE level (ordered for you).  - Hold bactrim for Pneumocystis prophylaxis until results of CD4 count return.   - Would favor holding MMF, or switching MMF to an alternative agent, since continued leukopenia precludes use of GCV at full induction dosing without supplemental G-CSF.    Transplant Infectious Disease will continue to follow with you.      Assessment:  Haider Torrez is a 58 year old male with PMHx significant for IgA nephropathy s/p kidney transplant (2004, 2015) on tacrolimus + mycophenolic acid + prednisone 5 mg, history of BK viremia, CMV viremia, hypertension, and secondary hyperparathyroid who presented on 10/3/23 with persistent diarrhea x 3-4 weeks with concern for CMV proctitis/colitis.   Infectious Disease issues include:  - 10/5/2023 bx + for CMV proctitis/colitis. He has diarrhea, although this may also be due in part to norovirus. He has longstanding CMV viremia, with a positive test in our system since 1/11/2023. Serostatus prior to transplant was D-/R+. He started IV GCV 10/6/2023, and while the diarrhea is improving in frequency, it is still watery. He would not  absorb oral vGCV. He'll need to continue non-valcyte treatment until diarrhea is resolved enough to transition to oral therapy. On the day he started GCV IV, viral load was 252, and by 10/9/2023 check it was < 35. From 10/17/2023 it was 144. GCV is difficult to dose at GFR of 25, and our nomogram does tend to underdose. With slight rise in CMV viral load, would favor using the higher end of induction dosing. Will increase IV GCV from 1.25 mg/kg/d to 2.5 mg/kg/d. WBC may increase (might seem paradoxical to some) if we successfully treat CMV in the marrow.   - Norovirus viral shedding since 3/2023. If norovirus does not resolve with 2-week courses of nitazoxanide and/or azithromycin, then chronic treatment is generally not used. The current amount of diarrhea is contemporaneous with the diagnosis of CMV colitis, so for now favor not treating norovirus.   - Leukopenia. Often this is multifactorial, with the CMV infection itself contributing, as well as side effect of GCV and/or MMF and/or bactrim. Would continue to support with G-CSF.   - Hx of COVID (+09/20/22 s/p 5 days remdesivir) c/b post-COVID bacterial pneumonia ([place], 10/2022). This was treated with vanc x1, 3 days of azithromycin, and 5 days cefepime.   - Prior travel to [facility] once to visit family. Also been to [place]. Negative Quantiferon 6/18/2015.   - QTc interval: 443msec on 10/4/23   - Bacterial prophylaxis: none  - PCP prophylaxis: Bactrim   - Viral serostatus: CMV D-/R+, EBV D+/R+   - Fungal prophylaxis: none  - Immunization status: Due for annual COVID, flu   - Gamma globulin status: 810 on 10/3/23, received IVIG on 10/4/23.   - Isolation status: Good hand hygiene. He is in enteric precautions.     Ita Magallanes MD. Pager [phone]         Interval History:   Since Haider Torrez was last seen by my colleague Dr. Serafin Rudd on 10/13/2023, he is overall feeling a little bit better. Today is my first day seeing this hospital stay. Chart reviewed  to date. CMV level in blood has returned mildly up compared to last test, so ID is reconsulted. He is on IV GCV for rx of CMV colitis, and while he feels improved, stool is still loose to watery. No blood visible in stool. Eating well. He is in enteric precautions. He is not on dialysis. The amount of his urine is not changing from day to day.       Transplants:  10/13/2015 (Kidney), 4/15/2004 (Kidney), Postoperative day:  2927.  Coordinator Meagan Richardson    Review of Systems:  CONSTITUTIONAL:  no fever, chills, sweats  EYES: no acute change to normal age-related vision issues.  ENT:  no change to what he feels are normal age-related decrease in hearing issues. He feels as though there is a trace of sore throat.   RESPIRATORY:  no cough, sputum production.  CARDIOVASCULAR:  no palpitations.   GASTROINTESTINAL:  no nausea  GENITOURINARY:  no dysuria. No blood visible in urine.   HEME:  no need for transfusions.   INTEGUMENT:  no new rash.   NEURO:           Current Medications & Allergies:      amLODIPine  5 mg Oral Daily    apixaban ANTICOAGULANT  5 mg Oral BID    calcium carbonate  500 mg Oral BID    carvedilol  25 mg Oral BID w/meals    cholecalciferol  10 mcg Oral Daily    clonazePAM  0.5 mg Oral At Bedtime    folic acid  1 mg Oral Daily    ganciclovir (CYTOVENE) 70 mg in D5W 100 mL intermittent infusion  1.25 mg/kg Intravenous Q24H    levothyroxine  100 mcg Oral Daily    magnesium sulfate  2 g Intravenous Once    mycophenolic acid  180 mg Oral BID IS    pantoprazole  40 mg Oral Daily    predniSONE  5 mg Oral Daily    rOPINIRole  1 mg Oral At Bedtime    sodium bicarbonate  1,300 mg Oral TID    sodium chloride (PF)  3 mL Intracatheter Q8H    sulfamethoxazole-trimethoprim  1 tablet Oral Once per day on Mon Wed Fri    tacrolimus  2.5 mg Oral BID IS       Allergies   Allergen Reactions    Hydralazine Swelling    Betadine [Povidone Iodine]     Cephalexin Hives    Clindamycin Hives    Trazodone Swelling      Swelling of face/lips            Physical Exam:   Patient Vitals for the past 24 hrs:   BP Temp Temp src Pulse Resp SpO2   10/18/23 0545 (!) 149/74 97.7  F (36.5  C) Oral 67 16 97 %   10/17/23 2230 126/65 97.5  F (36.4  C) Oral 64 16 99 %     Ranges for vital signs:  Temp:  [97.5  F (36.4  C)-97.7  F (36.5  C)] 97.7  F (36.5  C)  Pulse:  [64-67] 67  Resp:  [16] 16  BP: (126-149)/(65-74) 149/74  SpO2:  [97 %-99 %] 97 %  Vitals:    10/07/23 0900 10/08/23 0500 10/12/23 1648   Weight: 59.3 kg (130 lb 12.8 oz) 59.6 kg (131 lb 8 oz) 61.9 kg (136 lb 7.4 oz)       Physical Examination:  GENERAL:  well-developed, well-nourished man, resting in bed in no acute distress.  HEAD:  Head is normocephalic, atraumatic   EYES:  Eyes have anicteric sclerae   ENT:  Oropharynx is moist without exudates or ulcers; good visualization with tongue blade and light. Tongue is midline. No thrush.   NECK:  Supple.   LUNGS:  Clear to auscultation bilateral.   CARDIOVASCULAR:  Regular rate and rhythm with no murmur  ABDOMEN:  Normal bowel sounds, soft, nontender.   SKIN:  No acute rashes. 10/14/2023 left PICC line in place without any surrounding erythema or exudate. AV fistula in right forearm with good thrill. R arm is more swollen than the left arm.   NEUROLOGIC:  Grossly nonfocal. Active x4 extremities         Laboratory Data:     Immune Globulin Studies     Recent Labs   Lab Test 10/03/23  1240          Metabolic Studies       Recent Labs   Lab Test 10/18/23  0453 10/17/23  0444 10/14/15  1325 10/14/15  1021 10/14/15  0000 10/13/15  2231 10/13/15  2113 10/13/15  1215 10/12/15  1330    143   < >  --    < >  --   --    < > 136   POTASSIUM 4.4 4.6   < > 4.8   < > 5.4* 6.8*   < > 4.2   CHLORIDE 115* 113*   < >  --    < >  --   --    < > 97   CO2 21* 21*   < >  --    < >  --   --    < > 31   ANIONGAP 9 9   < >  --    < >  --   --    < > 8   BUN 40.8* 44.3*   < >  --    < >  --   --    < > 41*   CR 2.79* 2.88*   < >  --    < >  --    --    < > 9.83*   GFRESTIMATED 26* 25*   < >  --    < >  --   --    < > 6*   * 101*   < >  --    < >  --   --    < > 95   A1C  --   --   --   --   --   --   --   --  5.1   CRISTIAN 7.4* 7.5*   < >  --    < >  --   --    < > 8.7   PHOS 2.8  --    < >  --    < >  --   --    < >  --    MAG 1.4* 1.5*   < >  --    < >  --   --    < >  --    LACT  --   --   --  2.1  --   --  4.2*   < >  --    CKT  --   --   --   --   --  336*  --   --   --     < > = values in this interval not displayed.       Hepatic Studies    Recent Labs   Lab Test 10/17/23  0444 10/08/23  0946 10/03/23  1240 03/27/18  1510 10/12/15  1330   BILITOTAL  --  0.2 0.3  --  0.4   ALKPHOS  --  52 62  --  81   PROTTOTAL  --  5.1* 6.1*  --  8.4   ALBUMIN 2.9* 2.9* 3.9   < > 4.2   AST  --  18 12  --  10   ALT  --  <5 7  --  16    < > = values in this interval not displayed.       Hematology Studies   Recent Labs   Lab Test 10/18/23  0453 10/17/23  0444 10/16/23  0449 10/15/23  1153 10/03/23  1240 05/01/23  0700 11/02/15  0745 10/30/15  0718   WBC 2.4* 2.4* 3.6* 4.5   < >  --    < > 4.7   04552  --   --   --   --   --  3.2*   < >  --    ANEU 1.9  --   --   --   --   --   --  3.3   ANEUTAUTO  --  1.7 2.9  --    < >  --   --   --    ALYM 0.3*  --   --   --   --   --   --  0.3*   ALYMPAUTO  --  0.4* 0.4*  --    < >  --   --   --    MELVIN 0.1  --   --   --   --   --   --  0.3   AMONOAUTO  --  0.2 0.2  --    < >  --   --   --    AEOS 0.0  --   --   --   --   --   --  0.3   AEOSAUTO  --  0.0 0.0  --    < >  --   --   --    ABSBASO  --  0.0 0.0  --    < >  --   --   --    HGB 7.0* 7.3* 7.5* 7.8*   < >  --    < > 11.9*   23735  --   --   --   --   --  10.8*   < >  --    HCT 23.8* 24.7* 25.3* 26.4*   < >  --    < > 38.4*   * 116* 124* 107*   < >  --    < > 282   18113  --   --   --   --   --  116*   < >  --     < > = values in this interval not displayed.       Clotting Studies    Recent Labs   Lab Test 10/12/23  1648 10/04/23  1203 05/02/16  0717 04/25/16  1057  11/02/15  0745 10/30/15  0718   INR 1.05 1.30* 2.6 2.6   < > 1.75*   PTT  --   --   --   --   --  38*    < > = values in this interval not displayed.       Iron Testing    Recent Labs   Lab Test 10/18/23  0453 10/17/23  0444 10/16/23  0449 10/03/23  1240 03/27/18  1510 10/18/15  0750 10/17/15  0700   IRON  --   --  55*  --  67  --  25*   FEB  --   --  173*  --  276  --  216*   IRONSAT  --   --  32  --  24  --  12*   OFE  --   --  456*  --  695*  --  883*   MCV 99   < > 100   < >  --    < > 94   B12 422  --   --   --   --   --   --     < > = values in this interval not displayed.       Arterial Blood Gas Testing    Recent Labs   Lab Test 10/13/15  2113   PH 7.33*   PCO2 40   PO2 89   HCO3 21   O2PER 1L        Thyroid Studies     Recent Labs   Lab Test 10/18/23  0453   TSH 12.60*   T4 0.82*       Urine Studies     Recent Labs   Lab Test 10/03/23  2244 11/17/15  1410 10/14/15  2220 10/13/15  0725   URINEPH 6.0 6.5 5.5 5.0   NITRITE Negative Negative Negative Negative   LEUKEST Negative Negative Small* Negative   WBCU 1 1 15* 1       Medication levels    Recent Labs   Lab Test 10/16/23  0449 10/05/23  0542 02/01/23  0710 10/23/15  0653 10/21/15  0648   TACROL 4.2*   < >  --    < > 12.1   MPACID  --   --   --   --  1.52   46859  --   --  3.0   < >  --    MPAG  --   --   --   --  93.8   97425  --   --  91   < >  --     < > = values in this interval not displayed.       Microbiology:  Last Culture results   Culture Micro   Date Value Ref Range Status   11/17/2015 No growth  Final   10/14/2015 No growth  Final   10/13/2015   Final    Test canceled - Lab  error  Canceled, Test credited     10/13/2015 No growth  Final           Last checks of Clostridioides difficile testing  Recent Labs   Lab Test 10/04/23  0623   CDBPCT Negative       Infection Studies to assess Diarrhea   Recent Labs   Lab Test 10/04/23  0622   BIEPEC Negative   BISTEC Negative   BISHEI Negative   BIEAEC Negative   BIETEC Negative   HHV227 NA    BIADE Negative   BICAM Negative   BISAL Negative   BIVIBS Negative   BIVIBC Negative   BIYER Negative   BIGIA Negative   BINOR Positive*   BIROT Negative   BIPLE Negative   BIENT Negative   BIAST Negative   BISAP Negative       Virology:  Coronavirus-19 testing    Recent Labs   Lab Test 09/20/22  1055   COVIDPCREXT Detected*   SOUREXT Swab, Nasopharynx       CMV viral loads    Recent Labs   Lab Test 10/17/23  0444 10/09/23  0534 10/06/23  1016 10/06/23  1016 05/01/23  0700   CMVQNT  --  <35*  --   --   --    CMVRESINST 144*  --   --  252*  --    CMVLOG 2.2 <1.5   < > 2.4  --    55239  --   --   --   --  318*    < > = values in this interval not displayed.       EBV DNA Copies/mL   Date Value Ref Range Status   10/03/2023 Not Detected Not Detected copies/mL Final       BK Virus Testing     Recent Labs   Lab Test 05/01/23  0700 03/13/23  0740   48109 29* <22*       Pathology:  Last Pathology Report   Case Report   Date Value Ref Range Status   10/05/2023   Final    Surgical Pathology Report                         Case: [id number]                                  Authorizing Provider:  Russ Dodge MD          Collected:           10/05/2023 09:10 AM          Ordering Location:     [facility]          Received:            10/05/2023 10:09 AM                                 Endoscopy                                                                    Pathologist:           Josey Otero MD                                                           Specimens:   A) - Large Intestine, Colon, Random Colon                                                           B) - Rectum, Distal rectum                                                                          C) - Rectum, Rectal anal transition nodule                                                  Clinical Information   Date Value Ref Range Status   10/05/2023   Final    Procedure:  COLONOSCOPY, WITH POLYPECTOMY AND BIOPSY  Pre-op Diagnosis: Diarrhea  [R19.7]  Post-op Diagnosis: R19.7 - Diarrhea [ICD-10-CM]       Final Diagnosis   Date Value Ref Range Status   10/05/2023   Final    A. COLON, RANDOM BIOPSY:  Colonic mucosa with mild crypt architectural distortion, evidence of prior injury; no current evidence of cryptitis, dysplasia, or other histologic abnormality; report of CMV & adenovirus immunohistochemistry to follow     B. DISTAL RECTUM, BIOPSY:  Colonic mucosa with mild reactive changes, otherwise no evidence of microscopic or chronic colitis or other significant histologic abnormality; report of CMV & adenovirus immunohistochemistry to follow      3. ANORECTUM, BIOPSY OF NODULE AT TRANSITION:  Colonic mucosa with polypoid features, active inflammation with ulceration and granulation tissue; features concerning but not conclusive for viral cytopathic changes; report of adenovirus & CMV immunohistochemistry to follow          Imaging:  No results found for this or any previous visit (from the past 48 hour(s)).          New occlusive deep vein thrombus in the right internal jugular vein with surrounding collaterals.

## 2023-10-18 NOTE — PLAN OF CARE
Shift: 0888-5210  BP (!) 149/74 (BP Location: Left arm)   Pulse 67   Temp 97.7  F (36.5  C) (Oral)   Resp 16   Wt 61.9 kg (136 lb 7.4 oz)   SpO2 97%   BMI 22.71 kg/m       VS- Stable on RA.  BG- none  Labs- AM labs pending   Pain/Nausea/PRN'S- denies nausea, complains of some pain in Left  arm  Diet- regular   LDA- Left 2L PICC SL, R. AV fistula.   Gtt/IVF- none   GI/- voiding not saving, LBM yesterday 10/17  Skin- brusing on Left arm near PICC  Activity- UAL   Plan-  Continue with plan of care.

## 2023-10-18 NOTE — PROGRESS NOTES
Care Management Follow Up    Length of Stay (days): 15    Expected Discharge Date: 10/18/2023     Concerns to be Addressed:       Patient plan of care discussed at interdisciplinary rounds: Yes    Anticipated Discharge Disposition: Home, Home Infusion, Home Care     Anticipated Discharge Services: None  Anticipated Discharge DME: None    Patient/family educated on Medicare website which has current facility and service quality ratings: no  Education Provided on the Discharge Plan: No  Patient/Family in Agreement with the Plan: yes    Referrals Placed by CM/SW: Home Infusion  Private pay costs discussed: Not applicable    Additional Information:  Writer spoke with Mario Sethr [facility] regarding pending outpatient infusion request.  Rolo reconfirmed what writer had already been informed that [facility] would not be able to service patient without having a [place] provider sign off on the infusion orders.  Rolo referred writer to [facility] [phone].    Writer spoke with Shalom [facility] regarding pt infusion need/plan.  Shalom agreed to check to see if they are able to provide IV Ganciclivor.      Writer also reached out to [facility] Liaison inquiring if home care would be able potentially provide daily infusion support at home.      0835: Received follow up call from [facility].  [facility] is not currently Medicare certified so unable to provide infusion services.    0840: VM left for MARIBEL Noguera [facility] requesting return call.     0940: Spoke with Poornima [facility].  Per discussion with Poornima pt would have to have a PCP within [facility] in order for the infusion center to accept patient.      1030: Reviewed above with Dr. Vazquez, Transplant Nephrology.  Provider will reachout to Dr. Butler [place]  Nephrologist to discuss pt situation.  Agreed to follow up with patient wife regarding attending PLC tomorrow at 11 a.m. for additional education.  Per [facility] Liaison confirmed that local home care agency that would be providing home RN services would only be able to provide 3x per week visits.  Met with pt and his wife.  Reviewed above - anticipate OP infusion vs home with home care/home infusion services.  Pt and spouse are willing to attend another PLC session. RNCC will continue to monitor.  Messaged Yael Coe team with update    1120: Notified by Kurt Transplant Nephrology PA that he was able to connect with Mireya LÓPEZ [place] [facility] regarding pt situation.  Clinical information faxed to Dr. Butler, Nephrologist/Mireya LÓPEZ.        1400: Received VM from Mireya LÓPEZ with Dr. Butler, [place] Nephrologist confirming IV Ganciclovir order has been placed and OP infusion appt confirmed for Thursday 10/19 at 11 15 a.m.  Per VM pt/spouse updated on appointment.  Confirmed appointment and address with Rolo [facility].  Messaged Dr. Chiang and Yael Koo 2.  With update.   Awaiting confirmation from provider team pt will discharge today.       1615: Provider team confirmed pt medically cleared for discharge this evening.      1625: Notified by Yael Garcia 2 Attending, discharge on hold pending ID review of IV ganciclovir plan tomorrow 10/19.  Updated R[facility].  Will need to follow up with [facility] tomorrow and fax updated ID recommendations for [place] provider to enter infusion orders.  Provider team updated patient.     Outpatient Infusion Clinic:  [facility]  [phone]  [address] [place] [address]  First outpatient infusion appointment Thursday 10/19/23 at 11:15 a.m.      Anticipate pt will discharge once pt PCP confirms outpatient infusion orders so OP infusion appointments can be  arranged/confirmed.        Erin Boucher, RN BSN, PHN, ACM-RN  7A RN Care Coordinator  Phone: [phone]  Pager [phone]    To contact the weekend RNCC  [place] (0800 - 1630) Saturday and Sunday    Units: 5A,5B,5C [phone]    Units: 6A, [phone]    Units 6B, 6C, 6D [phone]    Units: 7A, 7B, 7C, 7D, [phone]    [facility] ([phone]) Saturday and Sunday  Units: 5 Ortho, 5MS & WB ED Pager: [phone]  Units: 6MS, 8A & 10 ICU  Pager [phone]    10/18/2023 8:32 AM

## 2023-10-18 NOTE — PLAN OF CARE
Vitals: BP (!) 149/74 (BP Location: Left arm)   Pulse 67   Temp 97.7  F (36.5  C) (Oral)   Resp 16   Wt 61.9 kg (136 lb 7.4 oz)   SpO2 97%   BMI 22.71 kg/m      Endocrine: n/a  Labs: HGB 7.0, Magnesium 1.4.  Pain: Minimal left arm discomfort.  PRN's: Magnesium 2 GM IV.  Diet: Regular diet, good appetite.  LDA: L SL PICC, R arm fistula.  GI: BM 10/17.  : Voids, not saving.  Skin: Bruising on left arm, R flank bruising, improving.  Neuro: Alert and oriented.  Mobility: Up ad evelin.  Education: n/a  Plan: Discharge home when medication is approved for out patient. Additional one time dose of IV Ganciclovir this evening. HGB re-check at 1600.

## 2023-10-18 NOTE — PROGRESS NOTES
[facility]    Medicine Progress Note - Medicine Service, DENA TEAM 2    Date of Admission:  10/3/2023    Assessment & Plan   Haider Torrez is a 58 year old male admitted on 10/3/2023. He has a PMH of IgA nephropathy s/p kidney transplant (2004, 2015), BK and CMV viremia, secondary hyperparathyroid presenting with CMV colitis and new Rt IJV DVT.       Today:  - Continuing to work on discharge IV infusion center set-up (possibly OP transplant nephrologist to order)  - Hb recheck, likely related to EPO, no concern for acute bleed but transfuse if Hb <7  - folate, B12 ordered for potential nutritional deficiency     CMV viremia with CMV colitis  Norovirus positive  He has a history of norovirus diarrhea and MATTHEW in March 2023. Patient has been having loose bowel movement, especially after eating for a month, and recently test positive for norovirus in stool on 9/21.      Per transplant nephrology note:    -Noted to have CMV viremia on 10/9/22 with 41 copies, up to 736 copies on 3/2/23. This hase been managed conservatively with decrease in MPA (patient did not actually decrease dose)              -Pt is CMV IgG+ at time of transplant.               -IgG >600. CMV has been low level but present   - IVIG 0.5g/kg x1 10/4, IgG level: normal 10/5     10/5 Colonoscopy biopsy confirmed CMV colitis. C. Diff panel negative. Enteric stool panel still positive for Norovirus, however transplant ID will not start nitaxoxanide due to poor evidence for efficacy and suggests weaning immunosuppression as able.   - ID recommends IV ganciclovir - renally dosing 4 week course (10/6/23 - 11/3/23), patient discharged on home infusion after education.  - Check CMV PCR weekly (ordered).     Acute on chronic anemia likely of CKD  Normal Iron study. Hb at baseline of 8, slowly declining over hospitalization without s/s of acute bleed. Potentially product of CKD alone, but unable to have epo at  this time due to DVT (must be one month at least from initial VTE). Potentially could have some nutritional deficiencies with CMV colitis, so folate and B12 ordered as well.   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 Baseline Cr 2.1-2.5, with Cr 3.75 at admission trending down but at discharge Cr is stable at 2.8. He has started having proteinuria since . Ongoing care with transplant nephrology. He is to continue PJP prophylaxis with bactrim, and his latest EBV PCR on 10/3/23.     - Will refer for retransplant evaluation as outpatient once acute issues resolve.      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 He has not had pain, SOB and can move arm normally.  Rt arm USG: acute DVT with complete occlusion, veins with collateral (compared to previous USG Rt arm in : nonocclusive DVT), likely acute on chronic IJV DVT. Heme consultant recommended high intensity IV heparin drip. Arm swelling is stable without any additional complication. 10/14 patient switched to Apixaban 5 mg BID after PICC line insertion. Lt arm swelling with pain reported after PICC insertion, USG Lt arm did not reveal DVT. Pt with swelling and bruising in this area, keeping it elevated as already on DOAC improved over the course of the day.   - Plan is to follow Xa level at 4th day after DOAC initiation (10/18): ordered.       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  On immunosuppressants for transplanted kidney: mycophenolic acid 360 mg bid, prednisone 5 mg daily, tacrolimus 2.5 mg bid. Patient had downtrending WBC, likely due to ganciclovir and received one  G-CSF which brought the WBC to normal.   - follow-up CBC with diff while receiving home infusion IV ganciclovir.   - May need to consider changing mycophenolate to alternative agent if diarrhea persists after 1m of treatment with IV GCV     Chronic issues  NAGMA, resolved  Likely secondary to diarrhea. Normalized following bicarb IV infusion. Continue with 1300 mg PO TID for CKD maintenance as ordered by transplant nephrology.     Hypertension  On carvedilol 25 mg BID. Started amlodipine 2.5 mg po daily.      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          Diet: Regular Diet Adult    DVT Prophylaxis: DOAC  Fernandez Catheter: Not present  Lines: PRESENT      PICC 10/14/23 Single Lumen Left Basilic ok to use PICC-Site Assessment: WDL except;Edematous  Hemodialysis Vascular Access Arteriovenous fistula Right Arm-Site Assessment: WDL      Cardiac Monitoring: None  Code Status: Full Code      Clinically Significant Risk Factors            # Hypomagnesemia: Lowest Mg = 1.5 mg/dL in last 2 days, will replace as needed   # Hypoalbuminemia: Lowest albumin = 2.9 g/dL at 10/17/2023  4:44 AM, will monitor as appropriate     # Thrombocytopenia: Lowest platelets = 116 in last 2 days, will monitor for bleeding     # Hypertension: Noted on problem list                   Disposition Plan      Expected Discharge Date: 10/18/2023    Discharge Delays: *Medically Ready for Discharge  Other (Add Comment)    Discharge Comments: 10/17: Pt medically stable for discharge. Discharge pending confirmation of OP infusion being arranged.  Awaiting PCP to write orders as [facility] will only accept orders from a [facility] provider          The patient's care was discussed with the Attending Physician, Dr. Panchal  .    Vanessa Bryant MD  Medicine Service, MAROON TEAM 2  [facility]  Securely message with Wattpad (more info)  Text page via [employer] Paging/Directory   See signed in provider for up to date coverage information  ______________________________________________________________________    Interval History   Patient feeling well no noted bleeding in stool, vomit, urine.  No noted bleeding elsewhere.  Feels that bruising improving specifically left arm at site of PICC.    Physical Exam   Vital Signs: Temp: 97.7  F (36.5  C) Temp src: Oral BP: (!) 149/74 Pulse: 67   Resp: 16 SpO2: 97 % O2 Device: None (Room air)    Weight: 136 lbs 7.44 oz    General Appearance: Comfortable, sitting up in bed  Skin: Rt flank bruising ~ 5*5 cm, no fluctuation. L upper arm with swelling distal to elbow and ecchymosis.   Neuro:  strength symmetrical bilaterally in upper extremities.     Medical Decision Making             Data

## 2023-10-18 NOTE — PROGRESS NOTES
CLINICAL NUTRITION SERVICES    Reviewed nutrition risk factors due to LOS. Pt is tolerating diet, eating well per nursing documentation. No nutrition issues identified at this time. RD will follow via rounds at this time, unless consulted.    Beverly Pugh, MPH, RDN, LD  7A + 7B (beds 2618-2831) RD pager: [phone]  Weekend/Holiday RD pager [phone]

## 2023-10-18 NOTE — PROGRESS NOTES
Brief Hematology Note    58-year-old man s/p renal transplants (last 2015) admitted with CMV colitis, course complicated by recurrent right internal jugular thrombosis.  He developed a right arm swelling on October 12.  Ultrasound showed complete occlusion of the right internal jugular vein.  Of note he had a thrombosis in this location in 2015.    He has been started on apixaban and has been receiving it for long enough to be at a steady state. It appears that the level drawn yesterday was not in the recommended time frame. Would suggest checking another apixaban level 3-4 hours after a dose, to ensure appropriate absorption.    - please check apixaban level 3-4 hours after a dose (level ordered)    Jacob Cogan, MD  Hematology

## 2023-10-19 VITALS
TEMPERATURE: 97.9 F | SYSTOLIC BLOOD PRESSURE: 151 MMHG | RESPIRATION RATE: 16 BRPM | DIASTOLIC BLOOD PRESSURE: 83 MMHG | OXYGEN SATURATION: 99 % | HEART RATE: 68 BPM | WEIGHT: 136.47 LBS | BODY MASS INDEX: 22.71 KG/M2

## 2023-10-19 DIAGNOSIS — Z94.0 KIDNEY REPLACED BY TRANSPLANT: Primary | ICD-10-CM

## 2023-10-19 DIAGNOSIS — A08.39 CMV COLITIS (H): ICD-10-CM

## 2023-10-19 DIAGNOSIS — B25.9 CMV COLITIS (H): ICD-10-CM

## 2023-10-19 DIAGNOSIS — N18.4 CHRONIC KIDNEY DISEASE, STAGE 4, SEVERELY DECREASED GFR (H): ICD-10-CM

## 2023-10-19 LAB
ANION GAP SERPL CALCULATED.3IONS-SCNC: 8 MMOL/L (ref 7–15)
APIXABAN PPP CHRO-MCNC: 158 NG/ML
BASO+EOS+MONOS # BLD AUTO: ABNORMAL 10*3/UL
BASO+EOS+MONOS NFR BLD AUTO: ABNORMAL %
BASOPHILS # BLD AUTO: ABNORMAL 10*3/UL
BASOPHILS # BLD MANUAL: 0 10E3/UL (ref 0–0.2)
BASOPHILS NFR BLD AUTO: ABNORMAL %
BASOPHILS NFR BLD MANUAL: 0 %
BUN SERPL-MCNC: 43 MG/DL (ref 6–20)
CALCIUM SERPL-MCNC: 7.8 MG/DL (ref 8.6–10)
CHLORIDE SERPL-SCNC: 114 MMOL/L (ref 98–107)
CREAT SERPL-MCNC: 2.87 MG/DL (ref 0.67–1.17)
DEPRECATED HCO3 PLAS-SCNC: 21 MMOL/L (ref 22–29)
EGFRCR SERPLBLD CKD-EPI 2021: 25 ML/MIN/1.73M2
EOSINOPHIL # BLD AUTO: ABNORMAL 10*3/UL
EOSINOPHIL # BLD MANUAL: 0.1 10E3/UL (ref 0–0.7)
EOSINOPHIL NFR BLD AUTO: ABNORMAL %
EOSINOPHIL NFR BLD MANUAL: 3 %
ERYTHROCYTE [DISTWIDTH] IN BLOOD BY AUTOMATED COUNT: 14.9 % (ref 10–15)
GLUCOSE SERPL-MCNC: 102 MG/DL (ref 70–99)
HCT VFR BLD AUTO: 27.1 % (ref 40–53)
HGB BLD-MCNC: 8.1 G/DL (ref 13.3–17.7)
IGA SERPL-MCNC: 115 MG/DL (ref 84–499)
IGE SERPL-ACNC: 5 KU/L (ref 0–114)
IGG SERPL-MCNC: 782 MG/DL (ref 610–1616)
IGM SERPL-MCNC: 41 MG/DL (ref 35–242)
IMM GRANULOCYTES # BLD: ABNORMAL 10*3/UL
IMM GRANULOCYTES NFR BLD: ABNORMAL %
LYMPHOCYTES # BLD AUTO: ABNORMAL 10*3/UL
LYMPHOCYTES # BLD MANUAL: 0.4 10E3/UL (ref 0.8–5.3)
LYMPHOCYTES NFR BLD AUTO: ABNORMAL %
LYMPHOCYTES NFR BLD MANUAL: 13 %
MCH RBC QN AUTO: 29.3 PG (ref 26.5–33)
MCHC RBC AUTO-ENTMCNC: 29.9 G/DL (ref 31.5–36.5)
MCV RBC AUTO: 98 FL (ref 78–100)
METAMYELOCYTES # BLD MANUAL: 0 10E3/UL
METAMYELOCYTES NFR BLD MANUAL: 1 %
MONOCYTES # BLD AUTO: ABNORMAL 10*3/UL
MONOCYTES # BLD MANUAL: 0.2 10E3/UL (ref 0–1.3)
MONOCYTES NFR BLD AUTO: ABNORMAL %
MONOCYTES NFR BLD MANUAL: 8 %
MYELOCYTES # BLD MANUAL: 0.1 10E3/UL
MYELOCYTES NFR BLD MANUAL: 3 %
NEUTROPHILS # BLD AUTO: ABNORMAL 10*3/UL
NEUTROPHILS # BLD MANUAL: 1.9 10E3/UL (ref 1.6–8.3)
NEUTROPHILS NFR BLD AUTO: ABNORMAL %
NEUTROPHILS NFR BLD MANUAL: 72 %
NRBC # BLD AUTO: 0 10E3/UL
NRBC # BLD AUTO: 0 10E3/UL
NRBC BLD AUTO-RTO: 1 /100
NRBC BLD MANUAL-RTO: 1 %
PLAT MORPH BLD: ABNORMAL
PLATELET # BLD AUTO: 129 10E3/UL (ref 150–450)
POLYCHROMASIA BLD QL SMEAR: SLIGHT
POTASSIUM SERPL-SCNC: 4.5 MMOL/L (ref 3.4–5.3)
RBC # BLD AUTO: 2.76 10E6/UL (ref 4.4–5.9)
RBC MORPH BLD: ABNORMAL
SODIUM SERPL-SCNC: 143 MMOL/L (ref 135–145)
WBC # BLD AUTO: 2.7 10E3/UL (ref 4–11)

## 2023-10-19 PROCEDURE — 250N000011 HC RX IP 250 OP 636: Mod: JZ | Performed by: INTERNAL MEDICINE

## 2023-10-19 PROCEDURE — 99239 HOSP IP/OBS DSCHRG MGMT >30: CPT | Mod: GC | Performed by: PEDIATRICS

## 2023-10-19 PROCEDURE — 80299 QUANTITATIVE ASSAY DRUG: CPT | Performed by: STUDENT IN AN ORGANIZED HEALTH CARE EDUCATION/TRAINING PROGRAM

## 2023-10-19 PROCEDURE — 80048 BASIC METABOLIC PNL TOTAL CA: CPT

## 2023-10-19 PROCEDURE — 85027 COMPLETE CBC AUTOMATED: CPT

## 2023-10-19 PROCEDURE — 86682 HELMINTH ANTIBODY: CPT | Performed by: INTERNAL MEDICINE

## 2023-10-19 PROCEDURE — 85007 BL SMEAR W/DIFF WBC COUNT: CPT

## 2023-10-19 PROCEDURE — 36592 COLLECT BLOOD FROM PICC: CPT | Performed by: INTERNAL MEDICINE

## 2023-10-19 PROCEDURE — 82784 ASSAY IGA/IGD/IGG/IGM EACH: CPT | Performed by: INTERNAL MEDICINE

## 2023-10-19 PROCEDURE — 258N000003 HC RX IP 258 OP 636: Performed by: INTERNAL MEDICINE

## 2023-10-19 PROCEDURE — 87209 SMEAR COMPLEX STAIN: CPT | Performed by: INTERNAL MEDICINE

## 2023-10-19 PROCEDURE — 99233 SBSQ HOSP IP/OBS HIGH 50: CPT | Performed by: INTERNAL MEDICINE

## 2023-10-19 PROCEDURE — 250N000012 HC RX MED GY IP 250 OP 636 PS 637

## 2023-10-19 PROCEDURE — 250N000013 HC RX MED GY IP 250 OP 250 PS 637

## 2023-10-19 PROCEDURE — 99233 SBSQ HOSP IP/OBS HIGH 50: CPT

## 2023-10-19 PROCEDURE — 82785 ASSAY OF IGE: CPT | Performed by: INTERNAL MEDICINE

## 2023-10-19 PROCEDURE — 36592 COLLECT BLOOD FROM PICC: CPT | Performed by: STUDENT IN AN ORGANIZED HEALTH CARE EDUCATION/TRAINING PROGRAM

## 2023-10-19 RX ADMIN — TACROLIMUS 2.5 MG: 1 CAPSULE ORAL at 07:55

## 2023-10-19 RX ADMIN — CALCIUM CARBONATE (ANTACID) CHEW TAB 500 MG 500 MG: 500 CHEW TAB at 15:13

## 2023-10-19 RX ADMIN — FOLIC ACID 1 MG: 1 TABLET ORAL at 07:55

## 2023-10-19 RX ADMIN — SODIUM BICARBONATE 650 MG TABLET 1300 MG: at 07:55

## 2023-10-19 RX ADMIN — PANTOPRAZOLE SODIUM 40 MG: 40 TABLET, DELAYED RELEASE ORAL at 07:55

## 2023-10-19 RX ADMIN — CARVEDILOL 25 MG: 25 TABLET, FILM COATED ORAL at 07:55

## 2023-10-19 RX ADMIN — AMLODIPINE BESYLATE 5 MG: 5 TABLET ORAL at 07:55

## 2023-10-19 RX ADMIN — GANCICLOVIR SODIUM 140 MG: 500 INJECTION, POWDER, LYOPHILIZED, FOR SOLUTION INTRAVENOUS at 12:00

## 2023-10-19 RX ADMIN — SODIUM BICARBONATE 650 MG TABLET 1300 MG: at 13:52

## 2023-10-19 RX ADMIN — CALCIUM CARBONATE (ANTACID) CHEW TAB 500 MG 500 MG: 500 CHEW TAB at 09:57

## 2023-10-19 RX ADMIN — CHOLECALCIFEROL (VITAMIN D3) 10 MCG (400 UNIT) TABLET 10 MCG: at 07:55

## 2023-10-19 RX ADMIN — APIXABAN 5 MG: 5 TABLET, FILM COATED ORAL at 07:55

## 2023-10-19 RX ADMIN — PREDNISONE 5 MG: 5 TABLET ORAL at 07:55

## 2023-10-19 RX ADMIN — LEVOTHYROXINE SODIUM 100 MCG: 100 TABLET ORAL at 07:55

## 2023-10-19 ASSESSMENT — ACTIVITIES OF DAILY LIVING (ADL)
ADLS_ACUITY_SCORE: 23

## 2023-10-19 NOTE — PROGRESS NOTES
Temp: 97.8  F (36.6  C) Temp src: Oral BP: (!) 149/72 Pulse: 57   Resp: 16 SpO2: 97 % O2 Device: None (Room air)        Shift: 9800-2123     Neuro: A&O x4. Able to make needs known. Slept most of shift.   Cardiac: No tele in place, SR. Afebrile. Denies chest pain.  Resp: VSS on RA overnight. Denies SOB.   GI/: Voiding in toilet. Adequate output. No BM this shift.    Skin: No new deficits noted.   Activity: up ad evelin  LDA's: L PIV in place SL.dressing change due 10/25  Pain: Denies.      Plan: Continue to monitor and follow POC. Notify with changes. Possible discharge today pending lab results.

## 2023-10-19 NOTE — PROGRESS NOTES
Date Admitted: 10/03/23    Reason Admitted: Diarrhea    Hx/Dx: kidney transplant 2015, HTN, hypothyroidism, hyperparathyroidism    4776-4041: Vitally stable, UAL, A&O x4, 1 Unit pRBC for Hgb 7.0 this morning, very pleasant, may discharge tomorrow pending lab results

## 2023-10-19 NOTE — CONSULTS
OPHTHALMOLOGY CONSULT NOTE  10/19/23    Patient: Haider Torrez  Consulted by: ID  Reason for Consult: Rule out CMV retinitis     HISTORY OF PRESENTING ILLNESS:     Haider Torrez is a 58 year old male with IgA nephropathy s/p renal transplants (most recent in 2015) on tacrolimus + mycophenolic acid + prednisone 5 mg who was admitted with CMV colitis. Ophthalmology was consulted to rule out CMV retinitis. Patient states slow decrease in near vision that he attributes to age. Denies any other eye concerns. Denies any double vision, pain with EOM, flashes or floaters, or any other eye concern today.     Review of systems were otherwise negative except for that which has been stated above.      OCULAR/MEDICAL/SURGICAL HISTORIES:     Past Ocular History:  Last eye exam: 5 years ago  Prior eye surgery/laser: denies   Contact lens wear: denies  Glasses: readers  Eyedrops: none    Family History:  No FH of glaucoma, AMD, or blindness    Social History:  Social History     Tobacco Use    Smoking status: Never    Smokeless tobacco: Never   Substance Use Topics    Alcohol use: No    Drug use: No         Past Medical History:   Diagnosis Date    Anemia of chronic kidney failure     Carpal tunnel syndrome     Clinical diagnosis of COVID-19 9/22/2022    Dyslipidemia     History of respiratory failure     Hypertension     Hypomagnesemia 2015    IgA nephropathy     Insomnia     Kidney disease, chronic, end stage on dialysis (H)     Kidney transplant failure     Myoclonus     Obstructive sleep apnea     Papillary thyroid carcinoma (H)     PRES (posterior reversible encephalopathy syndrome)     2011 after thyroidectomy    Restless leg syndrome     Secondary hyperparathyroidism (H24)     Seizure disorder (H)     Post surgery. Keppra now discontinued    Urinoma of kidney transplant        Past Surgical History:   Procedure Laterality Date    BENCH KIDNEY  10/13/2015    Procedure: BENCH KIDNEY;  Surgeon: Seth Gibbons MD;   Location: [facility]    COLONOSCOPY N/A 10/05/2023    Procedure: COLONOSCOPY, WITH POLYPECTOMY AND BIOPSY;  Surgeon: Russ Dodge MD;  Location: [facility] GI    CREATE FISTULA ARTERIOVENOUS UPPER EXTREMITY  2011    CYSTOSCOPY, REMOVE STENT(S), COMBINED N/A 2015    Procedure: COMBINED CYSTOSCOPY, REMOVE STENT(S);  Surgeon: Seth Gibbons MD;  Location: [facility] OR    CYSTOSCOPY, REMOVE STENT(S), COMBINED Bilateral 2015    Procedure: COMBINED CYSTOSCOPY, REMOVE STENT(S);  Surgeon: Seth Gibbons MD;  Location: [facility] OR    ELBOW SURGERY Right 1978    hemithyroidectomy Left 2011    HERNIA REPAIR Right 2009    PICC SINGLE LUMEN PLACEMENT Left 10/14/2023    left basilic 4fr sl power picc 44 cm    THYROIDECTOMY  2011    TRANSPLANT KIDNEY RECIPIENT  DONOR  2004    TRANSPLANT KIDNEY RECIPIENT  DONOR N/A 10/13/2015    Procedure: TRANSPLANT KIDNEY RECIPIENT  DONOR;  Surgeon: Seth Gibbons MD;  Location: [facility] OR    ureteropyelostomy  2004       EXAMINATION:     Base Eye Exam       Visual Acuity (Snellen - Linear)         Right Left    Dist sc 20/70 20/70 -1    Dist ph sc 20/40 20/50   Via near card    At 10 ft   20/20 OD 20/30 OS              Tonometry (Tonopen, 2:34 PM)         Right Left    Pressure 12 11              Pupils         Dark Light Shape React APD    Right 3 2 Round Brisk None    Left 3 2 Round Brisk None              Visual Fields         Left Right     Full Full              Extraocular Movement         Right Left     Full, Ortho Full, Ortho              Dilation       Both eyes: 2.5% Vidal Synephrine, 1.0% Mydriacyl @ 2:39 PM                  Additional Tests       Color         Right Left    Ishihara                   Slit Lamp and Fundus Exam       External Exam         Right Left    External Normal Normal              Slit Lamp Exam         Right Left    Lids/Lashes Normal Normal    Conjunctiva/Sclera White and quiet  White and quiet    Cornea Clear Clear, Arcus    Anterior Chamber Deep and quiet Deep and quiet    Iris Round and reactive Round and reactive    Lens Clear Clear    Anterior Vitreous Normal Normal              Fundus Exam         Right Left    Disc Normal Normal    C/D Ratio 0.6 0.6    Macula Normal Normal    Vessels Normal Normal    Periphery Normal Normal                    Labs/Studies/Imaging Performed:  CMV DNA (10/9/23)  < 35  international unit(s)/mL      ASSESSMENT/PLAN:     Haider Torrez is a 58 year old male who presents with     # CMV retinitis, ruled out   - No CMV retinitis on exam in either eyes     # Presbyopia   - Decreased near vision likely secondary to age-related changes; BCVA 20/40 and 20/50 at near via near card and 20/20 and 20/30 at 10 ft using EyeHandBook     # Glaucoma suspect due to increased cup to disc ratio  - Patient does not recall when his last eye exam was and denies a FH of glaucoma  - IOP WNL   - Discussed finding and recommended establishing with an ophthalmologist for baseline testing   - Patient prefers to follow closer to home     RECOMMENDATIONS:  - No acute interventions indicated at this time  - Follow up with ophthalmology in the next 3-6 months     Ophthalmology will sign off at this time. It is our pleasure to participate in this patient's care and treatment. Please contact us with any further questions or concerns.    Patient discussed with senior resident Dr. Sai Partida.    Kaelyn Ordonez MD  Resident Physician, PGY-2  Department of Ophthalmology

## 2023-10-19 NOTE — PROGRESS NOTES
Care Management Discharge Note    Discharge Date: 10/19/2023       Discharge Disposition: Home, Outpatient infusion    Discharge Services: None    Discharge DME: None    Discharge Transportation:  family/spouse    Private pay costs discussed: Not applicable    Does the patient's insurance plan have a 3 day qualifying hospital stay waiver?  No    PAS Confirmation Code:  N/A  Patient/family educated on Medicare website which has current facility and service quality ratings: no    Education Provided on the Discharge Plan: No  Persons Notified of Discharge Plans: patient,spouse  Patient/Family in Agreement with the Plan: yes    Handoff Referral Completed: Yes    Additional Information:    Notified by provider team final IV ganciclovir plan confirmed.  Anticipate discharge today.    Faxed ID provider note from today to  [facility].  [facility].  Mireya agreed to update writer once order entered in [employer] system.  Reviewed with pt and spouse.  No concerns noted with anticipated plan for discharge.     1120: Received VM confirmation from [facility] that infusion order has been placed.  Spoke with [facility] confirming OP infusion appt for Friday 10/20.     1140:  Messaged Yael Coe Attending and Resident regarding confirmation of OP infusion appointment.       Outpatient Infusion Clinic:  [facility]  [phone]  [address] [place] [address]  First outpatient infusion appointment Friday 10/20/23 at 10:15 a.m.      Erin Boucher RN BSN, PHN, ACM-RN  7A RN Care Coordinator  Phone: [phone]  Pager [phone]    To contact the weekend RNCC  [facility] (0800 - 1630) Saturday and Sunday    Units: 5A,5B,5C [phone]    Units: 6A, [phone]    Units 6B, 6C, 6D [phone]    Units: 7A, 7B, 7C, 7D, [phone]    [facility] ([phone]) Saturday and Sunday  Units: 5 Ortho, 5MS &  WB ED Pager: [phone]  Units: 6MS, 8A & 10 ICU  Pager [phone]    10/19/2023 10:34 AM

## 2023-10-19 NOTE — PHARMACY
[facility]  Parenteral ANtibiotic Review at Departure from Acute Care Collaborative Note     Patient: Haider Torrez  MRN: [id number]  Allergies: Hydralazine, Betadine [povidone iodine], Cephalexin, Clindamycin, and Trazodone    Current Location: [facility]  OPAT to be provided by: External Home Infusion [facility]     Line Type: PICC    Diagnosis/Indications: CMV viremia (D-/R+) and CMV proctitis/colitis with diarrhea  Organism(s): CMV  MRDO? No  Pending Cultures/Microbiological Tests: no      Inpatient ID involved in developing OPAT plan: Yes - discharge OPAT plan has no changes from ID provider, Dr. Serafin Rudd, OPAT plan charted on 10/12/2023    Outpatient ID Follow-up: ID OPAT Clinic Referral Placed ([facility] Ph: [phone] and Fax: [phone]) - appointment scheduled  Designated Provider: Dr. Serafin Rudd    Antimicrobial Regimen / Route Anticipated  Duration Start Date Stop /  Reassess Date   Ganciclovir 140 mg (~2.5 mg/kg ActBW 56.6 kg) every 24 hours/IV At least 4 weeks 10/6/2023 Tentative 11/3/2023 - definitive end date to be determined by ID provider - may be able to transition to oral valganciclovir when diarrhea resolves     Laboratory Tests and Monitoring Frequency: CBC with Diff, BMP Once Weekly    Imaging/Miscellaneous Monitoring: Weekly CMV PCR    ID Pharmacist Interventions: Dose Change Paged primary team to revise discharge ganciclovir order to 2.5 mg/kg (140 mg) every 24 hours                        Betty Madrid, PharmD, BCIDP  Pager: [phone]

## 2023-10-19 NOTE — PROGRESS NOTES
Transplant Evaluation  Received: 1 week ago  Kurt Shane APRN CNP [place], Meagan Mendes, RN  Hello,    Mr. Torrez's GFR was as low as 18% this admission.  He will need to retransplant evaluation when his CMV colitis is resolved so he can start collecting time on the wait list.  I thought I would send a message now so that it wouldn't fall through the cracks after discharge.    Thanks,  MARCUS Bynum, CNP  Transplant Nephrology  Pager [phone]    OUTCOME: RNCC placed order in [facility] for kidney transplant referral.    Meagan Richardson RN, BSN  Solid Organ Transplant, Post Kidney and Pancreas  Transplant Care Coordinator  [phone]

## 2023-10-19 NOTE — PROGRESS NOTES
discharge plan  Received: 3 days ago 10/16/2023  Maurice Bullard MD [place], Christin Rebecca, MARIBEL; Serafin Rudd MD    Being discharged today on IV GCV x1 month for CMV colitis. Scr elevated. Will need weekly labs with weelkly CMV PCR. Concern that diarrhea may also be from MPA so if diarrhea doesn't improve would want to change off MPA to either everolimus or AZA (depending on cell counts). Please schedule him with me virtually in a few months.    Serafin, he has upcoming appt with you. I wanted to let you know my plan to let me know if still having diarrhea when you see him.    Thanks,  Maurice    Discharge  Received: Today 10/19/2023  Kurt Shane APRN CNP [place], Christin Rebecca, MARIBEL  Hi Haider Rhodes will be discharging today.  We stopped his MPA today.  Could you arrange to have CMV PCR checked weekly and for a transplant neph appointment in two months (virtual ok).  He will also need to be worked up for a new transplant when the CMV has resolved.    Thanks,  MARCUS Bynum, CNP  Transplant Nephrology  Pager [phone]    OUTCOME:  RNCC sent lab requisition letter for weekly collection to preferred lab below. Order placed in [facility] for follow up visit w/transplant nephrologist in 2 months ~December per Dr. Juan Miguel rivers for AKT/v if necessary.    Preferred Labs     [facility]   Phone: [phone]   Fax: [phone]    Contact: --   : --    [address] [place] [address] [id number] [place] [address]        Meagan Richardson, RN, BSN  Solid Organ Transplant, Post Kidney and Pancreas  Transplant Care Coordinator  [phone]

## 2023-10-19 NOTE — PROGRESS NOTES
Patient is alert and oriented, walks around in the room, had shower, felt good about it. Had BM this morning and stool sample was sent to the lab. Single lumen PICC is saline locked. Bilateral UE swollen and right arm is bigger than the left. Left arm has old bruises near the PICC site but no pain noted. Possible discharge this afternoon and MD has been paged and stated that ID will review the plan. IV Ganciclovir given at noon.

## 2023-10-19 NOTE — PROGRESS NOTES
Eye specialist saw patient in the room and then patient was discharged to home and discharge instructions were read to patient and the wife.  Follow up appointments were high lighted.  Left arm PICC line in place since patient will be on IV antibiotics till 11/13/23, and patient have been told where to get the IV meds.

## 2023-10-19 NOTE — LETTER
PHYSICIAN ORDERS      DATE & TIME ISSUED: 10/19/23 2:29 PM   PATIENT NAME: Haider Torrez   : 1965     [facility] MR# [if applicable]: [id number]     DIAGNOSIS:  Kidney transplant; CMV colitis  ICD-10 CODE: Z94.0; A08.39    Please collect the following labs weekly:  BMP  CBC with platelets and differential  Tacrolimus/FK trough (12 hour trough)  CMV PCR Quantitative    Any questions please call: [phone] option 5    Please fax results to [phone] .      Maurice Bullard MD

## 2023-10-19 NOTE — PROGRESS NOTES
[facility]   Transplant Nephrology Progress Note  Date of Admission:  10/3/2023  Today's Date: 10/19/2023    Recommendations:  - Stop mycophenolic acid (ordered).  May resume after CMV resolved.  - Check CMV PCR weekly.  - Follow-up with transplant nephrology in 2 months (virtual visit ok).  - Pt to receive outpatient ganciclovir doses with [place].  - Re-transplant evaluation after CMV resolved.      Assessment & Plan   # DDKT: Increased serum creatinine this admission.  - MATTHEW felt secondary to dehydration.    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    # Current Immunosuppression: Tacrolimus immediate release (goal 4-6), Mycophenolic acid (dose 180 mg every 12 hours), and Prednisone (dose 5 mg daily)   - Continue with intensive monitoring of immunosuppression for efficacy and toxicity.   - Pt improving clinically and CMV <35 but with on going symptoms MPA was reduced further    - Changes: Stop MPA.  May need to consider changing mycophenolate to alternative agent if diarrhea persists after 1m of treatment with IV GCV    # Infection Prophylaxis:   - PJP: Sulfa/TMP (Bactrim)    # Norovirus  # CMV Colitis/proctitis  # Diarrhea:  Pt presented 10/3/23 with persistent diarrhea.  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  - Colonoscopy 10/2022 with mild  archietectural distortion, focal crypt degeneration. 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     - 10/4/23 IVIG 0.5 g/kg given   - 10/5/23 Repeat colonoscopy.  Biopsy results show crypt distortion.  Active area with inflammation with ulceration and granulation tissue; features concerning but not conclusive for viral cytopathic changes. 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     10/4/2023 C. Diff Toxin PCR Negative    10/3/2023   EBV DNA PCR Quantitative Whole Blood Not detected    10/3/2023  IgG 810    10/3/2023  Enteric Bacteria and Virus Panel PCR Positive for norovirus.      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 Treat CMV colitis as above and if continues to have diarrhea would consider mTORi     03/20/23  04/10/23  05/01/23  10/6/23 10/9/23 10/17/23   CMV DNA Quant  622 !  460 !  318 !  252 ! <35 144!        # RUE DVT: noted on ultrasound 10/12/23.  Started on apixiban.    # Right flank bruise: noted right abdominal pain and bruising evening of 10/15/23.  Likely d/t anticoagulation.  US 10/16 shows no discrete fluid collection or hematoma in the right   flank.     # Hypertension: Borderline control;  Goal BP: < 140/90 (Hospitalization goal)   - Changes: No.  Continue amlodipine 5 mg PO daily and carvedilol  25 mg PO BID.    # Anemia in Chronic Renal Disease: Hgb: Increased     DANNY: No   - Iron studies: Replete.    # Leukopenia: Likely d/t ganciclovir. Plan for G-CSF 3 mcg/kg IV or subcutaneous when ANC <1000 per ID.    # Mineral Bone Disorder:   - Secondary renal hyperparathyroidism; PTH level: Mildly elevated (151-300 pg/ml)        On treatment: None  - Vitamin D; level: Not checked recently, but was normal last check         On supplement: Yes  - Calcium;  level: Normal when corrected for low alb            On supplement: Yes, calcium carbonate 500 mg PO BID between meals.  - Phosphorus; level: Normal   10/18/23         On supplement: No    # Electrolytes:   - Potassium; level: Normal           On supplement: No,   - Magnesium; level: slightly Low          On supplement: No  - Bicarbonate; level: slightly Low       On supplement: Yes,  sodium bicarbonate 1300 mg PO TID    # Proteinuria:   -1.2g/g in previous check in 2/2023, 3.3g on 24h collection in 8/2023   - A1c 5.8%.    - 10/3/23 SPEP: monoclonal peak normal.   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    Discussed with Dr. Bullard.    MARCUS Bynum CNP   Pager: [phone]      Interval History   GCV increased from 1.25 mg/kg/d to 2.5 mg/kg/d yesterday.  Mr. Torrez's creatinine is 2.87 (10/19 0454); Stable      Other significant labs/tests/vitals: SBP 140s - 150s overnight. Afebrile  No acute events overnight.  No chest pain or shortness of breath.  No leg swelling.  No nausea and vomiting.  Bowel movements are loose.    Review of Systems   4 point ROS was obtained and negative except as noted in the Interval History.    MEDICATIONS:   amLODIPine  5 mg Oral Daily    apixaban ANTICOAGULANT  5 mg Oral BID    calcium carbonate  500 mg Oral BID    carvedilol  25 mg Oral BID w/meals    cholecalciferol  10 mcg Oral Daily    clonazePAM  0.5 mg Oral At Bedtime    folic acid  1 mg Oral Daily    ganciclovir (CYTOVENE) 140 mg in D5W 100 mL intermittent infusion  2.5 mg/kg  Intravenous Q24H    levothyroxine  100 mcg Oral Daily    pantoprazole  40 mg Oral Daily    predniSONE  5 mg Oral Daily    rOPINIRole  1 mg Oral At Bedtime    sodium bicarbonate  1,300 mg Oral TID    sodium chloride (PF)  3 mL Intracatheter Q8H    sulfamethoxazole-trimethoprim  1 tablet Oral Once per day on     tacrolimus  2.5 mg Oral BID IS           Physical Exam   Temp  Av.7  F (36.5  C)  Min: 97.3  F (36.3  C)  Max: 98.1  F (36.7  C)      Pulse  Av.9  Min: 53  Max: 66 Resp  Av.6  Min: 16  Max: 19  SpO2  Av.6 %  Min: 98 %  Max: 100 %     BP (!) 149/72 (BP Location: Left arm, Cuff Size: Adult Regular)   Pulse 57   Temp 97.8  F (36.6  C) (Oral)   Resp 16   Wt 61.9 kg (136 lb 7.4 oz)   SpO2 97%   BMI 22.71 kg/m      Admit Weight: 57.1 kg (125 lb 14.1 oz)     GENERAL APPEARANCE: alert and no distress  RESP: lungs clear to auscultation - no rales, rhonchi or wheezes  CV: regular rhythm, normal rate, no rub, no murmur  EDEMA: no LE edema bilaterally  ABDOMEN: soft, nondistended, nontender, bowel sounds hyperactive  MS: extremities normal - no gross deformities noted, no evidence of inflammation in joints, no muscle tenderness  SKIN: no rash, right flank bruise noted (no palpable mass), left arm bruised, no swelling  PSYCH: mentation appears normal and affect normal  DIALYSIS ACCESS:  RUE AV fistula +bruit/+thrill    Data   All labs reviewed by me.  CMP  Recent Labs   Lab 10/19/23  0454 10/18/23  0453 10/17/23  0444 10/16/23  0449 10/15/23  0615 10/14/23  0404    145 143 141   < > 143   POTASSIUM 4.5 4.4 4.6 4.7   < > 4.6   CHLORIDE 114* 115* 113* 113*   < > 113*   CO2 21* 21* 21* 21*   < > 20*   ANIONGAP 8 9 9 7   < > 10   * 115* 101* 96   < > 94   BUN 43.0* 40.8* 44.3* 42.5*   < > 43.8*   CR 2.87* 2.79* 2.88* 2.80*   < > 2.82*   GFRESTIMATED 25* 26* 25* 26*   < > 25*   CRISTIAN 7.8* 7.4* 7.5* 7.7*   < > 7.8*   MAG  --  1.4* 1.5*  --   --  1.5*   PHOS  --  2.8  --   --   --    --    ALBUMIN  --   --  2.9*  --   --   --     < > = values in this interval not displayed.     CBC  Recent Labs   Lab 10/19/23  0454 10/18/23  0453 10/17/23  0444 10/16/23  0449   HGB 8.1* 7.0* 7.3* 7.5*   WBC 2.7* 2.4* 2.4* 3.6*   RBC 2.76* 2.41* 2.46* 2.52*   HCT 27.1* 23.8* 24.7* 25.3*   MCV 98 99 100 100   MCH 29.3 29.0 29.7 29.8   MCHC 29.9* 29.4* 29.6* 29.6*   RDW 14.9 14.1 13.9 13.8   * 134* 116* 124*     INR  Recent Labs   Lab 10/12/23  1648   INR 1.05       ABGNo lab results found in last 7 days.   Urine Studies  Recent Labs   Lab Test 10/03/23  2244 11/17/15  1410 10/14/15  2220 10/13/15  0725   COLOR Light Yellow Light Yellow Yellow Straw   APPEARANCE Clear Clear Clear Clear   URINEGLC Negative Negative Negative Negative   URINEBILI Negative Negative Negative Negative   URINEKETONE Negative Negative Negative Negative   SG 1.014 1.007 1.006 1.003   UBLD Negative Negative Large* Large*   URINEPH 6.0 6.5 5.5 5.0   PROTEIN 200* Negative 10* Negative   NITRITE Negative Negative Negative Negative   LEUKEST Negative Negative Small* Negative   RBCU 1 0 >182* 57*   WBCU 1 1 15* 1     Recent Labs   Lab Test 03/27/18  1516 09/26/17  1506   UTPG 0.12 0.10     PTH  Recent Labs   Lab Test 03/27/18  1510 10/15/15  0626   PTHI 126* 561*     Iron Studies  Recent Labs   Lab Test 10/16/23  0449 03/27/18  1510 10/17/15  0700   IRON 55* 67 25*   * 276 216*   IRONSAT 32 24 12*   * 695* 883*       IMAGING:  All imaging studies reviewed by me.

## 2023-10-20 ENCOUNTER — REFERRAL (OUTPATIENT)
Dept: TRANSPLANT | Facility: CLINIC | Age: 58
End: 2023-10-20

## 2023-10-20 DIAGNOSIS — N18.4 CHRONIC KIDNEY DISEASE, STAGE IV (SEVERE) (H): Primary | ICD-10-CM

## 2023-10-20 DIAGNOSIS — E78.5 HYPERLIPIDEMIA: ICD-10-CM

## 2023-10-20 DIAGNOSIS — N02.B9 IGA NEPHROPATHY: ICD-10-CM

## 2023-10-20 DIAGNOSIS — I10 ESSENTIAL HYPERTENSION: ICD-10-CM

## 2023-10-20 DIAGNOSIS — Z01.818 PRE-TRANSPLANT EVALUATION FOR KIDNEY TRANSPLANT: ICD-10-CM

## 2023-10-20 DIAGNOSIS — Z76.82 ORGAN TRANSPLANT CANDIDATE: ICD-10-CM

## 2023-10-20 DIAGNOSIS — T86.12 KIDNEY TRANSPLANT FAILURE: ICD-10-CM

## 2023-10-20 LAB — O+P STL MICRO: NEGATIVE

## 2023-10-20 NOTE — LETTER
October 24, 2023        Haider Torrez  [address] [place] [address]          Dear Haider,    Thank you for your interest in the Transplant Center at [facility]. We look forward to being a part of your care team and assisting you through the transplant process.    As we discussed, your transplant coordinator is Geovanna Felipe (Kidney).  You may call your coordinator at any time with questions or concerns.  Your first scheduled call will be on November 6, 2023 between 9:00 am and 12:00 pm.  If this needs to change, call [phone].    Please complete the following.    Fill out and return the enclosed forms  Authorization for Electronic Communication  Authorization to Discuss Protected Health Information  Authorization for Release of Protected Health Information    Sign up for:  [employer], access to your electronic medical record (see enclosed pamphlet)  [email or web address], a transplant education website                                               My Transplant Place     You can use these tools to learn more about your transplant, communicate with your care team, and track your medical details      Sincerely,      Solid Organ Transplant  [facility]    cc: Referring Physician Dialysis Unit

## 2023-10-20 NOTE — DISCHARGE SUMMARY
[facility]  Discharge Summary - Medicine & Pediatrics       Date of Admission:  10/3/2023  Date of Discharge:  10/19/2023  4:06 PM  Discharging Provider: Dr. 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   -MMF held until CMV resolves   -Will rediscuss transplant evaluation after CMV has resolved.   -Continue Bactrim PJP prophylaxis    Nephrology at [facility] 1 to 2 weeks   -Weekly CMV PCR  -Weekly CBC, with transfusion if indicated, once 1 month out from DVT can consider EPO for likely anemia of CKD  -Outpatient ganciclovir with [facility] through infusion center    Primary care next Monday or early next week   -Help facilitate CBC checks with transfusions if indicated  -Follow-up pending stool ova and parasite, strong G antibody, she histo antibody, IgE level.  -Potassium checks while on PJP prophylaxis.    Ophthalmology consult placed, can follow-up in 3 to 6 months no concern for CMV retinitis at this time.      Unresulted Labs Ordered in the Past 30 Days of this Admission       Date and Time Order Name Status Description    10/18/2023 11:30 PM Strongyloides antibody IgG In process     10/18/2023 11:30 PM Schistosoma Antibody IgG In process     10/18/2023 11:30 PM IgE In process     10/18/2023  2:42 PM Ganciclovir HPLC In process     10/18/2023  2:19 PM Routine parasitology exam In process         These results will be followed up by Nephrology at [facility]/PCP at [facility]    Discharge Disposition   Discharged to  home  Condition at discharge: Stable    Hospital Course   Haider Torrez was admitted on 10/3/2023. PMHx significant for IgA nephropathy s/p kidney transplant (2004, 2015) on tacrolimus + mycophenolic acid + prednisone 5 mg, history of BK viremia, CMV viremia, hypertension, and secondary hyperparathyroid who presented on 10/3/23 with persistent diarrhea x 3-4 weeks with concern for CMV proctitis/colitis.      CMV viremia with CMV colitis  Norovirus positive  He has a history of norovirus diarrhea and MATTHEW in March 2023. Patient has been having loose bowel movement, especially after eating for a month, and recently test positive for norovirus in stool on 9/21. Noted to have CMV viremia on 10/9/22 with 41 copies, up to 736 copies on 3/2/23. This hase been managed conservatively with decrease in MPA (patient did not actually decrease dose). Pt is CMV IgG+ at time of transplant. IgG >600. CMV has been low level but present. IVIG 0.5g/kg x1 10/4, IgG level: normal 10/5     10/5 Colonoscopy biopsy confirmed CMV colitis. C. Diff panel negative. Enteric stool panel still positive for Norovirus, however transplant ID will not start nitaxoxanide due to poor evidence for efficacy and suggests weaning immunosuppression as able.   - ID recommends IV ganciclovir - renally dosing 4 week course (10/6/23 - 11/3/23), patient discharged on OP infusion after education.  - Check CMV PCR weekly (ordered).      Acute on chronic anemia likely of CKD  Normal Iron study. Hb at baseline of 8, slowly declining over hospitalization without s/s of acute bleed. Potentially product of CKD alone, but unable to have epo at this time due to DVT (must be one month at least from initial VTE). Potentially could have some nutritional deficiencies with CMV colitis, so folate and B12 ordered as well which were wnl. Received 1 unit PRBCs on 10/18. .   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 Baseline Cr 2.1-2.5, with Cr 3.75 at admission trending down but at discharge Cr is stable at 2.8. He has started having proteinuria since . Ongoing care with transplant nephrology. He is to continue PJP prophylaxis with bactrim, and his latest EBV PCR on 10/3/23.     - Will refer for retransplant evaluation as outpatient once acute issues resolve.      New acute on chronic Rt IJV DVT with complete occlusion  10/12 acute DVT with complete occlusion, veins with collateral (compared to previous USG Rt arm in : nonocclusive DVT), likely acute on chronic IJV DVT. Heme consultant recommended high intensity IV heparin drip. 10/14 patient switched to Apixaban 5 mg BID after PICC line insertion. Lt arm swelling with pain reported after PICC insertion, USG Lt arm did not reveal DVT. Pt with swelling and bruising in this area, keeping it elevated as already on DOAC improved over the course of the day.   - Continue DOAC  - OP heme followup       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  On immunosuppressants for transplanted kidney: mycophenolic acid 360 mg bid, prednisone 5 mg daily, tacrolimus 2.5 mg bid. Patient had downtrending WBC, likely due to ganciclovir and received one G-CSF which brought the WBC to normal.   - follow-up CBC with diff while receiving home infusion IV ganciclovir.   - HOLDING MMF     Chronic issues  NAGMA, resolved  Likely secondary to diarrhea. Normalized following bicarb IV infusion. Continue with 1300 mg PO TID for CKD maintenance as ordered by transplant nephrology.     Hypertension  On carvedilol 25 mg BID. Started amlodipine 2.5 mg po daily.      Papillary thyroid carcinoma s/p surgical resection  Continue PTA levothyroxine     Mineral bone disorder due to  secondary hyperparathyroidism  Hypocalcemia  Nephrology recommends calcium gluconate 1 g and calcium carbonate 500 mg PO BID between meals.    Consultations This Hospital Stay   GI LUMINAL ADULT IP CONSULT  NEPHROLOGY KIDNEY/PANCREAS TRANSPLANT ADULT IP CONSULT  NURSING TO CONSULT FOR VASCULAR ACCESS CARE IP CONSULT  NURSING TO CONSULT FOR VASCULAR ACCESS CARE IP CONSULT  INFECTIOUS DISEASE TRANSPLANT SOT ADULT IP CONSULT  INFECTIOUS DISEASE TRANSPLANT SOT ADULT IP CONSULT  DENTAL HYGIENE IP ADULT CONSULT - [facility]  HEMATOLOGY ADULT IP CONSULT  PHARMACY IP CONSULT  CARE MANAGEMENT / SOCIAL WORK IP CONSULT  NURSING TO CONSULT FOR VASCULAR ACCESS CARE IP CONSULT  VASCULAR ACCESS FOR PICC PLACEMENT ADULT IP CONSULT  PATIENT LEARNING CENTER IP CONSULT  OPHTHALMOLOGY IP CONSULT    Code Status   Prior       The patient was discussed with Dr. Ansley Bryant MD  [facility] [address] [address]  Phone: [phone]  ______________________________________________________________________    Physical Exam   Vital Signs: Temp: 97.9  F (36.6  C) Temp src: Oral BP: (!) 151/83 Pulse: 68   Resp: 16 SpO2: 99 % O2 Device: None (Room air)    Weight: 136 lbs 7.44 oz  General: Pt laying comfortably in bed, in no acute distress. Not requiring oxygen.   Cardio: Regular rate and rhythm. No murmurs, gallops, rubs.   Pulm: Clear to ascultation bilaterally without wheeze, crackles, rhonchi.   Abd: Active bowel sounds. Non-tender to palpation. No rebound or guarding.   Lower extremities: No lower extremity edema present bilaterally.   Neuro: Alert and oriented to person, place, and time.   Psych: Appropriate mood and affect.         Primary Care Physician   David TILLEY Maillette    Discharge Orders      Adult Oncology/Hematology  Referral      OPAT enrollment and ID Clinic Referral      Adult Eye  Referral      Activity    Your activity upon  discharge: activity as tolerated     Reason for your hospital stay    You were hospitalized for CMV colitis. We have started the treatment for this, which is intravenous ganciclovir that is to be continued daily for 4 weeks until 11/13/23. During this time, please obtain blood work Monday(10/23) CBC with diff with weekly (10/30, 11/6, 11/13) CMV PCR, CBC with diff, BMP.    You also had a new blood clot in your right arm, so you have been started on an anticoagulant called apixaban.     Please follow-up with infectious disease team Dr. Rudd on 10/31/23 @6:30 pm in a virtual visit, transplant nephrology, hematology (blood team). Additionally please follow up with your [place] primary care provider by next Monday 10/23 for repeat CBC for potential repeat blood transfusion.     Adult [facility] Follow-up and recommended labs and tests    Follow up with primary care provider, David TILLEY Maillette, on Monday 10/23 for follow-up.  The following labs/tests are recommended: During this time, please obtain blood work Monday(10/23) CBC with diff with weekly (10/30, 11/6, 11/13) CMV PCR, CBC with diff, BMP.    Appointments on [place] and/or [facility] (with [facility] or [facility] provider or service). Call [phone] if you haven't heard regarding these appointments within 7 days of discharge.    Nephrology in two weeks at [place].     Transplant nephrology at  [facility] 2 months.     Referral placed for ophthalmology for evaluation for CMV retinitis.     Diet    Follow this diet upon discharge: Regular Diet Adult       Significant Results and Procedures   Results for orders placed or performed during the hospital encounter of 10/03/23   US Upper Extremity Venous Duplex Right    Narrative    EXAM: Right upper extremity venous duplex ultrasound, 10/12/2023 2:44  PM     HISTORY: Kidney transplant patient admitted for CMV colitis, with Rt  arm swelling concerning for DVT (has fistula on Rt arm).    COMPARISON: None.    TECHNIQUE:  Gray-scale evaluation with compression, spectral flow, and  color Doppler assessment of the deep venous system of the right upper  extremity was performed.    FINDINGS: In the right upper extremity, normal blood flow and  waveforms are demonstrated in the innominate, subclavian, and axillary  veins. The right internal jugular vein demonstrates intraluminal  hypoechoic thrombus with lack of compressibility and color  flow/waveform. On ultrasound 10/19/2015, there was a nonocclusive clot  in the right internal jugular vein. In the right lower neck there are  numerous collateral vessels surrounding the right IJV. The right  basilic and basilar veins are part of the patient's arteriovenous  fistula and demonstrate normal compressibility. There is normal  compressibility of the right upper extremity cephalic vein.      Impression    IMPRESSION: New occlusive deep vein thrombus in the right internal  jugular vein with surrounding collaterals.    These findings were communicated to Dr. Yovana Flanagan by Dr. Jason Wilson at 10/12/2023 3:05 PM via telephone and understanding  was verbalized.    I have personally reviewed the examination and initial interpretation  and I agree with the findings.    MAT DHILLON MD         SYSTEM ID:  [id number]   XR Chest Port 1 View    Narrative    Exam: XR CHEST PORT 1 VIEW, 10/14/2023 2:44 PM    Comparison: 10/13/2015    History: picc    Findings:  Portable AP view of the chest. Left PICC terminates in the SVC.  Borderline enlarged cardiac silhouette. No pleural effusion. No  definite pneumothorax. No acute opacity.      Impression    Impression: Left PICC terminates in the SVC. Borderline enlarged  cardiac silhouette.     I have personally reviewed the examination and initial interpretation  and I agree with the findings.    VIPIN LYONS MD         SYSTEM ID:  [id number]   US Upper Extremity Venous Duplex Left    Narrative    EXAMINATION: DOPPLER VENOUS ULTRASOUND OF THE  LEFT UPPER EXTREMITY,  10/15/2023 11:30 AM     COMPARISON: None.    HISTORY: History of right IJV DVT, now presenting with left arm pain  and swelling after PICC line, concern for left arm DVT.    TECHNIQUE:  Gray-scale evaluation with compression, spectral flow and  color Doppler assessment of the deep venous system of the left upper  extremity.    FINDINGS:  Left: Normal blood flow and waveforms are demonstrated in the internal  jugular, innominate, subclavian, and axillary veins. There is normal  compressibility of the brachial, basilic and cephalic veins. PICC line  present within the left brachial and axillary veins.      Impression    IMPRESSION:  No evidence of left upper extremity deep venous thrombosis.    I have personally reviewed the examination and initial interpretation  and I agree with the findings.    DEVON WASHINGTON MD         SYSTEM ID:  [id number]   US Soft Tissue Abdominal Wall or Lower Back    Narrative    Exam: US SOFT TISSUE ABDOMINAL WALL OR LOWER BACK, 10/16/2023 8:55 AM    Indication: Right flank ecchymosis noticed 10/14 with pain since  10/13, on IV heparin drip transitioned to apixaban    Comparison: None    Findings:   Grayscale and Doppler ultrasound of the region of interest over the  right flank. No evidence of fluid collection, hematoma or abnormal  calcifications. Minimal superficial soft tissue edema.      Impression    Impression: No discrete fluid collection or hematoma in the right  flank.    I have personally reviewed the examination and initial interpretation  and I agree with the findings.    EFRA ADHIKARI MD         SYSTEM ID:  [id number]       Discharge Medications   Discharge Medication List as of 10/19/2023  2:47 PM        START taking these medications    Details   amLODIPine (NORVASC) 5 MG tablet Take 1 tablet (5 mg) by mouth daily for 30 days, Disp-30 tablet, R-0, E-Prescribe      apixaban ANTICOAGULANT (ELIQUIS) 5 MG tablet Take 1 tablet (5 mg) by mouth 2 times  daily, Disp-60 tablet, R-0, E-Prescribe           CONTINUE these medications which have CHANGED    Details   ganciclovir 140 mg Inject 140 mg into the vein every 24 hours, No Print Out      sodium bicarbonate 650 MG tablet Take 2 tablets (1,300 mg) by mouth 3 times daily, Historical      sulfamethoxazole-trimethoprim (BACTRIM) 400-80 MG tablet Take 1 tablet by mouth three times a week, Historical      tacrolimus (GENERIC) 0.5 MG capsule Take 5 capsules (2.5 mg) by mouth 2 times daily, Disp-300 capsule, R-0, E-Prescribe           CONTINUE these medications which have NOT CHANGED    Details   carvedilol (COREG) 25 MG tablet Take 1 tablet (25 mg) by mouth 2 times daily (with meals), Historical      clonazePAM (KLONOPIN) 0.5 MG tablet Take 1 tablet (0.5 mg) by mouth At Bedtime, Historical      folic acid (FOLVITE) 1 MG tablet Take 1 tablet (1 mg) by mouth daily, Historical      levothyroxine (SYNTHROID/LEVOTHROID) 100 MCG tablet Take 1 tablet (100 mcg) by mouth daily, Historical      pantoprazole (PROTONIX) 40 MG EC tablet Take 1 tablet (40 mg) by mouth daily Take 30-60 minutes before a meal., Disp-30 tablet, R-0, Historical      predniSONE (DELTASONE) 5 MG tablet Take 1 tablet (5 mg) by mouth daily, Historical      rOPINIRole (REQUIP) 1 MG tablet Take 1 tablet (1 mg) by mouth At Bedtime, Historical      vitamin D3 (CHOLECALCIFEROL) 10 MCG (400 UNIT) capsule Take 1 capsule (400 Units) by mouth daily, Historical           STOP taking these medications       mycophenolic acid (GENERIC EQUIVALENT) 180 MG EC tablet Comments:   Reason for Stopping:         nitazoxanide (ALINIA) 500 MG tablet Comments:   Reason for Stopping:             Allergies   Allergies   Allergen Reactions    Hydralazine Swelling    Betadine [Povidone Iodine]     Cephalexin Hives    Clindamycin Hives    Trazodone Swelling     Swelling of face/lips

## 2023-10-20 NOTE — LETTER
Haider Gray Shan  [address] [place] [address]                November 6, 2023    Abi Maloney,     It was a pleasure to speak with you over the phone today in preparation of your pre- kidney transplant evaluation. I am sending this email to review the pre-transplant information we covered.     A  from our Office will send your schedule in your My Chart for your pre-kidney transplant evaluation on Wednesday, January 17th, 2024 starting at 7:30 AM. [facility]  [address] [place] [address]    For parking options, please park in the open lot across the street from the front door of our Clinic.  Otherwise, enter the [facility] / arrival plaza from [facility] and attendants can assist you based on your needs.  parking is available for those with limited mobility M-F from 7:00 am to 5:00 pm.     Please bring your designated care person(s) to your appointment day to help listen to the patient education and ask questions that are important to you. You can eat/drink normally on this day. Please do not fast, as the appointment day will most likely go until 3 PM. There is a coffee shop on street level for you to purchase food and you can also bring food from home. Please be aware there are no microwaves or refrigerators for patient use at the clinic. Also, take all your prescribed medications as ordered on this day. There are no medication lab tests ordered during your appointments.    Upon completion of your appointments, I will compile the outcomes and have your results reviewed at the Transplant Team Selection Committee on Wednesday of the following week. This is a medical review meeting only, you will not be asked to attend. I will call you within a few days after this meeting to inform you of the outcomes and to assist in planning for the completion of your evaluation.     You will receive  an email from Thais in our Transplant Office which contains a Receipt of Information consent and patient educational materials. Please read and electronically sign the Receipt of Information consent as well as read the educational materials prior to your evaluation appointments on 1/17/2024.     Please complete your pre-kidney transplant education on [facility]. This is an online website that our Transplant Program uses for patient education, specifically for our Program. To find [facility] Transplant Place you can google search for it or click on this link: [email or web address].  You will find information for kidney videos under the Organ Recipient Patient Education (Adult).  Find kidney and select your preferred language.  The language link will send you to a [employer] video player. There are 15 short videos that are included in the SOT Pre-Transplant Training/ kidney 1. Please complete viewing of these videos prior to your evaluation appointments.  This will give you a good knowledge base to then to ask questions with the providers.     Additional transplant resources are as follows:   www.unos.org. UNOS, or United Network of Organ Sharing, is the national organization in our country that maintains all the organ wait lists and is responsible for the rules and regulations for organ allocation. I recommend looking at the Transplant Living section as this area has content created for patients.   www.srtr.org SRTR, or the Scientific Registry for Transplant Recipients is a national data base that all Transplant Centers report their success and failure rate for all organ types twice per year. The results are public knowledge and do provide a good perspective of organ transplant.     If the transplant providers tell you at your appointments that you should start to have live donors register with our Program to initiate their evaluations, please provide this registration website:  [email or web address]   The donor will receive a detailed email response back with information and next steps specific to their situation. It is important that the donor responds to the email in order to move forward with their evaluation. Donors can also call our Office and ask to speak with a live donor coordinator in the event of questions at [phone].     Please let me know of any questions or concerns.     Thank you,    MARIBEL Austin; Pre-Kidney/Pancreas Transplant Coordinator    Transplant Office phone number: [phone]  Transplant Office fax number: [phone]    Direct phone number: [phone]  E-mail: [email or web address]

## 2023-10-21 LAB — STRONGYLOIDES IGG SER IA-ACNC: 0.3 IV

## 2023-10-22 LAB — SCHISTOSOMA IGG SER IA-ACNC: 5 U

## 2023-10-24 VITALS — BODY MASS INDEX: 22.49 KG/M2 | HEIGHT: 65 IN | WEIGHT: 135 LBS

## 2023-10-24 NOTE — TELEPHONE ENCOUNTER
PCP: David Baumlette  Referring Organization:  [facility]  Referring Provider: Dr. Maurice Bullard  Referring Diagnosis: kidney replaced by transplant due to IgA nephropathy in 2004, 2015    GFR/Date: 25 (10/19/23)    Is patient under the age of 65? yes  Is patient diabetic? no  Is patient on insulin? no  Was patient offered a pancreas transplant referral? no    Is patient in a group home/assisted living? no  Does patient have a guardian? no    Referral intake process completed.  Patient is aware that after financial approval is received, medical records will be requested.   Patient confirmed for a callback from transplant coordinator on November 6, 2023. (within 2 weeks)  Tentative evaluation date January 17, 2024 slot 1 (within 4 weeks) if appointment is virtual, does patient [place] capabilities [place] setting [place]    Confirmed coordinator will discuss evaluation process in more detail at the time of their call.   Patient is aware of the need to arrange age appropriate cancer screening, vaccinations, and dental care.  Reminded patient to complete questionnaire, complete medical records release, and review packet prior to evaluation visit .  Assessed patient for special needs (ie-wheelchair, assistance, guardian, and ):  uses a walker    Patient instructed to call [phone] with questions.     Patient gave verbal consent during intake call to obtain medical records and documents outside of [facility]:  yes

## 2023-10-25 ENCOUNTER — TRANSFERRED RECORDS (OUTPATIENT)
Dept: HEALTH INFORMATION MANAGEMENT | Facility: CLINIC | Age: 58
End: 2023-10-25

## 2023-10-26 LAB — GANCICLOVIR SERPL-MCNC: 1 MCG/ML

## 2023-10-27 ENCOUNTER — TELEPHONE (OUTPATIENT)
Dept: TRANSPLANT | Facility: CLINIC | Age: 58
End: 2023-10-27
Payer: MEDICARE

## 2023-10-27 NOTE — TELEPHONE ENCOUNTER
Tacrolimus = 3.3  (10/23/23)  Goal 4-6  Current tac dose 2.5 mg BID    Previous level at goal on same dose    PLAN:   Call and confirm this was a good 12-hour trough.   Verify current dose.   Confirm no new medications or illness (phillip. Diarrhea).  MISSED DOSES?   If good trough and correct dose above, recommend:  stay on the same dose .   Recheck level in 1-2 weeks and make sure it is a good trough to avoid additional lab draws.      OUTCOME:  Reports good trough and correct dose.  No missed doses or new meds.  States gets labs weekly, every Monday.  Will continue to monitor. iF remains low will plan to increase Tac dose.

## 2023-10-30 ENCOUNTER — TELEPHONE (OUTPATIENT)
Dept: TRANSPLANT | Facility: CLINIC | Age: 58
End: 2023-10-30
Payer: MEDICARE

## 2023-10-30 DIAGNOSIS — Z94.0 KIDNEY REPLACED BY TRANSPLANT: ICD-10-CM

## 2023-10-30 DIAGNOSIS — B25.9 CMV COLITIS (H): Primary | ICD-10-CM

## 2023-10-30 DIAGNOSIS — A08.39 CMV COLITIS (H): Primary | ICD-10-CM

## 2023-10-30 NOTE — LETTER
PHYSICIAN ORDERS      DATE & TIME ISSUED: 10/31/23 10:32 AM   PATIENT NAME: Haider Torrez   : 1965     [facility] MR# [if applicable]: [id number]     DIAGNOSIS: Kidney transplant; CMV colitis; Neutropenia; Elevated Creatinine  ICD-10 CODE: Z94.0; A08.39; D70.9; R79.89    Please give Neupogen 480 mcg subcutaneous daily x 3 days for neutropenia.  Please administer 500 ml Lactated Ringers Intravenously x once for elevated creatinine.  Please continue with ganciclovir 1.25 mg/kg IV infusions every 24 hours for total of 4 weeks then reassess need for it.      Any questions please call: [phone] option 5.  Fax # [phone] .      Maurice Bullard MD

## 2023-10-30 NOTE — TELEPHONE ENCOUNTER
Patient Call: General  Route to LPN    Reason for call: Mireya from [facility] Nephrology dept, calling Dr. Butler has questions regarding patient's medication Ganciclovir 140 mg, she stated based on the lab work last week they decreased dose to 1.25. when calling back ask to speak with nurse.    Call back needed? Yes    Return Call Needed  Same as documented in contacts section  When to return call?: Same day: Route High Priority

## 2023-10-30 NOTE — LETTER
PHYSICIAN ORDERS      DATE & TIME ISSUED: 2023 8:44 AM   PATIENT NAME: Haider Torrez   : 1965     [employer] MR# [if applicable]: [id number]     DIAGNOSIS: Kidney transplanted; Long Term Use of Medication  ICD-10 CODE: Z94.0; Z79.899    Please check the following labs on 23:  - T cell subset   - BMP  - CBC w/platelets  - Tacrolimus drug level (12 hour trough)    Any questions please call: [phone] option 5.  Fax results to: [phone].     Thank you,      Maurice Bullard MD

## 2023-10-30 NOTE — LETTER
PHYSICIAN ORDERS (revised)      DATE & TIME ISSUED: 2023 8:50 AM   PATIENT NAME: Haider Torrez   : 1965     [facility] MR# [if applicable]: [id number]     DIAGNOSIS: Kidney transplanted; Long Term Use of Medication  ICD-10 CODE: Z94.0; Z79.899    Please check the following labs ~23:  - CMV specific T cell assay  - BMP  - CBC w/platelets  - Tacrolimus level (12 hour trough)    Any questions please call: [phone] option 5.  Fax results to: [phone].     Thank you,      Maurice Bullard MD

## 2023-10-30 NOTE — LETTER
PHYSICIAN ORDERS (REVISED)      DATE & TIME ISSUED: 10/31/23 10:32 AM; revised at 4 PM.   PATIENT NAME: Haider Torrez   : 1965     [facility] MR# [if applicable]: [id number]     DIAGNOSIS: Kidney transplant; CMV colitis; Neutropenia; Elevated Creatinine  ICD-10 CODE: Z94.0; A08.39; D70.9; R79.89    Please give Neupogen 480 mcg subcutaneous daily x 3 days for neutropenia.  Please administer 500 ml Lactated Ringers Intravenously x once for elevated creatinine.  Please continue with ganciclovir 1.25 mg/kg IV infusions every 24 hours for total of 4 weeks (10/6/23-11/3/23) then start valcyte 450 mg by mouth every other day on 23. Prescription sent electronically to: [id number] - [address] [address] - [address] AT [facility] [address] [facility] [address]     Any questions please call: [phone] option 5.  Fax # [phone] .      Maurice Bullard MD

## 2023-10-30 NOTE — TELEPHONE ENCOUNTER
DATE:  10/30/2023     TIME OF RECEIPT FROM LAB: 1005    ORDERING PROVIDER: Juan Miguel    LAB TEST:  ANC    LAB VALUE:  0.38    RESULTS GIVEN WITH READ-BACK TO:Geovanna HAIRSTON LAB VALUE REPORTED TO PROVIDER: Dianne  Lab phone #[phone]

## 2023-10-30 NOTE — LETTER
PHYSICIAN ORDERS      DATE & TIME ISSUED: 2023 9:36 AM   PATIENT NAME: Haider Torrez   : 1965     [facility] MR# [if applicable]: [id number]     DIAGNOSIS: Kidney transplant; CMV colitis  ICD-10 CODE: Z94.0; A08.39    Please check the following labs weekly for 6 weeks (thru 23):  - CMV PCR quantitative    Any questions please call: [phone] option 5.  Fax results to: [phone] and [phone].   Thank you,        Serafin Rudd MD

## 2023-10-31 ENCOUNTER — VIRTUAL VISIT (OUTPATIENT)
Dept: INFECTIOUS DISEASES | Facility: CLINIC | Age: 58
End: 2023-10-31
Attending: INTERNAL MEDICINE
Payer: MEDICARE

## 2023-10-31 DIAGNOSIS — B25.9 CMV COLITIS (H): Primary | ICD-10-CM

## 2023-10-31 DIAGNOSIS — B25.9 CYTOMEGALOVIRUS (CMV) VIREMIA (H): ICD-10-CM

## 2023-10-31 DIAGNOSIS — A08.39 CMV COLITIS (H): Primary | ICD-10-CM

## 2023-10-31 DIAGNOSIS — Z94.0 KIDNEY REPLACED BY TRANSPLANT: ICD-10-CM

## 2023-10-31 PROCEDURE — 99215 OFFICE O/P EST HI 40 MIN: CPT | Mod: VID | Performed by: INTERNAL MEDICINE

## 2023-10-31 RX ORDER — VALGANCICLOVIR 450 MG/1
450 TABLET, FILM COATED ORAL EVERY OTHER DAY
Qty: 15 TABLET | Refills: 1 | Status: SHIPPED | OUTPATIENT
Start: 2023-11-04 | End: 2023-11-15

## 2023-10-31 NOTE — NURSING NOTE
Is the patient currently in the state of [place]? NO Pt is in [place]    Visit mode:VIDEO    If the visit is dropped, the patient can be reconnected by: VIDEO VISIT: Text to cell phone:   Telephone Information:   Mobile [phone]       Will anyone else be joining the visit? Mica Hackett, is present  (If patient encounters technical issues they should call [phone]    How would you like to obtain your AVS? MyChart    Are changes needed to the allergy or medication list? Pt stated no changes to allergies and Pt stated no med changes    Reason for visit: RONDA YANCEYF

## 2023-10-31 NOTE — TELEPHONE ENCOUNTER
Orders for Neupogen sent in other open encounter.     RE: Lab Value - ANC 0.38  Received: Yesterday  Maurice Bullard MD Block, Alicia, RN; Lorena Duncan RN  Cc: Meagan Richardson RN  Caller: Unspecified (Yesterday, 10:06 AM)  Meagan, please arrange for neupogen x3 days. Will have to keep on supporting ANC while on IV GCV

## 2023-10-31 NOTE — LETTER
10/31/2023       RE: Haider Torrez  [address] [place] [address]     Dear Colleague,    Thank you for referring your patient, Haider Torrez, to the [facility] [place] at [facility]. Please see a copy of my visit note below.    Virtual Visit Details    Type of service:  Video Visit   Video Start Time:  6:30 pm  Video End Time: 6:38 pm    Originating Location (pt. Location): Home    Distant Location (provider location):  On-site  Platform used for Video Visit: [facility]    Transplant Infectious Diseases Outpatient Progress note      Patient:  Haider Torrez, Date of birth 1965, Medical record number [id number]  Date of Visit:  10/31/2023         Recommendations:   Discontinue GCV after last dose 11/3/23.   Discontinue PICC line after last dose 11/3/23.   Continue to monitor weekly CMV PCR for the next 6 weeks.     RTC: 6 weeks.         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 Now with failing allograft.   Recently discharged from the hospital after acute diarrhea for one month and worsening renal failure. Colonoscopy done on 10/5/23 which was normal. However, anorectal biopsy was suggestive of CMV infection with ulceration and positive immunohistochemical staining for CMV. 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   Was started on IV GCV adjusted to CrCl on 10/6/23 with CMV VL returning to <1.5 log. On 10/17/23 VL increased to 2.2 log, so IV GCV was increased on 10/18/23.           Active Problems and Infectious Diseases Issues:   CMV proctitis/colitis with diarrhea.   CMV viremia, D-/R+  The diarrhea resolved.   The VL is <1.5 as of 10/23/23 in care everywhere.   Will finish a total of 4 weeks on  11/3/23 and discontinue the PICC.     Norovirus viral shedding.   This episode of diarrhea seems to be due to CMV colitis rather than norovirus. The patient stated that diarrhea in 3/2023 (the time of norovirus infection) resolved before it recurred in 9/2023. The positive norovirus represents shedding. No evidence that there is a role of nitazoxanide for the treatment of norovirus especially with chronic shedding.       Leukopenia  Likely due to GCV.   Will support with G-CSF as needed.      Immunocompromised patient with viral infections.   The chronic viral shedding of norovirus, the recent norovirus infection and now CMV colitis following prolonged viremia, all indicate overly immunosuppressed patient.         Old Problems and Infectious Diseases Issues:   Norovirus infection 3/2023 treated with supportive care with resolution of diarrhea. Diarrhea recurred in 9/2023 with persistent shedding of the norovirus.   COVID-19 infection in 9/2022 treated with remdesivir.     Other Infectious Disease issues include:  - QTc: 443 as of 10/4/23.   - PCP prophylaxis: bactrim.   -   - Serostatus: CMV D-/R+, EBV D+/R+, VZV +.   - Gamma globulin status: 810 as of 10/3/23 and 780 as of 10/19/23. IVIG given 10/4/23.       Attestation:  Total duration of visit including chart review, reviewing labs and imaging, interviewing and examining the patient, documentation, and sending communication to the patient and to the primary treating team, all at the same day of this encounter, is: 52 minutes.   Serafin Rudd MD    Contact information available via [facility] Paging/Directory     10/31/2023         Interim History:   Diarrhea resolved.   No fever, chills. No N/A.   No other complaints or events.          Review of Systems:     As mentioned in the interim history otherwise negative by reviewing constitutional symptoms, central and peripheral neurological systems, respiratory system, cardiac system, GI system,  system,  musculoskeletal, skin, allergy, and lymphatics.                  Past Medical History:     Past Medical History:   Diagnosis Date    Anemia of chronic kidney failure     Carpal tunnel syndrome     Clinical diagnosis of COVID-19 2022    Dyslipidemia     History of respiratory failure     Hypertension     Hypomagnesemia     IgA nephropathy     Insomnia     Kidney disease, chronic, end stage on dialysis (H)     Kidney transplant failure     Myoclonus     Obstructive sleep apnea     Papillary thyroid carcinoma (H)     PRES (posterior reversible encephalopathy syndrome)      after thyroidectomy    Restless leg syndrome     Secondary hyperparathyroidism (H24)     Seizure disorder (H)     Post surgery. Keppra now discontinued    Urinoma of kidney transplant              Past Surgical History:     Past Surgical History:   Procedure Laterality Date    BENCH KIDNEY  10/13/2015    Procedure: BENCH KIDNEY;  Surgeon: Seth Gibbons MD;  Location: [facility] OR    COLONOSCOPY N/A 10/05/2023    Procedure: COLONOSCOPY, WITH POLYPECTOMY AND BIOPSY;  Surgeon: Russ Dodge MD;  Location: [facility] GI    CREATE FISTULA ARTERIOVENOUS UPPER EXTREMITY  2011    CYSTOSCOPY, REMOVE STENT(S), COMBINED N/A 2015    Procedure: COMBINED CYSTOSCOPY, REMOVE STENT(S);  Surgeon: Seth Gibbons MD;  Location: [facility] OR    CYSTOSCOPY, REMOVE STENT(S), COMBINED Bilateral 2015    Procedure: COMBINED CYSTOSCOPY, REMOVE STENT(S);  Surgeon: Seth Gibbons MD;  Location: [facility] OR    ELBOW SURGERY Right 1978    hemithyroidectomy Left 2011    HERNIA REPAIR Right 2009    PICC SINGLE LUMEN PLACEMENT Left 10/14/2023    left basilic 4fr sl power picc 44 cm    THYROIDECTOMY  2011    TRANSPLANT KIDNEY RECIPIENT  DONOR  2004    TRANSPLANT KIDNEY RECIPIENT  DONOR N/A 10/13/2015    Procedure: TRANSPLANT KIDNEY RECIPIENT  DONOR;  Surgeon: Seth Gibbons MD;  Location: [facility] OR     ureteropyelostomy  05/01/2004               Social History:     Social History     Tobacco Use    Smoking status: Never    Smokeless tobacco: Never   Substance Use Topics    Alcohol use: No             Family History:   I have reviewed this patient's family history          Immunizations:     Immunization History   Administered Date(s) Administered    COVID-19 12+ (2023-24) (MODERNA) 10/27/2023    COVID-19 MONOVALENT 12+ (Pfizer) 02/15/2021, 03/08/2021    Hepatitis B, Adult 11/09/2009    Influenza (H1N1) 11/07/2009    Influenza (intradermal) 06/03/2002, 10/01/2002, 10/01/2006, 11/01/2008, 09/23/2011, 09/16/2012, 09/25/2013, 10/13/2014, 11/19/2015, 12/29/2016, 10/17/2018    Influenza Vaccine >6 months (Alfuria,Fluzone) 11/19/2019, 01/05/2021    Pneumo Conj 13-V (2010&after) 11/03/2014    Pneumococcal 23 valent 11/01/2001, 02/14/2006, 03/18/2009, 11/06/2013    TD,PF 7+ (Tenivac) 11/01/2006    TDAP Vaccine (Boostrix) 12/29/2016    Varicella 11/09/2009    Zoster recombinant adjuvanted (SHINGRIX) 04/11/2019, 08/13/2019             Allergies:     Allergies   Allergen Reactions    Hydralazine Swelling    Betadine [Povidone Iodine]     Cephalexin Hives    Clindamycin Hives    Trazodone Swelling     Swelling of face/lips             Medications:     Current Outpatient Medications   Medication Sig    amLODIPine (NORVASC) 5 MG tablet Take 1 tablet (5 mg) by mouth daily for 30 days    apixaban ANTICOAGULANT (ELIQUIS) 5 MG tablet Take 1 tablet (5 mg) by mouth 2 times daily    carvedilol (COREG) 25 MG tablet Take 1 tablet (25 mg) by mouth 2 times daily (with meals)    clonazePAM (KLONOPIN) 0.5 MG tablet Take 1 tablet (0.5 mg) by mouth At Bedtime    folic acid (FOLVITE) 1 MG tablet Take 1 tablet (1 mg) by mouth daily    ganciclovir 140 mg Inject 140 mg into the vein every 24 hours    levothyroxine (SYNTHROID/LEVOTHROID) 100 MCG tablet Take 1 tablet (100 mcg) by mouth daily    pantoprazole (PROTONIX) 40 MG EC tablet Take 1 tablet  (40 mg) by mouth daily Take 30-60 minutes before a meal.    predniSONE (DELTASONE) 5 MG tablet Take 1 tablet (5 mg) by mouth daily    rOPINIRole (REQUIP) 1 MG tablet Take 1 tablet (1 mg) by mouth At Bedtime    sodium bicarbonate 650 MG tablet Take 2 tablets (1,300 mg) by mouth 3 times daily    sulfamethoxazole-trimethoprim (BACTRIM) 400-80 MG tablet Take 1 tablet by mouth three times a week    tacrolimus (GENERIC) 0.5 MG capsule Take 5 capsules (2.5 mg) by mouth 2 times daily    vitamin D3 (CHOLECALCIFEROL) 10 MCG (400 UNIT) capsule Take 1 capsule (400 Units) by mouth daily     No current facility-administered medications for this visit.                Physical Exam:   There were no vitals taken for this visit.  No vitals are available during this virtual visit.     Constitutional: awake, alert, cooperative, no apparent distress and appears at stated age, well nourished.            Laboratory Data:     Absolute CD4, [place] T Cells   Date Value Ref Range Status   10/18/2023 109 (L) 441 - 2,156 cells/uL Final       Inflammatory Markers  No lab results found.    Immune Globulin Studies      Recent Labs   Lab Test 10/19/23  0454 10/03/23  1240    810   IGM 41  --    IGE 5  --      --        Metabolic Studies    Recent Labs   Lab Test 10/23/23  0821 10/19/23  0454 10/18/23  0453 10/17/23  0444 10/16/23  0449 10/15/23  1153 10/15/23  0615 10/13/23  0454 10/12/23  0456 10/14/15  1325 10/14/15  1021 10/14/15  0000 10/13/15  2231 10/13/15  2113 10/13/15  1215 10/12/15  1330   NA  --  143 145 143 141 142 141   < > 147*   < >  --    < >  --   --    < > 136   POTASSIUM  --  4.5 4.4 4.6 4.7 4.6 4.5   < > 4.8   < > 4.8   < > 5.4* 6.8*   < > 4.2   CHLORIDE 115* 114* 115* 113* 113* 112* 111*   < > 116*   < >  --    < >  --   --    < > 97   CO2  --  21* 21* 21* 21* 20* 22   < > 21*   < >  --    < >  --   --    < > 31   ANIONGAP  --  8 9 9 7 10 8   < > 10   < >  --    < >  --   --    < > 8   BUN  --  43.0* 40.8* 44.3*  42.5* 42.1* 42.9*   < > 47.8*   < >  --    < >  --   --    < > 41*   CR  --  2.87* 2.79* 2.88* 2.80* 2.92* 2.88*   < > 2.86*   < >  --    < >  --   --    < > 9.83*   GFRESTIMATED  --  25* 26* 25* 26* 24* 25*   < > 25*   < >  --    < >  --   --    < > 6*   GLC  --  102* 115* 101* 96 109* 100*   < > 98   < >  --    < >  --   --    < > 95   A1C  --   --   --   --   --   --   --   --   --   --   --   --   --   --   --  5.1   CRISTIAN  --  7.8* 7.4* 7.5* 7.7* 7.7* 9.1   < > 7.8*   < >  --    < >  --   --    < > 8.7   PHOS  --   --  2.8  --   --   --   --   --  3.1   < >  --    < >  --   --    < >  --    MAG  --   --  1.4* 1.5*  --   --   --    < > 1.4*   < >  --    < >  --   --    < >  --    LACT  --   --   --   --   --   --   --   --   --   --  2.1  --   --  4.2*   < >  --    CKT  --   --   --   --   --   --   --   --   --   --   --   --  336*  --   --   --     < > = values in this interval not displayed.       Hepatic Studies    Recent Labs   Lab Test 10/17/23  0444 10/08/23  0946 10/03/23  1240 03/27/18  1510 10/12/15  1330   BILITOTAL  --  0.2 0.3  --  0.4   ALKPHOS  --  52 62  --  81   PROTTOTAL  --  5.1* 6.1*  --  8.4   ALBUMIN 2.9* 2.9* 3.9 3.8 4.2   AST  --  18 12  --  10   ALT  --  <5 7  --  16       Hematology Studies     Recent Labs   Lab Test 10/19/23  0454 10/18/23  0453 10/17/23  0444 10/16/23  0449 10/15/23  1153 10/15/23  0615 11/17/15  1139 10/30/15  0718 10/26/15  0707 10/25/15  0750 10/23/15  0652 10/21/15  0648   WBC 2.7* 2.4* 2.4* 3.6* 4.5 4.7   < > 4.7   < > 7.6 5.4 3.5*   ANEU 1.9 1.9  --   --   --   --   --  3.3  --  6.4 3.9 3.0   ALYM 0.4* 0.3*  --   --   --   --   --  0.3*  --  0.1* 0.1* 0.1*   MELVIN 0.2 0.1  --   --   --   --   --  0.3  --  0.2 0.2 0.2   AEOS 0.1 0.0  --   --   --   --   --  0.3  --  0.1 0.0 0.0   HGB 8.1* 7.0* 7.3* 7.5* 7.8* 7.4*   < > 11.9*   < > 11.8* 10.8* 10.5*   HCT 27.1* 23.8* 24.7* 25.3* 26.4* 25.1*   < > 38.4*   < > 35.8* 34.2* 32.6*   * 134* 116* 124* 107* 109*   < >  282   < > 269 232 163    < > = values in this interval not displayed.       Clotting Studies    Recent Labs   Lab Test 10/12/23  1648 10/04/23  1203 05/02/16  0717 04/25/16  1057 11/02/15  0745 10/30/15  0718 10/25/15  0750 10/23/15  0652 10/21/15  0648 10/20/15  0715   INR 1.05 1.30* 2.6 2.6   < > 1.75*   < > 1.09 1.04 1.02   PTT  --   --   --   --   --  38*  --  32 31 30    < > = values in this interval not displayed.       Urine Studies    Recent Labs   Lab Test 10/03/23  2244 11/17/15  1410 10/14/15  2220 10/13/15  0725   URINEPH 6.0 6.5 5.5 5.0   NITRITE Negative Negative Negative Negative   LEUKEST Negative Negative Small* Negative   WBCU 1 1 15* 1         Microbiology:  Last 6 Culture results with specimen source  Culture Micro   Date Value Ref Range Status   11/17/2015 No growth  Final   10/14/2015 No growth  Final   10/13/2015   Final    Test canceled - Lab  error  Canceled, Test credited     10/13/2015 No growth  Final    Specimen Description   Date Value Ref Range Status   11/17/2015 Catheterized Urine  Final   10/14/2015 Catheterized Urine  Final   10/13/2015 Fluid SALINE WASHINGS BLADDER  Final   10/13/2015 Fluid SALINE WASHINGS BLADDER  Final        Last check of C difficile  C Difficile Toxin B by PCR   Date Value Ref Range Status   10/04/2023 Negative Negative Final     Comment:     A negative result does not exclude actual disease due to C. difficile and may be due to improper collection, handling and storage of the specimen or the number of organisms in the specimen is below the detection limit of the assay.         Virology:  CMV viral loads    CMV viral loads    CMV DNA IU/mL, Instrument   Date Value Ref Range Status   10/17/2023 144 (H) <1 IU/mL Final   10/06/2023 252 (H) <1 IU/mL Final     CMV DNA Quant (External)   Date Value Ref Range Status   05/01/2023 318 (A) Undetected IU/mL Final     CMV viral loads    Recent Labs   Lab Test 10/17/23  0444 10/09/23  0534 10/06/23  1016  "05/01/23  0700   CMVRESINST 144*  --  252*  --    CMVLOG 2.2   < > 2.4  --    63012  --   --   --  318*    < > = values in this interval not displayed.       CMV viral loads    CMV DNA IU/mL   Date Value Ref Range Status   10/09/2023 <35 (A) Not Detected IU/mL Final     Comment:     CMV DNA detected, less than 35 IU/mL     CMV DNA IU/mL, Instrument   Date Value Ref Range Status   10/17/2023 144 (H) <1 IU/mL Final   10/06/2023 252 (H) <1 IU/mL Final     CMV log   Date Value Ref Range Status   10/17/2023 2.2  Final   10/09/2023 <1.5  Final   10/06/2023 2.4  Final     CMV DNA Quant (External)   Date Value Ref Range Status   05/01/2023 318 (A) Undetected IU/mL Final   04/10/2023 460 (A) Undetected IU/mL Final   03/20/2023 622 (A) Undetected IU/mL Final   03/13/2023 451 (A) UNDETECTED IU/mL Final   02/15/2023 301 (A) Undetected IU/mL Final   02/08/2023 311 (A) Undetected IU/mL Final   01/11/2023 138 (A) Undetected IU/mL Final       CMV resistance testing  No lab results found.  No results found for: \"CMVCID\", \"CMVFOS\", \"CMVGAN\", \"CMVDRUGRES\"     EBV viral loads     Recent Labs   Lab Test 10/03/23  1240   EBRES Not Detected     EBV DNA Copies/mL   Date Value Ref Range Status   10/03/2023 Not Detected Not Detected copies/mL Final       Human Herpes Virus 6 viral loads    No results found for: \"H6RES\" No results found for: \"H6SPEC\"    CMV Antibody IgG   Date Value Ref Range Status   10/12/2015 (H) 0.0 - 0.8 AI Final    >8.0  Positive   Antibody index (AI) values reflect qualitative changes in antibody   concentration that cannot be directly associated with clinical condition or   disease state.     06/18/2015 (H) 0.0 - 0.8 AI Final    >8.0  Positive   Antibody index (AI) values reflect qualitative changes in antibody   concentration that cannot be directly associated with clinical condition or   disease state.       CMV Antibody IgM   Date Value Ref Range Status   10/12/2015 0.2 0.0 - 0.8 AI Final     Comment:     " "Negative   Antibody index (AI) values reflect qualitative changes in antibody   concentration that cannot be directly associated with clinical condition or   disease state.       No results found for: \"EBIG2\", \"EBIGM\", \"EBVIGG\", \"EBIGG\", \"EBVAGN\", \"OU3817\", \"TOXG\"    Pathology:  Colon bx 10/5/23  Addendum   CMV POSITIVE BY IMMUNOHISTOCHEMISTRY   (Communicated to the clinical team on 11/06/2023; 3.50 p.m.)     Immunostain for CMV, with appropriate controls, on blocks A1, B1, & C1 is negative in several cells in block C1 (rectal anal transition nodule) cells, though the stain is negative in blocks A1 and B1. This confirms the initial suspicion of CMV proctitis. Immunostain on blocks A1, B1 & C1 is negative for adenovirus.    Addendum electronically signed by Josey Otero MD on 10/6/2023 at  7:19 PM   Final Diagnosis   A. COLON, RANDOM BIOPSY:  Colonic mucosa with mild crypt architectural distortion, evidence of prior injury; no current evidence of cryptitis, dysplasia, or other histologic abnormality; report of CMV & adenovirus immunohistochemistry to follow      B. DISTAL RECTUM, BIOPSY:  Colonic mucosa with mild reactive changes, otherwise no evidence of microscopic or chronic colitis or other significant histologic abnormality; report of CMV & adenovirus immunohistochemistry to follow       3. ANORECTUM, BIOPSY OF NODULE AT TRANSITION:  Colonic mucosa with polypoid features, active inflammation with ulceration and granulation tissue; features concerning but not conclusive for viral cytopathic changes; report of adenovirus & CMV immunohistochemistry to follow        Imaging:  OSH CT a/p WO 10/25/23  IMPRESSION:   Mild stranding about the pancreatic head and neck, correlate with lipase. No other acute findings in   the abdomen or pelvis.       Serafin Rudd MD  "

## 2023-10-31 NOTE — TELEPHONE ENCOUNTER
"[facility] received call from [facility] RN, Dr. Silvio Butler. Looking for input on Ganciclovir infusions scheduled the rest of the week to treat CMV colitis. Pharmacy recommended dose reduction due to Creatinine 3.09. ANC 0.38. CMV <135. Dose given yesterday, Ganciclovir 1.25 mg/kg (reduced from 2.5 mg/kg every 24 hrs). With Creatinine so high, what do you want dose to be and for how long should he receive infusions? Scheduled daily thru this Friday, then complete?     Also noted that Dr. Bullard has recommended Neupogen x3 days. Clarify recommended dose and fax orders to Mireya, at [facility] for help arranging with infusions/adding to therapy plan.     Fax [phone].   Ph [phone] opt 2.     Message  Received: Today  Maurice Bullard MD [place], Christin Rebecca, RN  Caller: Unspecified (Yesterday, 10:18 AM)  You can give him 500cc LR. His GFR is about the same. Continue 1.25mg/kg q24h. Dose neupogen 480mcg subcutaneous daily x3 days. He needs to receive it for total 4 weeks and then will reassess need for it. Thanks    Previous Messages  ----- Message -----  From: Meagan Richardson RN  Sent: 10/31/2023   9:07 AM CDT  To: Maurice Bullard MD; *    ----- Message from Meagan Richardson RN sent at 10/31/2023  9:07 AM CDT -----  Dr. Bullard, Please can you advise on ganciclovir treatment course. Dose and longevity of course given his creatinine is rising. Does he need IV fluids due to rising Cr? From the notes it sounded like only one stool per day so not severed diarrhea.      I also don't prescribe neupogen much in chronic side so what dose are we ordering x3 days?    Thanks, Meagan    OUTCOME: Order communication faxed to [facility]. Spoke with Mireya about ganciclovir for total of 4 weeks. Requesting end date. Reviewed historical administrations. Note on 10/16/23 to pharmacy states \"This is home infusion. Started on 10/6 to finish 4 week course.\" Hospital " "Discharge summary then reviewed; Plan as follows: - ID recommends IV ganciclovir - renally dosing 4 week course (10/6/23 - 11/3/23). Staff msg back to Dr. Bullard to confirm completion on this Fri 11/3.     Per Dr. Bullard, \"Total 4 week treatment with IV GCV and then should start PO valcyte 450mg every other day.\" Prescription sent to [facility] to start 11/4/23. Spoke with Haider and spouse, who verbalized understanding of plan.     Meagan Richardson, RN, BSN  Solid Organ Transplant, Post Kidney and Pancreas  Transplant Care Coordinator  [phone]       "

## 2023-10-31 NOTE — PROGRESS NOTES
Virtual Visit Details    Type of service:  Video Visit   Video Start Time:  6:30 pm  Video End Time: 6:38 pm    Originating Location (pt. Location): Home    Distant Location (provider location):  On-site  Platform used for Video Visit: [facility]    Transplant Infectious Diseases Outpatient Progress note      Patient:  Haider Torrez, Date of birth 1965, Medical record number [id number]  Date of Visit:  10/31/2023         Recommendations:   Discontinue GCV after last dose 11/3/23.   Discontinue PICC line after last dose 11/3/23.   Continue to monitor weekly CMV PCR for the next 6 weeks.     RTC: 6 weeks.         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 Now with failing allograft.   Recently discharged from the hospital after acute diarrhea for one month and worsening renal failure. Colonoscopy done on 10/5/23 which was normal. However, anorectal biopsy was suggestive of CMV infection with ulceration and positive immunohistochemical staining for CMV. 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   Was started on IV GCV adjusted to CrCl on 10/6/23 with CMV VL returning to <1.5 log. On 10/17/23 VL increased to 2.2 log, so IV GCV was increased on 10/18/23.           Active Problems and Infectious Diseases Issues:   CMV proctitis/colitis with diarrhea.   CMV viremia, D-/R+  The diarrhea resolved.   The VL is <1.5 as of 10/23/23 in care everywhere.   Will finish a total of 4 weeks on 11/3/23 and discontinue the PICC.     Norovirus viral shedding.   This episode of diarrhea seems to be due to CMV colitis rather than norovirus. The patient stated that diarrhea in 3/2023 (the time of norovirus infection) resolved before it recurred in 9/2023. The positive norovirus represents shedding. No evidence that  there is a role of nitazoxanide for the treatment of norovirus especially with chronic shedding.       Leukopenia  Likely due to GCV.   Will support with G-CSF as needed.      Immunocompromised patient with viral infections.   The chronic viral shedding of norovirus, the recent norovirus infection and now CMV colitis following prolonged viremia, all indicate overly immunosuppressed patient.         Old Problems and Infectious Diseases Issues:   Norovirus infection 3/2023 treated with supportive care with resolution of diarrhea. Diarrhea recurred in 9/2023 with persistent shedding of the norovirus.   COVID-19 infection in 9/2022 treated with remdesivir.     Other Infectious Disease issues include:  - QTc: 443 as of 10/4/23.   - PCP prophylaxis: bactrim.   -   - Serostatus: CMV D-/R+, EBV D+/R+, VZV +.   - Gamma globulin status: 810 as of 10/3/23 and 780 as of 10/19/23. IVIG given 10/4/23.       Attestation:  Total duration of visit including chart review, reviewing labs and imaging, interviewing and examining the patient, documentation, and sending communication to the patient and to the primary treating team, all at the same day of this encounter, is: 52 minutes.   Serafin Rudd MD    Contact information available via [facility] Paging/Directory     10/31/2023         Interim History:   Diarrhea resolved.   No fever, chills. No N/A.   No other complaints or events.          Review of Systems:     As mentioned in the interim history otherwise negative by reviewing constitutional symptoms, central and peripheral neurological systems, respiratory system, cardiac system, GI system,  system, musculoskeletal, skin, allergy, and lymphatics.                  Past Medical History:     Past Medical History:   Diagnosis Date    Anemia of chronic kidney failure     Carpal tunnel syndrome     Clinical diagnosis of COVID-19 9/22/2022    Dyslipidemia     History of respiratory failure     Hypertension     Hypomagnesemia 2015    IgA  nephropathy     Insomnia     Kidney disease, chronic, end stage on dialysis (H)     Kidney transplant failure     Myoclonus     Obstructive sleep apnea     Papillary thyroid carcinoma (H)     PRES (posterior reversible encephalopathy syndrome)      after thyroidectomy    Restless leg syndrome     Secondary hyperparathyroidism (H24)     Seizure disorder (H)     Post surgery. Keppra now discontinued    Urinoma of kidney transplant              Past Surgical History:     Past Surgical History:   Procedure Laterality Date    BENCH KIDNEY  10/13/2015    Procedure: BENCH KIDNEY;  Surgeon: Seth Gibbons MD;  Location: [facility] OR    COLONOSCOPY N/A 10/05/2023    Procedure: COLONOSCOPY, WITH POLYPECTOMY AND BIOPSY;  Surgeon: Russ Dodge MD;  Location: [facility] GI    CREATE FISTULA ARTERIOVENOUS UPPER EXTREMITY  2011    CYSTOSCOPY, REMOVE STENT(S), COMBINED N/A 2015    Procedure: COMBINED CYSTOSCOPY, REMOVE STENT(S);  Surgeon: Seth Gibbons MD;  Location: [facility] OR    CYSTOSCOPY, REMOVE STENT(S), COMBINED Bilateral 2015    Procedure: COMBINED CYSTOSCOPY, REMOVE STENT(S);  Surgeon: Seth Gibbons MD;  Location: [facility] OR    ELBOW SURGERY Right 1978    hemithyroidectomy Left 2011    HERNIA REPAIR Right 2009    PICC SINGLE LUMEN PLACEMENT Left 10/14/2023    left basilic 4fr sl power picc 44 cm    THYROIDECTOMY  2011    TRANSPLANT KIDNEY RECIPIENT  DONOR  2004    TRANSPLANT KIDNEY RECIPIENT  DONOR N/A 10/13/2015    Procedure: TRANSPLANT KIDNEY RECIPIENT  DONOR;  Surgeon: Seth Gibbons MD;  Location: [facility] OR    ureteropyelostomy  2004               Social History:     Social History     Tobacco Use    Smoking status: Never    Smokeless tobacco: Never   Substance Use Topics    Alcohol use: No             Family History:   I have reviewed this patient's family history          Immunizations:     Immunization History   Administered Date(s)  Administered    COVID-19 12+ (2023-24) (MODERNA) 10/27/2023    COVID-19 MONOVALENT 12+ (Pfizer) 02/15/2021, 03/08/2021    Hepatitis B, Adult 11/09/2009    Influenza (H1N1) 11/07/2009    Influenza (intradermal) 06/03/2002, 10/01/2002, 10/01/2006, 11/01/2008, 09/23/2011, 09/16/2012, 09/25/2013, 10/13/2014, 11/19/2015, 12/29/2016, 10/17/2018    Influenza Vaccine >6 months (Alfuria,Fluzone) 11/19/2019, 01/05/2021    Pneumo Conj 13-V (2010&after) 11/03/2014    Pneumococcal 23 valent 11/01/2001, 02/14/2006, 03/18/2009, 11/06/2013    TD,PF 7+ (Tenivac) 11/01/2006    TDAP Vaccine (Boostrix) 12/29/2016    Varicella 11/09/2009    Zoster recombinant adjuvanted (SHINGRIX) 04/11/2019, 08/13/2019             Allergies:     Allergies   Allergen Reactions    Hydralazine Swelling    Betadine [Povidone Iodine]     Cephalexin Hives    Clindamycin Hives    Trazodone Swelling     Swelling of face/lips             Medications:     Current Outpatient Medications   Medication Sig    amLODIPine (NORVASC) 5 MG tablet Take 1 tablet (5 mg) by mouth daily for 30 days    apixaban ANTICOAGULANT (ELIQUIS) 5 MG tablet Take 1 tablet (5 mg) by mouth 2 times daily    carvedilol (COREG) 25 MG tablet Take 1 tablet (25 mg) by mouth 2 times daily (with meals)    clonazePAM (KLONOPIN) 0.5 MG tablet Take 1 tablet (0.5 mg) by mouth At Bedtime    folic acid (FOLVITE) 1 MG tablet Take 1 tablet (1 mg) by mouth daily    ganciclovir 140 mg Inject 140 mg into the vein every 24 hours    levothyroxine (SYNTHROID/LEVOTHROID) 100 MCG tablet Take 1 tablet (100 mcg) by mouth daily    pantoprazole (PROTONIX) 40 MG EC tablet Take 1 tablet (40 mg) by mouth daily Take 30-60 minutes before a meal.    predniSONE (DELTASONE) 5 MG tablet Take 1 tablet (5 mg) by mouth daily    rOPINIRole (REQUIP) 1 MG tablet Take 1 tablet (1 mg) by mouth At Bedtime    sodium bicarbonate 650 MG tablet Take 2 tablets (1,300 mg) by mouth 3 times daily    sulfamethoxazole-trimethoprim (BACTRIM)  400-80 MG tablet Take 1 tablet by mouth three times a week    tacrolimus (GENERIC) 0.5 MG capsule Take 5 capsules (2.5 mg) by mouth 2 times daily    vitamin D3 (CHOLECALCIFEROL) 10 MCG (400 UNIT) capsule Take 1 capsule (400 Units) by mouth daily     No current facility-administered medications for this visit.                Physical Exam:   There were no vitals taken for this visit.  No vitals are available during this virtual visit.     Constitutional: awake, alert, cooperative, no apparent distress and appears at stated age, well nourished.            Laboratory Data:     Absolute CD4, Mystic T Cells   Date Value Ref Range Status   10/18/2023 109 (L) 441 - 2,156 cells/uL Final       Inflammatory Markers  No lab results found.    Immune Globulin Studies      Recent Labs   Lab Test 10/19/23  0454 10/03/23  1240    810   IGM 41  --    IGE 5  --      --        Metabolic Studies    Recent Labs   Lab Test 10/23/23  0821 10/19/23  0454 10/18/23  0453 10/17/23  0444 10/16/23  0449 10/15/23  1153 10/15/23  0615 10/13/23  0454 10/12/23  0456 10/14/15  1325 10/14/15  1021 10/14/15  0000 10/13/15  2231 10/13/15  2113 10/13/15  1215 10/12/15  1330   NA  --  143 145 143 141 142 141   < > 147*   < >  --    < >  --   --    < > 136   POTASSIUM  --  4.5 4.4 4.6 4.7 4.6 4.5   < > 4.8   < > 4.8   < > 5.4* 6.8*   < > 4.2   CHLORIDE 115* 114* 115* 113* 113* 112* 111*   < > 116*   < >  --    < >  --   --    < > 97   CO2  --  21* 21* 21* 21* 20* 22   < > 21*   < >  --    < >  --   --    < > 31   ANIONGAP  --  8 9 9 7 10 8   < > 10   < >  --    < >  --   --    < > 8   BUN  --  43.0* 40.8* 44.3* 42.5* 42.1* 42.9*   < > 47.8*   < >  --    < >  --   --    < > 41*   CR  --  2.87* 2.79* 2.88* 2.80* 2.92* 2.88*   < > 2.86*   < >  --    < >  --   --    < > 9.83*   GFRESTIMATED  --  25* 26* 25* 26* 24* 25*   < > 25*   < >  --    < >  --   --    < > 6*   GLC  --  102* 115* 101* 96 109* 100*   < > 98   < >  --    < >  --   --    < >  95   A1C  --   --   --   --   --   --   --   --   --   --   --   --   --   --   --  5.1   CRISTIAN  --  7.8* 7.4* 7.5* 7.7* 7.7* 9.1   < > 7.8*   < >  --    < >  --   --    < > 8.7   PHOS  --   --  2.8  --   --   --   --   --  3.1   < >  --    < >  --   --    < >  --    MAG  --   --  1.4* 1.5*  --   --   --    < > 1.4*   < >  --    < >  --   --    < >  --    LACT  --   --   --   --   --   --   --   --   --   --  2.1  --   --  4.2*   < >  --    CKT  --   --   --   --   --   --   --   --   --   --   --   --  336*  --   --   --     < > = values in this interval not displayed.       Hepatic Studies    Recent Labs   Lab Test 10/17/23  0444 10/08/23  0946 10/03/23  1240 03/27/18  1510 10/12/15  1330   BILITOTAL  --  0.2 0.3  --  0.4   ALKPHOS  --  52 62  --  81   PROTTOTAL  --  5.1* 6.1*  --  8.4   ALBUMIN 2.9* 2.9* 3.9 3.8 4.2   AST  --  18 12  --  10   ALT  --  <5 7  --  16       Hematology Studies     Recent Labs   Lab Test 10/19/23  0454 10/18/23  0453 10/17/23  0444 10/16/23  0449 10/15/23  1153 10/15/23  0615 11/17/15  1139 10/30/15  0718 10/26/15  0707 10/25/15  0750 10/23/15  0652 10/21/15  0648   WBC 2.7* 2.4* 2.4* 3.6* 4.5 4.7   < > 4.7   < > 7.6 5.4 3.5*   ANEU 1.9 1.9  --   --   --   --   --  3.3  --  6.4 3.9 3.0   ALYM 0.4* 0.3*  --   --   --   --   --  0.3*  --  0.1* 0.1* 0.1*   MELVIN 0.2 0.1  --   --   --   --   --  0.3  --  0.2 0.2 0.2   AEOS 0.1 0.0  --   --   --   --   --  0.3  --  0.1 0.0 0.0   HGB 8.1* 7.0* 7.3* 7.5* 7.8* 7.4*   < > 11.9*   < > 11.8* 10.8* 10.5*   HCT 27.1* 23.8* 24.7* 25.3* 26.4* 25.1*   < > 38.4*   < > 35.8* 34.2* 32.6*   * 134* 116* 124* 107* 109*   < > 282   < > 269 232 163    < > = values in this interval not displayed.       Clotting Studies    Recent Labs   Lab Test 10/12/23  1648 10/04/23  1203 05/02/16  0717 04/25/16  1057 11/02/15  0745 10/30/15  0718 10/25/15  0750 10/23/15  0652 10/21/15  0648 10/20/15  0715   INR 1.05 1.30* 2.6 2.6   < > 1.75*   < > 1.09 1.04 1.02   PTT   --   --   --   --   --  38*  --  32 31 30    < > = values in this interval not displayed.       Urine Studies    Recent Labs   Lab Test 10/03/23  2244 11/17/15  1410 10/14/15  2220 10/13/15  0725   URINEPH 6.0 6.5 5.5 5.0   NITRITE Negative Negative Negative Negative   LEUKEST Negative Negative Small* Negative   WBCU 1 1 15* 1         Microbiology:  Last 6 Culture results with specimen source  Culture Micro   Date Value Ref Range Status   11/17/2015 No growth  Final   10/14/2015 No growth  Final   10/13/2015   Final    Test canceled - Lab  error  Canceled, Test credited     10/13/2015 No growth  Final    Specimen Description   Date Value Ref Range Status   11/17/2015 Catheterized Urine  Final   10/14/2015 Catheterized Urine  Final   10/13/2015 Fluid SALINE WASHINGS BLADDER  Final   10/13/2015 Fluid SALINE WASHINGS BLADDER  Final        Last check of C difficile  C Difficile Toxin B by PCR   Date Value Ref Range Status   10/04/2023 Negative Negative Final     Comment:     A negative result does not exclude actual disease due to C. difficile and may be due to improper collection, handling and storage of the specimen or the number of organisms in the specimen is below the detection limit of the assay.         Virology:  CMV viral loads    CMV viral loads    CMV DNA IU/mL, Instrument   Date Value Ref Range Status   10/17/2023 144 (H) <1 IU/mL Final   10/06/2023 252 (H) <1 IU/mL Final     CMV DNA Quant (External)   Date Value Ref Range Status   05/01/2023 318 (A) Undetected IU/mL Final     CMV viral loads    Recent Labs   Lab Test 10/17/23  0444 10/09/23  0534 10/06/23  1016 05/01/23  0700   CMVRESINST 144*  --  252*  --    CMVLOG 2.2   < > 2.4  --    51910  --   --   --  318*    < > = values in this interval not displayed.       CMV viral loads    CMV DNA IU/mL   Date Value Ref Range Status   10/09/2023 <35 (A) Not Detected IU/mL Final     Comment:     CMV DNA detected, less than 35 IU/mL     CMV DNA IU/mL,  "Instrument   Date Value Ref Range Status   10/17/2023 144 (H) <1 IU/mL Final   10/06/2023 252 (H) <1 IU/mL Final     CMV log   Date Value Ref Range Status   10/17/2023 2.2  Final   10/09/2023 <1.5  Final   10/06/2023 2.4  Final     CMV DNA Quant (External)   Date Value Ref Range Status   05/01/2023 318 (A) Undetected IU/mL Final   04/10/2023 460 (A) Undetected IU/mL Final   03/20/2023 622 (A) Undetected IU/mL Final   03/13/2023 451 (A) UNDETECTED IU/mL Final   02/15/2023 301 (A) Undetected IU/mL Final   02/08/2023 311 (A) Undetected IU/mL Final   01/11/2023 138 (A) Undetected IU/mL Final       CMV resistance testing  No lab results found.  No results found for: \"CMVCID\", \"CMVFOS\", \"CMVGAN\", \"CMVDRUGRES\"     EBV viral loads     Recent Labs   Lab Test 10/03/23  1240   EBRES Not Detected     EBV DNA Copies/mL   Date Value Ref Range Status   10/03/2023 Not Detected Not Detected copies/mL Final       Human Herpes Virus 6 viral loads    No results found for: \"H6RES\" No results found for: \"H6SPEC\"    CMV Antibody IgG   Date Value Ref Range Status   10/12/2015 (H) 0.0 - 0.8 AI Final    >8.0  Positive   Antibody index (AI) values reflect qualitative changes in antibody   concentration that cannot be directly associated with clinical condition or   disease state.     06/18/2015 (H) 0.0 - 0.8 AI Final    >8.0  Positive   Antibody index (AI) values reflect qualitative changes in antibody   concentration that cannot be directly associated with clinical condition or   disease state.       CMV Antibody IgM   Date Value Ref Range Status   10/12/2015 0.2 0.0 - 0.8 AI Final     Comment:     Negative   Antibody index (AI) values reflect qualitative changes in antibody   concentration that cannot be directly associated with clinical condition or   disease state.       No results found for: \"EBIG2\", \"EBIGM\", \"EBVIGG\", \"EBIGG\", \"EBVAGN\", \"DA2446\", \"TOXG\"    Pathology:  Colon bx 10/5/23  Addendum   CMV POSITIVE BY IMMUNOHISTOCHEMISTRY "   (Communicated to the clinical team on 11/06/2023; 3.50 p.m.)     Immunostain for CMV, with appropriate controls, on blocks A1, B1, & C1 is negative in several cells in block C1 (rectal anal transition nodule) cells, though the stain is negative in blocks A1 and B1. This confirms the initial suspicion of CMV proctitis. Immunostain on blocks A1, B1 & C1 is negative for adenovirus.    Addendum electronically signed by Josey Otero MD on 10/6/2023 at  7:19 PM   Final Diagnosis   A. COLON, RANDOM BIOPSY:  Colonic mucosa with mild crypt architectural distortion, evidence of prior injury; no current evidence of cryptitis, dysplasia, or other histologic abnormality; report of CMV & adenovirus immunohistochemistry to follow      B. DISTAL RECTUM, BIOPSY:  Colonic mucosa with mild reactive changes, otherwise no evidence of microscopic or chronic colitis or other significant histologic abnormality; report of CMV & adenovirus immunohistochemistry to follow       3. ANORECTUM, BIOPSY OF NODULE AT TRANSITION:  Colonic mucosa with polypoid features, active inflammation with ulceration and granulation tissue; features concerning but not conclusive for viral cytopathic changes; report of adenovirus & CMV immunohistochemistry to follow        Imaging:  [facility] CT a/p WO 10/25/23  IMPRESSION:   Mild stranding about the pancreatic head and neck, correlate with lipase. No other acute findings in   the abdomen or pelvis.

## 2023-11-01 NOTE — PATIENT INSTRUCTIONS
Mr. Torrez,   As we discussed:  Please call [phone] to schedule an appointment with me in 6-8 weeks, in-person or virtual.   CMV PCR weekly for the next 6 weeks.     Serafin Rudd MD

## 2023-11-01 NOTE — TELEPHONE ENCOUNTER
CMV Results in Care Everywhere: 10/23/23 results <35, 10/30/23 results undetected  CMV DNA Detect/Quant, P Undetected <35 Abnormal          Message  Received: Yesterday  Serafin Rudd MD [place], Meagan Mendes, MARIBEL Rhodes,    I am not sure about the home infusion in charge of Mr. Torrez, but we should be able to discontinue GCV and the PICC line after last dose 11/3/23.  We should continue weekly CMV PCR, however. Would you be able to fax a request to his local lab to have weekly CMV done for the next 6 weeks? Would you be able to have them forward the results to me? Please fax results to Dr. Rudd, fax number [phone].       Thanks,  Serafin Rudd MD    OUTCOME: [facility] sent lab requisition letter to preferred lab on file, entered orders in EPIC and sent MyChart communication to patient.     Meagan Richardson RN, BSN  Solid Organ Transplant, Post Kidney and Pancreas  Transplant Care Coordinator  [phone]

## 2023-11-03 ENCOUNTER — TELEPHONE (OUTPATIENT)
Dept: INFECTIOUS DISEASES | Facility: CLINIC | Age: 58
End: 2023-11-03
Payer: MEDICARE

## 2023-11-06 NOTE — TELEPHONE ENCOUNTER
Reviewed pt's chart for pre-kidney transplant evaluation planning. Coordinator first call on 11/6/23. PKE STD on Wednesday, 1/17/2024 slot #1/A.      services required no. Is pt able to attend virtual education class? Has active MyChart    Pt has CKD original kidney disease IgA nephropathy, biopsy not yet . Qualifying GFR of 17 on 10/02/2023.  Pt is not on dialysis. Pt is not a diabetic.     Health hx: anemia, dyslipidemia, thyroid cancer s/p thyroidectomy 2011.  Heart hx: HTN, DVT on eliquis.  Lung hx: CAT. Surgical hx: DDKTxp in 2004 & 2015, cystoscopies in 2015, thyroidectomy 2011, AVF creation, hernia repair 2009.  Pt is not/never a smoker, does not consume alcohol, and does not use recreational drugs. 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 Rudd, non-healing wounds, or active cancer.    Health maintenance items: BMI 22.5 on 10/24/2023.  Colonoscopy: 10/05/2023.  Dental: due.  Vaccinations: UTD. Dermatology: follows a regular dermatologist - . Pt uses a walker w/ ADLs.  Pt lives in [place], [place] w/ wife and son.  Has support following transplant. Pt does not  living donors.     Contacted patient and introduced myself as their Transplant Coordinator, also introduced the role of the Transplant Coordinator in the transplant process.  Explained the purpose of this call including reviewing next steps and answering questions.      Confirmed Referring Provider, Dr. Maurice Bullard; Dialysis Center, not on HD; and Primary Care Physician, Dr. Hernandez Maillette. Explained to the patient of the importance of continued communication with referring providers and primary care physicians.      Reviewed components of transplant evaluation process including necessary appointments, tests, and procedures.  Instructed pt to bring 1-2 people with them to eval and to eat and drink and normally on eval day    Answered questions for patient regarding evaluation, provided my name and  contact information and requested they call/message with any additional questions or concerns.  Informed patient they will receive a letter with information discussed in referral call. Determined that patient would like information regarding transplant by:       Mail, Email, MyChart, and/or Phone Call.  Encouraged the use of [employer].    Informed pt about transplant educational website, asked to watch pre-kidney or sign up for education class prior to evaluation. Link for educational videos were provided.  Additional informational web sites about transplant were discussed. Links provided to www.[facility].org and www.[facility].org in letter sent to patient.  Pt expressed understanding of all and were in  agreement with the plan.    Confirmed STD Wednesday, 1/17/2024 slot #1. Informed pt they will hear from scheduling to arrange appointment times for evaluation day. As well as, receive an email from Thais in our Office prior to eval with a Receipt of Info and educational materials - instructed to read materials and sign consent prior to eval. Smartset orders entered.

## 2023-11-07 NOTE — TELEPHONE ENCOUNTER
----- Message -----  From: Meagan Richardson RN  Sent: 11/1/2023   9:52 AM CDT  To: Maurice Bullard MD; MD Abi [address] Dr[address]    Appreciate your message. I have placed the CMV PCR QT orders as requested and notified Haider via [employer] communication. Please also note, Dr. Bullard ordered valcyte 450 mg PO every other day starting 11/4/23 (after infusions completed). We sent the prescription for this in yesterday. I presume he will continue taking the Valcyte for the duration of lab monitoring (6 weeks) then we will reassess if able to discontinue that.    Thanks,    Meagan Richardson RN, BSN  Solid Organ Transplant, Post Kidney and Pancreas  Transplant Care Coordinator  [phone]    ----- Message -----  From: Serafin Rudd MD  Sent: 10/31/2023   7:13 PM CDT  To: MARIBEL Rivera,    I am not sure about the home infusion in charge of Mr. Torrez, but we should be able to discontinue GCV and the PICC line after last dose 11/3/23.  We should continue weekly CMV PCR, however. Would you be able to fax a request to his local lab to have weekly CMV done for the next 6 weeks? Would you be able to have them forward the results to me? Please fax results to Dr. Rudd, fax number [phone].       Thanks,  Serafin Rudd MD

## 2023-11-08 ENCOUNTER — TELEPHONE (OUTPATIENT)
Dept: TRANSPLANT | Facility: CLINIC | Age: 58
End: 2023-11-08
Payer: MEDICARE

## 2023-11-08 DIAGNOSIS — Z79.899 ENCOUNTER FOR LONG-TERM (CURRENT) USE OF HIGH-RISK MEDICATION: ICD-10-CM

## 2023-11-08 DIAGNOSIS — I15.1 HTN, KIDNEY TRANSPLANT RELATED: ICD-10-CM

## 2023-11-08 DIAGNOSIS — Z94.0 HTN, KIDNEY TRANSPLANT RELATED: ICD-10-CM

## 2023-11-08 DIAGNOSIS — Z94.0 KIDNEY REPLACED BY TRANSPLANT: Primary | ICD-10-CM

## 2023-11-08 RX ORDER — CHLORTHALIDONE 25 MG/1
12.5 TABLET ORAL DAILY
COMMUNITY
End: 2023-12-20

## 2023-11-08 RX ORDER — AMLODIPINE BESYLATE 5 MG/1
5 TABLET ORAL DAILY
Qty: 30 TABLET | Refills: 0 | Status: SHIPPED | OUTPATIENT
Start: 2023-11-08 | End: 2023-12-20

## 2023-11-08 RX ORDER — FUROSEMIDE 20 MG
20 TABLET ORAL DAILY
COMMUNITY
End: 2023-12-20

## 2023-11-08 RX ORDER — TACROLIMUS 1 MG/1
CAPSULE ORAL
Qty: 150 CAPSULE | Refills: 11 | Status: SHIPPED | OUTPATIENT
Start: 2023-11-08 | End: 2023-11-15

## 2023-11-08 RX ORDER — TACROLIMUS 0.5 MG/1
2.5 CAPSULE ORAL EVERY MORNING
Qty: 150 CAPSULE | Refills: 11 | Status: SHIPPED | OUTPATIENT
Start: 2023-11-08 | End: 2023-11-15 | Stop reason: ALTCHOICE

## 2023-11-08 NOTE — TELEPHONE ENCOUNTER
"ISSUE:   He received the neupogen 480 mcg subcutaneous x3 days (11/1-11/3) as recommended for WBC 0.9 & ANC 0.38 on 10/30. Now his WBC is 23 and ANC 20.27 on 11/6/23.     Ganciclovir 1.25 mg/kg completed 11/3. Started Valcyte 450 mg PO every other day on 11/4/23. Creatinine up to 3.2. (baseline 3.0-3.4)    CMV not detected x2.    PLAN:   Called to see how Haider is feeling.   No diarrhea, no fever, no chills, no abd pain over kidney site.   Hydrating.  Seen local nephrologist, Dr. Butler, yesterday 11/7/23. Notes in Care Everywhere.  R arm swelling; Feet swollen. Weight is 142 lbs. Normally 135 lbs.   Ultrasound ordered- has a HD fistula in that arm. 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  Prescribed Lasix 20 mg PO tab daily; instructed to .  sBP 150 in clinic; check BP at home and notify if >130/80.   Reviewed med list as Dr. Butler's note states: \"Carvedilol 25 mg twice a day; however, he is not sure whether he is taking carvedilol or metoprolol which is 100 mg 3 times a day.\" PATIENT CONFIRMS NOT TAKING METOPROLOL. HE IS TAKING CARVEDILOL. He is also taking amlodipine.     OUTCOME:   Was taking Bactrim daily; but we discussed it is prescribed 3 time per week. He will make this change to Mondays, Wednesdays, Saturdays.    Refilled one month supply of Amlodipine 5 mg PO; Future refills from Dr. Berger; Haider states he will follow up with local nephrologist in 1 month.    Returns to lab weekly on Mondays 11/13/23, 11/20/23, 11/27/23, 12/11/23.  Lab requisition letter sent for BMP, CBC, and Tacro level 11/13 in addition to CMV PCR.     Meagan Richardson, RN, BSN  Solid Organ Transplant, Post Kidney and Pancreas  Transplant Care Coordinator  [phone]       "

## 2023-11-08 NOTE — LETTER
PHYSICIAN ORDERS      DATE & TIME ISSUED: 2023 12:44 PM   PATIENT NAME: Haider Torrez   : 1965     [facility] MR# [if applicable]: [id number]     DIAGNOSIS: Kidney transplanted; Long Term Use of Medication  ICD-10 CODE: Z94.0; Z79.899    Please check the following labs ~23:  - BMP  - CBC w/platelets  - Tacrolimus level (12 hour trough)    Any questions please call: [phone] option 5.  Fax results to: [phone].     Thank you,      Maurice Bullard MD

## 2023-11-08 NOTE — TELEPHONE ENCOUNTER
ISSUE:   Tacrolimus IR level 3.5 on 11/6/23 07:51 AM, goal 4-6, dose 2.5 mg BID.     Message  Received: Today  Maurice Bullard MD Blaisdell, Christin Rebecca, RN  Keep 4-6 please as viremias seem a bit better          Previous Messages       ----- Message -----  From: Meagan Richardson, RN  Sent: 11/8/2023  11:51 AM CST  To: Maurice Bullard MD    Should we be running tacrolimus goal at 3-5 with the viremias instead of 4-6?    PLAN:   Please call patient and confirm this was an accurate 12-hour trough. Verify Tacrolimus IR dose 2.5 mg BID. Confirm no new medications or illness. Confirm no missed doses. If accurate trough and accurate dose, increase Tacrolimus IR dose to 2.5 mg AM/3 mg PM and repeat labs in 1 week.    OUTCOME:   Spoke with patient, they confirm accurate trough level and current dose 2.5 mg BID. Patient confirmed dose change to 2.5 mg AM/3 mg PM and to repeat labs in 1 weeks. Orders sent to preferred pharmacy for dose change and lab for repeat labs. Patient voiced understanding of plan.     Meagan Richardson, RN, BSN  Solid Organ Transplant, Post Kidney and Pancreas  Transplant Care Coordinator  [phone]

## 2023-11-15 ENCOUNTER — TELEPHONE (OUTPATIENT)
Dept: TRANSPLANT | Facility: CLINIC | Age: 58
End: 2023-11-15
Payer: MEDICARE

## 2023-11-15 DIAGNOSIS — Z94.0 KIDNEY REPLACED BY TRANSPLANT: Primary | ICD-10-CM

## 2023-11-15 DIAGNOSIS — B25.9 CMV COLITIS (H): ICD-10-CM

## 2023-11-15 DIAGNOSIS — Z94.0 KIDNEY TRANSPLANTED: ICD-10-CM

## 2023-11-15 DIAGNOSIS — A08.39 CMV COLITIS (H): ICD-10-CM

## 2023-11-15 RX ORDER — MYCOPHENOLIC ACID 180 MG/1
360 TABLET, DELAYED RELEASE ORAL 2 TIMES DAILY
Qty: 120 TABLET | Refills: 11 | Status: SHIPPED | OUTPATIENT
Start: 2023-11-15

## 2023-11-15 RX ORDER — TACROLIMUS 1 MG/1
3 CAPSULE ORAL 2 TIMES DAILY
Qty: 180 CAPSULE | Refills: 11 | Status: SHIPPED | OUTPATIENT
Start: 2023-11-15 | End: 2024-01-12

## 2023-11-15 RX ORDER — VALGANCICLOVIR 450 MG/1
TABLET, FILM COATED ORAL
Qty: 15 TABLET | Refills: 0 | Status: SHIPPED | OUTPATIENT
Start: 2023-11-15 | End: 2023-12-07

## 2023-11-15 NOTE — LETTER
PHYSICIAN ORDERS      DATE & TIME ISSUED: 2023 9:14 AM   PATIENT NAME: Haider Torrez   : 1965     [facility] MR# [if applicable]: [id number]     DIAGNOSIS: Kidney transplanted; Long Term Use of Medication  ICD-10 CODE: Z94.0; Z79.899    Please check the following labs ~23 in addition to the standing CMV PCR Quantitative order:  - BMP  - CBC w/platelets  - Tacrolimus level (12 hour trough)    Any questions please call: [phone] option 5.  Fax results to: [phone]. Please also fax to Dr. Silvio Butler, at [phone]     Thank you,      Maurice Bullard MD

## 2023-11-15 NOTE — TELEPHONE ENCOUNTER
Post Kidney and Pancreas Transplant Team Conference  Date: 11/15/2023  Transplant Coordinator: Meagan Richardson     Attendees:  [x]  Dr. Lyon [] Magda Jacobs, RN [x] Cele Padron LPN     [x]  Dr. Winn [x] Krys eRed, RN [] Maryan Taylor LPN    [x] Dr. Bullard [x] Helena Merchant RN    [x] Dr. Aguilar [] Nayeli Rojas, MARIBEL [x] Ericka Cabral RN   [] Dr. Barclay [x] Meagan Richardson, MARIBEL    [x] Dr. Marquis [x] Rox Brody, MARIBEL    []  Dr. North [x] Lorena Samuels RN    [x] Dr. Kingston [] Zachery Galvan RN    [x] Toya Cassidy, VINNY [x] Beverly Graham RN    [x] Shari Forman NP [x] Rocío Arias RN        Verbal Plan Read Back:   OK for IV fluids, encourage oral hydration  Decrease Valcyte to 450 mg twice per week  Recommend stopping Lasix and chlorthalidone  Restart  mg BID if no diarrhea present  Repeat txp labs next week          Routed to RN Coordinator   Cele Padron LPN

## 2023-11-15 NOTE — TELEPHONE ENCOUNTER
ISSUE:   Tacrolimus IR level 3.8 on 11/13/23 08:01, goal 4-6, dose 2.5 mg AM/3 mg PM.    PLAN:   Please call patient and confirm this was an accurate 12-hour trough. Verify Tacrolimus IR dose 2.5 mg AM/3 mg PM. Confirm no new medications or illness. Confirm no missed doses. If accurate trough and accurate dose, increase Tacrolimus IR dose to 3 mg BID and repeat labs in 1 week.    OUTCOME:   Left VM msg for patient requesting call back.     Other recommendations from Dr. Bullard include:  - Reduce Valcyte dose from 450 mg PO every other day to 450 mg PO twice per week.  - Restart mycophenolic acid at dose of 360 mg PO BID. (Note dose increase from 180 mg PO BID)  - Stop chlorthalidone and lasix. Follow up with local nephrologist, Dr. Butler. Creatinine elevated to 3.6. New baseline 3.0-3.4.   - Increase fluid intake or offer IV fluids.     Call back second attempt and spoke with patient and wife.  Patient states he actually has quite a bit of swelling in his R Ankle. Arm swelling; ultrasound was completed. Drinks 4 x 16 oz. Confirms no diarrhea. Doesn't feel dehydrated. Patient spent some time looking for chlorthalidone and wife states she doesn't see it on the list. Thinks Dr. Butler stopped it awhile ago. Not taking lasix.   Confirmed not taking diuretics.  Confirmed tacrolimus dose increase to 3 mg BID.  Confirmed Valcyte dose reduction to twice per week (Mon & Thur)  Confirmed restart  mg BID.     Monday repeat labs, 11/20/23.     Meagan Richardson, RN, BSN  Solid Organ Transplant, Post Kidney and Pancreas  Transplant Care Coordinator  [phone]

## 2023-11-16 ENCOUNTER — TELEPHONE (OUTPATIENT)
Dept: INFECTIOUS DISEASES | Facility: CLINIC | Age: 58
End: 2023-11-16
Payer: MEDICARE

## 2023-11-17 NOTE — TELEPHONE ENCOUNTER
Maurice Bullard MD    I do for sure want to give at least 4 weeks of secondary ppx prior to getting the T cell subset      Previous Messages     ----- Message -----   From: Serafin Rudd MD     I think that is reasonable. I just have to explain my thought process. We usually defer the CMV immunity test for few weeks or give CMV suppressive therapy for few weeks to give the patient's immune system to recover after recent transplant or rejection or escalation or deescalation of IS.    To my knowledge, this patient's immune status has not changed since his IS has been the same for a long while, so whether we check CMV immunity now or in few weeks the results in my mind will be similar.      Thanks,   Serafin     ----- Message -----   From: Maurice Bullard MD     I would really like the CMV specific T cell assay in a few weeks while on suppression prior to stopping it.     OUTCOME: [facility] sent request for labs w/CMV Specific T cell assay on 12/4/23, 4 weeks into CMV suppressive therapy. He started Valcyte on 11/4/23, taking 450 mg every other day. On Wed 11/15/23 the Valcyte dose was reduced to 450 mg twice per week as there was a rise in his creatinine.     Meagan Richardson RN, BSN  Solid Organ Transplant, Post Kidney and Pancreas  Transplant Care Coordinator  [phone]

## 2023-11-17 NOTE — TELEPHONE ENCOUNTER
Call placed to MARIBEL Mcginnis [facility], [phone], to update on patient.     Labs on Monday 11/20/23.   - CMV PCR follow up  - Tacrolimus level for dose increase on 11/15/23  - BMP for elevated creatinine 3.64 up from 3.2. GFR 19 down from 22  - CBC improvement in WBC. Following counts while on Valcyte    Informed Dr. Silvio Butler's RN, that he was still experiencing ankle swelling, increased creatinine, Valcyte reduced, held lasix-not yet started (& chlorthalidone if taking). Patient does not have diarrhea and he states he is drinking enough/not dehydrated.     Meagan Richardson RN, BSN  Solid Organ Transplant, Post Kidney and Pancreas  Transplant Care Coordinator  [phone]

## 2023-11-28 ENCOUNTER — TELEPHONE (OUTPATIENT)
Dept: TRANSPLANT | Facility: CLINIC | Age: 58
End: 2023-11-28
Payer: MEDICARE

## 2023-11-28 NOTE — PROGRESS NOTES
[facility]  [facility] [place] [address]  Phone: [phone], Fax: [phone]    Outpatient Visit Note:    Patient: Haider Torrez  MRN: [id number]  : 1965  JAMIA: Dec 1, 2023    History of Present Illness:  Haider Torrez is a 58-year-old man with a history of IgA nephropathy s/p renal transplants (last ) and recurrent right internal jugular thrombosis, on apixaban.      Other history of note includes a right arm AV fistula that had an aneurysm requiring repair in  and subsequently had a thrombosis.  He had a right basilic vein transposed in his fistula in 2011.  He also previously had a right internal jugular permacath which was subsequently removed.  He previously had a nonocclusive clot in the right internal jugular vein and was treated with warfarin.    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  Received ganciclovir.  Also developed MATTHEW on CKD. He developed a right arm swelling on .  Ultrasound showed complete occlusion of the right internal jugular vein.  Started on apixaban.  Of note he was not on DVT prophylaxis at that time as he was ambulatory.  Of note he had a thrombosis in this location in .      He was initially on apixaban 5 mg twice daily.  Had 2 therapeutic apixaban levels in October. He has since stopped taking it as he ran out.  He feels well in general.  Notes some bruising on his arms and back while he was on apixaban that has resolved since.  No overt bleeding.    Medications:  Current Outpatient Medications   Medication Sig Dispense Refill     amLODIPine (NORVASC) 5 MG tablet Take 1 tablet (5 mg) by mouth daily 30 tablet 0     apixaban ANTICOAGULANT (ELIQUIS) 5 MG tablet Take 1 tablet (5 mg) by mouth 2 times daily 60 tablet 0     carvedilol (COREG) 25 MG tablet Take 1 tablet (25 mg) by mouth 2 times daily (with meals)       chlorthalidone (HYGROTON) 25 MG tablet Take 12.5 mg by mouth daily        clonazePAM (KLONOPIN) 0.5 MG tablet Take 1 tablet (0.5 mg) by mouth At Bedtime       folic acid (FOLVITE) 1 MG tablet Take 1 tablet (1 mg) by mouth daily       furosemide (LASIX) 20 MG tablet Take 20 mg by mouth daily       ganciclovir 140 mg Inject 140 mg into the vein every 24 hours       levothyroxine (SYNTHROID/LEVOTHROID) 100 MCG tablet Take 1 tablet (100 mcg) by mouth daily       mycophenolic acid (GENERIC EQUIVALENT) 180 MG EC tablet Take 2 tablets (360 mg) by mouth 2 times daily 120 tablet 11     pantoprazole (PROTONIX) 40 MG EC tablet Take 1 tablet (40 mg) by mouth daily Take 30-60 minutes before a meal. 30 tablet 0     predniSONE (DELTASONE) 5 MG tablet Take 1 tablet (5 mg) by mouth daily       rOPINIRole (REQUIP) 1 MG tablet Take 1 tablet (1 mg) by mouth At Bedtime       sodium bicarbonate 650 MG tablet Take 2 tablets (1,300 mg) by mouth 3 times daily       sulfamethoxazole-trimethoprim (BACTRIM) 400-80 MG tablet Take 1 tablet by mouth three times a week       tacrolimus (GENERIC) 1 MG capsule Take 3 capsules (3 mg) by mouth 2 times daily 180 capsule 11     valGANciclovir (VALCYTE) 450 MG tablet Take one tablet twice per week on Monday and Thursday. 15 tablet 0     vitamin D3 (CHOLECALCIFEROL) 10 MCG (400 UNIT) capsule Take 1 capsule (400 Units) by mouth daily          Assessment:  Haider Torrez is a 58 year old man with a history of renal transplantation and recurrent right internal jugular vein provoked thromboses.    He has a distorted anatomy in this location from prior indwelling lines and fistula in the upper extremity and has had multiple provoked thromboses at this location, most recently in the context of hospitalization with CMV colitis and not receiving DVT prophylaxis.  It is imperative that he receive DVT prophylaxis during periods of high risk such as hospitalization, procedures, etc.    He has received a month and a half of anticoagulation, I think he should complete 3 months total.  I do  not think he needs indefinite prophylaxis, but ideally he would stay in touch with us to let us know if he has procedures or long trips during which he should receive prophylaxis.  I will discuss this with him at his next visit.    Plan:  - Resume apixaban 5 mg twice daily until mid January  - Repeat imaging  - Follow-up in January to discuss long-term anticoagulation plan    Patient understands and agrees with the above plan and recommendation.    Jacob Cogan, MD  Hematology    45 minutes spent by me on the date of the encounter doing chart review, history and exam, documentation and further activities per the note      Video-Visit Details:  Type of service:  Video Visit  Video Start Time: 9:30  Video End Time (time video stopped): 9:40  Originating Location (pt. Location): Home  Distant Location (provider location):  [facility]   Mode of Communication:  Video Conference via [employer]

## 2023-11-28 NOTE — TELEPHONE ENCOUNTER
Provider Call: General  Route to LPN    Reason for call: Call from Neph Nurse  needs to clarify lab orders they received to make easier on the schedulers  Phone # rasta not have VM option if unable to reach her today call her tomorrow     Call back needed? Yes    Return Call Needed  Same as documented in contacts section  When to return call?: Same day: Route High Priority

## 2023-11-30 NOTE — TELEPHONE ENCOUNTER
RNCC returned call to nephrology nurse; phone rang and rang with no answer or ability to leave  msg.

## 2023-12-01 ENCOUNTER — VIRTUAL VISIT (OUTPATIENT)
Dept: HEMATOLOGY | Facility: CLINIC | Age: 58
End: 2023-12-01
Attending: STUDENT IN AN ORGANIZED HEALTH CARE EDUCATION/TRAINING PROGRAM
Payer: MEDICARE

## 2023-12-01 DIAGNOSIS — I82.621 ACUTE DEEP VEIN THROMBOSIS (DVT) OF RIGHT UPPER EXTREMITY, UNSPECIFIED VEIN (H): ICD-10-CM

## 2023-12-01 PROCEDURE — 99215 OFFICE O/P EST HI 40 MIN: CPT | Mod: 95 | Performed by: STUDENT IN AN ORGANIZED HEALTH CARE EDUCATION/TRAINING PROGRAM

## 2023-12-01 NOTE — PROGRESS NOTES
Patient was contacted to complete the pre-visit call prior to their telephone visit with the provider.     Allergies and medications were reviewed.     I thanked them for their time to cover this information.     Sommer Head MA

## 2023-12-07 ENCOUNTER — TELEPHONE (OUTPATIENT)
Dept: TRANSPLANT | Facility: CLINIC | Age: 58
End: 2023-12-07
Payer: MEDICARE

## 2023-12-07 NOTE — TELEPHONE ENCOUNTER
ISSUE:   Lab results 12/4/23 08:01  - Creatinine elevation; notified txp nephrologist   - CMV monitoring w/T cell specific assay; plan below  - CBC stable  - Tacrolimus 4.3 within goal 4-6. No dose change.     ----- Message -----  From: Meagan Richardson, MARIBEL  Sent: 12/7/2023   9:29 AM CST  To: Maurice Bullard MD; Serafin Rudd MD    Can he stop the valcyte 450 mg twice weekly?     PLAN:  Received: Today  Maurice Bullard MD Blaisdell, Christin Rebecca, MARIBEL  Cc: Serafin Rudd MD    Yes, stop valcyte.     OUTCOME:   RNCC placed call to Haider; left detailed voicemail msg asking that he stop taking valganciclovir (Valcyte) 450 mg PO tab on Mondays and Thursdays. Discontinued from med list. MyChart msg sent. Asked that he follow up with general nephrologist regarding his kidney function still elevated in the mid 3s and for assessment of edema or other related concerns.   -Next gen neph appt 12/11/23.   -Next txp neph appt 12/20/23 9:30 AM.  -He has been referred for another kidney;Dx IgAN. Pre-kidney txp eval appt 1/17/24 7:30 AM.    Meagan Richardson RN, BSN  Solid Organ Transplant, Post Kidney and Pancreas  Transplant Care Coordinator  [phone]

## 2023-12-13 ENCOUNTER — TELEPHONE (OUTPATIENT)
Dept: TRANSPLANT | Facility: CLINIC | Age: 58
End: 2023-12-13
Payer: MEDICARE

## 2023-12-13 NOTE — TELEPHONE ENCOUNTER
Post Kidney and Pancreas Transplant Team Conference  Date: 12/13/2023  Transplant Coordinator: Meagan Richardson     Attendees:  [x]  Dr. Loyn [] Magda Jacobs, RN [x] Cele Padron LPN     [x]  Dr. Winn [x] Krys Reed, RN [] Maryan Taylor LPN    [x] Dr. Bullard [x] Helena Merchant RN    [] Dr. Aguilar [x] Nayeli Rjoas, RN [x] Ericka Cabral RN   [x] Dr. Barclay [x] Meagan Richardson, MARIBEL    [] Dr. Marquis [x] Rox Brody, RN    []  Dr. North [] Lorena Samuels RN    [] Dr. Kingston [] Zachery Galvan RN    [x] Toya Cassidy NP [] Beverly Graham RN    [x] Shari Forman NP [x] Rocío Arias RN        Verbal Plan Read Back:   Continue current treatment plan    Routed to RN Coordinator   Cele Padron LPN

## 2023-12-13 NOTE — TELEPHONE ENCOUNTER
Haider seen local nephrology team yesterday, 12/12/23. They are managing CKD, fluid volume status, and HTN. Proteinuria in nephrotic range 6.59 mg/mg Cr. RNCC brought to committee review to ensure that a biopsy would not change his course of treatment. He has been re-referred already for kidney transplant 11/27/23.     Call to Haider. Confirms he has not questions at this time and he stopped the Valcyte medication which was discontinued 12/7/23.     Meagan Richardson RN, BSN  Solid Organ Transplant, Post Kidney and Pancreas  Transplant Care Coordinator  [phone]

## 2023-12-16 ENCOUNTER — HEALTH MAINTENANCE LETTER (OUTPATIENT)
Age: 58
End: 2023-12-16

## 2023-12-19 NOTE — PROGRESS NOTES
TRANSPLANT NEPHROLOGY CHRONIC POST TRANSPLANT NOTE    Virtual Visit Details    Type of service:  Video Visit   Video Start Time: 9:31 AM  Video End Time:9:50 AM    Originating Location (pt. Location): Home  Distant Location (provider location):  On-site  Platform used for Video Visit: [employer]      Assessment & Plan   # DDKT: Trend up. GFR decreasing. Scr 3.73 on 12/18, up from 3.47 on 12/11   - Baseline Creatinine  ~ 3.2-3.6   - Proteinuria: Nephrotic range (>3 grams), 6.6g/g on 12/11/23   - Date DSA Last Checked: Feb/2023      Latest DSA: No   - BK Viremia: Yes, minimally elevated (< 1000)   - Kidney Tx Biopsy: Jun 25, 2020; Result:  severe hyaline arteriosclerosis, no evidence of acute cellular or humoral rejection, mild and patchy interstitial fibrosis with associated tubular atrophy involving less than 10% of cortical area, evolving transplant glomerulopathy. No recurrence of IgAN    -Has upcoming evaluation for repeat transplant on 1/17/24    # CKD Management: Patient is nearing need for renal replacement therapy with CKD Stage 4.   - Renal Replacement Therapy Discussion Completed by Provider: Yes   - Transplant Referral: Yes    - On Kidney Transplant Waiting List: No Any Potential Kidney Donors: No   - CKD Dialysis Journey Referral: No, managed by [facility]   - CKD Management Provider:  Dr. Cooley at [facility]   - Dialysis Modality Preference: In Center Hemodialysis   - H/O Previous Dialysis: In Center Hemodialysis   - Contraindications for Any Dialysis Modality: Not at this time   - Has Working Dialysis Access: Yes - RUE AVF (patent on 11/7/23 [place])   - Recommended Timing for Dialysis Access: N/A       Patient was informed of their options that includes transplant referral to any transplant center they prefer and/or referral to a dialysis center that offers their dialysis modality of choice, as appropriate.    # Immunosuppression: Tacrolimus immediate release (goal 4-6), Mycophenolic acid (dose 360 mg every 12  hours) and Prednisone (dose 5 mg daily)   - Continue with intensive monitoring of immunosuppression for efficacy and toxicity.   - Changes: Not at this time.      # Infection Prophylaxis:   - PJP: Sulfa/TMP (Bactrim)    # CMV colitis   -Biopsy proven on colonoscopy 10/5/23   -Treated with 4 weeks of IV GCV->4 weeks secondary ppx with valcyte. CMV<35 on last check 12/4/23    # Hyperuricemia:   -Uric acid 9.3 on 12/11. No recent gout flares    # Hypertension: Borderline control;  Goal BP: < 130/80   - Changes: Yes - start amlodipine 5mg daily. Continue coreg 25mg bid and torsemide 20mg daily. Pt does have proteinuria but would not trial ACEi as I think he will develop MATTHEW with the severe hyalinosis he has on biopsy in 6/2020    # Anemia in Chronic Renal Disease: Hgb: Stable, low       DANNY: No   - Iron studies: Not checked recently. Check ferritin and iron binding panel     # Mineral Bone Disorder:   - Secondary renal hyperparathyroidism; PTH level: Moderately elevated (301-600 pg/ml)        On treatment: None, recheck   - Vitamin D; level: Not checked recently, but was normal last check        On supplement: Yes  - Calcium; level: Normal corrected for albumin       On supplement: No  - Phosphorus; level: Normal        On binder: No       # Electrolytes:   - Potassium; level: Normal        On supplement: No  - Magnesium; level: Not checked recently, but was low last check        On supplement: No, check mag   - Bicarbonate; level: Normal        On supplement: Yes, decrease bicarb to 1300mg bid     # Proteinuria:   -6.6g/g 12/11/23   -A1c 5.8%. 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    # Medical Compliance: Yes    # Health Maintenance and Vaccination Review: Recommend:   PCV 20      # Transplant History:  Etiology of Kidney Failure: IgA nephropathy  Tx: DDKT  Transplant: 10/13/2015 (Kidney), 4/15/2004 (Kidney)  Significant changes in immunosuppression: None  Significant transplant-related complications: BK Viremia and CMV Viremia      Transplant Office Phone Number: [phone]    Assessment and plan was discussed with the patient and he voiced his understanding and agreement.    Return visit: Return in about 6 months (around 6/20/2024).    Maurice Bullard MD      Chief Complaint   Mr. Torrez is a 58 year old here for kidney transplant, immunosuppression management, new medical concerns and CMV viremia.    History of Present Illness   Since the last time I saw Haider he was admitted 10/3-10/16/23 and was diagnosed with CMV colitis w/ + biopsies. His enteric panel continued to be positive for norovirus but ID recommended weaning IS as able. He was treated with IV GCV x4 weeks, transitioned to PO valcyte 450mg every other day followed by twice weekly due to worsening GFR which he received x4 weeks and stopped valcyte on 12/7. He was diagnosed with an acute on chronic RIJ DVT with complete occlusion. He was started on heparin gtt and transitioned to apixiban 5mg bid. The plan per Dr. Cogan with hematology is to continue until mid January and repeat imaging.     After discharge on 11/15/23 he was asked to stop lasix and chlorthalidone. He was restarted on MPA 360mg bid. He was started on torsemide 20mg daily on 12/12/23 by Dr. Cooley.     He is no longer having diarrhea, is eating and drinking well. He denies nausea. He notes that R leg and R arm are swollen but no swelling on L hand side. He denies SOB. He feels cold all the time. He is waking up 3-4x/night to urinate. He notes that his R eye is waterring. He says that it started a month ago. He has some decrease in vision. He says it is not red but bothers him.     Home BP: 140s systolic        Problem List   Patient Active Problem List    Diagnosis     Dyslipidemia     Secondary renal hyperparathyroidism (H24)     Immunosuppressed status (H24)     Kidney replaced by transplant     Hypothyroidism     Hypomagnesemia     HTN, kidney transplant related     Metabolic acidosis     Cytomegalovirus (CMV) viremia (H)     Persistent proteinuria     Pancytopenia (H)     Administration of long-term prophylactic antibiotics     Aftercare following organ transplant     Acidosis     MATTHEW (acute kidney injury) (H24)     Colitis, cytomegalovirus (H)     Norovirus     Acute deep vein thrombosis (DVT) of right upper extremity (H)       Past Medical History    Past Medical History:   Diagnosis Date     Anemia of chronic kidney failure      Carpal tunnel syndrome      Clinical diagnosis of COVID-19 9/22/2022     Dyslipidemia      History of respiratory failure      Hypertension      Hypomagnesemia 2015     IgA nephropathy      Insomnia      Kidney disease, chronic, end stage on dialysis (H)      Kidney transplant failure      Myoclonus      Obstructive sleep apnea      Papillary thyroid carcinoma (H)      PRES (posterior reversible encephalopathy syndrome)     2011 after thyroidectomy     Restless leg syndrome      Secondary hyperparathyroidism (H24)      Seizure disorder (H)     Post surgery. Keppra now discontinued     Urinoma of kidney transplant        Past Surgical History   Past Surgical History:   Procedure Laterality Date     BENCH KIDNEY  10/13/2015    Procedure: BENCH KIDNEY;  Surgeon: Seth Gibbons MD;  Location: [facility] OR     COLONOSCOPY N/A 10/05/2023    Procedure: COLONOSCOPY, WITH POLYPECTOMY AND BIOPSY;  Surgeon: Russ Dodge MD;  Location: [facility] GI     CREATE FISTULA ARTERIOVENOUS UPPER EXTREMITY  01/01/2011     CYSTOSCOPY, REMOVE STENT(S), COMBINED N/A 11/17/2015    Procedure: COMBINED CYSTOSCOPY, REMOVE STENT(S);  Surgeon: Seth Gibbons MD;  Location: [facility] OR     CYSTOSCOPY, REMOVE STENT(S), COMBINED Bilateral 11/17/2015    Procedure:  COMBINED CYSTOSCOPY, REMOVE STENT(S);  Surgeon: Seth Gibbons MD;  Location: [facility] OR     ELBOW SURGERY Right 1978     hemithyroidectomy Left 2011     HERNIA REPAIR Right 2009     PICC SINGLE LUMEN PLACEMENT Left 10/14/2023    left basilic 4fr sl power picc 44 cm     THYROIDECTOMY  2011     TRANSPLANT KIDNEY RECIPIENT  DONOR  2004     TRANSPLANT KIDNEY RECIPIENT  DONOR N/A 10/13/2015    Procedure: TRANSPLANT KIDNEY RECIPIENT  DONOR;  Surgeon: Seth Gibbons MD;  Location: [facility] OR     ureteropyelostomy  2004       Family History   Family History   Problem Relation Age of Onset     Diabetes Mother      Other Cancer Father         lung        Social History   Social History     Tobacco Use     Smoking status: Never     Smokeless tobacco: Never   Substance Use Topics     Alcohol use: No     Drug use: No       Allergies   Allergies   Allergen Reactions     Hydralazine Swelling     Betadine [Povidone Iodine]      Cephalexin Hives     Clindamycin Hives     Trazodone Swelling     Swelling of face/lips       Medications   Current Outpatient Medications   Medication Sig     amLODIPine (NORVASC) 5 MG tablet Take 1 tablet (5 mg) by mouth daily for 90 days     levothyroxine (SYNTHROID/LEVOTHROID) 137 MCG tablet Take 1 tablet (137 mcg) by mouth daily     sodium bicarbonate 650 MG tablet Take 2 tablets (1,300 mg) by mouth 2 times daily     torsemide (DEMADEX) 20 MG tablet Take 1 tablet (20 mg) by mouth daily     apixaban ANTICOAGULANT (ELIQUIS) 5 MG tablet Take 1 tablet (5 mg) by mouth 2 times daily for 30 days     carvedilol (COREG) 25 MG tablet Take 1 tablet (25 mg) by mouth 2 times daily (with meals)     clonazePAM (KLONOPIN) 0.5 MG tablet Take 1 tablet (0.5 mg) by mouth At Bedtime     folic acid (FOLVITE) 1 MG tablet Take 1 tablet (1 mg) by mouth daily     mycophenolic acid (GENERIC EQUIVALENT) 180 MG EC tablet Take 2 tablets (360 mg) by mouth 2 times daily      pantoprazole (PROTONIX) 40 MG EC tablet Take 1 tablet (40 mg) by mouth daily Take 30-60 minutes before a meal.     predniSONE (DELTASONE) 5 MG tablet Take 1 tablet (5 mg) by mouth daily     rOPINIRole (REQUIP) 1 MG tablet Take 1 tablet (1 mg) by mouth At Bedtime     sulfamethoxazole-trimethoprim (BACTRIM) 400-80 MG tablet Take 1 tablet by mouth three times a week     tacrolimus (GENERIC) 1 MG capsule Take 3 capsules (3 mg) by mouth 2 times daily     vitamin D3 (CHOLECALCIFEROL) 10 MCG (400 UNIT) capsule Take 1 capsule (400 Units) by mouth daily     No current facility-administered medications for this visit.     Medications Discontinued During This Encounter   Medication Reason     apixaban ANTICOAGULANT (ELIQUIS) 5 MG tablet      sodium bicarbonate 650 MG tablet      levothyroxine (SYNTHROID/LEVOTHROID) 100 MCG tablet      ganciclovir 140 mg      furosemide (LASIX) 20 MG tablet      chlorthalidone (HYGROTON) 25 MG tablet      amLODIPine (NORVASC) 5 MG tablet Reorder (No AVS)         Physical Exam   Vital Signs: There were no vitals taken for this visit.    GENERAL APPEARANCE: alert and no distress  HENT: no obvious abnormalities on appearance  RESP: breathing appears unremarkable with normal rate, no audible wheezing or cough and no apparent shortness of breath with conversation  MS: extremities normal - no gross deformities noted  SKIN: no apparent rash and normal skin tone  NEURO: speech is clear with no obvious neurological deficits  PSYCH: mentation appears normal and affect normal      Data         Latest Ref Rng & Units 12/11/2023     7:58 AM 12/4/2023     8:01 AM 11/21/2023     7:02 AM   Renal   Na (external) 135 - 145 mmol/L 144     143     144       K (external) 3.6 - 5.2 mmol/L 4.4     4.3     4.5       Cl 98 - 107 mmol/L 112     110     111       Cl (external) 98 - 107 mmol/L 112     110     111       CO2 (external) 22 - 29 mmol/L 25     22     22       BUN (external) 8 - 24 mg/dL 51     57     70        Cr (external) 0.74 - 1.35 mg/dL 3.47     3.57     3.53       Glucose (external) 70 - 140 mg/dL 103     97     103       Ca (external) 8.6 - 10.0 mg/dL 8.0     8.0     8.2           This result is from an external source.         Latest Ref Rng & Units 12/11/2023     7:58 AM 10/18/2023     4:53 AM 10/12/2023     4:56 AM   Bone Health   Phosphorus 2.5 - 4.5 mg/dL  2.8  3.1    Phos (external) 2.5 - 4.5 mg/dL 3.8             This result is from an external source.         Latest Ref Rng & Units 12/11/2023     7:58 AM 12/4/2023     8:01 AM 11/21/2023     7:02 AM   Heme   WBC (external) 3.4 - 9.6 x10(9)/L 4.0     3.9     5.1       Hgb (external) 13.2 - 16.6 g/dL 10.3     9.9     10.1       Plt (external) 135 - 317 x10(9)/L 131     142     167       ABSOLUTE NEUTROPHILS (EXTERNAL) 1.56 - 6.45 x10(9)/L 2.66     2.54     3.81       ABSOLUTE LYMPHOCYTES (EXTERNAL) 0.95 - 3.07 x10(9)/L 0.84     0.78     0.84       ABSOLUTE MONOCYTES (EXTERNAL) 0.26 - 0.81 x10(9)/L 0.47     0.56     0.42       ABSOLUTE EOSINOPHILS (EXTERNAL) 0.03 - 0.48 x10(9)/L <0.03     0.03     0.03       ABSOLUTE BASOPHILS (EXTERNAL) 0.01 - 0.08 x10(9)/L <0.03     <0.03     <0.03           This result is from an external source.         Latest Ref Rng & Units 10/17/2023     4:44 AM 10/8/2023     9:46 AM 10/3/2023    12:40 PM   Liver   AP 40 - 129 U/L  52  62    TBili <=1.2 mg/dL  0.2  0.3    ALT 0 - 70 U/L  <5  7    AST 0 - 45 U/L  18  12    Tot Protein 6.4 - 8.3 g/dL  5.1  6.1    Albumin 3.5 - 5.2 g/dL 2.9  2.9  3.9          Latest Ref Rng & Units 3/13/2023     7:40 AM 7/26/2022     7:55 AM 7/15/2021     7:22 AM   Pancreas   A1C (external) 4.2 - 5.6 % 5.5     5.6     5.8           This result is from an external source.         Latest Ref Rng & Units 10/16/2023     4:49 AM 3/13/2023     7:40 AM 3/27/2018     3:10 PM   Iron studies   Iron 61 - 157 ug/dL 55   67    Iron Saturation Index 15 - 46 %   24    Iron Sat Index 15 - 46 % 32      Ferritin 31 - 409  ng/mL 456   695    Ferritin (external) 31 - 409 mcg/L  531            This result is from an external source.         Latest Ref Rng & Units 12/4/2023     8:01 AM 11/27/2023     7:54 AM 11/20/2023     7:59 AM   [facility]   CMV DNA Quant Ext Undetected IU/mL <35  <35  <35       LOG IU/ML OF CMVQNT (EXTERNAL) log IU/mL <1.54  <1.54  <1.54           This result is from an external source.        Recent Labs   Lab Test 09/26/17  1508 02/05/18  0710 02/28/18  0705 10/05/23  0542 10/09/23  0851 10/10/23  0614 10/16/23  0449   DOSTAC 730a 9.26.2017 2100 02/04/18 02/27/18  1930  --   --   --   --    TACROL 6.1 8.1 5.0   < > 5.1 4.8* 4.2*    < > = values in this interval not displayed.     Recent Labs   Lab Test 10/19/15  0701 10/21/15  0648   DOSMPA 2,100 1,900   MPACID 1.14 1.52   MPAG 92.7 93.8

## 2023-12-20 ENCOUNTER — VIRTUAL VISIT (OUTPATIENT)
Dept: TRANSPLANT | Facility: CLINIC | Age: 58
End: 2023-12-20
Attending: INTERNAL MEDICINE
Payer: MEDICARE

## 2023-12-20 DIAGNOSIS — Z94.0 HTN, KIDNEY TRANSPLANT RELATED: ICD-10-CM

## 2023-12-20 DIAGNOSIS — N25.81 SECONDARY RENAL HYPERPARATHYROIDISM (H): ICD-10-CM

## 2023-12-20 DIAGNOSIS — Z94.0 KIDNEY REPLACED BY TRANSPLANT: Primary | ICD-10-CM

## 2023-12-20 DIAGNOSIS — D84.9 IMMUNOSUPPRESSED STATUS (H): ICD-10-CM

## 2023-12-20 DIAGNOSIS — N18.4 CHRONIC KIDNEY DISEASE, STAGE 4, SEVERELY DECREASED GFR (H): ICD-10-CM

## 2023-12-20 DIAGNOSIS — Z48.298 AFTERCARE FOLLOWING ORGAN TRANSPLANT: ICD-10-CM

## 2023-12-20 DIAGNOSIS — B25.9 CMV COLITIS (H): ICD-10-CM

## 2023-12-20 DIAGNOSIS — I15.1 HTN, KIDNEY TRANSPLANT RELATED: ICD-10-CM

## 2023-12-20 DIAGNOSIS — D64.9 ANEMIA: ICD-10-CM

## 2023-12-20 DIAGNOSIS — E79.0 HYPERURICEMIA: ICD-10-CM

## 2023-12-20 DIAGNOSIS — A08.39 CMV COLITIS (H): ICD-10-CM

## 2023-12-20 PROBLEM — A08.11 NOROVIRUS: Status: RESOLVED | Noted: 2023-10-18 | Resolved: 2023-12-20

## 2023-12-20 PROBLEM — E87.20 ACIDOSIS: Status: RESOLVED | Noted: 2023-10-03 | Resolved: 2023-12-20

## 2023-12-20 PROBLEM — Z79.2 ADMINISTRATION OF LONG-TERM PROPHYLACTIC ANTIBIOTICS: Status: RESOLVED | Noted: 2023-03-07 | Resolved: 2023-12-20

## 2023-12-20 PROBLEM — D61.818 PANCYTOPENIA (H): Status: RESOLVED | Noted: 2023-03-07 | Resolved: 2023-12-20

## 2023-12-20 PROBLEM — N17.9 AKI (ACUTE KIDNEY INJURY) (H): Status: RESOLVED | Noted: 2023-10-03 | Resolved: 2023-12-20

## 2023-12-20 PROCEDURE — 99214 OFFICE O/P EST MOD 30 MIN: CPT | Mod: VID | Performed by: INTERNAL MEDICINE

## 2023-12-20 RX ORDER — SODIUM BICARBONATE 650 MG/1
1300 TABLET ORAL 2 TIMES DAILY
COMMUNITY
Start: 2023-12-20

## 2023-12-20 RX ORDER — TORSEMIDE 20 MG/1
20 TABLET ORAL DAILY
COMMUNITY
Start: 2023-12-20

## 2023-12-20 RX ORDER — LEVOTHYROXINE SODIUM 137 UG/1
137 TABLET ORAL DAILY
COMMUNITY
Start: 2023-12-20

## 2023-12-20 RX ORDER — AMLODIPINE BESYLATE 5 MG/1
5 TABLET ORAL DAILY
Qty: 90 TABLET | Refills: 3 | Status: SHIPPED | OUTPATIENT
Start: 2023-12-20 | End: 2024-03-21

## 2023-12-20 NOTE — PATIENT INSTRUCTIONS
Patient Recommendations:  - Decrease sodium bicarbonate to 1300mg twice daily   -Start amlodipine 5mg daily     Transplant Patient Information  Your Post Transplant Coordinator is: Meagan Richardson  For non urgent items, we encourage you to contact your coordinator/care team online via [employer]  You and your care team can also contact your transplant coordinator Monday - Friday, 8am - 5pm at [phone] (Option 2 to reach the coordinator or Option 4 to schedule an appointment).  After hours for urgent matters, please call [facility] at [phone].

## 2023-12-20 NOTE — LETTER
[address] Adult  Hospitalist Group                                    Advance Care Planning Note    Name: David Issa  YOB: 1924  MRN: [id number]  Admission Date: 4/9/2022  5:06 PM    Date of discussion: 4/9/2022    Active Diagnoses:    Hospital Problems  Date Reviewed: 9/26/2015    None          These active diagnoses are of sufficient risk that focused discussion on advance care planning is indicated in order to allow the patient to thoughtfully consider personal goals of care, and if situations arise that prevent the ability to personally give input, to ensure appropriate representation of their personal desires for different levels and aggressiveness of care. Discussion:     Persons present and participating in discussion: Ping Luo MD    Discussion: Discussed with patient who is of sound mind. She states she lives with her  and son Mica Breaux. She ambulates with a walker and requires help with most activities. She requires help with her medications, with preparing meals and with dressing and undressing. She does not bathe but cleans herself using wipes. Patient would not want intubation or chest compressions but would otherwise want everything done for any acute illness. She has a living well and her son Mica Breaux is MPOA. Time Spent:     Total time spent face-to-face in education and discussion: 16 minutes.      Bakari Barnes MD  Date of Service:  4/9/2022  11:43 PM PHYSICIAN ORDERS      DATE & TIME ISSUED: 2023 9:14 AM   PATIENT NAME: Haider Torrez   : 1965     [facility] MR# [if applicable]: [id number]     DIAGNOSIS: Kidney transplanted; Secondary Renal Hyperparathyroidism; Anemia  ICD-10 CODE: Z94.0; N25.81; D64.9    Please check the following with next labs:  - Parathyroid Hormone Level  - Iron & Iron Binding Capacity  - Ferritin    Any questions please call: [phone] option 5.  Fax results to: [phone]. Please also fax to Dr. Silvio Butler, at [phone]     Thank you,      Maurice Bullard MD

## 2023-12-20 NOTE — LETTER
12/20/2023         RE: Haider Torrez  [address] [place] [address]        Dear Colleague,    Thank you for referring your patient, Haider Torrez, to the [facility]. Please see a copy of my visit note below.      TRANSPLANT NEPHROLOGY CHRONIC POST TRANSPLANT NOTE    Virtual Visit Details    Type of service:  Video Visit   Video Start Time: 9:31 AM  Video End Time:9:50 AM    Originating Location (pt. Location): Home  Distant Location (provider location):  On-site  Platform used for Video Visit: [facility]      Assessment & Plan   # DDKT: Trend up. GFR decreasing. Scr 3.73 on 12/18, up from 3.47 on 12/11   - Baseline Creatinine  ~ 3.2-3.6   - Proteinuria: Nephrotic range (>3 grams), 6.6g/g on 12/11/23   - Date DSA Last Checked: Feb/2023      Latest DSA: No   - BK Viremia: Yes, minimally elevated (< 1000)   - Kidney Tx Biopsy: Jun 25, 2020; Result:  severe hyaline arteriosclerosis, no evidence of acute cellular or humoral rejection, mild and patchy interstitial fibrosis with associated tubular atrophy involving less than 10% of cortical area, evolving transplant glomerulopathy. No recurrence of IgAN    -Has upcoming evaluation for repeat transplant on 1/17/24    # CKD Management: Patient is nearing need for renal replacement therapy with CKD Stage 4.   - Renal Replacement Therapy Discussion Completed by Provider: Yes   - Transplant Referral: Yes    - On Kidney Transplant Waiting List: No Any Potential Kidney Donors: No   - CKD Dialysis Journey Referral: No, managed by [facility]   - CKD Management Provider:  Dr. Cooley at [facility]   - Dialysis Modality Preference: In Center Hemodialysis   - H/O Previous Dialysis: In Center Hemodialysis   - Contraindications for Any Dialysis Modality: Not at this time   - Has Working Dialysis Access: Yes - RUE AVF (patent on 11/7/23 [place])   - Recommended Timing for Dialysis Access: N/A       Patient was informed of their options that includes  transplant referral to any transplant center they prefer and/or referral to a dialysis center that offers their dialysis modality of choice, as appropriate.    # Immunosuppression: Tacrolimus immediate release (goal 4-6), Mycophenolic acid (dose 360 mg every 12 hours) and Prednisone (dose 5 mg daily)   - Continue with intensive monitoring of immunosuppression for efficacy and toxicity.   - Changes: Not at this time.      # Infection Prophylaxis:   - PJP: Sulfa/TMP (Bactrim)    # CMV colitis   -Biopsy proven on colonoscopy 10/5/23   -Treated with 4 weeks of IV GCV->4 weeks secondary ppx with valcyte. CMV<35 on last check 12/4/23    # Hyperuricemia:   -Uric acid 9.3 on 12/11. No recent gout flares    # Hypertension: Borderline control;  Goal BP: < 130/80   - Changes: Yes - start amlodipine 5mg daily. Continue coreg 25mg bid and torsemide 20mg daily. Pt does have proteinuria but would not trial ACEi as I think he will develop MATTHEW with the severe hyalinosis he has on biopsy in 6/2020    # Anemia in Chronic Renal Disease: Hgb: Stable, low       DANNY: No   - Iron studies: Not checked recently. Check ferritin and iron binding panel     # Mineral Bone Disorder:   - Secondary renal hyperparathyroidism; PTH level: Moderately elevated (301-600 pg/ml)        On treatment: None, recheck   - Vitamin D; level: Not checked recently, but was normal last check        On supplement: Yes  - Calcium; level: Normal corrected for albumin       On supplement: No  - Phosphorus; level: Normal        On binder: No       # Electrolytes:   - Potassium; level: Normal        On supplement: No  - Magnesium; level: Not checked recently, but was low last check        On supplement: No, check mag   - Bicarbonate; level: Normal        On supplement: Yes, decrease bicarb to 1300mg bid     # Proteinuria:   -6.6g/g 12/11/23   -A1c 5.8%. 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    # Medical Compliance: Yes    # Health Maintenance and Vaccination Review: Recommend:  PCV 20      # Transplant History:  Etiology of Kidney Failure: IgA nephropathy  Tx: DDKT  Transplant: 10/13/2015 (Kidney), 4/15/2004 (Kidney)  Significant changes in immunosuppression: None  Significant transplant-related complications: BK Viremia and CMV Viremia      Transplant Office Phone Number: [phone]    Assessment and plan was discussed with the patient and he voiced his understanding and agreement.    Return visit: Return in about 6 months (around 6/20/2024).    Maurice Bullard MD      Chief Complaint   Mr. Torrez is a 58 year old here for kidney transplant, immunosuppression management, new medical concerns and CMV viremia.    History of Present Illness   Since the last time I saw Haider he was admitted 10/3-10/16/23 and was diagnosed with CMV colitis w/ + biopsies. His enteric panel continued to be positive for norovirus but ID recommended weaning IS as able. He was treated with IV GCV x4 weeks, transitioned to PO valcyte 450mg every other day followed by twice weekly due to worsening GFR which he received x4 weeks and stopped valcyte on 12/7. He was diagnosed with an acute on chronic RIJ DVT with complete occlusion. He was started on heparin gtt and transitioned to apixiban 5mg bid. The plan per Dr. Cogan with hematology is to continue until mid January and repeat imaging.     After discharge on 11/15/23 he was asked to stop lasix and chlorthalidone. He was restarted on MPA 360mg bid. He was started on torsemide 20mg daily on 12/12/23 by Dr. Cooley.     He is no longer having diarrhea, is eating and drinking well. He denies nausea. He notes that R leg and R arm are swollen but no swelling on L hand side. He denies SOB. He feels cold all the time.  He is waking up 3-4x/night to urinate. He notes that his R eye is waterring. He says that it started a month ago. He has some decrease in vision. He says it is not red but bothers him.     Home BP: 140s systolic        Problem List   Patient Active Problem List   Diagnosis     Dyslipidemia     Secondary renal hyperparathyroidism (H24)     Immunosuppressed status (H24)     Kidney replaced by transplant     Hypothyroidism     Hypomagnesemia     HTN, kidney transplant related     Metabolic acidosis     Cytomegalovirus (CMV) viremia (H)     Persistent proteinuria     Pancytopenia (H)     Administration of long-term prophylactic antibiotics     Aftercare following organ transplant     Acidosis     MATTHEW (acute kidney injury) (H24)     Colitis, cytomegalovirus (H)     Norovirus     Acute deep vein thrombosis (DVT) of right upper extremity (H)       Past Medical History    Past Medical History:   Diagnosis Date     Anemia of chronic kidney failure      Carpal tunnel syndrome      Clinical diagnosis of COVID-19 9/22/2022     Dyslipidemia      History of respiratory failure      Hypertension      Hypomagnesemia 2015     IgA nephropathy      Insomnia      Kidney disease, chronic, end stage on dialysis (H)      Kidney transplant failure      Myoclonus      Obstructive sleep apnea      Papillary thyroid carcinoma (H)      PRES (posterior reversible encephalopathy syndrome)     2011 after thyroidectomy     Restless leg syndrome      Secondary hyperparathyroidism (H24)      Seizure disorder (H)     Post surgery. Keppra now discontinued     Urinoma of kidney transplant        Past Surgical History   Past Surgical History:   Procedure Laterality Date     BENCH KIDNEY  10/13/2015    Procedure: BENCH KIDNEY;  Surgeon: Seth Gibbons MD;  Location: [facility] OR     COLONOSCOPY N/A 10/05/2023    Procedure: COLONOSCOPY, WITH POLYPECTOMY AND BIOPSY;  Surgeon: Russ Dodge MD;  Location: [facility] GI     CREATE FISTULA ARTERIOVENOUS UPPER  EXTREMITY  2011     CYSTOSCOPY, REMOVE STENT(S), COMBINED N/A 2015    Procedure: COMBINED CYSTOSCOPY, REMOVE STENT(S);  Surgeon: Seth Gibbons MD;  Location: [facility] OR     CYSTOSCOPY, REMOVE STENT(S), COMBINED Bilateral 2015    Procedure: COMBINED CYSTOSCOPY, REMOVE STENT(S);  Surgeon: Seth Gibbons MD;  Location: [facility] OR     ELBOW SURGERY Right 1978     hemithyroidectomy Left 2011     HERNIA REPAIR Right 2009     PICC SINGLE LUMEN PLACEMENT Left 10/14/2023    left basilic 4fr sl power picc 44 cm     THYROIDECTOMY  2011     TRANSPLANT KIDNEY RECIPIENT  DONOR  2004     TRANSPLANT KIDNEY RECIPIENT  DONOR N/A 10/13/2015    Procedure: TRANSPLANT KIDNEY RECIPIENT  DONOR;  Surgeon: Seth Gibbons MD;  Location: [facility] OR     ureteropyelostomy  2004       Family History   Family History   Problem Relation Age of Onset     Diabetes Mother      Other Cancer Father         lung        Social History   Social History     Tobacco Use     Smoking status: Never     Smokeless tobacco: Never   Substance Use Topics     Alcohol use: No     Drug use: No       Allergies   Allergies   Allergen Reactions     Hydralazine Swelling     Betadine [Povidone Iodine]      Cephalexin Hives     Clindamycin Hives     Trazodone Swelling     Swelling of face/lips       Medications   Current Outpatient Medications   Medication Sig     amLODIPine (NORVASC) 5 MG tablet Take 1 tablet (5 mg) by mouth daily for 90 days     levothyroxine (SYNTHROID/LEVOTHROID) 137 MCG tablet Take 1 tablet (137 mcg) by mouth daily     sodium bicarbonate 650 MG tablet Take 2 tablets (1,300 mg) by mouth 2 times daily     torsemide (DEMADEX) 20 MG tablet Take 1 tablet (20 mg) by mouth daily     apixaban ANTICOAGULANT (ELIQUIS) 5 MG tablet Take 1 tablet (5 mg) by mouth 2 times daily for 30 days     carvedilol (COREG) 25 MG tablet Take 1 tablet (25 mg) by mouth 2 times daily (with meals)      clonazePAM (KLONOPIN) 0.5 MG tablet Take 1 tablet (0.5 mg) by mouth At Bedtime     folic acid (FOLVITE) 1 MG tablet Take 1 tablet (1 mg) by mouth daily     mycophenolic acid (GENERIC EQUIVALENT) 180 MG EC tablet Take 2 tablets (360 mg) by mouth 2 times daily     pantoprazole (PROTONIX) 40 MG EC tablet Take 1 tablet (40 mg) by mouth daily Take 30-60 minutes before a meal.     predniSONE (DELTASONE) 5 MG tablet Take 1 tablet (5 mg) by mouth daily     rOPINIRole (REQUIP) 1 MG tablet Take 1 tablet (1 mg) by mouth At Bedtime     sulfamethoxazole-trimethoprim (BACTRIM) 400-80 MG tablet Take 1 tablet by mouth three times a week     tacrolimus (GENERIC) 1 MG capsule Take 3 capsules (3 mg) by mouth 2 times daily     vitamin D3 (CHOLECALCIFEROL) 10 MCG (400 UNIT) capsule Take 1 capsule (400 Units) by mouth daily     No current facility-administered medications for this visit.     Medications Discontinued During This Encounter   Medication Reason     apixaban ANTICOAGULANT (ELIQUIS) 5 MG tablet      sodium bicarbonate 650 MG tablet      levothyroxine (SYNTHROID/LEVOTHROID) 100 MCG tablet      ganciclovir 140 mg      furosemide (LASIX) 20 MG tablet      chlorthalidone (HYGROTON) 25 MG tablet      amLODIPine (NORVASC) 5 MG tablet Reorder (No AVS)         Physical Exam   Vital Signs: There were no vitals taken for this visit.    GENERAL APPEARANCE: alert and no distress  HENT: no obvious abnormalities on appearance  RESP: breathing appears unremarkable with normal rate, no audible wheezing or cough and no apparent shortness of breath with conversation  MS: extremities normal - no gross deformities noted  SKIN: no apparent rash and normal skin tone  NEURO: speech is clear with no obvious neurological deficits  PSYCH: mentation appears normal and affect normal      Data         Latest Ref Rng & Units 12/11/2023     7:58 AM 12/4/2023     8:01 AM 11/21/2023     7:02 AM   Renal   Na (external) 135 - 145 mmol/L 144     143     144        K (external) 3.6 - 5.2 mmol/L 4.4     4.3     4.5       Cl 98 - 107 mmol/L 112     110     111       Cl (external) 98 - 107 mmol/L 112     110     111       CO2 (external) 22 - 29 mmol/L 25     22     22       BUN (external) 8 - 24 mg/dL 51     57     70       Cr (external) 0.74 - 1.35 mg/dL 3.47     3.57     3.53       Glucose (external) 70 - 140 mg/dL 103     97     103       Ca (external) 8.6 - 10.0 mg/dL 8.0     8.0     8.2           This result is from an external source.         Latest Ref Rng & Units 12/11/2023     7:58 AM 10/18/2023     4:53 AM 10/12/2023     4:56 AM   Bone Health   Phosphorus 2.5 - 4.5 mg/dL  2.8  3.1    Phos (external) 2.5 - 4.5 mg/dL 3.8             This result is from an external source.         Latest Ref Rng & Units 12/11/2023     7:58 AM 12/4/2023     8:01 AM 11/21/2023     7:02 AM   Heme   WBC (external) 3.4 - 9.6 x10(9)/L 4.0     3.9     5.1       Hgb (external) 13.2 - 16.6 g/dL 10.3     9.9     10.1       Plt (external) 135 - 317 x10(9)/L 131     142     167       ABSOLUTE NEUTROPHILS (EXTERNAL) 1.56 - 6.45 x10(9)/L 2.66     2.54     3.81       ABSOLUTE LYMPHOCYTES (EXTERNAL) 0.95 - 3.07 x10(9)/L 0.84     0.78     0.84       ABSOLUTE MONOCYTES (EXTERNAL) 0.26 - 0.81 x10(9)/L 0.47     0.56     0.42       ABSOLUTE EOSINOPHILS (EXTERNAL) 0.03 - 0.48 x10(9)/L <0.03     0.03     0.03       ABSOLUTE BASOPHILS (EXTERNAL) 0.01 - 0.08 x10(9)/L <0.03     <0.03     <0.03           This result is from an external source.         Latest Ref Rng & Units 10/17/2023     4:44 AM 10/8/2023     9:46 AM 10/3/2023    12:40 PM   Liver   AP 40 - 129 U/L  52  62    TBili <=1.2 mg/dL  0.2  0.3    ALT 0 - 70 U/L  <5  7    AST 0 - 45 U/L  18  12    Tot Protein 6.4 - 8.3 g/dL  5.1  6.1    Albumin 3.5 - 5.2 g/dL 2.9  2.9  3.9          Latest Ref Rng & Units 3/13/2023     7:40 AM 7/26/2022     7:55 AM 7/15/2021     7:22 AM   Pancreas   A1C (external) 4.2 - 5.6 % 5.5     5.6     5.8           This result is  from an external source.         Latest Ref Rng & Units 10/16/2023     4:49 AM 3/13/2023     7:40 AM 3/27/2018     3:10 PM   Iron studies   Iron 61 - 157 ug/dL 55   67    Iron Saturation Index 15 - 46 %   24    Iron Sat Index 15 - 46 % 32      Ferritin 31 - 409 ng/mL 456   695    Ferritin (external) 31 - 409 mcg/L  531            This result is from an external source.         Latest Ref Rng & Units 12/4/2023     8:01 AM 11/27/2023     7:54 AM 11/20/2023     7:59 AM   [facility]   CMV DNA Quant Ext Undetected IU/mL <35  <35  <35       LOG IU/ML OF CMVQNT (EXTERNAL) log IU/mL <1.54  <1.54  <1.54           This result is from an external source.        Recent Labs   Lab Test 09/26/17  1508 02/05/18  0710 02/28/18  0705 10/05/23  0542 10/09/23  0851 10/10/23  0614 10/16/23  0449   DOSTAC 730a 9.26.2017 2100 02/04/18 02/27/18  1930  --   --   --   --    TACROL 6.1 8.1 5.0   < > 5.1 4.8* 4.2*    < > = values in this interval not displayed.     Recent Labs   Lab Test 10/19/15  0701 10/21/15  0648   DOSMPA 2,100 1,900   MPACID 1.14 1.52   MPAG 92.7 93.8       Again, thank you for allowing me to participate in the care of your patient.        Sincerely,        Maurice Bullard MD

## 2023-12-20 NOTE — PROGRESS NOTES
ISSUE:     Clinic visit with Dr. Bullard on 12/20/23.    PLAN:     plan  Received: Today  Maurice Bullard MD Blaisdell, Meagan Mendes, RN  His CKD care is per Dr. Cooley. I started amlodipine 5mg daily today. No changes to IS. He should get PTH, ferritin, and iron binding panel with next labs.    Maurice Delong    OUTCOME:   Lab requisition letter sent to [facility] for above recommended labs. Copy of orders sent to patient and ordered in EPIC.    Meagan Richardson, RN, BSN  Solid Organ Transplant, Post Kidney and Pancreas  Transplant Care Coordinator  [phone]

## 2023-12-20 NOTE — NURSING NOTE
Is the patient currently in the state of [place]? YES    Visit mode:VIDEO    If the visit is dropped, the patient can be reconnected by: VIDEO VISIT: Text to cell phone:   Telephone Information:   Mobile [phone]       Will anyone else be joining the visit? NO  (If patient encounters technical issues they should call [phone]    How would you like to obtain your AVS? MyChart    Are changes needed to the allergy or medication list? No    Reason for visit: RECHECK    Blayne CHENG

## 2024-01-02 NOTE — LETTER
PHYSICIAN ORDERS      DATE & TIME ISSUED: 2024 8:49 AM   PATIENT NAME: Haider Torrez   : 1965     [facility] MR# [if applicable]: [id number]     DIAGNOSIS: Kidney transplanted; Immunosuppressed Status; CMV infection  ICD-10 CODE: Z94.0; D89.9; B25.9    Please check the following in 4 weeks ~24:  - CMV PCR quantitative    Any questions please call: [phone] option 5.  Fax results to: [phone]. Please also fax to Dr. Silvio Butler, at [phone]     Thank you,      Maurice Bullard MD

## 2024-01-02 NOTE — PROGRESS NOTES
Message  Received: Yesterday  Maurice Bullard MD Blaisdell, Meagan Mendes, RN    CKD care in [place]. CMV PCR basically stable. Check again in 4 weeks.    OUTCOME:  CMV PCR Quantitative ordered for repeat in 4 weeks ~1/30/24. Lab letter sent to preferred lab on file and copy of requisition letter sent via mail to patient.     Meagan Richardson RN, BSN  Solid Organ Transplant, Post Kidney and Pancreas  Transplant Care Coordinator  [phone]

## 2024-01-10 ENCOUNTER — TELEPHONE (OUTPATIENT)
Dept: TRANSPLANT | Facility: CLINIC | Age: 59
End: 2024-01-10
Payer: MEDICARE

## 2024-01-10 NOTE — TELEPHONE ENCOUNTER
I/Faith Mcgarry, RN BSN called let voice mail to Haider and wife Rosana for the all day Kidney evaluation on 1/17/2024.     Patient and support person to come to [facility] at 7:15 am on 1/17/2024 for all day evaluation.   I asked that they wear masks as our facility has increase in infections respiratory and covid.  Bring money for lunch and for parking.     If questions they may call Geovanna Felipe RN to confirm voice mail at [phone].    Robret Aragon

## 2024-01-11 NOTE — PROGRESS NOTES
Message  Received: 1 week ago  Serafin Rudd MD Bregman, Adam, MD; Meagan Richardson, RN  Hello and Happy New Year,    This is a very low level of viremia and otherwise would have been called detected but below the level of quantification using our old assay that we were using only few months ago.    I would keep off of VGCV, monitor CMV PCR monthly or every other week.    Thank you,  Serafin          Previous Messages       ----- Message -----  From: Maurice Bullard MD  Sent: 1/4/2024  11:32 AM CST  To: Meagan Richardson, MARIBEL; *    Sreafin, what do you think?  ----- Message -----  From: Meagan Richardson, RN  Sent: 1/4/2024  11:28 AM CST  To: Maurice Bullard MD    CMV PCR the last couple weeks has been higher off Valcyte as of 12/7/23. His kidney eval appt is scheduled for next Wed 1/17/24. His kidney function is declining fast. Recommendations appreciated. Thanks.

## 2024-01-12 DIAGNOSIS — Z94.0 KIDNEY REPLACED BY TRANSPLANT: Primary | ICD-10-CM

## 2024-01-12 RX ORDER — TACROLIMUS 0.5 MG/1
0.5 CAPSULE ORAL 2 TIMES DAILY
Qty: 60 CAPSULE | Refills: 11 | Status: SHIPPED | OUTPATIENT
Start: 2024-01-12 | End: 2024-01-26

## 2024-01-12 RX ORDER — TACROLIMUS 1 MG/1
2 CAPSULE ORAL 2 TIMES DAILY
Qty: 120 CAPSULE | Refills: 11 | Status: SHIPPED | OUTPATIENT
Start: 2024-01-12 | End: 2024-01-26

## 2024-01-12 NOTE — LETTER
PHYSICIAN ORDERS      DATE & TIME ISSUED: 2024 3:10 PM  PATIENT NAME: Haider Torrez   : 1965     [facility] MR# [if applicable]: [id number]     DIAGNOSIS:  Kidney Transplant  ICD-10 CODE: Z94.0     Please repeat the following labs in 1 week:  Tacrolimus drug level  CBC  BMP    Any questions please call: [phone]    Please fax each result same day as resulted/available    Critical lab results page [phone]  Please fax lab results to [phone].      Maurice Bullard MD

## 2024-01-12 NOTE — TELEPHONE ENCOUNTER
ISSUE:   Tacrolimus IR level 6.3 on 1/9/24 08:05 AM, goal 4-6, dose 3 mg BID.    PLAN:   Call Patient  and confirm this was an accurate 12-hour trough.   Verify Tacrolimus IR dose 3 mg BID.   Confirm no new medications or illness.   Confirm no missed doses.   If accurate trough and accurate dose, decrease Tacrolimus IR dose to 2.5 mg BID     Is this more than a 50% increase or decrease in current IS dose: No  If YES, justification: N/A    Repeat tacrolimus level in 1 week.  LPN Task: Please call patient per plan above.   Lab orders placed.    Meagan Richardson, RN, BSN  Solid Organ Transplant, Post Kidney and Pancreas  Transplant Care Coordinator  [phone]

## 2024-01-12 NOTE — TELEPHONE ENCOUNTER
Patient confirmed this was an accurate 12-hour trough.   Verified Tacrolimus IR dose 3 mg BID.   Confirmed no new medications or illness.   Confirmed no missed doses.   Patient confirms decrease Tacrolimus IR dose to 2.5 mg BID and repeat labs in 1 week.

## 2024-01-15 NOTE — PROGRESS NOTES
[facility]  [facility] [place] [address]  Phone: [phone], Fax: [phone]    Outpatient Visit Note:    Patient: Haider Torrez  MRN: [id number]  : 1965  JAMIA: 2024    History of Present Illness:  Haider Torrez is a 58-year-old man with a history of IgA nephropathy s/p renal transplants (last ) and recurrent right internal jugular thrombosis, on apixaban.      Other history of note includes a right arm AV fistula that had an aneurysm requiring repair in  and subsequently had a thrombosis.  He had a right basilic vein transposed in his fistula in 2011.  He also previously had a right internal jugular permacath which was subsequently removed.  He previously had a nonocclusive clot in the right internal jugular vein and was treated with warfarin.    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  Received ganciclovir.  Also developed MATTHEW on CKD. He developed a right arm swelling on .  Ultrasound showed complete occlusion of the right internal jugular vein.  Started on apixaban.  Of note he was not on DVT prophylaxis at that time as he was ambulatory.  Of note he had a thrombosis in this location in .      He was initially on apixaban 5 mg twice daily.  Had 2 therapeutic apixaban levels in October. He has since stopped taking it as he ran out.  He feels well in general.  Notes some bruising on his arms and back while he was on apixaban that has resolved since.  No overt bleeding.    2023: Beginning repeat renal transplant evaluation.  Had a brief interruption of anticoagulation earlier this month but back on it now.  No bleeding issues.    Medications:  Current Outpatient Medications   Medication Sig Dispense Refill    amLODIPine (NORVASC) 5 MG tablet Take 1 tablet (5 mg) by mouth daily for 90 days 90 tablet 3    carvedilol (COREG) 25 MG tablet Take 1 tablet (25 mg) by mouth 2 times daily (with meals)       clonazePAM (KLONOPIN) 0.5 MG tablet Take 1 tablet (0.5 mg) by mouth At Bedtime      folic acid (FOLVITE) 1 MG tablet Take 1 tablet (1 mg) by mouth daily      levothyroxine (SYNTHROID/LEVOTHROID) 137 MCG tablet Take 1 tablet (137 mcg) by mouth daily      mycophenolic acid (GENERIC EQUIVALENT) 180 MG EC tablet Take 2 tablets (360 mg) by mouth 2 times daily 120 tablet 11    pantoprazole (PROTONIX) 40 MG EC tablet Take 1 tablet (40 mg) by mouth daily Take 30-60 minutes before a meal. 30 tablet 0    predniSONE (DELTASONE) 5 MG tablet Take 1 tablet (5 mg) by mouth daily      rOPINIRole (REQUIP) 1 MG tablet Take 1 tablet (1 mg) by mouth At Bedtime      sodium bicarbonate 650 MG tablet Take 2 tablets (1,300 mg) by mouth 2 times daily      sulfamethoxazole-trimethoprim (BACTRIM) 400-80 MG tablet Take 1 tablet by mouth three times a week      tacrolimus (GENERIC) 0.5 MG capsule Take 1 capsule (0.5 mg) by mouth 2 times daily Total dose = 2.5 mg twice per day 60 capsule 11    tacrolimus (GENERIC) 1 MG capsule Take 2 capsules (2 mg) by mouth 2 times daily Total dose = 2.5 mg twice per day 120 capsule 11    torsemide (DEMADEX) 20 MG tablet Take 1 tablet (20 mg) by mouth daily      vitamin D3 (CHOLECALCIFEROL) 10 MCG (400 UNIT) capsule Take 1 capsule (400 Units) by mouth daily          Assessment:  Haider Torrez is a 58 year old man with a history of renal transplantation and recurrent right internal jugular vein provoked thromboses.    He has completed 3 months of apixaban.  Will repeat an ultrasound to see the status of the thrombosis.  If resolved, will stop anticoagulation.  If not resolved, can likely continue for another 3 months to see if resolution is achieved.    In general I do not think he needs indefinite prophylaxis.  He should however definitely receive prophylaxis in high risk situations (surgeries, hospitalizations, long trips, etc.).  He will likely also need extended posttransplant prophylaxis.  He was  counseled about staying in touch with us about such above high risk situations where we might need to coordinate prophylaxis for him.      Plan:  -Continue apixaban  -Repeat neck ultrasound  -Stop anticoagulation if thrombus is resolved  -Continue for 3 more months if not resolved  -Will need prophylaxis in high risk situations in the future  -Follow-up in 6 months with [facility]    Patient understands and agrees with the above plan and recommendation.    Jacob Cogan, MD  Hematology    20 minutes spent by me on the date of the encounter doing chart review, history and exam, documentation and further activities per the note      Video-Visit Details:  Type of service:  Video Visit  Video Start Time:  10:30  Video End Time (time video stopped): 11:45  Originating Location (pt. Location): Home  Distant Location (provider location):  [facility]   Mode of Communication:  Video Conference via [employer]

## 2024-01-16 ENCOUNTER — VIRTUAL VISIT (OUTPATIENT)
Dept: HEMATOLOGY | Facility: CLINIC | Age: 59
End: 2024-01-16
Attending: STUDENT IN AN ORGANIZED HEALTH CARE EDUCATION/TRAINING PROGRAM
Payer: MEDICARE

## 2024-01-16 ENCOUNTER — TELEPHONE (OUTPATIENT)
Dept: TRANSPLANT | Facility: CLINIC | Age: 59
End: 2024-01-16

## 2024-01-16 DIAGNOSIS — I82.621 ACUTE DEEP VEIN THROMBOSIS (DVT) OF RIGHT UPPER EXTREMITY, UNSPECIFIED VEIN (H): Primary | ICD-10-CM

## 2024-01-16 LAB
ABO/RH(D): NORMAL
ABO/RH(D): NORMAL
ANTIBODY SCREEN: NEGATIVE
SPECIMEN EXPIRATION DATE: NORMAL
SPECIMEN EXPIRATION DATE: NORMAL

## 2024-01-16 PROCEDURE — 99213 OFFICE O/P EST LOW 20 MIN: CPT | Mod: 95 | Performed by: STUDENT IN AN ORGANIZED HEALTH CARE EDUCATION/TRAINING PROGRAM

## 2024-01-16 RX ORDER — APIXABAN 5 MG/1
TABLET, FILM COATED ORAL
COMMUNITY
Start: 2024-01-04 | End: 2024-09-18

## 2024-01-16 NOTE — PROGRESS NOTES
TRANSPLANT NEPHROLOGY RECIPIENT EVALUATION NOTE    Assessment and Plan:  # Kidney Transplant Evaluation: Patient is a good candidate overall. Benefits of a living donor transplant were discussed.    #  IgA nephropathy s/p failing DDKT (2015): had MATTHEW with COVID in Sept 2022 and again with CMV colitis in December 2023. Current eGFR ~ 15 ml/min. When ready, he would likely benefit from another kidney transplant. IS includes Tacrolimus, MPA and prednisone.     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 Treated with 4 weeks of IV GCV->4 weeks secondary ppx with valcyte. CMV<35 on last check 1/9/24.     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 Will recheck.     # PAD Screening: 10/2023 CT available for surgery review.     # Cardiac risk: needs risk assessment given multiple long standing risk factors.      # Seizure disorder- developed in 2011, unclear etiology, initially treated with Keppra but currently maintained without medication. Followed by Dr. Magallanes, neurologist at [facility]. Will request neurology records.     # History of papillary thyroid cancer, follicular variant- s/p complete thyroidectomy 2011: continues to be followed every three months by endocrinologist Dr. Zhou at [facility]. Will request records from 2 months ago.      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 Seen by bleeding and clotting, on Eliquis until clot resolves.     # Health Maintenance: Colonoscopy: Up to date and Dental: Not up to date. Derm: reportedly UTD, will request records from [place].     - Discussed the risks and benefits of a transplant, including the risk of surgery and immunosuppression medications.  Patient's overall evaluation will be discussed in the Transplant Program's regular meeting with a final recommendation on the patients suitability for transplant to be made at that time.    Pending completion of the full  evaluation, patient presently appears to be enough of an acceptable kidney transplant recipient candidate to have any potential kidney donors start the evaluation process.    Evaluation:  Haider Torrez was seen in consultation at the request of Dr. Juanita Kitchen for evaluation as a potential kidney transplant recipient.    Reason for Visit:  Haider Torrez is a 58 year old male with failing DDKT , who presents for kidney transplant evaluation.    History of Present Illness:           Kidney Disease Hx:       Haider Torrez is a 58-year-old male with history of IgA nephropathy s/p DDKT x 2. IgA nephropathy diagnosed in 2000. 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 He later received a DDKT at [facility] in 2015.  Has had declining graft function since 2019. History of COVID in Sept 2022 with sCr up to  ~ 2.4 mg/dl. Later CMV viremia / diarrhea  in October 2022, but biopsies on colonoscopy were negative for CMV and were suggestive of medication induced colitis. MATTHEW (sCr up to ~ 3.9 mg/dl) in 12/2023 in the setting of CMV colitis / Norovirus. Treated with 4 weeks of IV GCV->4 weeks secondary ppx with valcyte. CMV<35 on last check 1/9/24. His kidney function has continued to further decline with a sCr of ~ 4.3 mg/dl on 1/9/24. His only kidney transplant biopsy in 2020 showed chronic changes.          Kidney Disease Dx: IgA S/p failing DDKT (2004)          On Dialysis: No       Primary Nephrologist: Dr. Butler       H/o Kidney Stones: No       H/o Recurrent/Frequent UTI: No         Diabetic Hx: None           Cardiac/Vascular Disease Risk Factors:        Cardiac Risk Factors: Hypertension, CKD, and Age (Male > 55, Female > 65)       Known CAD: No       Known PAD/Caludication  Symptoms: No       Known Heart Failure: No       Arrhythmia: No       Pulmonary Hypertension: No       Valvular Disease: No       Other: None         Viral Serology Status       CMV IgG Antibody: Unknown       EBV IgG Antibody: Unknown         Volume Status/Weight:        Volume status: Euvolemic       Weight:  Acceptable BMI       BMI: There is no height or weight on file to calculate BMI.         Functional Capacity/Frailty:        On disability since DDKT in 2015, previously worked as process computer  specialist.  Exercises by walking/running on treadmill, a few times week 10-15 minutes. Denies exertional symptoms.     Fatigue/Decreased Energy: [] No [x] Yes    Chest Pain or SOB with Exertion: [x] No [] Yes    Significant Weight Change: [x] No [] Yes    Nausea, Vomiting or Diarrhea: [x] No [] Yes    Fever, Sweats or Chills:  [x] No [] Yes    Leg Swelling [x] No [] Yes        History of Cancer: # History of papillary thyroid cancer, follicular variant- s/p complete thyroidectomy 2011. Continues to be followed every three months by endocrinologist Dr. Zhou at [facility]. Will records from 2 months ago.          Allergy Testing Questions:   Medication that caused a reaction  Cephalexin and Clindmyacin -  Hives    Antibiotics used that didn't give an allergic reaction?  None    COVID Vaccination Up To Date: Not asked    Potential Living Kidney Donors: No    Review of Systems:  A comprehensive review of systems was obtained and negative, except as noted in the HPI or PMH.    Past Medical History:   Medical record was reviewed and PMH was discussed with patient and noted below.  Past Medical History:   Diagnosis Date    Anemia of chronic kidney failure     Carpal tunnel syndrome     Clinical diagnosis of COVID-19 9/22/2022    Dyslipidemia     History of respiratory failure     Hypertension     Hypomagnesemia 2015    IgA nephropathy     Insomnia     Kidney disease, chronic, end stage on dialysis (H)     Kidney  transplant failure     Myoclonus     Obstructive sleep apnea     Papillary thyroid carcinoma (H)     PRES (posterior reversible encephalopathy syndrome)      after thyroidectomy    Restless leg syndrome     Secondary hyperparathyroidism (H24)     Seizure disorder (H)     Post surgery. Keppra now discontinued    Urinoma of kidney transplant        Past Social History:   Past Surgical History:   Procedure Laterality Date    BENCH KIDNEY  10/13/2015    Procedure: BENCH KIDNEY;  Surgeon: Seth Gibbons MD;  Location: [facility] OR    COLONOSCOPY N/A 10/05/2023    Procedure: COLONOSCOPY, WITH POLYPECTOMY AND BIOPSY;  Surgeon: Russ Dodge MD;  Location: [facility] GI    CREATE FISTULA ARTERIOVENOUS UPPER EXTREMITY  2011    CYSTOSCOPY, REMOVE STENT(S), COMBINED N/A 2015    Procedure: COMBINED CYSTOSCOPY, REMOVE STENT(S);  Surgeon: Seth Gibbons MD;  Location: [facility] OR    CYSTOSCOPY, REMOVE STENT(S), COMBINED Bilateral 2015    Procedure: COMBINED CYSTOSCOPY, REMOVE STENT(S);  Surgeon: Seth Gibbons MD;  Location: [facility] OR    ELBOW SURGERY Right 1978    hemithyroidectomy Left 2011    HERNIA REPAIR Right 2009    PICC SINGLE LUMEN PLACEMENT Left 10/14/2023    left basilic 4fr sl power picc 44 cm    THYROIDECTOMY  2011    TRANSPLANT KIDNEY RECIPIENT  DONOR  2004    TRANSPLANT KIDNEY RECIPIENT  DONOR N/A 10/13/2015    Procedure: TRANSPLANT KIDNEY RECIPIENT  DONOR;  Surgeon: Seth Gibbons MD;  Location: [facility] OR    ureteropyelostomy  2004     Personal history of bleeding or anesthesia problems: No    Family History:  Family History   Problem Relation Age of Onset    Diabetes Mother     Other Cancer Father         lung       Personal History:   Social History     Socioeconomic History    Marital status:      Spouse name: Not on file    Number of children: Not on file    Years of education: Not on file    Highest education level: Not  on file   Occupational History    Not on file   Tobacco Use    Smoking status: Never    Smokeless tobacco: Never   Substance and Sexual Activity    Alcohol use: No    Drug use: No    Sexual activity: Not on file   Other Topics Concern    Parent/sibling w/ CABG, MI or angioplasty before 65F 55M? Not Asked   Social History Narrative    Not on file     Social Determinants of Health     Financial Resource Strain: Not on file   Food Insecurity: Not on file   Transportation Needs: Not on file   Physical Activity: Not on file   Stress: Not on file   Social Connections: Not on file   Interpersonal Safety: Not on file   Housing Stability: Not on file       Allergies:  Allergies   Allergen Reactions    Hydralazine Swelling    Betadine [Povidone Iodine]     Cephalexin Hives    Clindamycin Hives    Trazodone Swelling     Swelling of face/lips       Medications:  Current Outpatient Medications   Medication Sig    amLODIPine (NORVASC) 5 MG tablet Take 1 tablet (5 mg) by mouth daily for 90 days    carvedilol (COREG) 25 MG tablet Take 1 tablet (25 mg) by mouth 2 times daily (with meals)    clonazePAM (KLONOPIN) 0.5 MG tablet Take 1 tablet (0.5 mg) by mouth At Bedtime    ELIQUIS ANTICOAGULANT 5 MG tablet     folic acid (FOLVITE) 1 MG tablet Take 1 tablet (1 mg) by mouth daily    levothyroxine (SYNTHROID/LEVOTHROID) 137 MCG tablet Take 1 tablet (137 mcg) by mouth daily    mycophenolic acid (GENERIC EQUIVALENT) 180 MG EC tablet Take 2 tablets (360 mg) by mouth 2 times daily    pantoprazole (PROTONIX) 40 MG EC tablet Take 1 tablet (40 mg) by mouth daily Take 30-60 minutes before a meal.    predniSONE (DELTASONE) 5 MG tablet Take 1 tablet (5 mg) by mouth daily    rOPINIRole (REQUIP) 1 MG tablet Take 1 tablet (1 mg) by mouth At Bedtime    sodium bicarbonate 650 MG tablet Take 2 tablets (1,300 mg) by mouth 2 times daily    sulfamethoxazole-trimethoprim (BACTRIM) 400-80 MG tablet Take 1 tablet by mouth three times a week    tacrolimus  (GENERIC) 0.5 MG capsule Take 1 capsule (0.5 mg) by mouth 2 times daily Total dose = 2.5 mg twice per day    tacrolimus (GENERIC) 1 MG capsule Take 2 capsules (2 mg) by mouth 2 times daily Total dose = 2.5 mg twice per day    torsemide (DEMADEX) 20 MG tablet Take 1 tablet (20 mg) by mouth daily    vitamin D3 (CHOLECALCIFEROL) 10 MCG (400 UNIT) capsule Take 1 capsule (400 Units) by mouth daily     No current facility-administered medications for this visit.       Vitals:  There were no vitals taken for this visit.    Exam:  GENERAL APPEARANCE: alert and no distress  HENT: mouth without ulcers or lesions  RESP: lungs clear to auscultation - no rales, rhonchi or wheezes  CV: regular rhythm, normal rate, no rub, no murmur  EDEMA: no LE edema bilaterally  ABDOMEN: soft, nondistended, nontender, bowel sounds normal  MS: extremities normal - no gross deformities noted, no evidence of inflammation in joints, no muscle tenderness  SKIN: no rash    Results:   Recent Results (from the past 336 hour(s))   Basic metabolic panel    Collection Time: 01/09/24  8:05 AM   Result Value Ref Range    Potassium (External) 4.4 3.6 - 5.2 mmol/L    Sodium (External) 145 135 - 145 mmol/L    Chloride (External) 109 (H) 98 - 107 mmol/L    CO2 (External) 22 22 - 29 mmol/L    Anion Gap (External) 14 7 - 15    Urea Nitrogen (External) 69 (H) 8 - 24 mg/dL    Creatinine (External) 4.36 (H) 0.74 - 1.35 mg/dL    GFR Estimated (External) <15 (L) >=60 ml/min/1.73m2    Calcium (External) 8.2 (L) 8.6 - 10.0 mg/dL    Glucose (External) 114 70 - 140 mg/dL   CBC with Platelets & Differential    Collection Time: 01/09/24  8:05 AM   Result Value Ref Range    Hemoglobin (External) 10.4 (L) 13.2 - 16.6 g/dL    Hematocrit (External) 32.7 (L) 38.3 - 48.6 %    RBC Count (External) 3.50 (L) 4.35 - 5.65 10*12/L    MCV (External) 93.4 78.2 - 97.9 fL    RDW (External) 12.9 (L) 118 - 145 %    Platelet Count (External) 157 135 - 317 10(9)/L    WBC Count (External) 4.5  3.4 - 9.6 10(9)/L    Absolute Neutrophils (External) 3.28 1.56 - 6.45 10(9)/L    Absolute Lymphocytes (External) 0.79 (L) 0.95 - 3.07 10(9)/L    Absolute Monocytes (External) 0.44 0.26 - 0.81 10(9)/L    Absolute Eosinophils (External) <0.03 (A) 0.03 - 0.48 10(9)/L    Absolute Basophils (External) <0.03 0.01 - 0.08 10(9)/L   Tacrolimus by Tandem Mass Spectrometry    Collection Time: 01/09/24  8:05 AM   Result Value Ref Range    Tacrolimus(FK-506) (External) 6.3 5.0 - 15.0 NG/Ml   Cytomegalovirus DNA by PCR, Quantitative    Collection Time: 01/09/24  8:05 AM   Result Value Ref Range    CMV DNA Quant (External) <35 (A) Undetected IU/ML    Log IU/ML of CMVQNT (External) <1.54 Undetected log IU/mL

## 2024-01-16 NOTE — TELEPHONE ENCOUNTER
ISSUE:   Lab frequency: CMV PCR monthly per Dr. Bullard     PLAN: Chart reviewed. Last labs collected 1/9/24.    OUTCOME:   [employer] message sent to Haider:    Manny Maloney,     I got a call today from your clinic lab. They said that you are coming in weekly but they don't have orders so I wanted to clarify that your CMV PCR checks are now monthly (last completed on 1/9/24), next due ~2/5/24.    Your post-transplant tacrolimus level can be done in about another week to follow up on the dose decrease we made on 1/12/24, then if at goal 4-6, monthly from there on out. Your BMP and CBC frequency can be determined by Dr. Butler, but we will plan on checking 1/22/24 when you are in for the tacrolimus level.     Meagan Richardson, RN, BSN  Solid Organ Transplant, Post Kidney and Pancreas  Transplant Care Coordinator  [phone]

## 2024-01-16 NOTE — PROGRESS NOTES
[facility] SOLID ORGAN TRANSPLANT  OUTPATIENT MNT: KIDNEY TRANSPLANT EVALUATION    Current BMI: 22.9 (HT 65 in,  lbs/63 kg)  BMI guideline for kidney transplant up to a BMI of 40 / per surgeon discretion     Frailty Assessment-- Frail (3/5 points)- unintentional wt loss (yet has regained a fair amount back), reduced , low activity level     Reference:  Score of 0-2 = Not Frail  Score of 3-5 = Frail      TIME SPENT: 15 minutes  VISIT TYPE: Initial   REFERRING PHYSICIAN: Imtiaz   PT ACCOMPANIED BY: his wife     History of previous txp: kidney #1 2004, kidney #2 2015   Dialysis: no    NUTRITION ASSESSMENT  - Appetite: good/baseline   - Food allergies/intolerances: no  - Meal prep & grocery shopping: pt's wife   - Previous RD education: yes  - Issues chewing or swallowing: no  - N/V/D/C: no  - Food access concerns: no     Vitamins, Supplements, Pertinent Meds: folic acid, vit D   Herbal Medicines/Supplements: none   Protein Supplement: none     Edema: + JULIETA     Weight hx:   - prior UBW was 145 lbs (Oct 2023 per pt report)--> lost wt down to 120 lbs but has regained ~15 lbs and is stable   - cannot find any weight readings >140 lbs in CE the past year- appears 120s-130s   Wt Readings from Last 10 Encounters:   01/17/24 62.6 kg (138 lb 1.6 oz)   10/24/23 61.2 kg (135 lb)   10/12/23 61.9 kg (136 lb 7.4 oz)   03/07/23 59 kg (130 lb)   03/27/18 69.7 kg (153 lb 9.6 oz)   09/26/17 69.5 kg (153 lb 4.8 oz)   03/14/17 67.3 kg (148 lb 6.4 oz)   09/13/16 66.3 kg (146 lb 3.2 oz)   03/15/16 64.9 kg (143 lb)   11/17/15 60.9 kg (134 lb 4.2 oz)     PHYSICAL ACTIVITY   Walks on the treadmill, 10-15 min a few times/week   Biking in the summer & walking outside   Energy level- gets a little tired after self cares, needs help sometimes-  d/t kidney dz     DIET RECALL  Breakfast Cereal    Lunch Chicken w/ rice, some veggies (broccoli)   Dinner Similar to L- does add salt/salty sauces   Snacks Fruits, some junk food  (less often)   Beverages Water, OJ (not daily)   Alcohol None    Dining out 1x/month      LABS  1/15 K 4.5   12/11 Phos 3.8     NUTRITION DIAGNOSIS   No nutrition diagnosis identified at this time     NUTRITION INTERVENTION   Nutrition education provided:  Discussed sodium intake (low sodium foods and drinks, seasoning food without salt and tips for low sodium diet).  Encouraged reducing salt, bouillon, salty sauces (soy, fish) to help with JULIETA. Pt reports having heard this in the past.   Reviewed wnl K/Phos levels, which do not warrant further dietary modification at this time, yet discussed foods he may need to limit in the future.     Reviewed post txp diet guidelines in brief (will review in further detail post txp):  (1) Review of proper food safety measures d/t immunosuppressant therapy post-op and increased risk for food-borne illness    (2) Avoid the following post txp d/t risk for rejection, unknown effects on the organs, and/or potential interactions with immunosuppressants:  - Herbal, Chinese, holistic, chiropractic, natural, alternative medicines and supplements  - Detoxes and cleanses  - Weight loss pills  - Protein powders or other products with extracts or herbs (ie green tea extract)    (3) Med regimen and possible side effects    Patient Understanding: Pt verbalized understanding of education provided.  Expected Engagement: Good  Follow-Up Plans: PRN     NUTRITION GOALS   No nutrition goals identified at this time     Rox Manuel, RD, LD, CCTD

## 2024-01-16 NOTE — TELEPHONE ENCOUNTER
Provider Call: General  Route to LPN    Reason for call: They had orders for pt to repeat labs n 1 week  pt did labs on 1/1/24 Does pt need to wait longer for lab  Call pt if he needs to go  to a week   Tacro level is still pending     Call back needed? No

## 2024-01-17 ENCOUNTER — APPOINTMENT (OUTPATIENT)
Dept: TRANSPLANT | Facility: CLINIC | Age: 59
End: 2024-01-17
Attending: NURSE PRACTITIONER
Payer: MEDICARE

## 2024-01-17 ENCOUNTER — LAB (OUTPATIENT)
Dept: LAB | Facility: CLINIC | Age: 59
End: 2024-01-17
Attending: NURSE PRACTITIONER
Payer: MEDICARE

## 2024-01-17 ENCOUNTER — EVALUATION (OUTPATIENT)
Dept: TRANSPLANT | Facility: CLINIC | Age: 59
End: 2024-01-17

## 2024-01-17 ENCOUNTER — ANCILLARY PROCEDURE (OUTPATIENT)
Dept: ULTRASOUND IMAGING | Facility: CLINIC | Age: 59
End: 2024-01-17
Attending: STUDENT IN AN ORGANIZED HEALTH CARE EDUCATION/TRAINING PROGRAM
Payer: MEDICARE

## 2024-01-17 ENCOUNTER — ANCILLARY PROCEDURE (OUTPATIENT)
Dept: GENERAL RADIOLOGY | Facility: CLINIC | Age: 59
End: 2024-01-17
Attending: NURSE PRACTITIONER
Payer: MEDICARE

## 2024-01-17 ENCOUNTER — ANCILLARY PROCEDURE (OUTPATIENT)
Dept: CARDIOLOGY | Facility: CLINIC | Age: 59
End: 2024-01-17
Attending: NURSE PRACTITIONER
Payer: MEDICARE

## 2024-01-17 VITALS
SYSTOLIC BLOOD PRESSURE: 155 MMHG | HEIGHT: 65 IN | DIASTOLIC BLOOD PRESSURE: 83 MMHG | WEIGHT: 138.1 LBS | HEART RATE: 62 BPM | BODY MASS INDEX: 23.01 KG/M2 | OXYGEN SATURATION: 98 %

## 2024-01-17 DIAGNOSIS — I10 ESSENTIAL HYPERTENSION: ICD-10-CM

## 2024-01-17 DIAGNOSIS — T86.12 KIDNEY TRANSPLANT FAILURE: ICD-10-CM

## 2024-01-17 DIAGNOSIS — N18.4 CHRONIC KIDNEY DISEASE, STAGE IV (SEVERE) (H): ICD-10-CM

## 2024-01-17 DIAGNOSIS — Z01.818 PRE-TRANSPLANT EVALUATION FOR KIDNEY TRANSPLANT: ICD-10-CM

## 2024-01-17 DIAGNOSIS — I82.621 ACUTE DEEP VEIN THROMBOSIS (DVT) OF RIGHT UPPER EXTREMITY, UNSPECIFIED VEIN (H): ICD-10-CM

## 2024-01-17 DIAGNOSIS — Z94.0 KIDNEY REPLACED BY TRANSPLANT: ICD-10-CM

## 2024-01-17 DIAGNOSIS — I82.C19: Primary | ICD-10-CM

## 2024-01-17 DIAGNOSIS — D64.9 ANEMIA: ICD-10-CM

## 2024-01-17 DIAGNOSIS — E78.5 HYPERLIPIDEMIA: ICD-10-CM

## 2024-01-17 DIAGNOSIS — N02.B9 IGA NEPHROPATHY: ICD-10-CM

## 2024-01-17 DIAGNOSIS — Z01.818 PRE-TRANSPLANT EVALUATION FOR KIDNEY TRANSPLANT: Primary | ICD-10-CM

## 2024-01-17 DIAGNOSIS — Z72.89 OTHER PROBLEMS RELATED TO LIFESTYLE: ICD-10-CM

## 2024-01-17 DIAGNOSIS — N18.4 CKD (CHRONIC KIDNEY DISEASE), STAGE IV (H): Primary | ICD-10-CM

## 2024-01-17 DIAGNOSIS — Z76.82 ORGAN TRANSPLANT CANDIDATE: Primary | ICD-10-CM

## 2024-01-17 DIAGNOSIS — Z11.59 ENCOUNTER FOR SCREENING FOR OTHER VIRAL DISEASES: ICD-10-CM

## 2024-01-17 DIAGNOSIS — Z48.298 AFTERCARE FOLLOWING ORGAN TRANSPLANT: ICD-10-CM

## 2024-01-17 DIAGNOSIS — N18.4 CKD (CHRONIC KIDNEY DISEASE), STAGE IV (H): ICD-10-CM

## 2024-01-17 DIAGNOSIS — N25.81 SECONDARY RENAL HYPERPARATHYROIDISM (H): ICD-10-CM

## 2024-01-17 DIAGNOSIS — Z76.82 ORGAN TRANSPLANT CANDIDATE: ICD-10-CM

## 2024-01-17 DIAGNOSIS — Z12.5 ENCOUNTER FOR SCREENING FOR MALIGNANT NEOPLASM OF PROSTATE: ICD-10-CM

## 2024-01-17 LAB
ALBUMIN SERPL BCG-MCNC: 3.6 G/DL (ref 3.5–5.2)
ALBUMIN UR-MCNC: 200 MG/DL
ALP SERPL-CCNC: 61 U/L (ref 40–150)
ALT SERPL W P-5'-P-CCNC: 8 U/L (ref 0–70)
ANION GAP SERPL CALCULATED.3IONS-SCNC: 10 MMOL/L (ref 7–15)
APPEARANCE UR: CLEAR
APTT PPP: 30 SECONDS (ref 22–38)
AST SERPL W P-5'-P-CCNC: 18 U/L (ref 0–45)
BASOPHILS # BLD AUTO: 0 10E3/UL (ref 0–0.2)
BASOPHILS NFR BLD AUTO: 0 %
BILIRUB SERPL-MCNC: 0.4 MG/DL
BILIRUB UR QL STRIP: NEGATIVE
BUN SERPL-MCNC: 64.6 MG/DL (ref 6–20)
CALCIUM SERPL-MCNC: 8.5 MG/DL (ref 8.6–10)
CHLORIDE SERPL-SCNC: 108 MMOL/L (ref 98–107)
CMV IGG SERPL IA-ACNC: >10 U/ML
CMV IGG SERPL IA-ACNC: ABNORMAL
COLOR UR AUTO: ABNORMAL
CREAT SERPL-MCNC: 4.29 MG/DL (ref 0.67–1.17)
DEPRECATED HCO3 PLAS-SCNC: 26 MMOL/L (ref 22–29)
EBV VCA IGG SER IA-ACNC: 79.5 U/ML
EBV VCA IGG SER IA-ACNC: POSITIVE
EGFRCR SERPLBLD CKD-EPI 2021: 15 ML/MIN/1.73M2
EOSINOPHIL # BLD AUTO: 0 10E3/UL (ref 0–0.7)
EOSINOPHIL NFR BLD AUTO: 0 %
ERYTHROCYTE [DISTWIDTH] IN BLOOD BY AUTOMATED COUNT: 12.9 % (ref 10–15)
FERRITIN SERPL-MCNC: 622 NG/ML (ref 31–409)
GLUCOSE SERPL-MCNC: 235 MG/DL (ref 70–99)
GLUCOSE UR STRIP-MCNC: NEGATIVE MG/DL
HCT VFR BLD AUTO: 35.4 % (ref 40–53)
HCV AB SERPL QL IA: NONREACTIVE
HGB BLD-MCNC: 11.5 G/DL (ref 13.3–17.7)
HGB UR QL STRIP: ABNORMAL
HYALINE CASTS: 2 /LPF
IMM GRANULOCYTES # BLD: 0 10E3/UL
IMM GRANULOCYTES NFR BLD: 1 %
INR PPP: 1.06 (ref 0.85–1.15)
IRON BINDING CAPACITY (ROCHE): 237 UG/DL (ref 240–430)
IRON SATN MFR SERPL: 32 % (ref 15–46)
IRON SERPL-MCNC: 77 UG/DL (ref 61–157)
KETONES UR STRIP-MCNC: NEGATIVE MG/DL
LEUKOCYTE ESTERASE UR QL STRIP: NEGATIVE
LVEF ECHO: NORMAL
LYMPHOCYTES # BLD AUTO: 0.5 10E3/UL (ref 0.8–5.3)
LYMPHOCYTES NFR BLD AUTO: 8 %
MCH RBC QN AUTO: 29.8 PG (ref 26.5–33)
MCHC RBC AUTO-ENTMCNC: 32.5 G/DL (ref 31.5–36.5)
MCV RBC AUTO: 92 FL (ref 78–100)
MONOCYTES # BLD AUTO: 0.3 10E3/UL (ref 0–1.3)
MONOCYTES NFR BLD AUTO: 4 %
NEUTROPHILS # BLD AUTO: 5.4 10E3/UL (ref 1.6–8.3)
NEUTROPHILS NFR BLD AUTO: 87 %
NITRATE UR QL: NEGATIVE
NRBC # BLD AUTO: 0 10E3/UL
NRBC BLD AUTO-RTO: 0 /100
PH UR STRIP: 6 [PH] (ref 5–7)
PLATELET # BLD AUTO: 159 10E3/UL (ref 150–450)
POTASSIUM SERPL-SCNC: 4.8 MMOL/L (ref 3.4–5.3)
PROT SERPL-MCNC: 6.5 G/DL (ref 6.4–8.3)
PSA SERPL DL<=0.01 NG/ML-MCNC: 0.96 NG/ML (ref 0–3.5)
PTH-INTACT SERPL-MCNC: 601 PG/ML (ref 15–65)
RBC # BLD AUTO: 3.86 10E6/UL (ref 4.4–5.9)
RBC URINE: 2 /HPF
SODIUM SERPL-SCNC: 144 MMOL/L (ref 135–145)
SP GR UR STRIP: 1.01 (ref 1–1.03)
T PALLIDUM AB SER QL: NONREACTIVE
THROMBIN TIME: 18.9 SECONDS (ref 13–19)
UROBILINOGEN UR STRIP-MCNC: NORMAL MG/DL
VZV IGG SER QL IA: >4000 INDEX
VZV IGG SER QL IA: POSITIVE
WBC # BLD AUTO: 6.3 10E3/UL (ref 4–11)
WBC URINE: 1 /HPF

## 2024-01-17 PROCEDURE — 99215 OFFICE O/P EST HI 40 MIN: CPT | Performed by: NURSE PRACTITIONER

## 2024-01-17 PROCEDURE — 83970 ASSAY OF PARATHORMONE: CPT | Performed by: PATHOLOGY

## 2024-01-17 PROCEDURE — 99207 PR NO CHARGE COORDINATED CARE PS: CPT

## 2024-01-17 PROCEDURE — 86833 HLA CLASS II HIGH DEFIN QUAL: CPT | Performed by: NURSE PRACTITIONER

## 2024-01-17 PROCEDURE — 36415 COLL VENOUS BLD VENIPUNCTURE: CPT | Performed by: NURSE PRACTITIONER

## 2024-01-17 PROCEDURE — 86706 HEP B SURFACE ANTIBODY: CPT | Performed by: NURSE PRACTITIONER

## 2024-01-17 PROCEDURE — 86832 HLA CLASS I HIGH DEFIN QUAL: CPT | Performed by: NURSE PRACTITIONER

## 2024-01-17 PROCEDURE — 86704 HEP B CORE ANTIBODY TOTAL: CPT | Performed by: NURSE PRACTITIONER

## 2024-01-17 PROCEDURE — 83550 IRON BINDING TEST: CPT | Performed by: PATHOLOGY

## 2024-01-17 PROCEDURE — 99204 OFFICE O/P NEW MOD 45 MIN: CPT | Performed by: SURGERY

## 2024-01-17 PROCEDURE — 86665 EPSTEIN-BARR CAPSID VCA: CPT | Performed by: NURSE PRACTITIONER

## 2024-01-17 PROCEDURE — 85730 THROMBOPLASTIN TIME PARTIAL: CPT | Performed by: NURSE PRACTITIONER

## 2024-01-17 PROCEDURE — 81001 URINALYSIS AUTO W/SCOPE: CPT | Performed by: NURSE PRACTITIONER

## 2024-01-17 PROCEDURE — 71046 X-RAY EXAM CHEST 2 VIEWS: CPT | Mod: GC | Performed by: RADIOLOGY

## 2024-01-17 PROCEDURE — 87340 HEPATITIS B SURFACE AG IA: CPT | Performed by: NURSE PRACTITIONER

## 2024-01-17 PROCEDURE — 86481 TB AG RESPONSE T-CELL SUSP: CPT | Performed by: NURSE PRACTITIONER

## 2024-01-17 PROCEDURE — 86900 BLOOD TYPING SEROLOGIC ABO: CPT | Performed by: NURSE PRACTITIONER

## 2024-01-17 PROCEDURE — 85004 AUTOMATED DIFF WBC COUNT: CPT | Performed by: NURSE PRACTITIONER

## 2024-01-17 PROCEDURE — 86803 HEPATITIS C AB TEST: CPT | Performed by: NURSE PRACTITIONER

## 2024-01-17 PROCEDURE — 93306 TTE W/DOPPLER COMPLETE: CPT | Performed by: INTERNAL MEDICINE

## 2024-01-17 PROCEDURE — 86780 TREPONEMA PALLIDUM: CPT | Performed by: NURSE PRACTITIONER

## 2024-01-17 PROCEDURE — 81378 HLA I & II TYPING HR: CPT | Performed by: NURSE PRACTITIONER

## 2024-01-17 PROCEDURE — 86787 VARICELLA-ZOSTER ANTIBODY: CPT | Performed by: NURSE PRACTITIONER

## 2024-01-17 PROCEDURE — 85610 PROTHROMBIN TIME: CPT | Performed by: NURSE PRACTITIONER

## 2024-01-17 PROCEDURE — 86147 CARDIOLIPIN ANTIBODY EA IG: CPT | Performed by: NURSE PRACTITIONER

## 2024-01-17 PROCEDURE — 83540 ASSAY OF IRON: CPT | Performed by: PATHOLOGY

## 2024-01-17 PROCEDURE — 82728 ASSAY OF FERRITIN: CPT | Performed by: PATHOLOGY

## 2024-01-17 PROCEDURE — 85670 THROMBIN TIME PLASMA: CPT | Performed by: NURSE PRACTITIONER

## 2024-01-17 PROCEDURE — 82040 ASSAY OF SERUM ALBUMIN: CPT | Performed by: NURSE PRACTITIONER

## 2024-01-17 PROCEDURE — 93971 EXTREMITY STUDY: CPT | Mod: RT | Performed by: RADIOLOGY

## 2024-01-17 PROCEDURE — 84450 TRANSFERASE (AST) (SGOT): CPT | Performed by: NURSE PRACTITIONER

## 2024-01-17 PROCEDURE — 86644 CMV ANTIBODY: CPT | Performed by: NURSE PRACTITIONER

## 2024-01-17 PROCEDURE — 93000 ELECTROCARDIOGRAM COMPLETE: CPT | Performed by: INTERNAL MEDICINE

## 2024-01-17 PROCEDURE — G0103 PSA SCREENING: HCPCS | Performed by: NURSE PRACTITIONER

## 2024-01-17 PROCEDURE — G0463 HOSPITAL OUTPT CLINIC VISIT: HCPCS | Performed by: SURGERY

## 2024-01-17 NOTE — PROGRESS NOTES
Transplant Surgery Consult Note     Medical record number: [id number]  YOB: 1965,   Consult requested  for evaluation of kidney transplant candidacy.    Assessment and Recommendations:Mr. Torrez appears to be a excellent candidate for kidney transplantation and has a good understanding of the risks and benefits of this approach to the management of renal failure. The following issues should be addressed prior to finalizing his transplant candidacy:     Transplant order: Mr. Torrez has Chronic renal failure due to IgA nephropathy/failed kidney whose condition is not expected to resolve, is expected to progress, and is expected to continue to develop related comorbid conditions.  Recommend he be considered as a candidate for kidney.  Cardiology consult for cardiac risk stratification to be ordered: Yes  CT abdomen and pelvis without contrast to be ordered for assessment of vascular targets: Yes  Transplant listing labs ordered to include HLA, ABOx2, Cr, etc.  Dietician consult ordered: Yes  Social work consult ordered: Yes  Imaging reports reviewed:  CT 10/2023  FINDINGS:   The liver is grossly negative on this noncontrast exam. The gallbladder is negative. Mild stranding   about the pancreatic head and neck. The spleen is negative. The adrenals are negative. Atrophy of   the native kidneys. Hyperdense renal cysts or calcifications upper pole right native kidney. There   are 2 atrophy renal transplant in the right lower quadrant. Left lower quadrant renal transplant   without hydronephrosis or nephrolithiasis. Normal caliber of the bowel. Large colonic stool burden.   Scattered colonic diverticula. Negative appendix. Mild stranding throughout the mesentery,   nonspecific. Body wall edema. Trace right pleural effusion with adjacent compressive atelectasis.   Cardiomegaly. No pericardial effusion. No acute osseous abnormality.   Radiology images reviewed:Please obtain CT images from 10/2023  Recipient  suitable to move forward with work up of living donors:  Yes  Jayne won't need anticoag around txp but should have good prophylaxis  Test for A2 to B  Will need to go midline for transplant as this will be 3rd kidney transplant  Discussed benefits of LD and eplet matching      The majority of our visit was spent in counselling, discussing the medical and surgical risks of kidney transplantation. We discussed approximate wait time and how that is influenced by issues such as blood type and sensitization (PRA) and access to a living donor. I contrasted potential waiting time for living vs  donor kidneys from  normal (0-85%) or higher (%) kidney donor profile index (KDPI) donors and their associated outcomes. I would not recommend this individual to consider kidneys from high KDPI donors. The reason for this decision is best summarized as: not on dialysis yet. Potential surgical complications of kidney transplantation include bleeding, superficial or deep wound complications (infection, hernia, lymphocele), ureteral anastomotic failure (leak or stenosis), graft thrombosis, need for reoperation and other issues such as cardiac complications, pneumonia, deep venous thrombosis, pulmonary embolism, post transplant diabetes and death. The potential for recurrent disease or need for retransplantation was also addressed. We discussed the possible need for ureteral stent (and subsequent removal), and the utility of protocol biopsy and laboratory studies to evaluate for rejection or recurrent disease. We discussed the risk of graft rejection, our center's average graft and patient survival rates, immunosuppression protocols, as well as the potential opportunity to participate in clinical trials.  We also discussed the average length of stay, recovery process, and posttransplant lab and monitoring protocol.  I emphasized the need for strict immunosuppression medication adherence and the potential for complications  of immunosuppression such as skin cancer or lymphoma, as well as a very low but not zero risk of donor-derived disease transmission risks (infection, cancer). Mr. Torrez asked good questions and his candidacy will be reviewed at our Multidisciplinary Selection Committee. Thank you for the opportunity to participate in Mr. Torrez's care.      Total time: 60 minutes        Juanita Kitchen MD FACS  Associate Professor of Surgery  Director, Living Kidney Donor Program.    ---------------------------------------------------------------------------------------------------    HPI: Mr. Torrez has Chronic renal failure due to IgA nephropathy. The patient is non-diabetic.       The patient is not on dialysis.    Has potential kidney donors:  No.  Interested in participation in paired exchange if a donor is willing: Doesn't know     The patient has the following pertinent history:       No    Yes  Dialysis:    [x]      [] via:       Blood Transfusion                  []      [x]  Number of units:   Most recently: 10/18/2023  Pregnancy:    [x]      [] Number:       Previous Transplant:  []      [x] Details:  2004 and 2015  Cancer    []      [x] Comment: thyroid cancer 2011  Kidney stones   [x]      [] Comment:      Recurrent infections  [x]      []  Type:                  Bladder dysfunction  [x]      [] Cause:    Claudication   [x]      [] Distance:    Previous Amputation  [x]      [] Cause:     Chronic anticoagulation  []      [x] Indication: R internal jugular thombus  Scientologist  [x]      []      Past Medical History:   Diagnosis Date    Anemia of chronic kidney failure     Carpal tunnel syndrome     Clinical diagnosis of COVID-19 9/22/2022    Dyslipidemia     History of respiratory failure     Hypertension     Hypomagnesemia 2015    IgA nephropathy     Insomnia     Kidney disease, chronic, end stage on dialysis (H)     Kidney transplant failure     Myoclonus     Obstructive sleep apnea     Papillary thyroid  carcinoma (H)     PRES (posterior reversible encephalopathy syndrome)      after thyroidectomy    Restless leg syndrome     Secondary hyperparathyroidism (H24)     Seizure disorder (H)     Post surgery. Keppra now discontinued    Urinoma of kidney transplant      Past Surgical History:   Procedure Laterality Date    BENCH KIDNEY  10/13/2015    Procedure: BENCH KIDNEY;  Surgeon: Seth Gibbons MD;  Location: [facility]    COLONOSCOPY N/A 10/05/2023    Procedure: COLONOSCOPY, WITH POLYPECTOMY AND BIOPSY;  Surgeon: Russ Dodge MD;  Location: [facility] GI    CREATE FISTULA ARTERIOVENOUS UPPER EXTREMITY  2011    CYSTOSCOPY, REMOVE STENT(S), COMBINED N/A 2015    Procedure: COMBINED CYSTOSCOPY, REMOVE STENT(S);  Surgeon: Seth Gibbons MD;  Location: [facility] OR    CYSTOSCOPY, REMOVE STENT(S), COMBINED Bilateral 2015    Procedure: COMBINED CYSTOSCOPY, REMOVE STENT(S);  Surgeon: Seth Gibbons MD;  Location: [facility] OR    ELBOW SURGERY Right 1978    hemithyroidectomy Left 2011    HERNIA REPAIR Right 2009    PICC SINGLE LUMEN PLACEMENT Left 10/14/2023    left basilic 4fr sl power picc 44 cm    THYROIDECTOMY  2011    TRANSPLANT KIDNEY RECIPIENT  DONOR  2004    TRANSPLANT KIDNEY RECIPIENT  DONOR N/A 10/13/2015    Procedure: TRANSPLANT KIDNEY RECIPIENT  DONOR;  Surgeon: Seth Gibbons MD;  Location: [facility] OR    ureteropyelostomy  2004     Family History   Problem Relation Age of Onset    Diabetes Mother     Other Cancer Father         lung    Heart Disease No family hx of     Kidney Disease No family hx of      Social History     Socioeconomic History    Marital status:      Spouse name: Not on file    Number of children: Not on file    Years of education: Not on file    Highest education level: Not on file   Occupational History    Not on file   Tobacco Use    Smoking status: Never    Smokeless tobacco: Never   Substance and Sexual  Activity    Alcohol use: No    Drug use: No    Sexual activity: Not on file   Other Topics Concern    Parent/sibling w/ CABG, MI or angioplasty before 65F 55M? Not Asked   Social History Narrative    Not on file     Social Determinants of Health     Financial Resource Strain: Not on file   Food Insecurity: Not on file   Transportation Needs: Not on file   Physical Activity: Not on file   Stress: Not on file   Social Connections: Not on file   Interpersonal Safety: Not on file   Housing Stability: Not on file       ROS:   CONSTITUTIONAL:  No fevers or chills  EYES: negative for icterus  ENT:  negative for hearing loss, tinnitus and sore throat  RESPIRATORY:  negative for cough, sputum, dyspnea  CARDIOVASCULAR:  negative for chest pain Fatigue  GASTROINTESTINAL:  negative for nausea, vomiting, diarrhea or constipation  GENITOURINARY:  negative for incontinence, dysuria, bladder emptying problems  HEME:  No easy bruising  INTEGUMENT:  negative for rash and pruritus  NEURO:  Negative for headache, seizure disorder  Allergies:   Allergies   Allergen Reactions    Hydralazine Swelling    Betadine [Povidone Iodine]     Cephalexin Hives    Clindamycin Hives    Trazodone Swelling     Swelling of face/lips     Medications:  Prescription Medications as of 1/17/2024         Rx Number Disp Refills Start End Last Dispensed Date Next Fill Date Owning Pharmacy    amLODIPine (NORVASC) 5 MG tablet  90 tablet 3 12/20/2023 3/19/2024   [id number] [place], [place] - [address] AT [facility] [address] [facility] [address]    Sig: Take 1 tablet (5 mg) by mouth daily for 90 days    Class: E-Prescribe    Route: Oral    carvedilol (COREG) 25 MG tablet  -- -- 3/7/2023 --       Sig: Take 1 tablet (25 mg) by mouth 2 times daily (with meals)    Class: Historical    Route: Oral    clonazePAM (KLONOPIN) 0.5 MG tablet  -- -- 3/7/2023 --       Sig: Take 1 tablet (0.5 mg) by mouth At Bedtime    Class: Historical    Route: Oral    ELIQUIS  ANTICOAGULANT 5 MG tablet  -- -- 1/4/2024 --       Class: Historical    folic acid (FOLVITE) 1 MG tablet  -- -- 3/7/2023 --       Sig: Take 1 tablet (1 mg) by mouth daily    Class: Historical    Route: Oral    levothyroxine (SYNTHROID/LEVOTHROID) 137 MCG tablet  -- -- 12/20/2023 --       Sig: Take 1 tablet (137 mcg) by mouth daily    Class: Historical    Route: Oral    mycophenolic acid (GENERIC EQUIVALENT) 180 MG EC tablet  120 tablet 11 11/15/2023 --   [facility] [place] - [address]    Sig: Take 2 tablets (360 mg) by mouth 2 times daily    Class: E-Prescribe    Notes to Pharmacy: Patient will call for refills when needed. TXP DT 10/13/2015 (Kidney), 4/15/2004 (Kidney) TXP Dischg DT  DX Kidney replaced by transplant Z94.0 TX Center [facility])    Route: Oral    pantoprazole (PROTONIX) 40 MG EC tablet  30 tablet 0 3/7/2023 --       Sig: Take 1 tablet (40 mg) by mouth daily Take 30-60 minutes before a meal.    Class: Historical    Route: Oral    predniSONE (DELTASONE) 5 MG tablet  -- -- 3/7/2023 --       Sig: Take 1 tablet (5 mg) by mouth daily    Class: Historical    Route: Oral    rOPINIRole (REQUIP) 1 MG tablet  -- -- 3/7/2023 --       Sig: Take 1 tablet (1 mg) by mouth At Bedtime    Class: Historical    Route: Oral    sodium bicarbonate 650 MG tablet  -- -- 12/20/2023 --       Sig: Take 2 tablets (1,300 mg) by mouth 2 times daily    Class: Historical    Route: Oral    sulfamethoxazole-trimethoprim (BACTRIM) 400-80 MG tablet  -- -- 10/18/2023 --       Sig: Take 1 tablet by mouth three times a week    Class: Historical    Route: Oral    tacrolimus (GENERIC) 0.5 MG capsule  60 capsule 11 1/12/2024 --   [id number] - [place] - [address] AT [facility] [address]    Sig: Take 1 capsule (0.5 mg) by mouth 2 times daily Total dose = 2.5 mg twice per day    Class: E-Prescribe    Route: Oral    tacrolimus  "(GENERIC) 1 MG capsule  120 capsule 11 1/12/2024 --   [facility][id number] - [place], [place] - [address] AT [facility] [address]    Sig: Take 2 capsules (2 mg) by mouth 2 times daily Total dose = 2.5 mg twice per day    Class: E-Prescribe    Route: Oral    torsemide (DEMADEX) 20 MG tablet  -- -- 12/20/2023 --       Sig: Take 1 tablet (20 mg) by mouth daily    Class: Historical    Route: Oral    vitamin D3 (CHOLECALCIFEROL) 10 MCG (400 UNIT) capsule  -- -- 3/7/2023 --       Sig: Take 1 capsule (400 Units) by mouth daily    Class: Historical    Route: Oral          Exam:     BP (!) 155/83   Pulse 62   Ht 1.651 m (5' 5\")   Wt 62.6 kg (138 lb 1.6 oz)   SpO2 98%   BMI 22.98 kg/m    Appearance: in no apparent distress.   Skin: normal  Eyes:  no redness or discharge.  Sclera anicteric  Head and Neck: Normal, no rashes or jaundice  Respiratory: easy respirations, no audible wheezing.  Abdomen: flat, No distention, Surgical scars consistent with history, and bilateral wharton incisions   Extremities: femoral 3+/3+, Edema, trace and pitting  Neuro: without deficit   Psychiatric: Normal mood and affect    Diagnostics:   Recent Results (from the past 672 hour(s))   CBC with Platelets & Differential    Collection Time: 12/26/23  7:46 AM   Result Value Ref Range    Hemoglobin (External) 10.6 (L) 13.2 - 16.6 g/dL    Hematocrit (External) 33.9 (L) 38.3 - 48.6 %    RBC Count (External) 3.58 (L) 4.35 - 5.65 x10(12)/L    MCV (External) 94.7 78.2 - 97.9 fL    RDW (External) 13.2 11.8 - 14.5 %    Platelet Count (External) 161 135 - 317 x10(9)/L    WBC Count (External) 4.4 3.4 - 9.6 x10(9)/L    Absolute Neutrophils (External) 3.01 1.56 - 6.45 x10(9)/L    Absolute Lymphocytes (External) 0.92 (L) 0.95 - 3.07 x10(9)/L    Absolute Monocytes (External) 0.42 0.26 - 0.81 x10(9)/L    Absolute Eosinophils (External) 0.03 0.03 - 0.48 x10(9)/L    Absolute Basophils (External) <0.03 0.01 - 0.08 x10(9)/L   Tacrolimus by Tandem " Mass Spectrometry    Collection Time: 12/26/23  7:46 AM   Result Value Ref Range    Tacrolimus(FK-506) (External) 4.2 (L) 5.0 - 15.0 ng/mL   Cytomegalovirus DNA by PCR, Quantitative    Collection Time: 12/26/23  7:46 AM   Result Value Ref Range    CMV DNA Quant (External) 49 (A) Undetected IU/mL    Log IU/ML of CMVQNT (External) 1.69 log IU/mL   Basic metabolic panel    Collection Time: 12/26/23  7:46 AM   Result Value Ref Range    Potassium (External) 4.3 3.6 - 5.2 mmol/L    Sodium (External) 143 135 - 145 mmol/L    Chloride (External) 110 (H) 98 - 107 mmol/L    CO2 (External) 24 22 - 29 mmol/L    Anion Gap (External) 9 7 - 15    Urea Nitrogen (External) 56 (H) 8 - 24 mg/dL    Creatinine (External) 3.91 (H) 0.74 - 1.35 mg/dL    GFR Estimated (External) 17 (L) >=60 ml/min/1.73m2    Calcium (External) 7.8 (L) 8.6 - 10.0 mg/dL    Glucose (External) 100 70 - 140 mg/dL   CBC with Platelets & Differential    Collection Time: 01/02/24  7:24 AM   Result Value Ref Range    Hemoglobin (External) 10.7 (L) 13.2 - 16.6 g/dL    Hematocrit (External) 34.3 (L) 38.3 - 48.6 %    RBC Count (External) 3.61 (L) 4.35 - 5.65 x10(12)/L    MCV (External) 95.0 78.2 - 97.9 fL    RDW (External) 12.9 11.8 - 14.5 %    Platelet Count (External) 179 135 - 317 x10(9)/L    WBC Count (External) 4.6 3.4 - 9.6 x10(9)/L    Absolute Neutrophils (External) 3.10 1.56 - 6.45 x10(9)/L    Absolute Lymphocytes (External) 0.97 0.95 - 3.07 x10(9)/L    Absolute Monocytes (External) 0.44 0.26 - 0.81 x10(9)/L    Absolute Eosinophils (External) 0.04 0.03 - 0.48 x10(9)/L    Absolute Basophils (External) <0.03 0.01 - 0.08 x10(9)/L   Basic metabolic panel    Collection Time: 01/02/24  7:24 AM   Result Value Ref Range    Potassium (External) 4.3 3.6 - 5.2 mmol/L    Sodium (External) 145 135 - 145 mmol/L    Chloride (External) 108 (H) 98 - 107 mmol/L    CO2 (External) 26 22 - 29 mmol/L    Anion Gap (External) 11 7 - 15    Urea Nitrogen (External) 72 (H) 8 - 24 mg/dL     Creatinine (External) 4.10 (H) 0.74 - 1.35 mg/dL    GFR Estimated (External) 16 (L) >=60 ml/min/1.73m2    Calcium (External) 7.8 (L) 8.6 - 10.0 mg/dL    Glucose (External) 115 70 - 140 mg/dL   Cytomegalovirus DNA by PCR, Quantitative    Collection Time: 01/02/24  7:24 AM   Result Value Ref Range    CMV DNA Quant (External) 40 (A) Undetected IU/mL    Log IU/ML of CMVQNT (External) 1.61 log IU/mL   Tacrolimus by Tandem Mass Spectrometry    Collection Time: 01/02/24  7:24 AM   Result Value Ref Range    Tacrolimus(FK-506) (External) 6.4 5.0 - 15.0 NG/Ml   Basic metabolic panel    Collection Time: 01/09/24  8:05 AM   Result Value Ref Range    Potassium (External) 4.4 3.6 - 5.2 mmol/L    Sodium (External) 145 135 - 145 mmol/L    Chloride (External) 109 (H) 98 - 107 mmol/L    CO2 (External) 22 22 - 29 mmol/L    Anion Gap (External) 14 7 - 15    Urea Nitrogen (External) 69 (H) 8 - 24 mg/dL    Creatinine (External) 4.36 (H) 0.74 - 1.35 mg/dL    GFR Estimated (External) <15 (L) >=60 ml/min/1.73m2    Calcium (External) 8.2 (L) 8.6 - 10.0 mg/dL    Glucose (External) 114 70 - 140 mg/dL   CBC with Platelets & Differential    Collection Time: 01/09/24  8:05 AM   Result Value Ref Range    Hemoglobin (External) 10.4 (L) 13.2 - 16.6 g/dL    Hematocrit (External) 32.7 (L) 38.3 - 48.6 %    RBC Count (External) 3.50 (L) 4.35 - 5.65 10*12/L    MCV (External) 93.4 78.2 - 97.9 fL    RDW (External) 12.9 (L) 118 - 145 %    Platelet Count (External) 157 135 - 317 10(9)/L    WBC Count (External) 4.5 3.4 - 9.6 10(9)/L    Absolute Neutrophils (External) 3.28 1.56 - 6.45 10(9)/L    Absolute Lymphocytes (External) 0.79 (L) 0.95 - 3.07 10(9)/L    Absolute Monocytes (External) 0.44 0.26 - 0.81 10(9)/L    Absolute Eosinophils (External) <0.03 (A) 0.03 - 0.48 10(9)/L    Absolute Basophils (External) <0.03 0.01 - 0.08 10(9)/L   Tacrolimus by Tandem Mass Spectrometry    Collection Time: 01/09/24  8:05 AM   Result Value Ref Range    Tacrolimus(FK-506)  (External) 6.3 5.0 - 15.0 NG/Ml   Cytomegalovirus DNA by PCR, Quantitative    Collection Time: 01/09/24  8:05 AM   Result Value Ref Range    CMV DNA Quant (External) <35 (A) Undetected IU/ML    Log IU/ML of CMVQNT (External) <1.54 Undetected log IU/mL     OS cPRA   Date Value Ref Range Status   07/14/2020 90  Final     OS CPRA   Date Value Ref Range Status   02/15/2023 91  Final

## 2024-01-17 NOTE — LETTER
1/17/2024         RE: Haider Torrez  [address] [facility] [address]        Dear Colleague,    Thank you for referring your patient, Haider Torrez, to the [facility]PL[facility]. Please see a copy of my visit note below.    Transplant Surgery Consult Note     Medical record number: [id number]  YOB: 1965,   Consult requested  for evaluation of kidney transplant candidacy.    Assessment and Recommendations:Mr. Torrez appears to be a excellent candidate for kidney transplantation and has a good understanding of the risks and benefits of this approach to the management of renal failure. The following issues should be addressed prior to finalizing his transplant candidacy:     Transplant order: Mr. Torrez has Chronic renal failure due to IgA nephropathy/failed kidney whose condition is not expected to resolve, is expected to progress, and is expected to continue to develop related comorbid conditions.  Recommend he be considered as a candidate for kidney.  Cardiology consult for cardiac risk stratification to be ordered: Yes  CT abdomen and pelvis without contrast to be ordered for assessment of vascular targets: Yes  Transplant listing labs ordered to include HLA, ABOx2, Cr, etc.  Dietician consult ordered: Yes  Social work consult ordered: Yes  Imaging reports reviewed:  CT 10/2023  FINDINGS:   The liver is grossly negative on this noncontrast exam. The gallbladder is negative. Mild stranding   about the pancreatic head and neck. The spleen is negative. The adrenals are negative. Atrophy of   the native kidneys. Hyperdense renal cysts or calcifications upper pole right native kidney. There   are 2 atrophy renal transplant in the right lower quadrant. Left lower quadrant renal transplant   without hydronephrosis or nephrolithiasis. Normal caliber of the bowel. Large colonic stool burden.   Scattered colonic diverticula. Negative appendix. Mild stranding throughout the mesentery,    nonspecific. Body wall edema. Trace right pleural effusion with adjacent compressive atelectasis.   Cardiomegaly. No pericardial effusion. No acute osseous abnormality.   Radiology images reviewed:Please obtain CT images from 10/2023  Recipient suitable to move forward with work up of living donors:  Yes  Jayne won't need anticoag around txp but should have good prophylaxis  Test for A2 to B  Will need to go midline for transplant as this will be 3rd kidney transplant  Discussed benefits of LD and eplet matching      The majority of our visit was spent in counselling, discussing the medical and surgical risks of kidney transplantation. We discussed approximate wait time and how that is influenced by issues such as blood type and sensitization (PRA) and access to a living donor. I contrasted potential waiting time for living vs  donor kidneys from  normal (0-85%) or higher (%) kidney donor profile index (KDPI) donors and their associated outcomes. I would not recommend this individual to consider kidneys from high KDPI donors. The reason for this decision is best summarized as: not on dialysis yet. Potential surgical complications of kidney transplantation include bleeding, superficial or deep wound complications (infection, hernia, lymphocele), ureteral anastomotic failure (leak or stenosis), graft thrombosis, need for reoperation and other issues such as cardiac complications, pneumonia, deep venous thrombosis, pulmonary embolism, post transplant diabetes and death. The potential for recurrent disease or need for retransplantation was also addressed. We discussed the possible need for ureteral stent (and subsequent removal), and the utility of protocol biopsy and laboratory studies to evaluate for rejection or recurrent disease. We discussed the risk of graft rejection, our center's average graft and patient survival rates, immunosuppression protocols, as well as the potential opportunity to  participate in clinical trials.  We also discussed the average length of stay, recovery process, and posttransplant lab and monitoring protocol.  I emphasized the need for strict immunosuppression medication adherence and the potential for complications of immunosuppression such as skin cancer or lymphoma, as well as a very low but not zero risk of donor-derived disease transmission risks (infection, cancer). Mr. Torrez asked good questions and his candidacy will be reviewed at our Multidisciplinary Selection Committee. Thank you for the opportunity to participate in Mr. Torrez's care.      Total time: 60 minutes        Juanita Kitchen MD FACS  Associate Professor of Surgery  Director, Living Kidney Donor Program.    ---------------------------------------------------------------------------------------------------    HPI: Mr. Torrez has Chronic renal failure due to IgA nephropathy. The patient is non-diabetic.       The patient is not on dialysis.    Has potential kidney donors:  No.  Interested in participation in paired exchange if a donor is willing: Doesn't know     The patient has the following pertinent history:       No    Yes  Dialysis:    [x]      [] via:       Blood Transfusion                  []      [x]  Number of units:   Most recently: 10/18/2023  Pregnancy:    [x]      [] Number:       Previous Transplant:  []      [x] Details:  2004 and 2015  Cancer    []      [x] Comment: thyroid cancer 2011  Kidney stones   [x]      [] Comment:      Recurrent infections  [x]      []  Type:                  Bladder dysfunction  [x]      [] Cause:    Claudication   [x]      [] Distance:    Previous Amputation  [x]      [] Cause:     Chronic anticoagulation  []      [x] Indication: R internal jugular thombus  Jewish  [x]      []      Past Medical History:   Diagnosis Date     Anemia of chronic kidney failure      Carpal tunnel syndrome      Clinical diagnosis of COVID-19 9/22/2022     Dyslipidemia       History of respiratory failure      Hypertension      Hypomagnesemia      IgA nephropathy      Insomnia      Kidney disease, chronic, end stage on dialysis (H)      Kidney transplant failure      Myoclonus      Obstructive sleep apnea      Papillary thyroid carcinoma (H)      PRES (posterior reversible encephalopathy syndrome)      after thyroidectomy     Restless leg syndrome      Secondary hyperparathyroidism (H24)      Seizure disorder (H)     Post surgery. Keppra now discontinued     Urinoma of kidney transplant      Past Surgical History:   Procedure Laterality Date     BENCH KIDNEY  10/13/2015    Procedure: BENCH KIDNEY;  Surgeon: Seth Gibbons MD;  Location: [facility] OR     COLONOSCOPY N/A 10/05/2023    Procedure: COLONOSCOPY, WITH POLYPECTOMY AND BIOPSY;  Surgeon: Russ Dodge MD;  Location: [facility] GI     CREATE FISTULA ARTERIOVENOUS UPPER EXTREMITY  2011     CYSTOSCOPY, REMOVE STENT(S), COMBINED N/A 2015    Procedure: COMBINED CYSTOSCOPY, REMOVE STENT(S);  Surgeon: Seth Gibbons MD;  Location: [facility] OR     CYSTOSCOPY, REMOVE STENT(S), COMBINED Bilateral 2015    Procedure: COMBINED CYSTOSCOPY, REMOVE STENT(S);  Surgeon: Seth Gibbons MD;  Location: [facility] OR     ELBOW SURGERY Right 1978     hemithyroidectomy Left 2011     HERNIA REPAIR Right 2009     PICC SINGLE LUMEN PLACEMENT Left 10/14/2023    left basilic 4fr sl power picc 44 cm     THYROIDECTOMY  2011     TRANSPLANT KIDNEY RECIPIENT  DONOR  2004     TRANSPLANT KIDNEY RECIPIENT  DONOR N/A 10/13/2015    Procedure: TRANSPLANT KIDNEY RECIPIENT  DONOR;  Surgeon: Seth Gibbons MD;  Location: [facility] OR     ureteropyelostomy  2004     Family History   Problem Relation Age of Onset     Diabetes Mother      Other Cancer Father         lung     Heart Disease No family hx of      Kidney Disease No family hx of      Social History     Socioeconomic History      Marital status:      Spouse name: Not on file     Number of children: Not on file     Years of education: Not on file     Highest education level: Not on file   Occupational History     Not on file   Tobacco Use     Smoking status: Never     Smokeless tobacco: Never   Substance and Sexual Activity     Alcohol use: No     Drug use: No     Sexual activity: Not on file   Other Topics Concern     Parent/sibling w/ CABG, MI or angioplasty before 65F 55M? Not Asked   Social History Narrative     Not on file     Social Determinants of Health     Financial Resource Strain: Not on file   Food Insecurity: Not on file   Transportation Needs: Not on file   Physical Activity: Not on file   Stress: Not on file   Social Connections: Not on file   Interpersonal Safety: Not on file   Housing Stability: Not on file       ROS:   CONSTITUTIONAL:  No fevers or chills  EYES: negative for icterus  ENT:  negative for hearing loss, tinnitus and sore throat  RESPIRATORY:  negative for cough, sputum, dyspnea  CARDIOVASCULAR:  negative for chest pain Fatigue  GASTROINTESTINAL:  negative for nausea, vomiting, diarrhea or constipation  GENITOURINARY:  negative for incontinence, dysuria, bladder emptying problems  HEME:  No easy bruising  INTEGUMENT:  negative for rash and pruritus  NEURO:  Negative for headache, seizure disorder  Allergies:   Allergies   Allergen Reactions     Hydralazine Swelling     Betadine [Povidone Iodine]      Cephalexin Hives     Clindamycin Hives     Trazodone Swelling     Swelling of face/lips     Medications:  Prescription Medications as of 1/17/2024         Rx Number Disp Refills Start End Last Dispensed Date Next Fill Date Owning Pharmacy    amLODIPine (NORVASC) 5 MG tablet  90 tablet 3 12/20/2023 3/19/2024   [id number] - [place], [place] - [address] AT [place] [address]    Sig: Take 1 tablet (5 mg) by mouth daily for 90 days    Class: E-Prescribe    Route: Oral    carvedilol  (COREG) 25 MG tablet  -- -- 3/7/2023 --       Sig: Take 1 tablet (25 mg) by mouth 2 times daily (with meals)    Class: Historical    Route: Oral    clonazePAM (KLONOPIN) 0.5 MG tablet  -- -- 3/7/2023 --       Sig: Take 1 tablet (0.5 mg) by mouth At Bedtime    Class: Historical    Route: Oral    ELIQUIS ANTICOAGULANT 5 MG tablet  -- -- 1/4/2024 --       Class: Historical    folic acid (FOLVITE) 1 MG tablet  -- -- 3/7/2023 --       Sig: Take 1 tablet (1 mg) by mouth daily    Class: Historical    Route: Oral    levothyroxine (SYNTHROID/LEVOTHROID) 137 MCG tablet  -- -- 12/20/2023 --       Sig: Take 1 tablet (137 mcg) by mouth daily    Class: Historical    Route: Oral    mycophenolic acid (GENERIC EQUIVALENT) 180 MG EC tablet  120 tablet 11 11/15/2023 --   [facility] - [address]    Sig: Take 2 tablets (360 mg) by mouth 2 times daily    Class: E-Prescribe    Notes to Pharmacy: Patient will call for refills when needed. TXP DT 10/13/2015 (Kidney), 4/15/2004 (Kidney) TXP Dischg DT  DX Kidney replaced by transplant Z94.0 [facility] ([facility])    Route: Oral    pantoprazole (PROTONIX) 40 MG EC tablet  30 tablet 0 3/7/2023 --       Sig: Take 1 tablet (40 mg) by mouth daily Take 30-60 minutes before a meal.    Class: Historical    Route: Oral    predniSONE (DELTASONE) 5 MG tablet  -- -- 3/7/2023 --       Sig: Take 1 tablet (5 mg) by mouth daily    Class: Historical    Route: Oral    rOPINIRole (REQUIP) 1 MG tablet  -- -- 3/7/2023 --       Sig: Take 1 tablet (1 mg) by mouth At Bedtime    Class: Historical    Route: Oral    sodium bicarbonate 650 MG tablet  -- -- 12/20/2023 --       Sig: Take 2 tablets (1,300 mg) by mouth 2 times daily    Class: Historical    Route: Oral    sulfamethoxazole-trimethoprim (BACTRIM) 400-80 MG tablet  -- -- 10/18/2023 --       Sig: Take 1 tablet by mouth three times a week    Class: Historical    Route: Oral     "tacrolimus (GENERIC) 0.5 MG capsule  60 capsule 11 1/12/2024 --   [facility] #[id number] - [place], [place] - [address] AT [place] [address]    Sig: Take 1 capsule (0.5 mg) by mouth 2 times daily Total dose = 2.5 mg twice per day    Class: E-Prescribe    Route: Oral    tacrolimus (GENERIC) 1 MG capsule  120 capsule 11 1/12/2024 --   [facility] #[id number] - [place], [place] - [address] AT [place] [address]    Sig: Take 2 capsules (2 mg) by mouth 2 times daily Total dose = 2.5 mg twice per day    Class: E-Prescribe    Route: Oral    torsemide (DEMADEX) 20 MG tablet  -- -- 12/20/2023 --       Sig: Take 1 tablet (20 mg) by mouth daily    Class: Historical    Route: Oral    vitamin D3 (CHOLECALCIFEROL) 10 MCG (400 UNIT) capsule  -- -- 3/7/2023 --       Sig: Take 1 capsule (400 Units) by mouth daily    Class: Historical    Route: Oral          Exam:     BP (!) 155/83   Pulse 62   Ht 1.651 m (5' 5\")   Wt 62.6 kg (138 lb 1.6 oz)   SpO2 98%   BMI 22.98 kg/m    Appearance: in no apparent distress.   Skin: normal  Eyes:  no redness or discharge.  Sclera anicteric  Head and Neck: Normal, no rashes or jaundice  Respiratory: easy respirations, no audible wheezing.  Abdomen: flat, No distention, Surgical scars consistent with history, and bilateral wharton incisions   Extremities: femoral 3+/3+, Edema, trace and pitting  Neuro: without deficit   Psychiatric: Normal mood and affect    Diagnostics:   Recent Results (from the past 672 hour(s))   CBC with Platelets & Differential    Collection Time: 12/26/23  7:46 AM   Result Value Ref Range    Hemoglobin (External) 10.6 (L) 13.2 - 16.6 g/dL    Hematocrit (External) 33.9 (L) 38.3 - 48.6 %    RBC Count (External) 3.58 (L) 4.35 - 5.65 x10(12)/L    MCV (External) 94.7 78.2 - 97.9 fL    RDW (External) 13.2 11.8 - 14.5 %    Platelet Count (External) 161 135 - 317 x10(9)/L    WBC Count (External) 4.4 3.4 - 9.6 x10(9)/L    Absolute Neutrophils " (External) 3.01 1.56 - 6.45 x10(9)/L    Absolute Lymphocytes (External) 0.92 (L) 0.95 - 3.07 x10(9)/L    Absolute Monocytes (External) 0.42 0.26 - 0.81 x10(9)/L    Absolute Eosinophils (External) 0.03 0.03 - 0.48 x10(9)/L    Absolute Basophils (External) <0.03 0.01 - 0.08 x10(9)/L   Tacrolimus by Tandem Mass Spectrometry    Collection Time: 12/26/23  7:46 AM   Result Value Ref Range    Tacrolimus(FK-506) (External) 4.2 (L) 5.0 - 15.0 ng/mL   Cytomegalovirus DNA by PCR, Quantitative    Collection Time: 12/26/23  7:46 AM   Result Value Ref Range    CMV DNA Quant (External) 49 (A) Undetected IU/mL    Log IU/ML of CMVQNT (External) 1.69 log IU/mL   Basic metabolic panel    Collection Time: 12/26/23  7:46 AM   Result Value Ref Range    Potassium (External) 4.3 3.6 - 5.2 mmol/L    Sodium (External) 143 135 - 145 mmol/L    Chloride (External) 110 (H) 98 - 107 mmol/L    CO2 (External) 24 22 - 29 mmol/L    Anion Gap (External) 9 7 - 15    Urea Nitrogen (External) 56 (H) 8 - 24 mg/dL    Creatinine (External) 3.91 (H) 0.74 - 1.35 mg/dL    GFR Estimated (External) 17 (L) >=60 ml/min/1.73m2    Calcium (External) 7.8 (L) 8.6 - 10.0 mg/dL    Glucose (External) 100 70 - 140 mg/dL   CBC with Platelets & Differential    Collection Time: 01/02/24  7:24 AM   Result Value Ref Range    Hemoglobin (External) 10.7 (L) 13.2 - 16.6 g/dL    Hematocrit (External) 34.3 (L) 38.3 - 48.6 %    RBC Count (External) 3.61 (L) 4.35 - 5.65 x10(12)/L    MCV (External) 95.0 78.2 - 97.9 fL    RDW (External) 12.9 11.8 - 14.5 %    Platelet Count (External) 179 135 - 317 x10(9)/L    WBC Count (External) 4.6 3.4 - 9.6 x10(9)/L    Absolute Neutrophils (External) 3.10 1.56 - 6.45 x10(9)/L    Absolute Lymphocytes (External) 0.97 0.95 - 3.07 x10(9)/L    Absolute Monocytes (External) 0.44 0.26 - 0.81 x10(9)/L    Absolute Eosinophils (External) 0.04 0.03 - 0.48 x10(9)/L    Absolute Basophils (External) <0.03 0.01 - 0.08 x10(9)/L   Basic metabolic panel    Collection  Time: 01/02/24  7:24 AM   Result Value Ref Range    Potassium (External) 4.3 3.6 - 5.2 mmol/L    Sodium (External) 145 135 - 145 mmol/L    Chloride (External) 108 (H) 98 - 107 mmol/L    CO2 (External) 26 22 - 29 mmol/L    Anion Gap (External) 11 7 - 15    Urea Nitrogen (External) 72 (H) 8 - 24 mg/dL    Creatinine (External) 4.10 (H) 0.74 - 1.35 mg/dL    GFR Estimated (External) 16 (L) >=60 ml/min/1.73m2    Calcium (External) 7.8 (L) 8.6 - 10.0 mg/dL    Glucose (External) 115 70 - 140 mg/dL   Cytomegalovirus DNA by PCR, Quantitative    Collection Time: 01/02/24  7:24 AM   Result Value Ref Range    CMV DNA Quant (External) 40 (A) Undetected IU/mL    Log IU/ML of CMVQNT (External) 1.61 log IU/mL   Tacrolimus by Tandem Mass Spectrometry    Collection Time: 01/02/24  7:24 AM   Result Value Ref Range    Tacrolimus(FK-506) (External) 6.4 5.0 - 15.0 NG/Ml   Basic metabolic panel    Collection Time: 01/09/24  8:05 AM   Result Value Ref Range    Potassium (External) 4.4 3.6 - 5.2 mmol/L    Sodium (External) 145 135 - 145 mmol/L    Chloride (External) 109 (H) 98 - 107 mmol/L    CO2 (External) 22 22 - 29 mmol/L    Anion Gap (External) 14 7 - 15    Urea Nitrogen (External) 69 (H) 8 - 24 mg/dL    Creatinine (External) 4.36 (H) 0.74 - 1.35 mg/dL    GFR Estimated (External) <15 (L) >=60 ml/min/1.73m2    Calcium (External) 8.2 (L) 8.6 - 10.0 mg/dL    Glucose (External) 114 70 - 140 mg/dL   CBC with Platelets & Differential    Collection Time: 01/09/24  8:05 AM   Result Value Ref Range    Hemoglobin (External) 10.4 (L) 13.2 - 16.6 g/dL    Hematocrit (External) 32.7 (L) 38.3 - 48.6 %    RBC Count (External) 3.50 (L) 4.35 - 5.65 10*12/L    MCV (External) 93.4 78.2 - 97.9 fL    RDW (External) 12.9 (L) 118 - 145 %    Platelet Count (External) 157 135 - 317 10(9)/L    WBC Count (External) 4.5 3.4 - 9.6 10(9)/L    Absolute Neutrophils (External) 3.28 1.56 - 6.45 10(9)/L    Absolute Lymphocytes (External) 0.79 (L) 0.95 - 3.07 10(9)/L     Absolute Monocytes (External) 0.44 0.26 - 0.81 10(9)/L    Absolute Eosinophils (External) <0.03 (A) 0.03 - 0.48 10(9)/L    Absolute Basophils (External) <0.03 0.01 - 0.08 10(9)/L   Tacrolimus by Tandem Mass Spectrometry    Collection Time: 01/09/24  8:05 AM   Result Value Ref Range    Tacrolimus(FK-506) (External) 6.3 5.0 - 15.0 NG/Ml   Cytomegalovirus DNA by PCR, Quantitative    Collection Time: 01/09/24  8:05 AM   Result Value Ref Range    CMV DNA Quant (External) <35 (A) Undetected IU/ML    Log IU/ML of CMVQNT (External) <1.54 Undetected log IU/mL     UNOS cPRA   Date Value Ref Range Status   07/14/2020 90  Final     UNOS CPRA   Date Value Ref Range Status   02/15/2023 91  Final          Again, thank you for allowing me to participate in the care of your patient.        Sincerely,        Juanita Kitchen MD, MD

## 2024-01-17 NOTE — PROGRESS NOTES
Kidney Transplant Referral - 10/20/2023  Haider Torrez attended the pre-transplant patient Kidney EVALUATION with his wife Lauren.  The [facility] website pre-transplant modules were viewed;    Content reviewed:  Living Donation and how to access that program: Haider was quite calixto and said he knew of NO live donors, as Asians do not usually donate organs.   He has the donor card website and will try to ask if anyone willing to register on the website.  Haider has had two previous kidney transplants 1st one at [facility] and second one 2015 at [facility]; pt currently in not on dialysis; 2nd kidney continues to function   Paired exchange: Pt aware of the program.   Kidney Donor Profile Index (KDPI) Pt signed Willing to receive KDPI >85% organ offers.   Waiting list issues (right to decline without penalty, high PHS risk donors, what to expect when called with an offer)  Hospital experience,  length of stay , need to stay locally post-discharge (2-4 weeks)  Surgical options (with pictures)                           Post-surgery lifting and driving restrictions  Post-transplant routines, frequency of lab work and clinic visits  Need to stay locally post-discharge (2-4 weeks)  Role of Transplant Coordinator  (Pre Wait List Post)  Participants were informed of the benefits of transplant as well as potential risks such as infection, cancer, and death.  The need for total adherence with immunosuppression medications and following transplant regimens was stressed.  The overall evaluation/approval/listing process was reviewed.        The patient was provided with the following documents:  What You Need to Know About a Kidney Transplant  Adult Kidney Transplant - A Guide for Patients  SRTR Data Sheet - Kidney  Brochure - Kidney Allocation  Brochure - Multiple Listing and Waiting Time Transfer  What Every Patient Needs to Know (UNOS)  UNOS Facts and Figures  Finding a Donor  [facility] - Quick Start  "Guide  KDPI Consent  Receipt of Information form    Haider Torrez signed the  Receipt of Information for Organ Transplant Recipient.\" He was provided Meagan Richardson, Geovanna Felipe's business card and instructed to call with additional questions.      Summary    Team s concerns/comments: CKD original kidney disease IgA nephropathy. Qualifying GFR of 17 on 10/02/2023.  Pt is not on dialysis. Pt is not a diabetic. Health hx: anemia, dyslipidemia, thyroid cancer s/p thyroidectomy 2011.  Heart hx: HTN, DVT on eliquis.  Lung hx: CAT. Surgical hx: DDKTxp in 2004 & 2015, cystoscopies in 2015, thyroidectomy 2011, AVF creation, hernia repair 2009.  Pt is not/never a smoker, does not consume alcohol, and does not use recreational drugs. 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 Rudd n   Health maintenance items: BMI 22.5 on 10/24/2023.  Colonoscopy: 10/05/2023.  Dental: due.  Vaccinations: UTD. Dermatology: follows a regular dermatologist - .    Candidacy category: Yellow    Action/Plan:  - IgANephropathy s/p failing 2nd transplant of 2015 at [facility]. !st kidney transplant done in 2024 in . Pt on immunosuppressive medications.   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 RX completed  - MD team to determine if vascular ABD CT and Ultrasounds needed to assess vascular targets since 2 previous kidney transplants.  Team to determine Placement of the 3 kidney.    - Hx of seizure disorder dx 2011 had Keppra, no medication currently.  Follows Dr. Elpidio Alvarez neurologist.   Will need records and likely neurology clearance prior to transplant.  - Hx of papillary thyroid cancer, follicular variant- complete thyroidectomy 2011 follows every three months by endocrinologist Dr. Zhou at [place] [facility]. Will obtain records from 2 months ago.    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   - Dental: Not up to date.   -Derm: reportedly UTD, will request records from [facility].   - Continue with Post kidney coordinator following the drug levels: Meagan Richardson, RN   - Patient aware his PRA is 91% and he is a high risk in getting a match with a  donor. Dr. Kitchen advised asking for possible live donors.  He/Haider and his wife Ana are aware.   Note to Geovanna Felipe        Expected Selection Meeting Discussion: 2024

## 2024-01-17 NOTE — LETTER
1/17/2024         RE: Haider Torrez  [address] [facility] [address]        Dear Colleague,    Thank you for referring your patient, Haider Torrez, to the [facility] TRANSPLANT CLINIC. Please see a copy of my visit note below.    [facility] SOLID ORGAN TRANSPLANT  OUTPATIENT MNT: KIDNEY TRANSPLANT EVALUATION    Current BMI: 22.9 (HT 65 in,  lbs/63 kg)  BMI guideline for kidney transplant up to a BMI of 40 / per surgeon discretion     Frailty Assessment-- Frail (3/5 points)- unintentional wt loss (yet has regained a fair amount back), reduced , low activity level     Reference:  Score of 0-2 = Not Frail  Score of 3-5 = Frail      TIME SPENT: 15 minutes  VISIT TYPE: Initial   REFERRING PHYSICIAN: Imtiaz   PT ACCOMPANIED BY: his wife     History of previous txp: kidney #1 2004, kidney #2 2015   Dialysis: no    NUTRITION ASSESSMENT  - Appetite: good/baseline   - Food allergies/intolerances: no  - Meal prep & grocery shopping: pt's wife   - Previous RD education: yes  - Issues chewing or swallowing: no  - N/V/D/C: no  - Food access concerns: no     Vitamins, Supplements, Pertinent Meds: folic acid, vit D   Herbal Medicines/Supplements: none   Protein Supplement: none     Edema: + JULIETA     Weight hx:   - prior UBW was 145 lbs (Oct 2023 per pt report)--> lost wt down to 120 lbs but has regained ~15 lbs and is stable   - cannot find any weight readings >140 lbs in CE the past year- appears 120s-130s   Wt Readings from Last 10 Encounters:   01/17/24 62.6 kg (138 lb 1.6 oz)   10/24/23 61.2 kg (135 lb)   10/12/23 61.9 kg (136 lb 7.4 oz)   03/07/23 59 kg (130 lb)   03/27/18 69.7 kg (153 lb 9.6 oz)   09/26/17 69.5 kg (153 lb 4.8 oz)   03/14/17 67.3 kg (148 lb 6.4 oz)   09/13/16 66.3 kg (146 lb 3.2 oz)   03/15/16 64.9 kg (143 lb)   11/17/15 60.9 kg (134 lb 4.2 oz)     PHYSICAL ACTIVITY   Walks on the treadmill, 10-15 min a few times/week   Biking in the summer & walking outside   Energy  level- gets a little tired after self cares, needs help sometimes-  d/t kidney dz     DIET RECALL  Breakfast Cereal    Lunch Chicken w/ rice, some veggies (broccoli)   Dinner Similar to L- does add salt/salty sauces   Snacks Fruits, some junk food (less often)   Beverages Water, OJ (not daily)   Alcohol None    Dining out 1x/month      LABS  1/15 K 4.5   12/11 Phos 3.8     NUTRITION DIAGNOSIS   No nutrition diagnosis identified at this time     NUTRITION INTERVENTION   Nutrition education provided:  Discussed sodium intake (low sodium foods and drinks, seasoning food without salt and tips for low sodium diet).  Encouraged reducing salt, bouillon, salty sauces (soy, fish) to help with JULIETA. Pt reports having heard this in the past.   Reviewed wnl K/Phos levels, which do not warrant further dietary modification at this time, yet discussed foods he may need to limit in the future.     Reviewed post txp diet guidelines in brief (will review in further detail post txp):  (1) Review of proper food safety measures d/t immunosuppressant therapy post-op and increased risk for food-borne illness    (2) Avoid the following post txp d/t risk for rejection, unknown effects on the organs, and/or potential interactions with immunosuppressants:  - Herbal, Chinese, holistic, chiropractic, natural, alternative medicines and supplements  - Detoxes and cleanses  - Weight loss pills  - Protein powders or other products with extracts or herbs (ie green tea extract)    (3) Med regimen and possible side effects    Patient Understanding: Pt verbalized understanding of education provided.  Expected Engagement: Good  Follow-Up Plans: PRN     NUTRITION GOALS   No nutrition goals identified at this time     Rox Manuel RD, LD, CCTD                                      Again, thank you for allowing me to participate in the care of your patient.        Sincerely,        Rox Manuel RD

## 2024-01-17 NOTE — PROGRESS NOTES
Psychosocial Assessment for kidney Transplantation  Patient Name/ Age: Haider Torrez 58 year old   Medical Record #: [id number]  Duration of Interview:     30 min  Process:   Face-to-Face Interview                (counseling < 50%)   Present at Appointment: pt and his wife, Rosana        : KARSON Campos LICSW Date:  January 17, 2024        Type of transplant: kidney    Donor type:      Cadaver   Prior Transplants:    Yes  Status of Transplant:     Kidney transplant in '04, '15          Current Living Situation    Location:   [address] [facility] [address]  With Whom: lives with their spouse       Family/ Social Support:      - Spouse, Rosana    - 5 adult children - Jens (39M), Yuki (37F), Marci (36F), Quique (34M), Cuong (23M)    - 3 siblings in , 1 in [place], 1 in [place]   available, helpful   Committed Relationship:     Stable/Supportive   Other Supports:    none       Activities/ Functional Ability    Current Level: ambulatory and uses cane/walker for longer distances. Requires some assistance/supervision with ADL's     Transportation drives self       Vocational/Employment/Financial     Employment   disabled   Job Description      Income   SSDI   Insurance. Medicare and pt's wife believes they have additional assistance through medicaid.       At this time, patient can afford medication costs:  Yes  Medicare       Medical Status    Current Mode of Treatment for ESRD None, dialysis in past.    Complications None       Behavioral    Tobacco Use No Chemical Dependency No         Psychiatric Impairment No      Reading Ability: Good  Education Level: Bachelors Degree Recent Legal History No      Coping Style/Strategies:        Ability to Adhere to Complex Medical Regime: Yes     Adherence History: Pt is compliant with medication, outpatient appointments, recommendations of providers.        Education  _X_ Medicare  _X_ Rehabilitation  _X_ Donor issues  _X_ Community  resources  _X_ Post discharge housing  _X_ Financial resources  _X_ Medical insurance options  _X_ Psych adjustment  _X_ Family adjustment  _X_ Health Care Directive Provided Education. Pt identifies his wife, Rosana as his appointed decision maker    Psychosocial Risks of Transplant Reviewed and Discussed:  _X_ Increased stress related to emotional,            family, social, employment or financial           situation  _X_ Effect on work and/or disability benefits  _X_ Effect on future health and life           insurance  _X_ Transplant outcome expectations may           not be met  _X_ Mental Health Risks: anxiety,           depression, PTSD, guilt, grief and           chronic fatigue     Notable Items:   None noted.       Final Evaluation/Assessment   Patient seemed to process information well. Appeared well informed, motivated and able to follow post transplant requirements. Behavior was appropriate during interview. Has adequate income and insurance coverage. Adequate social support. No major contraindications noted for transplant.  At this time patient appears to understand the risks and benefits of transplant.      Recommendation  Acceptable    Selection Criteria Met:  Plan for support Yes   Chemical Dependence Yes   Smoking Yes   Mental Health Yes   Adequate Finances Yes    Signature: KARSON Campos [facility]   Title: Clinical

## 2024-01-17 NOTE — LETTER
1/17/2024         RE: Haider Torrez  [address] [facility] [address]        Dear Colleague,    Thank you for referring your patient, Haider Torrez, to the [facility] TRANSPLANT CLINIC. Please see a copy of my visit note below.    Psychosocial Assessment for kidney Transplantation  Patient Name/ Age: Haider Torrez 58 year old   Medical Record #: [id number]  Duration of Interview:     30 min  Process:   Face-to-Face Interview                (counseling < 50%)   Present at Appointment: pt and his wife, Rosana        : KARSON Campos LICSW Date:  January 17, 2024        Type of transplant: kidney    Donor type:      Cadaver   Prior Transplants:    Yes  Status of Transplant:     Kidney transplant in '04, '15          Current Living Situation    Location:   [address] [facility] [address]  With Whom: lives with their spouse       Family/ Social Support:      - Spouse, Rosana    - 5 adult children - Jens (39M), Yuki (37F), Marci (36F), Quique (34M), Cuong (23M)    - 3 siblings in , 1 in [place], 1 in [place]   available, helpful   Committed Relationship:     Stable/Supportive   Other Supports:    none       Activities/ Functional Ability    Current Level: ambulatory and uses cane/walker for longer distances. Requires some assistance/supervision with ADL's     Transportation drives self       Vocational/Employment/Financial     Employment   disabled   Job Description      Income   SSDI   Insurance. Medicare and pt's wife believes they have additional assistance through medicaid.       At this time, patient can afford medication costs:  Yes  Medicare       Medical Status    Current Mode of Treatment for ESRD None, dialysis in past.    Complications None       Behavioral    Tobacco Use No Chemical Dependency No         Psychiatric Impairment No      Reading Ability: Good  Education Level: Bachelors Degree Recent Legal History No      Coping Style/Strategies:         Ability to Adhere to Complex Medical Regime: Yes     Adherence History: Pt is compliant with medication, outpatient appointments, recommendations of providers.        Education  _X_ Medicare  _X_ Rehabilitation  _X_ Donor issues  _X_ Community resources  _X_ Post discharge housing  _X_ Financial resources  _X_ Medical insurance options  _X_ Psych adjustment  _X_ Family adjustment  _X_ Health Care Directive Provided Education. Pt identifies his wife, Rosana as his appointed decision maker    Psychosocial Risks of Transplant Reviewed and Discussed:  _X_ Increased stress related to emotional,            family, social, employment or financial           situation  _X_ Effect on work and/or disability benefits  _X_ Effect on future health and life           insurance  _X_ Transplant outcome expectations may           not be met  _X_ Mental Health Risks: anxiety,           depression, PTSD, guilt, grief and           chronic fatigue     Notable Items:   None noted.       Final Evaluation/Assessment   Patient seemed to process information well. Appeared well informed, motivated and able to follow post transplant requirements. Behavior was appropriate during interview. Has adequate income and insurance coverage. Adequate social support. No major contraindications noted for transplant.  At this time patient appears to understand the risks and benefits of transplant.      Recommendation  Acceptable    Selection Criteria Met:  Plan for support Yes   Chemical Dependence Yes   Smoking Yes   Mental Health Yes   Adequate Finances Yes    Signature: KARSON Campos [facility]   Title: Clinical      Again, thank you for allowing me to participate in the care of your patient.        Sincerely,        KARSON Campos

## 2024-01-17 NOTE — LETTER
1/17/2024         RE: Haider Torrez  [address] [facility] [place] [address]        Dear Colleague,    Thank you for referring your patient, Haider Torrez, to the [facility] TRANSPLANT CLINIC. Please see a copy of my visit note below.    TRANSPLANT NEPHROLOGY RECIPIENT EVALUATION NOTE    Assessment and Plan:  # Kidney Transplant Evaluation: Patient is a good candidate overall. Benefits of a living donor transplant were discussed.    #  IgA nephropathy s/p failing DDKT (2015): had MATTHEW with COVID in Sept 2022 and again with CMV colitis in December 2023. Current eGFR ~ 15 ml/min. When ready, he would likely benefit from another kidney transplant. IS includes Tacrolimus, MPA and prednisone.     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 Treated with 4 weeks of IV GCV->4 weeks secondary ppx with valcyte. CMV<35 on last check 1/9/24.     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 Will recheck.     # PAD Screening: 10/2023 CT available for surgery review.     # Cardiac risk: needs risk assessment given multiple long standing risk factors.      # Seizure disorder- developed in 2011, unclear etiology, initially treated with Keppra but currently maintained without medication. Followed by Dr. Magallanes, neurologist at [facility]. Will request neurology records.     # History of papillary thyroid cancer, follicular variant- s/p complete thyroidectomy 2011: continues to be followed every three months by endocrinologist Dr. Zhou at [facility]. Will request records from 2 months ago.      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 Seen by bleeding and clotting, on Eliquis until clot resolves.     # Health Maintenance: Colonoscopy: Up to date and Dental: Not up to date. Derm: reportedly UTD, will request records from [place].     - Discussed the risks and benefits of a transplant, including the risk of surgery and  immunosuppression medications.  Patient's overall evaluation will be discussed in the Transplant Program's regular meeting with a final recommendation on the patients suitability for transplant to be made at that time.    Pending completion of the full evaluation, patient presently appears to be enough of an acceptable kidney transplant recipient candidate to have any potential kidney donors start the evaluation process.    Evaluation:  Haider Torrez was seen in consultation at the request of Dr. Juanita Kitchen for evaluation as a potential kidney transplant recipient.    Reason for Visit:  Haider Torrez is a 58 year old male with failing DDKT , who presents for kidney transplant evaluation.    History of Present Illness:           Kidney Disease Hx:       Haider Torrez is a 58-year-old male with history of IgA nephropathy s/p DDKT x 2. IgA nephropathy diagnosed in 2000. 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 He later received a DDKT at [facility] in 2015.  Has had declining graft function since 2019. History of COVID in Sept 2022 with sCr up to  ~ 2.4 mg/dl. Later CMV viremia / diarrhea  in October 2022, but biopsies on colonoscopy were negative for CMV and were suggestive of medication induced colitis. MATTHEW (sCr up to ~ 3.9 mg/dl) in 12/2023 in the setting of CMV colitis / Norovirus. Treated with 4 weeks of IV GCV->4 weeks secondary ppx with valcyte. CMV<35 on last check 1/9/24. His kidney function has continued to further decline with a sCr of ~ 4.3 mg/dl on 1/9/24. His only kidney transplant biopsy in 2020 showed chronic changes.          Kidney Disease Dx: IgA S/p failing DDKT (2004)          On Dialysis: No       Primary Nephrologist: Dr. Butler       H/o Kidney  Stones: No       H/o Recurrent/Frequent UTI: No         Diabetic Hx: None           Cardiac/Vascular Disease Risk Factors:        Cardiac Risk Factors: Hypertension, CKD, and Age (Male > 55, Female > 65)       Known CAD: No       Known PAD/Caludication Symptoms: No       Known Heart Failure: No       Arrhythmia: No       Pulmonary Hypertension: No       Valvular Disease: No       Other: None         Viral Serology Status       CMV IgG Antibody: Unknown       EBV IgG Antibody: Unknown         Volume Status/Weight:        Volume status: Euvolemic       Weight:  Acceptable BMI       BMI: There is no height or weight on file to calculate BMI.         Functional Capacity/Frailty:        On disability since DDKT in 2015, previously worked as process computer  specialist.  Exercises by walking/running on treadmill, a few times week 10-15 minutes. Denies exertional symptoms.     Fatigue/Decreased Energy: [] No [x] Yes    Chest Pain or SOB with Exertion: [x] No [] Yes    Significant Weight Change: [x] No [] Yes    Nausea, Vomiting or Diarrhea: [x] No [] Yes    Fever, Sweats or Chills:  [x] No [] Yes    Leg Swelling [x] No [] Yes        History of Cancer: # History of papillary thyroid cancer, follicular variant- s/p complete thyroidectomy 2011. Contin[facility] three [facility] Will records from 2 months ago.          Allergy Testing Questions:   Medication that caused a reaction  Cephalexin and Clindmyacin -  Hives    Antibiotics used that didn't give an allergic reaction?  None    COVID Vaccination Up To Date: Not asked    Potential Living Kidney Donors: No    Review of Systems:  A comprehensive review of systems was obtained and negative, except as noted in the HPI or PMH.    Past Medical History:   Medical record was reviewed and PMH was discussed with patient and noted below.  Past Medical History:   Diagnosis Date     Anemia of chronic kidney failure       Carpal tunnel syndrome      Clinical diagnosis of COVID-19 2022     Dyslipidemia      History of respiratory failure      Hypertension      Hypomagnesemia      IgA nephropathy      Insomnia      Kidney disease, chronic, end stage on dialysis (H)      Kidney transplant failure      Myoclonus      Obstructive sleep apnea      Papillary thyroid carcinoma (H)      PRES (posterior reversible encephalopathy syndrome)      after thyroidectomy     Restless leg syndrome      Secondary hyperparathyroidism (H24)      Seizure disorder (H)     Post surgery. Keppra now discontinued     Urinoma of kidney transplant        Past Social History:   Past Surgical History:   Procedure Laterality Date     BENCH KIDNEY  10/13/2015    Procedure: BENCH KIDNEY;  Surgeon: Seth Gibbons MD;  Location: [facility] OR     COLONOSCOPY N/A 10/05/2023    Procedure: COLONOSCOPY, WITH POLYPECTOMY AND BIOPSY;  Surgeon: Russ Dodge MD;  Location: [facility] GI     CREATE FISTULA ARTERIOVENOUS UPPER EXTREMITY  2011     CYSTOSCOPY, REMOVE STENT(S), COMBINED N/A 2015    Procedure: COMBINED CYSTOSCOPY, REMOVE STENT(S);  Surgeon: Seth Gibbons MD;  Location: [facility] OR     CYSTOSCOPY, REMOVE STENT(S), COMBINED Bilateral 2015    Procedure: COMBINED CYSTOSCOPY, REMOVE STENT(S);  Surgeon: Seth Gibbons MD;  Location: [facility] OR     ELBOW SURGERY Right 1978     hemithyroidectomy Left 2011     HERNIA REPAIR Right 2009     PICC SINGLE LUMEN PLACEMENT Left 10/14/2023    left basilic 4fr sl power picc 44 cm     THYROIDECTOMY  2011     TRANSPLANT KIDNEY RECIPIENT  DONOR  2004     TRANSPLANT KIDNEY RECIPIENT  DONOR N/A 10/13/2015    Procedure: TRANSPLANT KIDNEY RECIPIENT  DONOR;  Surgeon: Seth Gibbons MD;  Location: [facility] OR     ureteropyelostomy  2004     Personal history of bleeding or anesthesia problems: No    Family History:  Family History   Problem Relation Age of  Onset     Diabetes Mother      Other Cancer Father         lung       Personal History:   Social History     Socioeconomic History     Marital status:      Spouse name: Not on file     Number of children: Not on file     Years of education: Not on file     Highest education level: Not on file   Occupational History     Not on file   Tobacco Use     Smoking status: Never     Smokeless tobacco: Never   Substance and Sexual Activity     Alcohol use: No     Drug use: No     Sexual activity: Not on file   Other Topics Concern     Parent/sibling w/ CABG, MI or angioplasty before 65F 55M? Not Asked   Social History Narrative     Not on file     Social Determinants of Health     Financial Resource Strain: Not on file   Food Insecurity: Not on file   Transportation Needs: Not on file   Physical Activity: Not on file   Stress: Not on file   Social Connections: Not on file   Interpersonal Safety: Not on file   Housing Stability: Not on file       Allergies:  Allergies   Allergen Reactions     Hydralazine Swelling     Betadine [Povidone Iodine]      Cephalexin Hives     Clindamycin Hives     Trazodone Swelling     Swelling of face/lips       Medications:  Current Outpatient Medications   Medication Sig     amLODIPine (NORVASC) 5 MG tablet Take 1 tablet (5 mg) by mouth daily for 90 days     carvedilol (COREG) 25 MG tablet Take 1 tablet (25 mg) by mouth 2 times daily (with meals)     clonazePAM (KLONOPIN) 0.5 MG tablet Take 1 tablet (0.5 mg) by mouth At Bedtime     ELIQUIS ANTICOAGULANT 5 MG tablet      folic acid (FOLVITE) 1 MG tablet Take 1 tablet (1 mg) by mouth daily     levothyroxine (SYNTHROID/LEVOTHROID) 137 MCG tablet Take 1 tablet (137 mcg) by mouth daily     mycophenolic acid (GENERIC EQUIVALENT) 180 MG EC tablet Take 2 tablets (360 mg) by mouth 2 times daily     pantoprazole (PROTONIX) 40 MG EC tablet Take 1 tablet (40 mg) by mouth daily Take 30-60 minutes before a meal.     predniSONE (DELTASONE) 5 MG tablet  Take 1 tablet (5 mg) by mouth daily     rOPINIRole (REQUIP) 1 MG tablet Take 1 tablet (1 mg) by mouth At Bedtime     sodium bicarbonate 650 MG tablet Take 2 tablets (1,300 mg) by mouth 2 times daily     sulfamethoxazole-trimethoprim (BACTRIM) 400-80 MG tablet Take 1 tablet by mouth three times a week     tacrolimus (GENERIC) 0.5 MG capsule Take 1 capsule (0.5 mg) by mouth 2 times daily Total dose = 2.5 mg twice per day     tacrolimus (GENERIC) 1 MG capsule Take 2 capsules (2 mg) by mouth 2 times daily Total dose = 2.5 mg twice per day     torsemide (DEMADEX) 20 MG tablet Take 1 tablet (20 mg) by mouth daily     vitamin D3 (CHOLECALCIFEROL) 10 MCG (400 UNIT) capsule Take 1 capsule (400 Units) by mouth daily     No current facility-administered medications for this visit.       Vitals:  There were no vitals taken for this visit.    Exam:  GENERAL APPEARANCE: alert and no distress  HENT: mouth without ulcers or lesions  RESP: lungs clear to auscultation - no rales, rhonchi or wheezes  CV: regular rhythm, normal rate, no rub, no murmur  EDEMA: no LE edema bilaterally  ABDOMEN: soft, nondistended, nontender, bowel sounds normal  MS: extremities normal - no gross deformities noted, no evidence of inflammation in joints, no muscle tenderness  SKIN: no rash    Results:   Recent Results (from the past 336 hour(s))   Basic metabolic panel    Collection Time: 01/09/24  8:05 AM   Result Value Ref Range    Potassium (External) 4.4 3.6 - 5.2 mmol/L    Sodium (External) 145 135 - 145 mmol/L    Chloride (External) 109 (H) 98 - 107 mmol/L    CO2 (External) 22 22 - 29 mmol/L    Anion Gap (External) 14 7 - 15    Urea Nitrogen (External) 69 (H) 8 - 24 mg/dL    Creatinine (External) 4.36 (H) 0.74 - 1.35 mg/dL    GFR Estimated (External) <15 (L) >=60 ml/min/1.73m2    Calcium (External) 8.2 (L) 8.6 - 10.0 mg/dL    Glucose (External) 114 70 - 140 mg/dL   CBC with Platelets & Differential    Collection Time: 01/09/24  8:05 AM   Result Value  Ref Range    Hemoglobin (External) 10.4 (L) 13.2 - 16.6 g/dL    Hematocrit (External) 32.7 (L) 38.3 - 48.6 %    RBC Count (External) 3.50 (L) 4.35 - 5.65 10*12/L    MCV (External) 93.4 78.2 - 97.9 fL    RDW (External) 12.9 (L) 118 - 145 %    Platelet Count (External) 157 135 - 317 10(9)/L    WBC Count (External) 4.5 3.4 - 9.6 10(9)/L    Absolute Neutrophils (External) 3.28 1.56 - 6.45 10(9)/L    Absolute Lymphocytes (External) 0.79 (L) 0.95 - 3.07 10(9)/L    Absolute Monocytes (External) 0.44 0.26 - 0.81 10(9)/L    Absolute Eosinophils (External) <0.03 (A) 0.03 - 0.48 10(9)/L    Absolute Basophils (External) <0.03 0.01 - 0.08 10(9)/L   Tacrolimus by Tandem Mass Spectrometry    Collection Time: 01/09/24  8:05 AM   Result Value Ref Range    Tacrolimus(FK-506) (External) 6.3 5.0 - 15.0 NG/Ml   Cytomegalovirus DNA by PCR, Quantitative    Collection Time: 01/09/24  8:05 AM   Result Value Ref Range    CMV DNA Quant (External) <35 (A) Undetected IU/ML    Log IU/ML of CMVQNT (External) <1.54 Undetected log IU/mL           Again, thank you for allowing me to participate in the care of your patient.        Sincerely,        MARCUS Chambers CNP

## 2024-01-18 LAB
CARDIOLIPIN IGG SER IA-ACNC: <2 GPL-U/ML
CARDIOLIPIN IGG SER IA-ACNC: NEGATIVE
CARDIOLIPIN IGM SER IA-ACNC: <2 MPL-U/ML
CARDIOLIPIN IGM SER IA-ACNC: NEGATIVE
HBV CORE AB SERPL QL IA: NONREACTIVE
HBV SURFACE AB SERPL IA-ACNC: 64.8 M[IU]/ML
HBV SURFACE AB SERPL IA-ACNC: REACTIVE M[IU]/ML
HBV SURFACE AG SERPL QL IA: NONREACTIVE
HIV 1+2 AB+HIV1 P24 AG SERPL QL IA: NONREACTIVE
QUANTIFERON MITOGEN: 0.81 IU/ML
QUANTIFERON NIL TUBE: 0.03 IU/ML
QUANTIFERON TB1 TUBE: 0.04 IU/ML
QUANTIFERON TB2 TUBE: 0.04

## 2024-01-19 ENCOUNTER — TELEPHONE (OUTPATIENT)
Dept: TRANSPLANT | Facility: CLINIC | Age: 59
End: 2024-01-19
Payer: MEDICARE

## 2024-01-19 LAB
GAMMA INTERFERON BACKGROUND BLD IA-ACNC: 0.03 IU/ML
M TB IFN-G BLD-IMP: NEGATIVE
M TB IFN-G CD4+ BCKGRND COR BLD-ACNC: 0.78 IU/ML
MITOGEN IGNF BCKGRD COR BLD-ACNC: 0.01 IU/ML
MITOGEN IGNF BCKGRD COR BLD-ACNC: 0.01 IU/ML

## 2024-01-19 NOTE — TELEPHONE ENCOUNTER
[facility] called; Blood bank needs more blood to run antibody workup.  There was not enough from the lab draw on 01/17/2024.   Lab PH# [phone]    (test is:  Antibody workup)      Was told anyone in blood bank lab can help with questions.

## 2024-01-19 NOTE — CONFIDENTIAL NOTE
Based on some amount of residual IJ thrombosis on ultrasound, will recommend 3 more months of anticoagulation treatment.    Jacob Cogan, MD  Hematology

## 2024-01-22 LAB
A*: NORMAL
A*LOCUS SEROLOGIC EQUIVALENT: 2
A*LOCUS: NORMAL
A*SEROLOGIC EQUIVALENT: 24
ABTEST METHOD: NORMAL
ATRIAL RATE - MUSE: 62 BPM
B*: NORMAL
B*LOCUS SEROLOGIC EQUIVALENT: 46
B*LOCUS: NORMAL
B*SEROLOGIC EQUIVALENT: 55
BW-1: NORMAL
C*: NORMAL
C*LOCUS SEROLOGIC EQUIVALENT: 1
C*LOCUS: NORMAL
C*SEROLOGIC EQUIVALENT: 9
DIASTOLIC BLOOD PRESSURE - MUSE: NORMAL MMHG
DPA1*: NORMAL
DPB1*: NORMAL
DQA1*: NORMAL
DQA1*LOCUS: NORMAL
DQB1*: NORMAL
DQB1*LOCUS SEROLOGIC EQUIVALENT: 7
DQB1*LOCUS: NORMAL
DQB1*SEROLOGIC EQUIVALENT: 5
DRB1*: NORMAL
DRB1*LOCUS SEROLOGIC EQUIVALENT: 12
DRB1*LOCUS: NORMAL
DRB1*SEROLOGIC EQUIVALENT: 14
DRB3*: NORMAL
DRB3*LOCUS SEROLOGIC EQUIVALENT: 52
DRB3*LOCUS: NORMAL
DRB3*SEROLOGIC EQUIVALENT: 52
DRSSO TEST METHOD: NORMAL
INTERPRETATION ECG - MUSE: NORMAL
P AXIS - MUSE: 50 DEGREES
PR INTERVAL - MUSE: 154 MS
QRS DURATION - MUSE: 104 MS
QT - MUSE: 434 MS
QTC - MUSE: 440 MS
R AXIS - MUSE: 29 DEGREES
SYSTOLIC BLOOD PRESSURE - MUSE: NORMAL MMHG
T AXIS - MUSE: 24 DEGREES
VENTRICULAR RATE- MUSE: 62 BPM

## 2024-01-24 ENCOUNTER — COMMITTEE REVIEW (OUTPATIENT)
Dept: TRANSPLANT | Facility: CLINIC | Age: 59
End: 2024-01-24
Payer: MEDICARE

## 2024-01-24 DIAGNOSIS — Z94.0 KIDNEY REPLACED BY TRANSPLANT: Primary | ICD-10-CM

## 2024-01-24 LAB
DONOR IDENTIFICATION: NORMAL
DSA COMMENTS: NORMAL
DSA PRESENT: NO
DSA TEST METHOD: NORMAL
ORGAN: NORMAL
PROTOCOL CUTOFF: NORMAL
SA 1 CELL: NORMAL
SA 1 TEST METHOD: NORMAL
SA 2 CELL: NORMAL
SA 2 TEST METHOD: NORMAL
SA1 HI RISK ABY: NORMAL
SA1 MOD RISK ABY: NORMAL
SA2 HI RISK ABY: NORMAL
SA2 MOD RISK ABY: NORMAL
UNACCEPTABLE ANTIGENS: NORMAL
UNOS CPRA: 91
ZZZSA 1  COMMENTS: NORMAL
ZZZSA 2 COMMENTS: NORMAL

## 2024-01-24 NOTE — LETTER
PHYSICIAN ORDERS      DATE & TIME ISSUED: 2024 10:28 AM  PATIENT NAME: Haider Torrez   : 1965     [facility] MR# [if applicable]: [id number]     DIAGNOSIS:  Kidney Transplanted; Long Term Use of High Risk Med  ICD-10 CODE: Z94.0; Z79.899    Please repeat the following labs in 1-2 weeks:  Tacrolimus drug level (12 hour trough level)    Any questions please call: [phone]    Please fax each result same day as resulted/available    Critical lab results page [phone]  Please fax lab results to [phone].      Maurice Bullard MD

## 2024-01-24 NOTE — COMMITTEE REVIEW
Abdominal Committee Review Note     Evaluation Date: 2024  Committee Review Date: 2024    Organ being evaluated for: Kidney    Transplant Phase: Evaluation  Transplant Status: Active    Transplant Coordinator: Geovanna Rivera  Transplant Surgeon:   Dillan Nieto    Referring Physician: Maurice Darby    Primary Diagnosis: IgA Nephropathy  Secondary Diagnosis:     Committee Review Members:  Dietitiarmani, Sydni Manuel RD   Nephrology Maurice Bullard MD, Yariel Croft, APRN CNP, Nicola Lyon MD   Nutrition Rox Manuel, KULDEEP   Pharmacist Romie Mccrary, [facility], Lindsay Cast, [facility]   Pharmacy Eulogio Hannon, [facility]    - Clinical Diane Keller, [facility], Celina Fernandes, [facility]   Transplant Lucy Stone, MARIBEL, Marely Johnson, RN, Maura Andrews, RN, Celina Reynolds, RN, Toya Cassidy, NP, Deedee Navarrete, RN, Bee Street, MARIBEL, Rocío Arias, RN   Transplant Surgery River North MD       Transplant Eligibility: Irreversible chronic kidney disease treated w/dialysis or expected need for dialysis    Committee Review Decision: Needs Re-presentation    Relative Contraindications:     Absolute Contraindications:     Committee Chair River North MD verbally attested to the committee's decision.    Committee Discussion Details: Reviewed patient's medical status and evaluation results to date with multidisciplinary committee. Committee determined that patient is a good candidate for kidney transplant, living or . Patient should have live donors register now to initiate donor evaluation.    The committee has recommended the following evaluation items be completed prior to being listed as active status on the kidney transplant wait list:    Cardiology consult and risk assessment for kidney transplant.  Labs - A2B titers/BK virus  Surgery to review imaging: CT abd/pelvis & iliac US to determine placement  of kidney transplant.  Tested frail 3/5 - will need a referral for PT from PCP  Neurology clearance due to seizure history  Will need Endocrine notes/clearance due to history of thyroid cancer and thyroidectomy  Will need to determine anti-coag plan, on [employer] maintenance items, will need derm records and update dental visit, if not completed in the last 6 months to a year.    Patient could be a candidate for A2B - ABO B, titers are needed.    The plan is to list the patient inactive on the kidney wait list to accrue time since they are not on dialysis. The patient will be called and updated on the committee's decision, orders will be placed (if applicable), and summary letter will be sent.

## 2024-01-24 NOTE — LETTER
01/24/24        Haider Torrez  [address] [place], [place] [address] [place] [address]        Dear Haider,    It was a pleasure to see you recently for consideration of kidney transplantation. Your pre-transplant evaluation results were reviewed at our Multidisciplinary Selection Committee. The committee is requesting the following items are completed before determining your candidacy:    Cardiology consult and risk assessment for kidney transplant.  Labs - A2B titers/BK virus  Surgery to review imaging: CT abd/pelvis & iliac US to determine placement of kidney transplant.  Tested frail 3/5 - will need a referral for PT from PCP  Neurology clearance due to seizure history  Will need Endocrine notes/clearance due to history of thyroid cancer and thyroidectomy  Will need to determine anti-coag plan, on [place]  Health maintenance items, will need derm records and update dental visit, if not completed in the last 6 months to a year.    You can schedule locally if you wish for items # *** . Please work with your regular providers to arrange for these locally.  If you wish to schedule any appointments at our clinic, please let your transplant coordinator know who will assist with scheduling.     You will be listed on the kidney transplant wait list as INACTIVE to accrue wait time while working on your recommended evaluation items. Once you are made active, your wait time during your evaluation will be added to determine your placement on the kidney transplant wait list. A wait list letter for inactive status will be sent to you once you are listed in [facility].    Please notify your transplant coordinator when all of the above items are successfully completed, at this point your results will be reviewed at the Multidisciplinary Selection Committee for approval. If your results indicate that your health is adequate for transplant, you will be eligible to be changed to ACTIVE status on the wait list and also to proceed with a live kidney donor  transplant in the event of an approved live donor.     Please have any potential live kidney donors register online at [facility] to initiate their evaluations through our program's living donor screening tool at [email or web address]. Please note that potential donors will get an e-mail response once they submit their form within 1-2 business days. Potential donors may call our Main Office Number at [phone] and ask to speak with a donor coordinator if they have questions about the process. You will be notified by your transplant coordinator if a live donor has been approved for donation.       For any questions, please contact the Transplant Office at [phone].      Sincerely,    MARIBEL Austin; Pre-Kidney/Pancreas Transplant Coordinator    Solid Organ Transplant  [facility], [facility]    Transplant Office phone number: [phone]  Transplant Office fax number: [phone]    Direct phone number: [phone]  E-mail: [email or web address]

## 2024-01-24 NOTE — TELEPHONE ENCOUNTER
ISSUE:   Tacrolimus IR level 3.4 on 1/22/24 7:27 AM, goal 4-6, dose 2.5 mg BID.    PLAN:   Call Patient and confirm this was an accurate 12-hour trough.   Verify Tacrolimus IR dose 2.5 mg BID.   Confirm no new medications or illness.   Confirm no missed doses.   If accurate trough and accurate dose, increase Tacrolimus IR dose to 2.5 mg every morning and 3 mg every evening.     Is this more than a 50% increase or decrease in current IS dose: No  If YES, justification: N/A    Repeat labs in 2 weeks.  OUTCOME:  Lab requisition letter sent.    Call placed to Haider; busy signal x3.   [facility] message sent:    Hi Haider,    Your tacrolimus level is 3.4, now too low with the dose change we made last week. We need to prescribe differently to reach 12 hour trough goal of 4-6. The level was too high on 3.5 mg twice daily and too low on 2.5 mg twice daily.     Please confirm this was an accurate 12-hour trough.   Verify taking Tacrolimus IR dose 2.5 mg every 12 hours.  Confirm no new medications or illness.   Confirm no missed doses.   If accurate trough and accurate dose, increase Tacrolimus IR dose to 2.5 mg every morning and 3 mg every evening.     Repeat level in 2 weeks.Thank you,    Sincerely your Post-Transplant Coordinator,     Meagan Richardson RN, BSN  Solid Organ Transplant  [phone]     Addendum: Second call attempt made to change tacrolimus dose. Left detailed voicemail message directing Haider to [facility] message for instructions. Requested call back or reply via [facility] to follow up and update prescription on file. -GEORGINA

## 2024-01-26 ENCOUNTER — TELEPHONE (OUTPATIENT)
Dept: TRANSPLANT | Facility: CLINIC | Age: 59
End: 2024-01-26
Payer: MEDICARE

## 2024-01-26 DIAGNOSIS — Z94.0 KIDNEY REPLACED BY TRANSPLANT: ICD-10-CM

## 2024-01-26 DIAGNOSIS — N18.6 END STAGE RENAL DISEASE (H): Primary | ICD-10-CM

## 2024-01-26 DIAGNOSIS — I10 ESSENTIAL HYPERTENSION: ICD-10-CM

## 2024-01-26 DIAGNOSIS — Z01.818 PRE-TRANSPLANT EVALUATION FOR KIDNEY TRANSPLANT: ICD-10-CM

## 2024-01-26 DIAGNOSIS — Z76.82 ORGAN TRANSPLANT CANDIDATE: ICD-10-CM

## 2024-01-26 RX ORDER — TACROLIMUS 1 MG/1
CAPSULE ORAL
Qty: 150 CAPSULE | Refills: 11 | Status: SHIPPED | OUTPATIENT
Start: 2024-01-26 | End: 2024-05-16

## 2024-01-26 RX ORDER — TACROLIMUS 0.5 MG/1
0.5 CAPSULE ORAL EVERY MORNING
Qty: 30 CAPSULE | Refills: 11 | Status: SHIPPED | OUTPATIENT
Start: 2024-01-26 | End: 2024-05-16 | Stop reason: ALTCHOICE

## 2024-01-26 NOTE — TELEPHONE ENCOUNTER
Called pt to discuss outcome of committee review. Patient is scheduled with Dr. Marie for cards clearance and Dr. Cogan for bleeding and clotting for his anticoagulation plan. RNCC will request records from neurology, endocrinology, and dermatology. Informed pt he will need clearance from all 3. Pt unsure where his endocrinologist was.  Pt will work with PCP to get a physical therapy referral. RNCC will place orders for lab testing and imaging to be completed when he comes back for return visits. Pt will also be listed for inactive status on our kidney transplant wait list to accrue time since he is not on HD. Will send a letter when inactive. Pt verbalized understanding, will call with any questions.    Cardiology consult and risk assessment for kidney transplant - scheduled with Frank.  Labs - A2B titers/BK virus  Surgery to review imaging: CT abd/pelvis & iliac US to determine placement of kidney transplant.  Tested frail 3/5 - will need a referral for PT from PCP  Neurology clearance due to seizure history  Will need Endocrine notes/clearance due to history of thyroid cancer and thyroidectomy  Will need to determine anti-coag plan, on Eliquis - bleeding & clotting scheduled  Health maintenance items, will need derm records and update dental visit, if not completed in the last 6 months to a year.

## 2024-01-26 NOTE — TELEPHONE ENCOUNTER
Patient confirms this was an accurate 12-hour trough.   Verified Tacrolimus IR dose 2.5 mg BID.   Confirmed no new medications or illness.   Confirmed no missed doses.   Patient confirms increase Tacrolimus IR dose to 2.5 mg every morning and 3 mg every evening and repeat labs next week.

## 2024-02-08 ENCOUNTER — TELEPHONE (OUTPATIENT)
Dept: TRANSPLANT | Facility: CLINIC | Age: 59
End: 2024-02-08
Payer: MEDICARE

## 2024-02-08 NOTE — TELEPHONE ENCOUNTER
ISSUE:   CMV PCR quantitative levels. <35 international units/ml; Log <1.54.      Tacrolimus level 5.2, at goal 4-6. Dose 2.5 mg every morning and 3 mg every evening.    PLAN:   Message  Received: Today  Serafin Rudd MD Blaisdell, Christin Rebecca, RN    I would check only when clinically indicated (inexplicable symptoms of fever, failure to thrive, cytopenias, etc...)          Previous Messages       ----- Message -----  From: Meagan Richardson RN  Sent: 2/8/2024  11:41 AM CST  To: Serafin Rudd MD    CMV not quantifiable on weekly checks. Can we reduce the frequency of CMV checks? If so, what frequency do you recommend?    OUTCOME:   RNCC spoke with Haider. Notified that he may stop checking CMV PCR levels and that only needs to complete for inexplicable symptoms:    Symptoms of CMV  Call your primary care doctor and your transplant coordinator if you:  - Have a fever, headache, muscle weakness, or stomach ache  - Have nausea or loss of appetite  - Have diarrhea  - Are feeling mentally cloudy or very tired    Order sent to lab to discontinue weekly CMV PCR and tacrolimus levels.    Reviewed tacrolimus level at goal; no need to check for another 3 months per routine; updated order. Pt confirms current dose is being taken as prescribed; 2.5 mg every morning and 3 mg every evening.    He follows with local general nephrologist for ESRD and will notify transplant if he starts dialysis for review of his immunosuppression medications. His lab orders for BMP and CBC are thru Dr. Berger.    Haider verbalized understanding of plan.    Meagan Richardson, RN, BSN  Solid Organ Transplant, Post Kidney and Pancreas  Transplant Care Coordinator  [phone]

## 2024-02-08 NOTE — LETTER
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     Please check the following labs every 3 months:  - Tacrolimus drug level (12 hour trough level)    Any questions please call: [phone]    Please fax each result same day as resulted/available to [phone].  Critical lab results page [phone]        Maurice Bullard MD

## 2024-02-14 NOTE — TELEPHONE ENCOUNTER
RECORDS RECEIVED FROM:    DATE RECEIVED: 3/21/24   NOTES STATUS DETAILS   OFFICE NOTE from referring provider  Internal 1/24/24   OFFICE NOTE from other cardiologists  N/A    RECORDS from hospital/ED N/A    MEDICATION LIST Internal    GENERAL CARDIO RECORDS   (ALL APPOINTMENT TYPES)     LABS (CBC,BMP,CMP, TSH) N/A    EKG (STRIPS & REPORTS) Internal 1/17/24   MONITORS (STRIPS & REPORTS) N/A    ECHOS (IMAGES AND REPORTS) Internal 1/17/24   STRESS TESTS (IMAGES AND REPORTS) N/A    cMRI (IMAGES AND REPORTS) N/A    Cardiac cath (IMAGES AND REPORTS) N/A    CT/CTA (IMAGES AND REPORTS) N/A      Action    Action Taken Complete

## 2024-02-26 ENCOUNTER — TELEPHONE (OUTPATIENT)
Dept: TRANSPLANT | Facility: CLINIC | Age: 59
End: 2024-02-26
Payer: MEDICARE

## 2024-02-26 NOTE — LETTER
OUTPATIENT LABORATORY TEST ORDER     Patient Name: Haider Torrez   YOB: 1965     [facility] MR# [if applicable]: [id number]   Date & Time: February 27, 2024  9:21 AM  Expiration Date: 1 year after date issued or when renal replacement therapy started.      Diagnoses: Kidney Transplant (ICD-10 Z94.0)   Long term use of medications (ICD-10 Z79.899)             Immunosuppressed status (ICD-10 D89.9)    We ask your assistance in obtaining the following laboratory tests, which are part of our routine surveillance program for Solid Organ Transplant patients.     Please fax each result to [phone], same day as resulted/available    Critical lab results page [phone]    Every 3 Months  CBC with platelets  Basic Metabolic Panel (Sodium, Potassium, Chloride, Creatinine, CO2, Urea Nitrogen, glucose, Calcium)  Tacrolimus drug level - 12-hour trough, please document time of last dose      Every 6 Months   Urine for protein/creatinine    Discontinue CMV PCR orders. Check only when clinically indicated (inexplicable symptoms of fever, failure to thrive, cytopenias, etc.). In which case, future orders would follow.    Please note general nephrologist to manage CKD/ESRD and may order more frequent labs than the post-kidney transplant team needs to manage immunosuppression medication.      If you have any questions, please call [facility] [phone] or [phone], Fax [phone].    .

## 2024-02-26 NOTE — TELEPHONE ENCOUNTER
[facility] in [place] called would like new orders faxed to [phone]. Caller stated they have some many orders and they would like to have a set of fresh lab orders.

## 2024-02-27 NOTE — TELEPHONE ENCOUNTER
RNCC sent standing post-transplant labs orders as follows to  in one year or if renal replacement therapy started:    Every 3 Months  CBC with platelets  Basic Metabolic Panel (Sodium, Potassium, Chloride, Creatinine, CO2, Urea Nitrogen, glucose, Calcium)  Tacrolimus drug level - 12-hour trough, please document time of last dose      Every 6 Months   Urine for protein/creatinine    Discontinue CMV PCR orders. Check only when clinically indicated (inexplicable symptoms of fever, failure to thrive, cytopenias, etc.). In which case, future orders would follow.    Please note general nephrologist to manage CKD/ESRD and may order more frequent labs than the post-kidney transplant team needs to manage immunosuppression medication.     Meagan Richardson RN, BSN  Solid Organ Transplant, Post Kidney and Pancreas  Transplant Care Coordinator  [phone]

## 2024-03-08 ENCOUNTER — TELEPHONE (OUTPATIENT)
Dept: TRANSPLANT | Facility: CLINIC | Age: 59
End: 2024-03-08
Payer: MEDICARE

## 2024-03-08 NOTE — TELEPHONE ENCOUNTER
Issue:  Stable transplant labs- pt nearing need for dialysis.    Plan:  Call placed to Haider Torrez to check in. Haider Torrez reports he feels well, no c/o uremic symptoms or decrease in UOP. Haider states he has appt with local nephrologist soon. RNCC encouraged him to keep local follow up and to alert SOT when and if he starts dialysis so we can review his IS. Haider verbalized understanding of plan.

## 2024-03-18 NOTE — PROGRESS NOTES
[facility]  [address] [place] [address], [place] [address]  Main: [phone], Fax: [phone]    Patient seen at: [facility] at [address] [place] [address]    Outpatient Visit Note:    Patient: Haider Torrez  MRN: [id number]  : 1965  JAMIA: 2024    Reason for visit:  Follow up, history of venous thromboembolism, anticoagulation discussion     Clinical History Summary:  Haider Torrez is a 58 year old male with past medical history significant for IgA nephropathy s/p renal transplants (last ) and recurrent right internal jugular thrombosis, on apixaban.     Thrombosis History:   - 10/2015 -  Right internal jugular vein thrombosis two months following fistulogram and angioplasty. Treated with warfarin x 6 months.   - 10/2023 - Right internal jugular vein DVT following admission for CMV colitis and was not on venous thromboembolism prophylaxis due to ambulatory status. Started on Eliquis 5mg twice daily.  Follow up imaging in 2023 with chronic appearing residual thrombus.     He did have apixaban level checked and was therapeutic on two separate occasions.     He is currently undergoing renal transplant evaluation.     Interim History:  Today, Haider notes that he is doing quite well. He denies any acute concerns. He had appointments with cardiology this morning for transplant workup. He had repeat imaging this morning that showed chronic stable appearing nonocclusive internal jugular thrombus of the right upper extremity. This is unchanged from prior. He has about 1 month supply of Eliquis at home.  He denies any upcoming surgeries, procedures, travel. His symptoms are back to baseline. He is not currently on dialysis but has active fistula of RUE.    ROS:  Denies any bleeding complications. Specifically, no frequent epistaxis. No issues with oral mucosal bleeding. Denies any hematuria or blood in stools. Denies any  shortness of breath. No chest pain. No cough. No fever. No arm pain or swelling.       Medications:   Current Outpatient Medications   Medication Sig Dispense Refill    amLODIPine (NORVASC) 5 MG tablet Take 1 tablet (5 mg) by mouth daily for 90 days 90 tablet 3    carvedilol (COREG) 25 MG tablet Take 1 tablet (25 mg) by mouth 2 times daily (with meals)      clonazePAM (KLONOPIN) 0.5 MG tablet Take 1 tablet (0.5 mg) by mouth At Bedtime      ELIQUIS ANTICOAGULANT 5 MG tablet       folic acid (FOLVITE) 1 MG tablet Take 1 tablet (1 mg) by mouth daily      levothyroxine (SYNTHROID/LEVOTHROID) 137 MCG tablet Take 1 tablet (137 mcg) by mouth daily      mycophenolic acid (GENERIC EQUIVALENT) 180 MG EC tablet Take 2 tablets (360 mg) by mouth 2 times daily 120 tablet 11    pantoprazole (PROTONIX) 40 MG EC tablet Take 1 tablet (40 mg) by mouth daily Take 30-60 minutes before a meal. 30 tablet 0    predniSONE (DELTASONE) 5 MG tablet Take 1 tablet (5 mg) by mouth daily      rOPINIRole (REQUIP) 1 MG tablet Take 1 tablet (1 mg) by mouth At Bedtime      sodium bicarbonate 650 MG tablet Take 2 tablets (1,300 mg) by mouth 2 times daily      sulfamethoxazole-trimethoprim (BACTRIM) 400-80 MG tablet Take 1 tablet by mouth three times a week      tacrolimus (GENERIC) 0.5 MG capsule Take 1 capsule (0.5 mg) by mouth every morning Total dose = 2.5 mg in the AM and 3 mg in the PM 30 capsule 11    tacrolimus (GENERIC) 1 MG capsule Total dose = 2.5 mg in the AM and 3 mg in the  capsule 11    torsemide (DEMADEX) 20 MG tablet Take 1 tablet (20 mg) by mouth daily      vitamin D3 (CHOLECALCIFEROL) 10 MCG (400 UNIT) capsule Take 1 capsule (400 Units) by mouth daily          Allergies:      Allergies   Allergen Reactions    Hydralazine Swelling    Betadine [Povidone Iodine]     Cephalexin Hives    Clindamycin Hives    Trazodone Swelling     Swelling of face/lips       PMH:  Past Medical History:   Diagnosis Date    Anemia of chronic kidney  "failure     Carpal tunnel syndrome     Clinical diagnosis of COVID-19 9/22/2022    Dyslipidemia     History of respiratory failure     Hypertension     Hypomagnesemia 2015    IgA nephropathy     Insomnia     Kidney disease, chronic, end stage on dialysis (H)     Kidney transplant failure     Myoclonus     Obstructive sleep apnea     Papillary thyroid carcinoma (H)     PRES (posterior reversible encephalopathy syndrome)     2011 after thyroidectomy    Restless leg syndrome     Secondary hyperparathyroidism (H24)     Seizure disorder (H)     Post surgery. Keppra now discontinued    Urinoma of kidney transplant         Social History:   Social History     Tobacco Use    Smoking status: Never    Smokeless tobacco: Never   Substance Use Topics    Alcohol use: No    Drug use: No       Family History:  Deferred.    Objective:  Vitals: BP (!) 152/72   Pulse 59   Ht 1.651 m (5' 5\")   Wt 60.6 kg (133 lb 9.6 oz)   SpO2 100%   BMI 22.23 kg/m       Exam:   Constitutional: Appears well, no distress  HEENT: Pupils equal and round. No scleral icterus or hemorrhage.   Respiratory: no increased work of breathing.   Mus/Skele: no edema RUE  Skin: no petechiae, no ecchymosis on exposed dermis.  Neuro: CN II-XII intact. Normal gait. AOx3      Labs:  CBC RESULTS:   Recent Labs   Lab Test 01/17/24  1229   WBC 6.3   RBC 3.86*   HGB 11.5*   HCT 35.4*   MCV 92   MCH 29.8   MCHC 32.5   RDW 12.9        Last Comprehensive Metabolic Panel:  Sodium   Date Value Ref Range Status   01/17/2024 144 135 - 145 mmol/L Final     Comment:     Reference intervals for this test were updated on 09/26/2023 to more accurately reflect our healthy population. There may be differences in the flagging of prior results with similar values performed with this method. Interpretation of those prior results can be made in the context of the updated reference intervals.    03/27/2018 143 133 - 144 mmol/L Final     Potassium   Date Value Ref Range Status "   01/17/2024 4.8 3.4 - 5.3 mmol/L Final   03/27/2018 4.2 3.4 - 5.3 mmol/L Final     Chloride   Date Value Ref Range Status   03/27/2018 112 (H) 94 - 109 mmol/L Final     Chloride (External)   Date Value Ref Range Status   03/06/2024 108 (H) 98 - 107 mmol/L Final     Carbon Dioxide   Date Value Ref Range Status   03/27/2018 24 20 - 32 mmol/L Final     Carbon Dioxide (CO2)   Date Value Ref Range Status   01/17/2024 26 22 - 29 mmol/L Final     Anion Gap   Date Value Ref Range Status   01/17/2024 10 7 - 15 mmol/L Final   03/27/2018 6 3 - 14 mmol/L Final     Glucose   Date Value Ref Range Status   01/17/2024 235 (H) 70 - 99 mg/dL Final   03/27/2018 152 (H) 70 - 99 mg/dL Final     Urea Nitrogen   Date Value Ref Range Status   01/17/2024 64.6 (H) 6.0 - 20.0 mg/dL Final   03/27/2018 21 7 - 30 mg/dL Final     Creatinine   Date Value Ref Range Status   01/17/2024 4.29 (H) 0.67 - 1.17 mg/dL Final   03/27/2018 0.94 0.66 - 1.25 mg/dL Final     GFR Estimate   Date Value Ref Range Status   01/17/2024 15 (L) >60 mL/min/1.73m2 Final   03/27/2018 84 >60 mL/min/1.7m2 Final     Comment:     Non  GFR Calc     Calcium   Date Value Ref Range Status   01/17/2024 8.5 (L) 8.6 - 10.0 mg/dL Final   03/27/2018 8.4 (L) 8.5 - 10.1 mg/dL Final     Liver Function Studies -   Recent Labs   Lab Test 01/17/24  1229   PROTTOTAL 6.5   ALBUMIN 3.6   BILITOTAL 0.4   ALKPHOS 61   AST 18   ALT 8        Imaging:    10/2015 US RUE   1. Nonocclusive clot adherent to the right internal jugular vein, age  indeterminate.     10/2023 US RUE   IMPRESSION: New occlusive deep vein thrombus in the right internal  jugular vein with surrounding collaterals.    11/2023 US RUE   Central veins: Very small amount of chronic nonocclusive thrombus present within the internal jugular vein.     1/2024 US RUE   Residual nonocclusive chronic appearing deep vein thrombosis of  the lower neck right internal jugular vein.    Assessment:  History of recurrent right  internal jugular vein thrombosis    2015 - provoked after fistulogram and angioplasty  Treated with warfarin x 6 months   2023 - provoked after hospitalization, acute illness without venous thromboembolism prophylaxis   Eliquis 5mg twice daily - has completed nearly 6 months of therapy   Follow up imaging in 1/2024 with persistent thrombus   S/P Renal transplantation       Plan:  Reviewed preliminary results of right upper extremity US that show a very small amount of nonocclusive chronic appearing internal jugular thrombus.   Plan to finish out this last month of anticoagulation and then discontinue. He will hold onto additional tablets for future use.   Mr. Torrez remains a candidate for VTE prophylaxis in high risk situations (surgeries, hospitalizations, trauma, prolonged travel, and indwelling catheters). Should he have another transplant, he will also need extended posttransplant prophylaxis. He understands that he should reach out to our team to discuss anticoagulation should any of these situations arise.     Patient instructed to contact the clinic should they require any surgical procedures for perioperative planning.     Return to clinic as needed      Bailey Montoya, MPH, PA-C  [facility]    20 minutes spent by me on the date of the encounter doing chart review, review of outside records, review of test results, interpretation of tests, patient visit, and documentation

## 2024-03-21 ENCOUNTER — ANCILLARY PROCEDURE (OUTPATIENT)
Dept: ULTRASOUND IMAGING | Facility: CLINIC | Age: 59
End: 2024-03-21
Attending: PHYSICIAN ASSISTANT
Payer: MEDICARE

## 2024-03-21 ENCOUNTER — OFFICE VISIT (OUTPATIENT)
Dept: CARDIOLOGY | Facility: CLINIC | Age: 59
End: 2024-03-21
Attending: NURSE PRACTITIONER
Payer: MEDICARE

## 2024-03-21 ENCOUNTER — LAB (OUTPATIENT)
Dept: LAB | Facility: CLINIC | Age: 59
End: 2024-03-21
Attending: NURSE PRACTITIONER
Payer: MEDICARE

## 2024-03-21 ENCOUNTER — PRE VISIT (OUTPATIENT)
Dept: CARDIOLOGY | Facility: CLINIC | Age: 59
End: 2024-03-21

## 2024-03-21 ENCOUNTER — ANCILLARY PROCEDURE (OUTPATIENT)
Dept: ULTRASOUND IMAGING | Facility: CLINIC | Age: 59
End: 2024-03-21
Attending: NURSE PRACTITIONER
Payer: MEDICARE

## 2024-03-21 ENCOUNTER — OFFICE VISIT (OUTPATIENT)
Dept: HEMATOLOGY | Facility: CLINIC | Age: 59
End: 2024-03-21
Attending: NURSE PRACTITIONER
Payer: MEDICARE

## 2024-03-21 VITALS
WEIGHT: 133.7 LBS | BODY MASS INDEX: 22.25 KG/M2 | SYSTOLIC BLOOD PRESSURE: 160 MMHG | DIASTOLIC BLOOD PRESSURE: 77 MMHG | OXYGEN SATURATION: 100 % | HEART RATE: 60 BPM

## 2024-03-21 VITALS
WEIGHT: 133.6 LBS | BODY MASS INDEX: 22.26 KG/M2 | HEIGHT: 65 IN | DIASTOLIC BLOOD PRESSURE: 72 MMHG | OXYGEN SATURATION: 100 % | HEART RATE: 59 BPM | SYSTOLIC BLOOD PRESSURE: 152 MMHG

## 2024-03-21 DIAGNOSIS — D63.1 ANEMIA DUE TO STAGE 5 CHRONIC KIDNEY DISEASE, NOT ON CHRONIC DIALYSIS (H): ICD-10-CM

## 2024-03-21 DIAGNOSIS — Z94.0 KIDNEY REPLACED BY TRANSPLANT: ICD-10-CM

## 2024-03-21 DIAGNOSIS — Z71.89 ENCOUNTER FOR ANTICOAGULATION DISCUSSION AND COUNSELING: ICD-10-CM

## 2024-03-21 DIAGNOSIS — I15.1 HTN, KIDNEY TRANSPLANT RELATED: ICD-10-CM

## 2024-03-21 DIAGNOSIS — I10 ESSENTIAL HYPERTENSION: ICD-10-CM

## 2024-03-21 DIAGNOSIS — Z11.59 ENCOUNTER FOR SCREENING FOR OTHER VIRAL DISEASES: ICD-10-CM

## 2024-03-21 DIAGNOSIS — D84.9 IMMUNOSUPPRESSED STATUS (H): ICD-10-CM

## 2024-03-21 DIAGNOSIS — Z01.818 PRE-TRANSPLANT EVALUATION FOR KIDNEY TRANSPLANT: ICD-10-CM

## 2024-03-21 DIAGNOSIS — I82.621 ACUTE DEEP VEIN THROMBOSIS (DVT) OF RIGHT UPPER EXTREMITY, UNSPECIFIED VEIN (H): ICD-10-CM

## 2024-03-21 DIAGNOSIS — Z01.810 PRE-OPERATIVE CARDIOVASCULAR EXAMINATION: Primary | ICD-10-CM

## 2024-03-21 DIAGNOSIS — Z76.82 ORGAN TRANSPLANT CANDIDATE: ICD-10-CM

## 2024-03-21 DIAGNOSIS — N18.5 ANEMIA DUE TO STAGE 5 CHRONIC KIDNEY DISEASE, NOT ON CHRONIC DIALYSIS (H): ICD-10-CM

## 2024-03-21 DIAGNOSIS — Z94.0 HTN, KIDNEY TRANSPLANT RELATED: ICD-10-CM

## 2024-03-21 DIAGNOSIS — E78.5 DYSLIPIDEMIA: ICD-10-CM

## 2024-03-21 DIAGNOSIS — N18.4 CKD (CHRONIC KIDNEY DISEASE), STAGE IV (H): ICD-10-CM

## 2024-03-21 DIAGNOSIS — I82.C21 CHRONIC THROMBOSIS OF RIGHT INTERNAL JUGULAR VEIN (H): Primary | ICD-10-CM

## 2024-03-21 DIAGNOSIS — I82.621 ACUTE DEEP VEIN THROMBOSIS (DVT) OF RIGHT UPPER EXTREMITY, UNSPECIFIED VEIN (H): Primary | ICD-10-CM

## 2024-03-21 LAB
A1 AB TITR SERPL: 32 {TITER}
A1 AB TITR SERPL: 32 {TITER}
A2 AB TITR SERPL: 8 {TITER}
A2 AB TITR SERPL: 8 {TITER}
ANTIBODY SCREEN: NEGATIVE
ANTIBODY TITER IGM SCREEN: NEGATIVE
BK VIRUS SPECIMEN TYPE: NORMAL
BKV DNA # SPEC NAA+PROBE: NOT DETECTED IU/ML
SPECIMEN EXPIRATION DATE: NORMAL
SPECIMEN EXPIRATION DATE: NORMAL
TACROLIMUS BLD-MCNC: 6.1 UG/L (ref 5–15)
TME LAST DOSE: NORMAL H
TME LAST DOSE: NORMAL H

## 2024-03-21 PROCEDURE — 87799 DETECT AGENT NOS DNA QUANT: CPT | Performed by: NURSE PRACTITIONER

## 2024-03-21 PROCEDURE — G0463 HOSPITAL OUTPT CLINIC VISIT: HCPCS | Mod: 27 | Performed by: PHYSICIAN ASSISTANT

## 2024-03-21 PROCEDURE — 93978 VASCULAR STUDY: CPT | Performed by: RADIOLOGY

## 2024-03-21 PROCEDURE — G0463 HOSPITAL OUTPT CLINIC VISIT: HCPCS | Performed by: INTERNAL MEDICINE

## 2024-03-21 PROCEDURE — 99000 SPECIMEN HANDLING OFFICE-LAB: CPT | Performed by: PATHOLOGY

## 2024-03-21 PROCEDURE — 86850 RBC ANTIBODY SCREEN: CPT | Performed by: NURSE PRACTITIONER

## 2024-03-21 PROCEDURE — 93971 EXTREMITY STUDY: CPT | Mod: RT | Performed by: STUDENT IN AN ORGANIZED HEALTH CARE EDUCATION/TRAINING PROGRAM

## 2024-03-21 PROCEDURE — 36415 COLL VENOUS BLD VENIPUNCTURE: CPT | Performed by: PATHOLOGY

## 2024-03-21 PROCEDURE — 99204 OFFICE O/P NEW MOD 45 MIN: CPT | Performed by: INTERNAL MEDICINE

## 2024-03-21 PROCEDURE — 86886 COOMBS TEST INDIRECT TITER: CPT | Performed by: NURSE PRACTITIONER

## 2024-03-21 PROCEDURE — 99203 OFFICE O/P NEW LOW 30 MIN: CPT | Performed by: PHYSICIAN ASSISTANT

## 2024-03-21 PROCEDURE — 80197 ASSAY OF TACROLIMUS: CPT | Performed by: NURSE PRACTITIONER

## 2024-03-21 ASSESSMENT — PAIN SCALES - GENERAL: PAINLEVEL: NO PAIN (0)

## 2024-03-21 NOTE — NURSING NOTE
Chief Complaint   Patient presents with    New Patient     new - kidney transplant eval       Vitals were taken, medications reviewed.    Claudia Blanchard, EMT   7:55 AM

## 2024-03-21 NOTE — PATIENT INSTRUCTIONS
Complete a Dobutamine Stress Echocardiogram (ultrasound of heart)    Orders will be faxed to [facility] in [place] per your request.    As soon as results are compiled, returned to us, added to your record and reviewed by the doctor,  you will be notified.

## 2024-03-21 NOTE — LETTER
3/21/2024      RE: Haider Torrez  [address] [facility] [id number]       Dear Colleague,    Thank you for the opportunity to participate in the care of your patient, Haider Torrez, at the [facility] [place] [facility]. Please see a copy of my visit note below.    I am delighted to see Haider Torrez in consultation.The primary encounter diagnosis was Pre-operative cardiovascular examination. Diagnoses of CKD (chronic kidney disease), stage IV (H), Kidney replaced by transplant, Organ transplant candidate, Encounter for screening for other viral diseases, Immunosuppressed status (H24), Anemia due to stage 5 chronic kidney disease, not on chronic dialysis (H), HTN, kidney transplant related, and Dyslipidemia were also pertinent to this visit.   As you know, the patient is a 58 year old  male. He   has a past medical history of Anemia of chronic kidney failure, Carpal tunnel syndrome, Clinical diagnosis of COVID-19 (09/22/2022), Dyslipidemia, History of respiratory failure, Hyperlipidemia, Hypertension, Hypomagnesemia (2015), IgA nephropathy, Insomnia, Kidney disease, chronic, end stage on dialysis (H), Kidney transplant failure, Myoclonus, Obstructive sleep apnea, Papillary thyroid carcinoma (H), PRES (posterior reversible encephalopathy syndrome), Restless leg syndrome, Secondary hyperparathyroidism (H24), Seizure disorder (H), and Urinoma of kidney transplant..    On this visit, the patient states that he has modest exercise tolerance and is being worked up for his third kidney transplant.  The patient denies chest pressure/discomfort, dyspnea, palpitations, near-syncope, syncope, orthopnea, paroxysmal nocturnal dyspnea, and lower extermity edema.    The patient's cardiovascular risk factors include hypertension and high cholesterol.    The following portions of the patient's history were reviewed and updated as appropriate:  allergies, current medications, past family history, past medical history, past social history, past surgical history, and the problem list.    PMH: The patient's past medical history includes:    Past Medical History:   Diagnosis Date    Anemia of chronic kidney failure     Carpal tunnel syndrome     Clinical diagnosis of COVID-19 2022    Dyslipidemia     History of respiratory failure     Hyperlipidemia     Hypertension     Hypomagnesemia     IgA nephropathy     Insomnia     Kidney disease, chronic, end stage on dialysis (H)     Kidney transplant failure     Myoclonus     Obstructive sleep apnea     Papillary thyroid carcinoma (H)     PRES (posterior reversible encephalopathy syndrome)      after thyroidectomy    Restless leg syndrome     Secondary hyperparathyroidism (H24)     Seizure disorder (H)     Post surgery. Keppra now discontinued    Urinoma of kidney transplant       Past Surgical History:   Procedure Laterality Date    BENCH KIDNEY  10/13/2015    Procedure: BENCH KIDNEY;  Surgeon: Seth Gibbons MD;  Location: [facility] OR    COLONOSCOPY N/A 10/05/2023    Procedure: COLONOSCOPY, WITH POLYPECTOMY AND BIOPSY;  Surgeon: Russ Dodge MD;  Location: [facility] GI    CREATE FISTULA ARTERIOVENOUS UPPER EXTREMITY  2011    CYSTOSCOPY, REMOVE STENT(S), COMBINED N/A 2015    Procedure: COMBINED CYSTOSCOPY, REMOVE STENT(S);  Surgeon: Seth Gibbons MD;  Location: [facility] OR    CYSTOSCOPY, REMOVE STENT(S), COMBINED Bilateral 2015    Procedure: COMBINED CYSTOSCOPY, REMOVE STENT(S);  Surgeon: Seth Gibbons MD;  Location: [facility] OR    ELBOW SURGERY Right 1978    hemithyroidectomy Left 2011    HERNIA REPAIR Right 2009    PICC SINGLE LUMEN PLACEMENT Left 10/14/2023    left basilic 4fr sl power picc 44 cm    THYROIDECTOMY  2011    TRANSPLANT KIDNEY RECIPIENT  DONOR  2004    TRANSPLANT KIDNEY RECIPIENT  DONOR N/A 10/13/2015    Procedure:  TRANSPLANT KIDNEY RECIPIENT  DONOR;  Surgeon: Seth Gibbons MD;  Location: [facility] OR    ureteropyelostomy  2004       The patient's medications as of the current encounter are:     Current Outpatient Medications   Medication Sig Dispense Refill    amLODIPine (NORVASC) 5 MG tablet Take 1 tablet (5 mg) by mouth daily for 90 days 90 tablet 3    carvedilol (COREG) 25 MG tablet Take 1 tablet (25 mg) by mouth 2 times daily (with meals)      clonazePAM (KLONOPIN) 0.5 MG tablet Take 1 tablet (0.5 mg) by mouth At Bedtime      ELIQUIS ANTICOAGULANT 5 MG tablet       levothyroxine (SYNTHROID/LEVOTHROID) 137 MCG tablet Take 1 tablet (137 mcg) by mouth daily      mycophenolic acid (GENERIC EQUIVALENT) 180 MG EC tablet Take 2 tablets (360 mg) by mouth 2 times daily 120 tablet 11    pantoprazole (PROTONIX) 40 MG EC tablet Take 1 tablet (40 mg) by mouth daily Take 30-60 minutes before a meal. 30 tablet 0    predniSONE (DELTASONE) 5 MG tablet Take 1 tablet (5 mg) by mouth daily      rOPINIRole (REQUIP) 1 MG tablet Take 1 tablet (1 mg) by mouth At Bedtime      sodium bicarbonate 650 MG tablet Take 2 tablets (1,300 mg) by mouth 2 times daily      sulfamethoxazole-trimethoprim (BACTRIM) 400-80 MG tablet Take 1 tablet by mouth three times a week      tacrolimus (GENERIC) 0.5 MG capsule Take 1 capsule (0.5 mg) by mouth every morning Total dose = 2.5 mg in the AM and 3 mg in the PM 30 capsule 11    tacrolimus (GENERIC) 1 MG capsule Total dose = 2.5 mg in the AM and 3 mg in the  capsule 11    torsemide (DEMADEX) 20 MG tablet Take 1 tablet (20 mg) by mouth daily      vitamin D3 (CHOLECALCIFEROL) 10 MCG (400 UNIT) capsule Take 1 capsule (400 Units) by mouth daily      folic acid (FOLVITE) 1 MG tablet Take 1 tablet (1 mg) by mouth daily         Labs:     External Order Results on 2024   Component Date Value Ref Range Status    Hemoglobin (External) 2024 10.1 (L)  13.2 - 16.6 g/dL Final    Hematocrit  (External) 03/06/2024 32.2 (L)  38.3 - 48.6 % Final    RBC Count (External) 03/06/2024 3.51 (L)  4.35 - 5.65 x10(12)/L Final    MCV (External) 03/06/2024 91.7  78.2 - 97.9 fL Final    RDW (External) 03/06/2024 12.9  11.8 - 14.5 % Final    Platelet Count (External) 03/06/2024 147  135 - 317 x10(9)/L Final    WBC Count (External) 03/06/2024 3.8  3.4 - 9.6 x10(9)/L Final    Absolute Neutrophils (External) 03/06/2024 2.67  1.56 - 6.45 x10(9)/L Final    Absolute Lymphocytes (External) 03/06/2024 0.68 (L)  0.95 - 3.07 x10(9)/L Final    Absolute Monocytes (External) 03/06/2024 0.42  0.26 - 0.81 x10(9)/L Final    Absolute Eosinophils (External) 03/06/2024 0.06  0.03 - 0.48 x10(9)/L Final    Absolute Basophils (External) 03/06/2024 <0.03  0.01 - 0.08 x10(9)/L Final    Potassium (External) 03/06/2024 4.8  3.6 - 5.2 mmol/L Final    Sodium (External) 03/06/2024 143  135 - 145 mmol/L Final    Chloride (External) 03/06/2024 108 (H)  98 - 107 mmol/L Final    CO2 (External) 03/06/2024 23  22 - 29 mmol/L Final    Anion Gap (External) 03/06/2024 12  7 - 15 Final    Urea Nitrogen (External) 03/06/2024 64 (H)  8 - 24 mg/dL Final    Creatinine (External) 03/06/2024 5.03 (H)  0.74 - 1.35 mg/dL Final    GFR Estimated (External) 03/06/2024 <15 (L)  >=60 mL/min/BSA Final    Calcium (External) 03/06/2024 8.0 (L)  8.6 - 10.0 mg/dL Final    Glucose (External) 03/06/2024 102  70 - 140 mg/dL Final    Tacrolimus(FK-506) (External) 03/06/2024 5.2  5.0 - 15.0 ng/mL Final    Protein Random Urine (External) 03/06/2024 393  mg/dL Final    Creatinine Urine mg/dL (External) 03/06/2024 84  16 - 326 mg/dL Final    Protein Total Ur per Cr (External) 03/06/2024 4.68 (H)  <0.18 mg/mg Final       Allergies:    Allergies   Allergen Reactions    Hydralazine Swelling    Betadine [Povidone Iodine]     Cephalexin Hives    Clindamycin Hives    Trazodone Swelling     Swelling of face/lips       Family History:   Family History   Problem Relation Age of Onset     Diabetes Mother     Other Cancer Father         lung    Cerebrovascular Disease Brother     Diabetes Brother     No Known Problems Brother     No Known Problems Brother     No Known Problems Brother     No Known Problems Brother     No Known Problems Sister     No Known Problems Sister     No Known Problems Sister     No Known Problems Son     No Known Problems Son     No Known Problems Son     No Known Problems Daughter     No Known Problems Daughter     Heart Disease No family hx of     Kidney Disease No family hx of        Psychosocial history:  reports that he has never smoked. He has never used smokeless tobacco. He reports that he does not drink alcohol and does not use drugs.    Review of systems: negative for, palpitations, exertional chest pain or pressure, paroxysmal nocturnal dyspnea, dyspnea on exertion, orthopnea, lower extremity edema, and syncope or near-syncope    In addition,   General: No change in weight, sleep or appetite.  Normal energy.  No fever or chills  Eyes: Negative for vision changes or eye problems  ENT: No problems with ears, nose or throat.  No difficulty swallowing.  Resp: No coughing, wheezing or shortness of breath  GI: No nausea, vomiting,  heartburn, abdominal pain, diarrhea, constipation or change in bowel habits  : CKD, s/p transplant x 2  Musculoskeletal: No significant muscle or joint pains  Neurologic: No headaches, numbness, tingling, weakness, problems with balance or coordination  Psychiatric: No problems with anxiety, depression or mental health  Heme/immune/allergy: Upper extremity DVT  Endocrine: hypothyroid  Skin: No rashes,worrisome lesions or skin problems  Vascular:  No claudication, lifestyle limiting or otherwise; no ischemic rest pain; no non-healing ulcers. No weakness, No loss of sensation        Physical examination  Vitals: BP (!) 160/77 (BP Location: Left arm, Patient Position: Sitting, Cuff Size: Adult Regular)   Pulse 60   Wt 60.6 kg (133 lb 11.2 oz)    SpO2 100%   BMI 22.25 kg/m    BMI= Body mass index is 22.25 kg/m .    In general, the patient is a pleasant male in no apparent distress.    HEENT: Normiocephalic and atraumatic.  PERRLA.  EOMI.  Sclerae white, not injected.  Nares clear.  Pharynx without erythema or exudate.  Dentition intact.    Neck: No adenopathy.  No thyromegaly. Carotids +2/2 bilaterally without bruits.  No jugular venous distension.   Heart:  The PMI is in the 5th ICS in the midclavicular line. There is no heave. Regular rate and rhythm. Normal S1, S2 splits physiologically. No murmur, rub, click, or gallop.    Lungs: Clear to asculation.  No ronchi, wheezes, rales.  No dullness to percussion.   Abdomen: Soft, nontender, nondistended. No organomegaly. No AAA.  No bruits.   Extremities: No clubbing, cyanosis, or edema. Reduced right radial pulse with right arm AV shunt  Neurological: The neurological examination reveal a patient who was oriented to person, place, and time.  The remainder of the examination was nonfocal.    Cardiac tests include:    1/17/24 - ECG - NSR, normal  1/17/24 - echo - normal EF, mild aortic dilation that is stable since 2015    Assessment and Plan    Preop - dobutamine echo with limited exercise tolerance  HTN - amlodipine, carvedilol  HL - diet controlled  S/p - kidney transplant  DVT - eliquis    The patient is to return  prn. The patient understood the treatment plan as outlined above.  There were no barriers to learning.      Cecil Marie MD

## 2024-03-21 NOTE — PATIENT INSTRUCTIONS
[facility]  [facility] [address], [address], [place] [address]  Main: [phone], Fax: [phone]    Haider,   It was a pleasure seeing you today.  Thank you for allowing us to be involved in your care.  Please let us know if there is anything else we can do for you, so that we can be sure you are leaving completely satisfied with your care experience. Below is the plan that we discussed.     Continue your Eliquis until the end of the month and then discontinue. Hold onto the rest of the tablets in case we need to give them to you in the future   Please call if you have any planned surgeries, procedures, immobilization, new drains or catheter or fistula placements or when your transplant is scheduled to discuss use of blood thinners in those situations short term.   Call if any signs/symptoms of new clots.       Return to clinic in  as needed.    If you have questions or concerns, please don't hesitate to send a [employer] Message for non urgent matters or contact my nurse clinician, Jose. His number is [phone]. If they are unavailable and you have immediate concerns, please call [phone] and ask for a nurse.     Bailey Montoya, MPH, PA-C  [facility]

## 2024-03-21 NOTE — PROGRESS NOTES
I am delighted to see Haider Torrez in consultation.The primary encounter diagnosis was Pre-operative cardiovascular examination. Diagnoses of CKD (chronic kidney disease), stage IV (H), Kidney replaced by transplant, Organ transplant candidate, Encounter for screening for other viral diseases, Immunosuppressed status (H24), Anemia due to stage 5 chronic kidney disease, not on chronic dialysis (H), HTN, kidney transplant related, and Dyslipidemia were also pertinent to this visit.   As you know, the patient is a 58 year old  male. He   has a past medical history of Anemia of chronic kidney failure, Carpal tunnel syndrome, Clinical diagnosis of COVID-19 (09/22/2022), Dyslipidemia, History of respiratory failure, Hyperlipidemia, Hypertension, Hypomagnesemia (2015), IgA nephropathy, Insomnia, Kidney disease, chronic, end stage on dialysis (H), Kidney transplant failure, Myoclonus, Obstructive sleep apnea, Papillary thyroid carcinoma (H), PRES (posterior reversible encephalopathy syndrome), Restless leg syndrome, Secondary hyperparathyroidism (H24), Seizure disorder (H), and Urinoma of kidney transplant..    On this visit, the patient states that he has modest exercise tolerance and is being worked up for his third kidney transplant.  The patient denies chest pressure/discomfort, dyspnea, palpitations, near-syncope, syncope, orthopnea, paroxysmal nocturnal dyspnea, and lower extermity edema.    The patient's cardiovascular risk factors include hypertension and high cholesterol.    The following portions of the patient's history were reviewed and updated as appropriate: allergies, current medications, past family history, past medical history, past social history, past surgical history, and the problem list.    PMH: The patient's past medical history includes:    Past Medical History:   Diagnosis Date    Anemia of chronic kidney failure     Carpal tunnel syndrome     Clinical diagnosis of COVID-19 09/22/2022     Dyslipidemia     History of respiratory failure     Hyperlipidemia     Hypertension     Hypomagnesemia     IgA nephropathy     Insomnia     Kidney disease, chronic, end stage on dialysis (H)     Kidney transplant failure     Myoclonus     Obstructive sleep apnea     Papillary thyroid carcinoma (H)     PRES (posterior reversible encephalopathy syndrome)      after thyroidectomy    Restless leg syndrome     Secondary hyperparathyroidism (H24)     Seizure disorder (H)     Post surgery. Keppra now discontinued    Urinoma of kidney transplant       Past Surgical History:   Procedure Laterality Date    BENCH KIDNEY  10/13/2015    Procedure: BENCH KIDNEY;  Surgeon: Seth Gibbons MD;  Location: [facility] OR    COLONOSCOPY N/A 10/05/2023    Procedure: COLONOSCOPY, WITH POLYPECTOMY AND BIOPSY;  Surgeon: Russ Dodge MD;  Location: [facility] GI    CREATE FISTULA ARTERIOVENOUS UPPER EXTREMITY  2011    CYSTOSCOPY, REMOVE STENT(S), COMBINED N/A 2015    Procedure: COMBINED CYSTOSCOPY, REMOVE STENT(S);  Surgeon: Seth Gibbons MD;  Location: [facility] OR    CYSTOSCOPY, REMOVE STENT(S), COMBINED Bilateral 2015    Procedure: COMBINED CYSTOSCOPY, REMOVE STENT(S);  Surgeon: Seth Gibbons MD;  Location: [facility] OR    ELBOW SURGERY Right 1978    hemithyroidectomy Left 2011    HERNIA REPAIR Right 2009    PICC SINGLE LUMEN PLACEMENT Left 10/14/2023    left basilic 4fr sl power picc 44 cm    THYROIDECTOMY  2011    TRANSPLANT KIDNEY RECIPIENT  DONOR  2004    TRANSPLANT KIDNEY RECIPIENT  DONOR N/A 10/13/2015    Procedure: TRANSPLANT KIDNEY RECIPIENT  DONOR;  Surgeon: Seth Gibbons MD;  Location: [facility] OR    ureteropyelostomy  2004       The patient's medications as of the current encounter are:     Current Outpatient Medications   Medication Sig Dispense Refill    amLODIPine (NORVASC) 5 MG tablet Take 1 tablet (5 mg) by mouth daily for 90 days 90 tablet  3    carvedilol (COREG) 25 MG tablet Take 1 tablet (25 mg) by mouth 2 times daily (with meals)      clonazePAM (KLONOPIN) 0.5 MG tablet Take 1 tablet (0.5 mg) by mouth At Bedtime      ELIQUIS ANTICOAGULANT 5 MG tablet       levothyroxine (SYNTHROID/LEVOTHROID) 137 MCG tablet Take 1 tablet (137 mcg) by mouth daily      mycophenolic acid (GENERIC EQUIVALENT) 180 MG EC tablet Take 2 tablets (360 mg) by mouth 2 times daily 120 tablet 11    pantoprazole (PROTONIX) 40 MG EC tablet Take 1 tablet (40 mg) by mouth daily Take 30-60 minutes before a meal. 30 tablet 0    predniSONE (DELTASONE) 5 MG tablet Take 1 tablet (5 mg) by mouth daily      rOPINIRole (REQUIP) 1 MG tablet Take 1 tablet (1 mg) by mouth At Bedtime      sodium bicarbonate 650 MG tablet Take 2 tablets (1,300 mg) by mouth 2 times daily      sulfamethoxazole-trimethoprim (BACTRIM) 400-80 MG tablet Take 1 tablet by mouth three times a week      tacrolimus (GENERIC) 0.5 MG capsule Take 1 capsule (0.5 mg) by mouth every morning Total dose = 2.5 mg in the AM and 3 mg in the PM 30 capsule 11    tacrolimus (GENERIC) 1 MG capsule Total dose = 2.5 mg in the AM and 3 mg in the  capsule 11    torsemide (DEMADEX) 20 MG tablet Take 1 tablet (20 mg) by mouth daily      vitamin D3 (CHOLECALCIFEROL) 10 MCG (400 UNIT) capsule Take 1 capsule (400 Units) by mouth daily      folic acid (FOLVITE) 1 MG tablet Take 1 tablet (1 mg) by mouth daily         Labs:     External Order Results on 03/06/2024   Component Date Value Ref Range Status    Hemoglobin (External) 03/06/2024 10.1 (L)  13.2 - 16.6 g/dL Final    Hematocrit (External) 03/06/2024 32.2 (L)  38.3 - 48.6 % Final    RBC Count (External) 03/06/2024 3.51 (L)  4.35 - 5.65 x10(12)/L Final    MCV (External) 03/06/2024 91.7  78.2 - 97.9 fL Final    RDW (External) 03/06/2024 12.9  11.8 - 14.5 % Final    Platelet Count (External) 03/06/2024 147  135 - 317 x10(9)/L Final    WBC Count (External) 03/06/2024 3.8  3.4 - 9.6 x10(9)/L  Final    Absolute Neutrophils (External) 03/06/2024 2.67  1.56 - 6.45 x10(9)/L Final    Absolute Lymphocytes (External) 03/06/2024 0.68 (L)  0.95 - 3.07 x10(9)/L Final    Absolute Monocytes (External) 03/06/2024 0.42  0.26 - 0.81 x10(9)/L Final    Absolute Eosinophils (External) 03/06/2024 0.06  0.03 - 0.48 x10(9)/L Final    Absolute Basophils (External) 03/06/2024 <0.03  0.01 - 0.08 x10(9)/L Final    Potassium (External) 03/06/2024 4.8  3.6 - 5.2 mmol/L Final    Sodium (External) 03/06/2024 143  135 - 145 mmol/L Final    Chloride (External) 03/06/2024 108 (H)  98 - 107 mmol/L Final    CO2 (External) 03/06/2024 23  22 - 29 mmol/L Final    Anion Gap (External) 03/06/2024 12  7 - 15 Final    Urea Nitrogen (External) 03/06/2024 64 (H)  8 - 24 mg/dL Final    Creatinine (External) 03/06/2024 5.03 (H)  0.74 - 1.35 mg/dL Final    GFR Estimated (External) 03/06/2024 <15 (L)  >=60 mL/min/BSA Final    Calcium (External) 03/06/2024 8.0 (L)  8.6 - 10.0 mg/dL Final    Glucose (External) 03/06/2024 102  70 - 140 mg/dL Final    Tacrolimus(FK-506) (External) 03/06/2024 5.2  5.0 - 15.0 ng/mL Final    Protein Random Urine (External) 03/06/2024 393  mg/dL Final    Creatinine Urine mg/dL (External) 03/06/2024 84  16 - 326 mg/dL Final    Protein Total Ur per Cr (External) 03/06/2024 4.68 (H)  <0.18 mg/mg Final       Allergies:    Allergies   Allergen Reactions    Hydralazine Swelling    Betadine [Povidone Iodine]     Cephalexin Hives    Clindamycin Hives    Trazodone Swelling     Swelling of face/lips       Family History:   Family History   Problem Relation Age of Onset    Diabetes Mother     Other Cancer Father         lung    Cerebrovascular Disease Brother     Diabetes Brother     No Known Problems Brother     No Known Problems Brother     No Known Problems Brother     No Known Problems Brother     No Known Problems Sister     No Known Problems Sister     No Known Problems Sister     No Known Problems Son     No Known Problems Son      No Known Problems Son     No Known Problems Daughter     No Known Problems Daughter     Heart Disease No family hx of     Kidney Disease No family hx of        Psychosocial history:  reports that he has never smoked. He has never used smokeless tobacco. He reports that he does not drink alcohol and does not use drugs.    Review of systems: negative for, palpitations, exertional chest pain or pressure, paroxysmal nocturnal dyspnea, dyspnea on exertion, orthopnea, lower extremity edema, and syncope or near-syncope    In addition,   General: No change in weight, sleep or appetite.  Normal energy.  No fever or chills  Eyes: Negative for vision changes or eye problems  ENT: No problems with ears, nose or throat.  No difficulty swallowing.  Resp: No coughing, wheezing or shortness of breath  GI: No nausea, vomiting,  heartburn, abdominal pain, diarrhea, constipation or change in bowel habits  : CKD, s/p transplant x 2  Musculoskeletal: No significant muscle or joint pains  Neurologic: No headaches, numbness, tingling, weakness, problems with balance or coordination  Psychiatric: No problems with anxiety, depression or mental health  Heme/immune/allergy: Upper extremity DVT  Endocrine: hypothyroid  Skin: No rashes,worrisome lesions or skin problems  Vascular:  No claudication, lifestyle limiting or otherwise; no ischemic rest pain; no non-healing ulcers. No weakness, No loss of sensation        Physical examination  Vitals: BP (!) 160/77 (BP Location: Left arm, Patient Position: Sitting, Cuff Size: Adult Regular)   Pulse 60   Wt 60.6 kg (133 lb 11.2 oz)   SpO2 100%   BMI 22.25 kg/m    BMI= Body mass index is 22.25 kg/m .    In general, the patient is a pleasant male in no apparent distress.    HEENT: Normiocephalic and atraumatic.  PERRLA.  EOMI.  Sclerae white, not injected.  Nares clear.  Pharynx without erythema or exudate.  Dentition intact.    Neck: No adenopathy.  No thyromegaly. Carotids +2/2 bilaterally  without bruits.  No jugular venous distension.   Heart:  The PMI is in the 5th ICS in the midclavicular line. There is no heave. Regular rate and rhythm. Normal S1, S2 splits physiologically. No murmur, rub, click, or gallop.    Lungs: Clear to asculation.  No ronchi, wheezes, rales.  No dullness to percussion.   Abdomen: Soft, nontender, nondistended. No organomegaly. No AAA.  No bruits.   Extremities: No clubbing, cyanosis, or edema. Reduced right radial pulse with right arm AV shunt  Neurological: The neurological examination reveal a patient who was oriented to person, place, and time.  The remainder of the examination was nonfocal.    Cardiac tests include:    1/17/24 - ECG - NSR, normal  1/17/24 - echo - normal EF, mild aortic dilation that is stable since 2015    Assessment and Plan    Preop - dobutamine echo with limited exercise tolerance  HTN - amlodipine, carvedilol  HL - diet controlled  S/p - kidney transplant  DVT - eliquis    The patient is to return  prn. The patient understood the treatment plan as outlined above.  There were no barriers to learning.      Cecil Marie MD     The stress test is normal suggesting that this patient has a low risk of cardiovascular complications during surgery.

## 2024-03-27 ENCOUNTER — DOCUMENTATION ONLY (OUTPATIENT)
Dept: INFECTIOUS DISEASES | Facility: CLINIC | Age: 59
End: 2024-03-27
Payer: MEDICARE

## 2024-03-27 ENCOUNTER — TRANSFERRED RECORDS (OUTPATIENT)
Dept: HEALTH INFORMATION MANAGEMENT | Facility: CLINIC | Age: 59
End: 2024-03-27
Payer: MEDICARE

## 2024-03-29 ENCOUNTER — TELEPHONE (OUTPATIENT)
Dept: HEMATOLOGY | Facility: CLINIC | Age: 59
End: 2024-03-29
Payer: MEDICARE

## 2024-03-29 NOTE — TELEPHONE ENCOUNTER
"[id number]  Haider Isaac Shan  58 year old male  CBCD Diagnosis: VTE  CBCD Provider: Bailey Ortega PA-C     Medication refill request for Eliquis faxed over by pharmacy.     Per last note on 3/21/24, \"Plan to finish out this last month of anticoagulation and then discontinue.\"    Refills not provided per provider plan.    Lydia Rolle RN, BSN, PCCN  Nurse Clinician    [facility]  [address] [place] [address], [address], [place] [address]   Office, direct: [phone]  Main office number: [phone]  Pronouns: She, her, hers      "

## 2024-04-01 ENCOUNTER — TRANSFERRED RECORDS (OUTPATIENT)
Dept: HEALTH INFORMATION MANAGEMENT | Facility: CLINIC | Age: 59
End: 2024-04-01
Payer: MEDICARE

## 2024-04-02 ENCOUNTER — TEAM CONFERENCE (OUTPATIENT)
Dept: TRANSPLANT | Facility: CLINIC | Age: 59
End: 2024-04-02
Payer: MEDICARE

## 2024-04-02 NOTE — TELEPHONE ENCOUNTER
Image Review Meeting    ATTENDEES: Dr. Kitchen, Lucy Stone, Rocío Murillo    IMAGES REVIEWED: CT abd/pelvis  10/2023 & iliac 3/2024    DECISION: Ok will need to go intra-abdominal due to 3rd transplant. Increased right velocity on distal external iliac    INCIDENTALS: Yes: noted cardiomegaly - had recent visit with Dr. Marie 3/21/24 - will follow up

## 2024-04-03 DIAGNOSIS — Z94.0 HTN, KIDNEY TRANSPLANT RELATED: Primary | ICD-10-CM

## 2024-04-03 DIAGNOSIS — Z01.810 PRE-OPERATIVE CARDIOVASCULAR EXAMINATION: ICD-10-CM

## 2024-04-03 DIAGNOSIS — Z76.82 ORGAN TRANSPLANT CANDIDATE: ICD-10-CM

## 2024-04-03 DIAGNOSIS — I15.1 HTN, KIDNEY TRANSPLANT RELATED: Primary | ICD-10-CM

## 2024-04-10 ENCOUNTER — TELEPHONE (OUTPATIENT)
Dept: TRANSPLANT | Facility: CLINIC | Age: 59
End: 2024-04-10
Payer: MEDICARE

## 2024-04-10 NOTE — TELEPHONE ENCOUNTER
Called Haider but could only leave a voice message. Let him know I was following up on his evaluation items and have requested the recent visits to neurology and endocrinology.Wondering if he has an appointment for dental and dermatology. Need to clarify if he planned on doing the DSE at [facility] in [place] for his cardiology clearance. Also need to ask about PT.  Asked him to return my call.

## 2024-04-17 ENCOUNTER — TRANSFERRED RECORDS (OUTPATIENT)
Dept: HEALTH INFORMATION MANAGEMENT | Facility: CLINIC | Age: 59
End: 2024-04-17
Payer: MEDICARE

## 2024-05-06 ENCOUNTER — DOCUMENTATION ONLY (OUTPATIENT)
Dept: TRANSPLANT | Facility: CLINIC | Age: 59
End: 2024-05-06
Payer: MEDICARE

## 2024-05-06 ENCOUNTER — MYC MEDICAL ADVICE (OUTPATIENT)
Dept: TRANSPLANT | Facility: CLINIC | Age: 59
End: 2024-05-06

## 2024-05-16 ENCOUNTER — TELEPHONE (OUTPATIENT)
Dept: TRANSPLANT | Facility: CLINIC | Age: 59
End: 2024-05-16
Payer: MEDICARE

## 2024-05-16 DIAGNOSIS — Z94.0 KIDNEY REPLACED BY TRANSPLANT: ICD-10-CM

## 2024-05-16 RX ORDER — TACROLIMUS 1 MG/1
3 CAPSULE ORAL 2 TIMES DAILY
Qty: 180 CAPSULE | Refills: 11 | Status: SHIPPED | OUTPATIENT
Start: 2024-05-16 | End: 2024-09-27

## 2024-05-16 NOTE — TELEPHONE ENCOUNTER
ISSUE:  Tacrolimus IR level 3.3 on 5/13/24 07:21 AM, goal 4-6, dose 2.5 mg AM/3 mg PM.   Uric acid level elevated. No gout pain endorsed. Does have ankle swelling in both ankles. No c/o SOB or chest pain. No muscle spasms. Does have fatigue and poor appetite.      PLAN:   Call Patient and confirm this was an accurate 12-hour trough.   Verify Tacrolimus IR dose 2.5 mg AM/3 mg PM.   Confirm no new medications or or missed doses.   Confirm no new illness / infection / diarrhea.   If accurate trough and accurate dose, increase Tacrolimus IR dose to 3 mg BID     Is this more than a 50% increase or decrease in current IS dose: No    Repeat labs in 2 weeks.       OUTCOME:  Spoke with Patient, they confirm accurate trough level and current dose 2.5 mg AM/3 mg PM.   Patient confirmed dose change to 3 mg BID.  Patient agreed to repeat labs in 2 weeks.   Orders sent to preferred pharmacy for dose change and lab for repeat labs.   Patient and wife voiced understanding of plan. Reviewed uremic symptoms and follow up with local nephrologist as nearing need for renal replacement therapy. Haider states his next appt is in 2 weeks. Wife verbalized understanding to report uremic symptoms.     Preferred Lab:  [facility]   Phone: [phone]   Fax: [phone]

## 2024-05-16 NOTE — LETTER
PHYSICIAN ORDERS      DATE & TIME ISSUED: May 16, 2024 3:31 PM  PATIENT NAME: Haider Torrez   : 1965     [facility] MR# [if applicable]: [id number]     DIAGNOSIS/ICD-10 CODES: Kidney Transplanted (Z94.0);   Long Term Use of High Risk Med (Z79.899);     Please check the following labs in 2 weeks:  - Tacrolimus drug level (12 hour trough level)    Any questions please call: [phone]    Please fax each result same day as resulted/available to [phone].  Critical lab results page [phone]        Maurice Bullard MD

## 2024-05-20 ENCOUNTER — HOSPITAL ENCOUNTER (OUTPATIENT)
Dept: NUCLEAR MEDICINE | Facility: CLINIC | Age: 59
Discharge: HOME | End: 2024-05-20
Attending: INTERNAL MEDICINE
Payer: MEDICARE

## 2024-05-20 ENCOUNTER — HOSPITAL ENCOUNTER (OUTPATIENT)
Dept: CARDIOLOGY | Facility: CLINIC | Age: 59
Discharge: HOME | End: 2024-05-20
Attending: INTERNAL MEDICINE
Payer: MEDICARE

## 2024-05-20 DIAGNOSIS — Z01.810 PRE-OPERATIVE CARDIOVASCULAR EXAMINATION: ICD-10-CM

## 2024-05-20 DIAGNOSIS — I15.1 HTN, KIDNEY TRANSPLANT RELATED: ICD-10-CM

## 2024-05-20 DIAGNOSIS — Z76.82 ORGAN TRANSPLANT CANDIDATE: ICD-10-CM

## 2024-05-20 DIAGNOSIS — Z94.0 HTN, KIDNEY TRANSPLANT RELATED: ICD-10-CM

## 2024-05-20 LAB
CV STRESS CURRENT BP HE: NORMAL
CV STRESS CURRENT HR HE: 61
CV STRESS CURRENT HR HE: 63
CV STRESS CURRENT HR HE: 65
CV STRESS CURRENT HR HE: 69
CV STRESS CURRENT HR HE: 70
CV STRESS CURRENT HR HE: 70
CV STRESS CURRENT HR HE: 71
CV STRESS CURRENT HR HE: 71
CV STRESS CURRENT HR HE: 72
CV STRESS CURRENT HR HE: 72
CV STRESS CURRENT HR HE: 74
CV STRESS CURRENT HR HE: 75
CV STRESS DEVIATION TIME HE: NORMAL
CV STRESS ECHO PERCENT HR HE: NORMAL
CV STRESS EXERCISE STAGE HE: NORMAL
CV STRESS FINAL RESTING BP HE: NORMAL
CV STRESS FINAL RESTING HR HE: 69
CV STRESS MAX HR HE: 75
CV STRESS MAX TREADMILL GRADE HE: 0
CV STRESS MAX TREADMILL SPEED HE: 0
CV STRESS PEAK DIA BP HE: NORMAL
CV STRESS PEAK SYS BP HE: NORMAL
CV STRESS PHASE HE: NORMAL
CV STRESS PROTOCOL HE: NORMAL
CV STRESS RESTING PT POSITION HE: NORMAL
CV STRESS ST DEVIATION AMOUNT HE: NORMAL
CV STRESS ST DEVIATION ELEVATION HE: NORMAL
CV STRESS ST EVELATION AMOUNT HE: NORMAL
CV STRESS TEST TYPE HE: NORMAL
CV STRESS TOTAL STAGE TIME MIN 1 HE: NORMAL
NUC STRESS EJECTION FRACTION: 64 %
RATE PRESSURE PRODUCT: NORMAL
STRESS ECHO BASELINE DIASTOLIC HE: 73
STRESS ECHO BASELINE HR: 61
STRESS ECHO BASELINE SYSTOLIC BP: 155
STRESS ECHO CALCULATED PERCENT HR: 46 %
STRESS ECHO LAST STRESS DIASTOLIC BP: 78
STRESS ECHO LAST STRESS HR: 74
STRESS ECHO LAST STRESS SYSTOLIC BP: 150
STRESS ECHO TARGET HR: 163

## 2024-05-20 PROCEDURE — A9500 TC99M SESTAMIBI: HCPCS | Performed by: INTERNAL MEDICINE

## 2024-05-20 PROCEDURE — G1010 CDSM STANSON: HCPCS | Performed by: GENERAL ACUTE CARE HOSPITAL

## 2024-05-20 PROCEDURE — 93018 CV STRESS TEST I&R ONLY: CPT | Performed by: GENERAL ACUTE CARE HOSPITAL

## 2024-05-20 PROCEDURE — 78452 HT MUSCLE IMAGE SPECT MULT: CPT | Mod: MF

## 2024-05-20 PROCEDURE — 93017 CV STRESS TEST TRACING ONLY: CPT

## 2024-05-20 PROCEDURE — 250N000011 HC RX IP 250 OP 636: Mod: JZ | Performed by: INTERNAL MEDICINE

## 2024-05-20 PROCEDURE — 93016 CV STRESS TEST SUPVJ ONLY: CPT | Performed by: INTERNAL MEDICINE

## 2024-05-20 PROCEDURE — 343N000001 HC RX 343: Performed by: INTERNAL MEDICINE

## 2024-05-20 PROCEDURE — 78452 HT MUSCLE IMAGE SPECT MULT: CPT | Mod: 26 | Performed by: GENERAL ACUTE CARE HOSPITAL

## 2024-05-20 RX ORDER — CAFFEINE 200 MG
200 TABLET ORAL
Status: DISCONTINUED | OUTPATIENT
Start: 2024-05-20 | End: 2024-05-20 | Stop reason: HOSPADM

## 2024-05-20 RX ORDER — CAFFEINE CITRATE 20 MG/ML
60 SOLUTION INTRAVENOUS
Status: DISCONTINUED | OUTPATIENT
Start: 2024-05-20 | End: 2024-05-20 | Stop reason: HOSPADM

## 2024-05-20 RX ORDER — AMINOPHYLLINE 25 MG/ML
50 INJECTION, SOLUTION INTRAVENOUS
Status: DISCONTINUED | OUTPATIENT
Start: 2024-05-20 | End: 2024-05-20 | Stop reason: HOSPADM

## 2024-05-20 RX ORDER — REGADENOSON 0.08 MG/ML
0.4 INJECTION, SOLUTION INTRAVENOUS ONCE
Status: COMPLETED | OUTPATIENT
Start: 2024-05-20 | End: 2024-05-20

## 2024-05-20 RX ORDER — ALBUTEROL SULFATE 0.83 MG/ML
2.5 SOLUTION RESPIRATORY (INHALATION)
Status: DISCONTINUED | OUTPATIENT
Start: 2024-05-20 | End: 2024-05-20 | Stop reason: HOSPADM

## 2024-05-20 RX ADMIN — Medication 32.3 MILLICURIE: at 08:10

## 2024-05-20 RX ADMIN — Medication 8.3 MILLICURIE: at 07:22

## 2024-05-20 RX ADMIN — REGADENOSON 0.4 MG: 0.08 INJECTION, SOLUTION INTRAVENOUS at 08:08

## 2024-06-10 ENCOUNTER — TELEPHONE (OUTPATIENT)
Dept: TRANSPLANT | Facility: CLINIC | Age: 59
End: 2024-06-10
Payer: MEDICARE

## 2024-06-10 NOTE — TELEPHONE ENCOUNTER
PA Initiation    Medication: TACROLIMUS (GENERIC EQUIVALENT) 1 MG PO CAPS  Insurance Company:    Pharmacy Filling the Rx: [id number] - [place], [place] - [address] AT [place] [address]  Filling Pharmacy Phone:    Filling Pharmacy Fax:    Start Date: 6/10/2024  Patient had medicare at time of transplant medication should be billed to medicare part B.   Medicare dated listed below  Pharmacy was able to get this filled though insurance

## 2024-06-10 NOTE — TELEPHONE ENCOUNTER
Received call from `[facility]' pharmacy tech.,  PH#[phone] stated `we' need to fill out a Mycoverage form via internet to get PA Approval for Tacrolimus 1.0mg.,  (as [facility] is no longer faxing PA forms).  (I personally have not heard about this)    [id number] - [place], [place] - [address] AT [facility] [address] Phone: [phone]   Fax: [phone]

## 2024-06-19 ENCOUNTER — TELEPHONE (OUTPATIENT)
Dept: TRANSPLANT | Facility: CLINIC | Age: 59
End: 2024-06-19
Payer: MEDICARE

## 2024-06-19 NOTE — TELEPHONE ENCOUNTER
Provider Call: General  Route to LPN    Reason for call: They have updates to review from Dr Butler's office     Call back needed? Yes    Return Call Needed  Same as documented in contacts section  When to return call?: Greater than one day: Route standard priority

## 2024-06-19 NOTE — TELEPHONE ENCOUNTER
"RNCC returned call to Dr. Berger's office/nurse Mireya. Mireya was unavailable at time of call back. Suspect update on pt status to be regarding dialysis initiation on 6/7/24. RNCC will review immunosuppression medications with transplant nephrologist and communicate any changes with patient.     Addendum: RNCC spoke with MARIBEL Gomes on call back. She states that dialysis unit was reviewing patient code status on 6/18/24 and Haider wishes to be DNR. However, the question arose, \"If he has signed DNR paperwork, is he therefore ineligible for another transplant?\" Patient dialyzes on T, Th, Sa ~noon. RNCC informed this is a question for pre-transplant team and that the question would be passed along for further discussion. Pre-txp RNCC to return call to Mireya.    Haider and I briefly spoke about his wishes for DNR. He states that his heart right now is pretty healthy but if for some reason it were to stop he would wish to not be resuscitated. I explained that I was not well versed in implications this may have for re-transplant and that would need to be discussed with his pre-transplant coordinator. Haider also asked if he was done with the transplant evaluation process to which I informed him it states that his work up is incomplete. Will have Pre-transplant RNCC call him to discuss what is needed.    Meagan Richardson, RN, BSN  Solid Organ Transplant, Post Kidney and Pancreas  Transplant Care Coordinator  [phone]         "

## 2024-06-19 NOTE — TELEPHONE ENCOUNTER
Received notification of failed kidney transplant graft from Dr. Butler's office, nephrologist with [facility]:  Contact information is [phone].  Fail is indicated by dialysis initiation- HD via R AV fistula access  Date of organ failure was reported as 6/7/24  Cause of failure is reported as Chronic kidney disease  Biopsy was not performed since 6/5/2020:   - Kidney Tx Biopsy: Jun 25, 2020; Result:  severe hyaline arteriosclerosis, no evidence of acute cellular or humoral rejection, mild and patchy interstitial fibrosis with associated tubular atrophy involving less than 10% of cortical area, evolving transplant glomerulopathy. No recurrence of IgAN     # Proteinuria:              -6.6g/g 12/11/23              -A1c 5.8%. 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     Suggest we start with reducing MPA to 180 mg PO BID x 30 days, then stop. Then reduce prednisone to 5 mg every other day for 2 weeks and check cosyntropin stimulation test with plans to discontinue if adrenal response sufficient. Continue on Tacrolimus w/goal 4-6 as long as under evaluation for another transplant.     Meagan Richardson, RN, BSN  Solid Organ Transplant, Post Kidney and Pancreas  Transplant Care Coordinator  [phone]     Addendum:  Message  Received: Yesterday  Maurice Bullard MD [place], Meagan Cinthya, RN  Caller: Unspecified (Yesterday,  1:25 PM)    If patient  did start dialysis I agree with this plan: reduce MPA to 180 mg PO BID x 30 days, then stop. 30 days later reduce prednisone to 5 mg every other day for 2 weeks and check cosyntropin stimulation test with plans to discontinue if adrenal response sufficient. Continue on Tacrolimus w/goal 4-6 as long as under evaluation for another transplant.     RNCC spoke with Haider. He confirmed his current MPA dose was 2 pills both morning and evening or total dose of 360 mg BID. He verbalized understanding to reduce this dose to 180 mg twice daily for the next 30 days then stop ~7/20/24. At this time he was asked to connect with transplant team regarding prednisone reduction.

## 2024-06-20 ENCOUNTER — TELEPHONE (OUTPATIENT)
Dept: TRANSPLANT | Facility: CLINIC | Age: 59
End: 2024-06-20
Payer: MEDICARE

## 2024-06-20 NOTE — TELEPHONE ENCOUNTER
Left VM informing pt that if he wants to be active on the kidney wait list he has to be Full code and cannot be DNR. Also wanted to see where he was with dental, dermatology, and PT.  Gave office line to call back.

## 2024-06-20 NOTE — TELEPHONE ENCOUNTER
Spoke with MARIBEL Gomes with Dr. Berger's office. Confirmed with her that in order to be listed active on the transplant wait list, patient would need to be full code. He can be DNR while he is working on the evaluation items but as soon as he completes the items and approved for active status, he needs to change to full code in order to be a candidate for transplant.    Explained to Mireya the difference between inactive and active status and what that means for the transplant process.    RNCC will call the patient to explain the next steps for evaluation.

## 2024-06-21 NOTE — TELEPHONE ENCOUNTER
Rosana is returning Coordinator's call, and wanting to know what do they need to do to change DNR/DNII status to Full Code who do they change it with.

## 2024-06-25 ENCOUNTER — DOCUMENTATION ONLY (OUTPATIENT)
Dept: TRANSPLANT | Facility: CLINIC | Age: 59
End: 2024-06-25
Payer: MEDICARE

## 2024-06-25 NOTE — PROGRESS NOTES
Received notification of failed kidney graft from Geovanna Felipe RN.  Fail is indicated by resumption of dialysis.  Date of organ failure was reported as 6/7/2024.  Cause of failure is reported as chronic rejection.  TIS verfication is complete.

## 2024-06-28 NOTE — CONFIDENTIAL NOTE
Spoke with pt - informed him that he is on the list just not active. If he wants to be active on the list, he needs to be full code. Pt stated he has figured it out his code status with his provider.     Pt has dental appointment on 7/19, states he has seen a dermatologist (RNCC will look for records), pt stated he has been having issues with his feet and has been unable to walk, he has to crawl on his knees to get around. Pt has talked with his provider and is looking to get into an appointment soon.    Asked pt to keep RNCC updated with his progress.

## 2024-07-03 ENCOUNTER — TELEPHONE (OUTPATIENT)
Dept: TRANSPLANT | Facility: CLINIC | Age: 59
End: 2024-07-03
Payer: MEDICARE

## 2024-07-03 NOTE — TELEPHONE ENCOUNTER
Call placed to Haider Torrez. He denies any fevers, N/V/D. Will send lab order to repeat CMV this month, Haider verbalizes understanding. We reviewed IS taper plan, he has no questions about taper at this time.     LPN task:  Please send lab order to recheck CMV PCR later this month.

## 2024-07-03 NOTE — LETTER
PHYSICIAN ORDERS      DATE & TIME ISSUED: July 3, 2024 12:22 PM  PATIENT NAME: Haider Torrez   : 1965     [facility] MR# [if applicable]: [id number]     DIAGNOSIS:  Kidney transplant   ICD-10 CODE: Z94.0      Please repeat the following level later this month  CMV PCR QT    Any questions please call: [phone] option 5    Please fax each result same day as resulted/available    Critical lab results page [phone]    Please fax results to [phone].

## 2024-07-03 NOTE — TELEPHONE ENCOUNTER
----- Message from Judi FUNG sent at 7/1/2024  5:23 PM CDT -----    ----- Message -----  From: Janel Chandler MD  Sent: 7/1/2024   5:04 PM CDT  To: Judi Lucas RN    Would just make sure he is asymptomatic and repeat in 2 weeks  ----- Message -----  From: Judi Deleon RN  Sent: 7/1/2024  11:34 AM CDT  To: MD Dr. Edmar Canales, this CMV is ordered by primary nephrologist- he just started HD.  He is off CNI and will be off MPA later this month. Pred taper to follow.    Anything else we need to follow? I just wanted our team to review.

## 2024-07-07 ENCOUNTER — HEALTH MAINTENANCE LETTER (OUTPATIENT)
Age: 59
End: 2024-07-07

## 2024-09-05 ENCOUNTER — VIRTUAL VISIT (OUTPATIENT)
Dept: HEMATOLOGY | Facility: CLINIC | Age: 59
End: 2024-09-05
Attending: PHYSICIAN ASSISTANT
Payer: MEDICARE

## 2024-09-05 DIAGNOSIS — N18.6 ESRD (END STAGE RENAL DISEASE) ON DIALYSIS (H): ICD-10-CM

## 2024-09-05 DIAGNOSIS — Z86.718 PERSONAL HISTORY OF DVT (DEEP VEIN THROMBOSIS): Primary | ICD-10-CM

## 2024-09-05 DIAGNOSIS — Z71.89 ENCOUNTER FOR ANTICOAGULATION DISCUSSION AND COUNSELING: ICD-10-CM

## 2024-09-05 DIAGNOSIS — Z99.2 ESRD (END STAGE RENAL DISEASE) ON DIALYSIS (H): ICD-10-CM

## 2024-09-05 PROCEDURE — 99214 OFFICE O/P EST MOD 30 MIN: CPT | Mod: 95 | Performed by: PHYSICIAN ASSISTANT

## 2024-09-05 NOTE — PATIENT INSTRUCTIONS
[facility]  [facility]  [facility] [address], [address], [place] [address]  Main: [phone], Fax: [phone]    Haider,   It was a pleasure seeing you today.  Thank you for allowing us to be involved in your care.  Please let us know if there is anything else we can do for you, so that we can be sure you are leaving completely satisfied with your care experience. Below is the plan that we discussed.     After you procedure on 9/9, start taking Eliquis 2.5mg twice daily within about 12 hours after surgery. Take until the bottle runs out which should be about 15 days.   Call if you have any new pain, swelling, redness, tenderness concerning for new clot or any bleeding issues.   Call if you have any other procedures, surgeries scheduled.      Return to clinic in  as needed.    If you have questions or concerns, please don't hesitate to send a Cloud Content Message for non urgent matters or contact my nurse clinician, Idania. Her number is [phone]. If they are unavailable and you have immediate concerns, please call [phone] and ask for a nurse.     Bailey Montoya, MPH, PA-C  [facility]

## 2024-09-18 ENCOUNTER — DOCUMENTATION ONLY (OUTPATIENT)
Dept: ANTICOAGULATION | Facility: CLINIC | Age: 59
End: 2024-09-18
Payer: MEDICARE

## 2024-09-18 NOTE — PROGRESS NOTES
Anticoagulant Therapeutic Duplication    Duplicate orders identified: same medication but different dose, form, frequency or route    The duplicate anticoagulant order(s) has been discontinued    Active anticoagulant: apixaban (Eliquis)    Plan made per [facility] anticoagulation protocol.    Haile Coyle RN  9/18/2024

## 2024-09-27 ENCOUNTER — TELEPHONE (OUTPATIENT)
Dept: TRANSPLANT | Facility: CLINIC | Age: 59
End: 2024-09-27
Payer: MEDICARE

## 2024-09-27 DIAGNOSIS — Z94.0 KIDNEY REPLACED BY TRANSPLANT: Primary | ICD-10-CM

## 2024-09-27 RX ORDER — TACROLIMUS 1 MG/1
2 CAPSULE ORAL 2 TIMES DAILY
Qty: 180 CAPSULE | Refills: 11 | Status: SHIPPED | OUTPATIENT
Start: 2024-09-27

## 2024-09-27 NOTE — LETTER
PHYSICIAN ORDERS      DATE & TIME ISSUED: 2024 1:46 PM  PATIENT NAME: Haider Torrez   : 1965     [facility] MR# [if applicable]: [id number]     DIAGNOSIS/ICD-10 CODES: Kidney Transplanted (Z94.0);   Long Term Use of High Risk Med (Z79.899)    Please check the following labs in 1-2 weeks:  - Tacrolimus drug level (12 hour trough level)    Any questions please call: [phone]    Please fax each result same day as resulted/available to [phone].  Critical lab results page [phone]      .

## 2024-09-27 NOTE — TELEPHONE ENCOUNTER
ISSUE:   Tacrolimus IR level 9.1 on 9/25/24 09:50 AM, goal 4-6, dose 3 mg BID. Last dose date/time not recorded. Did he take tacrolimus same morning/before labs? Previous level at goal.    PLAN:   Call Patient and confirm this was an accurate 12-hour trough.   Verify Tacrolimus IR dose 3 mg BID.   Confirm no new medications or or missed doses.   Confirm no new illness / infection / diarrhea.   If accurate trough and accurate dose, decrease Tacrolimus IR dose to 2 mg BID     Is this more than a 50% increase or decrease in current IS dose: No  If YES, justification: N/A    Repeat labs in 1-2 weeks.  *If > 50% change in immunosuppression dose, repeat labs in 1 week.     OUTCOME:   Spoke with Patient, they confirm NOT accurate trough level. Took medication that morning not knowing his tacrolimus level was going to be collected. But states he is already taking 2 mg PO BID.   Patient confirmed to continue current dose 2 mg BID.  Patient agreed to repeat labs in 1-2 weeks.   Orders sent to preferred pharmacy for dose change and lab for repeat labs.   Patient voiced understanding of plan.      Meagan Richardson, RN, BSN, CCTN  Solid Organ Transplant, Post Kidney and Pancreas  Transplant Care Coordinator  [phone]

## 2024-11-01 ENCOUNTER — TELEPHONE (OUTPATIENT)
Dept: TRANSPLANT | Facility: CLINIC | Age: 59
End: 2024-11-01
Payer: MEDICARE

## 2024-11-01 DIAGNOSIS — I15.1 HTN, KIDNEY TRANSPLANT RELATED: Primary | ICD-10-CM

## 2024-11-01 DIAGNOSIS — Z94.0 HTN, KIDNEY TRANSPLANT RELATED: Primary | ICD-10-CM

## 2024-11-12 NOTE — TELEPHONE ENCOUNTER
Left VM stating pt will need an updated frailty screen, updated PRA, plan for switching anti-coags, and dental clearance note.  RNCC will discuss with team if there are any additional items needed.

## 2024-11-15 ENCOUNTER — DOCUMENTATION ONLY (OUTPATIENT)
Dept: TRANSPLANT | Facility: CLINIC | Age: 59
End: 2024-11-15
Payer: MEDICARE

## 2024-11-15 DIAGNOSIS — I15.1 HTN, KIDNEY TRANSPLANT RELATED: Primary | ICD-10-CM

## 2024-11-15 DIAGNOSIS — Z94.0 HTN, KIDNEY TRANSPLANT RELATED: Primary | ICD-10-CM

## 2024-11-18 ENCOUNTER — TELEPHONE (OUTPATIENT)
Dept: TRANSPLANT | Facility: CLINIC | Age: 59
End: 2024-11-18
Payer: MEDICARE

## 2024-11-18 DIAGNOSIS — N18.6 END STAGE RENAL DISEASE (H): ICD-10-CM

## 2024-11-18 DIAGNOSIS — I15.1 HTN, KIDNEY TRANSPLANT RELATED: Primary | ICD-10-CM

## 2024-11-18 DIAGNOSIS — Z94.0 HTN, KIDNEY TRANSPLANT RELATED: Primary | ICD-10-CM

## 2024-11-18 DIAGNOSIS — Z76.82 ORGAN TRANSPLANT CANDIDATE: ICD-10-CM

## 2024-11-18 NOTE — LETTER
[facility]  [address] [place] [id number]            November 18, 2024      Dental Clearance Form       Patient Name _______________________________________________     Date of Birth _______________________________________________     Dentist Name _______________________________________________     Dental Office _______________________________________________     Patient's Last Dental Exam _____________________________________       I confirm that this patient has had a dental exam in the last 2 years and this patient does not have a medical condition that requires dental treatment.     Today's Date _______________________________________________     Dentist Signature _______________________________________________

## 2024-11-18 NOTE — TELEPHONE ENCOUNTER
Spoke with patient's wife today to go through transplant evaluation items needed. She reports patient's eloquis was recently discontinued, and he is no longer on any anticoagulation. I did confirm that patient needs a repeat frailty screening appointment and updated PRA level and will put orders in. He also needs dental clearance and will fax dental letter to [facility] to [phone]. [facility]. Will request derm records as appropriate.     Orders placed, patient's wife will call once appointments are completed.

## 2024-11-25 ENCOUNTER — LAB (OUTPATIENT)
Dept: LAB | Facility: CLINIC | Age: 59
End: 2024-11-25
Attending: NURSE PRACTITIONER
Payer: MEDICARE

## 2024-11-25 ENCOUNTER — ALLIED HEALTH/NURSE VISIT (OUTPATIENT)
Dept: TRANSPLANT | Facility: CLINIC | Age: 59
End: 2024-11-25
Attending: NURSE PRACTITIONER
Payer: MEDICARE

## 2024-11-25 VITALS — BODY MASS INDEX: 23.59 KG/M2 | WEIGHT: 141.6 LBS | HEIGHT: 65 IN

## 2024-11-25 DIAGNOSIS — I15.1 HTN, KIDNEY TRANSPLANT RELATED: ICD-10-CM

## 2024-11-25 DIAGNOSIS — N18.6 END STAGE RENAL DISEASE (H): ICD-10-CM

## 2024-11-25 DIAGNOSIS — Z76.82 ORGAN TRANSPLANT CANDIDATE: ICD-10-CM

## 2024-11-25 DIAGNOSIS — I15.1 HTN, KIDNEY TRANSPLANT RELATED: Primary | ICD-10-CM

## 2024-11-25 DIAGNOSIS — Z94.0 HTN, KIDNEY TRANSPLANT RELATED: ICD-10-CM

## 2024-11-25 DIAGNOSIS — Z94.0 HTN, KIDNEY TRANSPLANT RELATED: Primary | ICD-10-CM

## 2024-11-25 LAB
BK VIRUS SPECIMEN TYPE: NORMAL
BKV DNA # SPEC NAA+PROBE: NOT DETECTED IU/ML

## 2024-11-25 PROCEDURE — 87799 DETECT AGENT NOS DNA QUANT: CPT | Performed by: NURSE PRACTITIONER

## 2024-11-25 PROCEDURE — 36415 COLL VENOUS BLD VENIPUNCTURE: CPT | Performed by: PATHOLOGY

## 2024-11-25 NOTE — NURSING NOTE
Frailty Assessment Note: Not frail    Overall Score 2/5    Weight:   Wt Readings from Last 3 Encounters:   11/25/24 64.2 kg (141 lb 9.6 oz)   03/21/24 60.6 kg (133 lb 9.6 oz)   03/21/24 60.6 kg (133 lb 11.2 oz)     Height: 165.1 cm    Weight lost over 10lbs in last year: Yes    Reported Exhaustion/Energy Levels: Mild    Slowness: 3.89 seconds / 15 feet walking (average of 2 attempts)    Low Activity Level Score: 0      Strength: 26 (average of 3 trials on dominant hand)      JEFERSON NEWBY RN on 11/25/2024 at 9:56 AM

## 2024-11-27 ENCOUNTER — TRANSFERRED RECORDS (OUTPATIENT)
Dept: HEALTH INFORMATION MANAGEMENT | Facility: CLINIC | Age: 59
End: 2024-11-27
Payer: MEDICARE

## 2024-12-21 LAB
PROTOCOL CUTOFF: NORMAL
SA 1  COMMENTS: NORMAL
SA 1 CELL: NORMAL
SA 1 TEST METHOD: NORMAL
SA 2 CELL: NORMAL
SA 2 COMMENTS: NORMAL
SA 2 TEST METHOD: NORMAL
SA1 HI RISK ABY: NORMAL
SA1 MOD RISK ABY: NORMAL
SA2 HI RISK ABY: NORMAL
SA2 MOD RISK ABY: NORMAL
UNACCEPTABLE ANTIGENS: NORMAL
UNOS CPRA: 91

## 2025-01-08 ENCOUNTER — TELEPHONE (OUTPATIENT)
Dept: TRANSPLANT | Facility: CLINIC | Age: 60
End: 2025-01-08
Payer: MEDICARE

## 2025-01-08 DIAGNOSIS — I15.1 HTN, KIDNEY TRANSPLANT RELATED: Primary | ICD-10-CM

## 2025-01-08 DIAGNOSIS — Z94.0 HTN, KIDNEY TRANSPLANT RELATED: Primary | ICD-10-CM

## 2025-01-08 NOTE — TELEPHONE ENCOUNTER
Patient Call: General  Route to LPN    Reason for call: Rosana patient's wife, looking for a status update, what else is needing to be done to become Active on the list    Call back needed? Yes    Return Call Needed  Same as documented in contacts section  When to return call?: Same day: Route High Priority

## 2025-01-09 NOTE — TELEPHONE ENCOUNTER
Left VM stating that patient is on the list for transplant committee review for active status. There have been delays with the holidays and lack of committee reviews. Will give them an update next week.

## 2025-01-15 ENCOUNTER — COMMITTEE REVIEW (OUTPATIENT)
Dept: TRANSPLANT | Facility: CLINIC | Age: 60
End: 2025-01-15

## 2025-01-15 DIAGNOSIS — I15.1 HTN, KIDNEY TRANSPLANT RELATED: Primary | ICD-10-CM

## 2025-01-15 DIAGNOSIS — Z94.0 HTN, KIDNEY TRANSPLANT RELATED: Primary | ICD-10-CM

## 2025-01-15 NOTE — COMMITTEE REVIEW
Kidney/Pancreas Committee Review Note     Evaluation Date: 1/17/2024  Committee Review Date: 1/15/2025    Organ being evaluated for: Kidney    Transplant Phase: Waitlist  Transplant Status: Inactive    Transplant Coordinator: Geovanna Rivera  Transplant Surgeon:   Dillan Nieto    Referring Physician: Silvio Butler    Primary Diagnosis: IgA Nephropathy  Secondary Diagnosis:     Committee Review Members:  Nephrology Janel Winn MD, Nicola Lyon MD   Nutrition Rox Manuel, KULDEEP   Pharmacist Lindsay Cast, [facility]    - Clinical Diamante Gurrola, KARSON, Serena Figueroa, [facility]   Transplant KENDALL ROE, RN, Zina Trevino, RN, Lucy Stone, RN, MARCUS Singh CNP, Maura Andrews, RN, Celina Sue, RN, Sandy Caputo, RN, Bee Street, RN, Albina Cueto, RN   Transplant Surgery Juanita Kitchen MD, MD       Transplant Eligibility: Irreversible chronic kidney disease treated w/dialysis or expected need for dialysis    Committee Review Decision: Make Active    Relative Contraindications:     Absolute Contraindications:      Committee Chair Juanita Kitchen MD verbally attested to the committee's decision.    Committee Discussion Details: Reviewed patient's transplant work up to date.  Patient was evaluated for repeat kidney transplant on 1/17//2024.  Patient was asked to complete the following to determine transplant candidacy:  Cardiology  Labs  Frail  Imaging  Endocrine  Neurology  Anti-coagulation plan  Health maintenance items    Patient saw Dr. Marie 3/21/24 and attempted a dobutamine stress test but testing was non-diagnostic and underwent a NM lexiscan on 5/20/2024 which showed no ischemia, LVEF 64% and was determined to be a low risk for a cardiac event.  Repeated A2B titers but was too high to be a candidate for the A2B program.  BK virus was not detected on follow up labs as well.  Patient work with physical therapy to improve physical  function, returned on 11/25/24 and tested not frail 2/5.  Reviewed patient's updated imaging with Dr. Kitchen on 4/2/24 determined vascular was acceptable for 3rd kidney transplant but would need to go intra-abdominally.  Patient has a remote seizure history and was asked for neurology clearance prior to activation. Pt has not had any seizures after the initial episode.  He follows with Dr. Jens Magallanes and is seen regularly for restless leg syndrome and insomnia.  Double checked with committee that it was ok for patient to be on clonazepam 0.5 mg and ropinirole 0.5 mg for the conditions.  There is no concern at this time and agree with clearance from a neurology perspective.  Patient had papillary thyroid cancer s/p thyroidectomy in 2011. Follows with [facility]. Peter Potts at [facility], regularly.  Has had no issues with reoccurrence or concerns since treatment, patient has been cleared by endocrinology.  Patient has seen bleeding and clotting and hematology to assess for an anti-coagulation plan since patient has history of clots and was on eliquis. Providers determined that he remains a candidate for VTE prophylaxis in high risk situations (surgeries, hospitalizations, trauma, prolonged travel, and indwelling catheters). Should he have another transplant, he will also need extended posttransplant prophylaxis.   Per patient has completed all health maintenance items, RNCC is waiting for updated dermatology notes for clearance.    Once dermatology notes are received, patient can be approved for active listing on the kidney transplant wait list.    Patient will be called and summary letter will be sent.

## 2025-01-17 DIAGNOSIS — Z94.0 HTN, KIDNEY TRANSPLANT RELATED: Primary | ICD-10-CM

## 2025-01-17 DIAGNOSIS — I15.1 HTN, KIDNEY TRANSPLANT RELATED: Primary | ICD-10-CM

## 2025-01-17 NOTE — TELEPHONE ENCOUNTER
Medication Refill  Route to [facility]    Pharmacy Name:   [id number] - [place], [place] - [address] AT [facility] [address] Phone: [phone]   Fax: [phone]          Name of Medication: amLODIPine (NORVASC) 5 MG tablet    Quantity / Dose: 90 / 5MG

## 2025-01-20 RX ORDER — AMLODIPINE BESYLATE 5 MG/1
5 TABLET ORAL DAILY
Qty: 90 TABLET | Refills: 3 | Status: SHIPPED | OUTPATIENT
Start: 2025-01-20

## 2025-03-25 ENCOUNTER — TELEPHONE (OUTPATIENT)
Dept: TRANSPLANT | Facility: CLINIC | Age: 60
End: 2025-03-25
Payer: COMMERCIAL

## 2025-03-25 DIAGNOSIS — Z94.0 HTN, KIDNEY TRANSPLANT RELATED: Primary | ICD-10-CM

## 2025-03-25 DIAGNOSIS — I15.1 HTN, KIDNEY TRANSPLANT RELATED: Primary | ICD-10-CM

## 2025-03-25 NOTE — TELEPHONE ENCOUNTER
Called Haider to check on his dermatology update. He is actually scheduled to see dermatology on 4/7/2025 at [facility] in [place]. Will request records after the visit.

## 2025-04-07 ENCOUNTER — TRANSFERRED RECORDS (OUTPATIENT)
Dept: HEALTH INFORMATION MANAGEMENT | Facility: CLINIC | Age: 60
End: 2025-04-07
Payer: COMMERCIAL

## 2025-05-29 ENCOUNTER — TELEPHONE (OUTPATIENT)
Dept: TRANSPLANT | Facility: CLINIC | Age: 60
End: 2025-05-29
Payer: COMMERCIAL

## 2025-05-29 DIAGNOSIS — I15.1 HTN, KIDNEY TRANSPLANT RELATED: Primary | ICD-10-CM

## 2025-05-29 DIAGNOSIS — Z94.0 HTN, KIDNEY TRANSPLANT RELATED: Primary | ICD-10-CM

## 2025-05-29 NOTE — TELEPHONE ENCOUNTER
Called Haider to check on a couple of things. He completed his dental at [facility] [phone] so will call for clearance. He is going to need his PRA updated. He does home hemo so he prefers to have the order sent to his nephrologist. Will send mailers out. He will let Geovanna know when the PRA is drawn and then he can be active.He is no longer on Eliquis.

## 2025-06-18 ENCOUNTER — TELEPHONE (OUTPATIENT)
Dept: TRANSPLANT | Facility: CLINIC | Age: 60
End: 2025-06-18
Payer: COMMERCIAL

## 2025-06-18 ENCOUNTER — DOCUMENTATION ONLY (OUTPATIENT)
Dept: TRANSPLANT | Facility: CLINIC | Age: 60
End: 2025-06-18
Payer: COMMERCIAL

## 2025-06-18 DIAGNOSIS — Z94.0 HTN, KIDNEY TRANSPLANT RELATED: Primary | ICD-10-CM

## 2025-06-18 DIAGNOSIS — I15.1 HTN, KIDNEY TRANSPLANT RELATED: Primary | ICD-10-CM

## 2025-06-18 NOTE — TELEPHONE ENCOUNTER
Called patient to inform them of status change to ACTIVE on the Kidney alone list today 25. Status change letter and PRA orders to be mailed. Patient is in agreement with listing ACTIVE status.      Discussed:   - Pt is eligible for  donor offers and pt can receive calls 24 hours a day, 7 days a week, and on call staff need to be able to reach pt or one of emergency contacts within 30 minutes or they might skip over to next recipient on list.   -Please make sure your phone is charged at all times and your voicemail is not full   -Your transplant on call coordinator will give you directions about when and where you will need to come to the hospital for transplant  -The transplant coordinator will call you to discuss your current health status, the offer, and plans to move forward.  Some organ offer calls will require you to come to the hospital immediately; some may not be as time sensitive.  It may be possible for you to come to the hospital and, for various reasons, the transplant could be cancelled.  This is often called a  dry run .  - Reviewed the right to accept or decline offer, without penalty  - Expected length of surgical procedure is about 4-6 hours, expected inpatient length of stay post transplant is 3-4 days, and potential to stay locally post transplant. Offered resources for lodging in the area  - Verified contact information as documented in Epic. Confirmed emergency contact information  -Instructed patient about importance of having PRAs drawn every 3 months via: (Mailers/[facility]). Encouraged them to set reminder in their preferred calendar to stay on top of their quarterly labs  -Reminded pt to stay up on health maintenance and to call with any updates on their health status, insurance or contact information.   -Reminded pt to contact coordinator prior to receiving any blood transfusions as it may change PRA level and make it more difficult to match potential donors    -Donor website  was provided     -Last PRA- pt to drawn this week      -Provided name and contact information for additional questions or concerns.  Pt expressed excellent understanding of all and was in good agreement with the plan.

## 2025-06-18 NOTE — TELEPHONE ENCOUNTER
Hepatitis C Donor Acceptance      -Called pt in regards to the possibility of accepting a  or living donor kidney that has known Hepatitis C infection (past or current).  Informed pt due to shortage of organs, the transplant team is always looking for safe alternatives to increase chances of transplant. One means of increasing chance of transplant is to accept an organ that may have been exposed to hepatitis C.  Even if the organ accepted has preliminary evidence of hepatitis C infection, there is a possibility that pt may not contract hepatitis C from it.  If hepatitis C is contracted, it is treatable.     -Reviewed that Hepatitis C (HCV) is an infection caused by the Hepatitis C virus. Hepatitis C may be spread from one person to another through contact with the blood of an infected person.  This virus can attack the liver and cause injury. If not treated, this virus can lead to liver injury.     -Reviewed that all  donor offer organs are checked for hepatitis C infection. A small number of organs test clearly positive for active hepatitis C infection (Approximately 5% nationally of  donors). A few organs have testing that can indicate the donor may have been infected with hepatitis C but it is not clear whether the organ is currently infected with the virus or may have been infected in the past.     -If the donor has been exposed to hepatitis C but DOES NOT have active virus in the bloodstream, it is very unlikely that pt will develop hepatitis C infection. If the donor DOES have active virus in the bloodstream, there is a very high likelihood (almost 100%) that hep C will develop. Regardless, pt will be monitored closely by your post transplant team for any signs of Hepatitis C infection and treated appropriately.     -Reviewed there are several medications available to treat hepatitis C, and the treatments are more effective and have fewer side effects than previous treatment options.  These medications are taken by mouth daily for 4 to 24 weeks.  When taken as prescribed, these medications have cure rates of 90% to 100% based on currently available data from large studies.  They are safe to take after transplant.     -Reviewed that if pt chooses not to accept a hepatitis C exposed donor, it will not affect their status on the transplant list.  Donor organs that are hepatitis C-exposed will not be offered to you. We will continue to consider non-hepatitis C -exposed donor offers for you. Pt care will not be negatively affected in any way.      -Answered patient questions regarding Hep C donor acceptance     -Pt would like to review consent and pt education and make decision at a later time.  Will send consent and pt education via [employer]. Pt is aware that consent will be required prior to listing change. Once signed consent is uploaded will update chart and UNOS appropriately

## 2025-07-02 ENCOUNTER — LAB (OUTPATIENT)
Dept: LAB | Facility: CLINIC | Age: 60
End: 2025-07-02
Payer: COMMERCIAL

## 2025-07-02 ENCOUNTER — DOCUMENTATION ONLY (OUTPATIENT)
Dept: TRANSPLANT | Facility: CLINIC | Age: 60
End: 2025-07-02
Payer: COMMERCIAL

## 2025-07-02 DIAGNOSIS — Z94.0 HTN, KIDNEY TRANSPLANT RELATED: Primary | ICD-10-CM

## 2025-07-02 DIAGNOSIS — Z94.0 HTN, KIDNEY TRANSPLANT RELATED: ICD-10-CM

## 2025-07-02 DIAGNOSIS — I15.1 HTN, KIDNEY TRANSPLANT RELATED: ICD-10-CM

## 2025-07-02 DIAGNOSIS — I15.1 HTN, KIDNEY TRANSPLANT RELATED: Primary | ICD-10-CM

## 2025-07-02 DIAGNOSIS — Z01.818 PRE-TRANSPLANT EVALUATION FOR KIDNEY TRANSPLANT: ICD-10-CM

## 2025-07-02 DIAGNOSIS — Z76.82 ORGAN TRANSPLANT CANDIDATE: ICD-10-CM

## 2025-07-02 DIAGNOSIS — N18.6 END STAGE RENAL DISEASE (H): ICD-10-CM

## 2025-07-14 ENCOUNTER — MYC MEDICAL ADVICE (OUTPATIENT)
Dept: HEMATOLOGY | Facility: CLINIC | Age: 60
End: 2025-07-14
Payer: COMMERCIAL

## 2025-07-19 ENCOUNTER — HEALTH MAINTENANCE LETTER (OUTPATIENT)
Age: 60
End: 2025-07-19

## 2025-07-23 NOTE — TELEPHONE ENCOUNTER
[id number]  Haider Torrez  60 year old male  CBCD Diagnosis: Recurrent RIJ thrombosis   CBCD Provider: Lindsay Ortega PA-C    RN attempted to call Haider PERERA: prescription refill request. He did not answer and RN was unable to leave a voicemail.    America Garibay  MSN, RN, PHN  [facility]  Office: [phone]  Fax: [phone]

## 2025-07-24 ENCOUNTER — TELEPHONE (OUTPATIENT)
Dept: TRANSPLANT | Facility: CLINIC | Age: 60
End: 2025-07-24
Payer: COMMERCIAL

## 2025-07-24 DIAGNOSIS — I15.1 HTN, KIDNEY TRANSPLANT RELATED: Primary | ICD-10-CM

## 2025-07-24 DIAGNOSIS — Z94.0 HTN, KIDNEY TRANSPLANT RELATED: Primary | ICD-10-CM

## 2025-07-24 RX ORDER — DARBEPOETIN ALFA 40 UG/.4ML
40 INJECTION, SOLUTION INTRAVENOUS; SUBCUTANEOUS
COMMUNITY
Start: 2025-07-02

## 2025-07-24 RX ORDER — ASCORBIC ACID, THIAMINE, RIBOFLAVIN, NIACINAMIDE, PYRIDOXINE, FOLIC ACID, COBALAMIN, BIOTIN, PANTOTHENIC ACID 100; 1.5; 1.7; 20; 10; 1; 6; 300; 1 MG/1; MG/1; MG/1; MG/1; MG/1; MG/1; UG/1; UG/1; MG/1
1 TABLET, COATED ORAL
COMMUNITY
Start: 2024-11-27

## 2025-07-24 RX ORDER — CALCIUM ACETATE 667 MG/1
667 CAPSULE ORAL
COMMUNITY
Start: 2025-04-22

## 2025-07-24 NOTE — TELEPHONE ENCOUNTER
ISSUE:  RE: 1 year post dialysis initiation review  ----- Message -----  From: Meagan Richardson, MARIBEL  Sent: 7/24/2025   9:13 AM CDT  To: Nicola Lyon MD  Subject: 1 year post dialysis initiation review          Hi Dr. Lyon    Haider's second kidney transplant failed 13 months ago, June 2024, and he has been on dialysis (at some point I believe he switched from HD to PD). Follows with Dr. Butler in [place] so I haven't followed him that closely. (Pre-txp committee review January 2025 notes with more medical hx.)    Active on kidney waitlist so I presume that we will leave the current immunosuppression plan as is? He is taking Tacrolimus 2 mg PO BID (goal 3-5), not checked recently-Jan 2025), prednisone 5 mg daily, mycophenolic acid 360 mg PO BID (CMV colitis hx). I think Dr. Butler took over management of tacrolimus because the last few levels, found in Care Everywhere were collected under him, but orders still under us...    Looks like on Bactrim still but I will have to confirm. Would you like to check CD4 count and stop if greater than 200? We also haven't checked CMV PCR lately but he hasn't called to complain of any symptoms.    PLAN:   Received: Today  Nicola Lyon MD [place], Meagan Mendes, MARIBEL  I would probably taper of mycophenolic acid by decreasing to 180 mg bid and then in 3-6 months (depending on urine output) would stop it.  You could touch base with the Nephrologist to make sure he is okay with that plan.  Would continue on tacrolimus and prednisone (his next kidney would be his 3rd, so would be on prednisone with that one anyway).    Yes, would check CD4 and try to stop Bactrim.    Kurt     OUTCOME:   RNCC spoke with Haider. Confirms on Peritoneal Dialysis now. He reports that he has been off mycophenolic acid for quite some time- many months- maybe a year now. Will remove this from his med list. He reports he makes very little urine. He still takes prednisone 5 mg  daily and tacrolimus 2 mg PO BID. We will get tacrolimus level updated. He no longer has Bactrim on his med list so he believes he is off this prophylactic antibiotic as well. We'll get the CD4 count updated. Orders sent to [facility].    Meagan Richardson, RN, BSN, CCTN  Solid Organ Transplant, Post Kidney and Pancreas  Transplant Care Coordinator  [phone]

## 2025-07-24 NOTE — LETTER
PHYSICIAN ORDERS      DATE & TIME ISSUED: 2025 4:19 PM  PATIENT NAME: Haider Torrez   : 1965     [facility] MR# [if applicable]: [id number]     DIAGNOSIS/ICD-10 CODES: Kidney Transplanted (Z94.0);   Long Term Use of High Risk Med (Z79.899);   Need for Pneumocystis Prophylaxis (Z29.8)    Please check the following labs in 1-2 weeks:  - Tacrolimus drug level (12 hour trough level)  - T cell subset for absolute CD4 count    Any questions please call: [phone]    Please fax each result same day as resulted/available to [phone].  Critical lab results page [phone]      .

## 2025-09-01 DIAGNOSIS — Z76.82 AWAITING ORGAN TRANSPLANT: ICD-10-CM

## 2025-09-01 DIAGNOSIS — I15.1 HTN, KIDNEY TRANSPLANT RELATED: Primary | ICD-10-CM

## 2025-09-01 DIAGNOSIS — Z94.0 HTN, KIDNEY TRANSPLANT RELATED: Primary | ICD-10-CM

## (undated) RX ORDER — SIMETHICONE 40MG/0.6ML
SUSPENSION, DROPS(FINAL DOSAGE FORM)(ML) ORAL
Status: DISPENSED
Start: 2023-10-05

## (undated) RX ORDER — FENTANYL CITRATE 50 UG/ML
INJECTION, SOLUTION INTRAMUSCULAR; INTRAVENOUS
Status: DISPENSED
Start: 2023-10-05